# Patient Record
Sex: FEMALE | Race: WHITE | Employment: OTHER | URBAN - METROPOLITAN AREA
[De-identification: names, ages, dates, MRNs, and addresses within clinical notes are randomized per-mention and may not be internally consistent; named-entity substitution may affect disease eponyms.]

---

## 2017-01-04 ENCOUNTER — HOSPITAL ENCOUNTER (OUTPATIENT)
Dept: ULTRASOUND IMAGING | Age: 67
Discharge: HOME | End: 2017-01-04
Attending: FAMILY MEDICINE | Admitting: FAMILY MEDICINE

## 2017-01-04 DIAGNOSIS — K21.9 GASTRO-ESOPHAGEAL REFLUX DISEASE WITHOUT ESOPHAGITIS: ICD-10-CM

## 2017-01-04 DIAGNOSIS — E04.1 THYROID NODULE, COLD: ICD-10-CM

## 2017-01-12 DIAGNOSIS — F32.A ANXIETY AND DEPRESSION: ICD-10-CM

## 2017-01-12 DIAGNOSIS — F41.9 ANXIETY AND DEPRESSION: ICD-10-CM

## 2017-01-12 RX ORDER — LORAZEPAM 1 MG/1
1 TABLET ORAL 2 TIMES DAILY
Qty: 60 TABLET | Refills: 0 | Status: SHIPPED | OUTPATIENT
Start: 2017-01-12 | End: 2017-03-09 | Stop reason: SDUPTHER

## 2017-02-02 DIAGNOSIS — F32.A ANXIETY AND DEPRESSION: ICD-10-CM

## 2017-02-02 DIAGNOSIS — F41.9 ANXIETY AND DEPRESSION: ICD-10-CM

## 2017-02-02 RX ORDER — VENLAFAXINE HYDROCHLORIDE 150 MG/1
CAPSULE, EXTENDED RELEASE ORAL
Qty: 90 CAPSULE | Refills: 0 | Status: SHIPPED | OUTPATIENT
Start: 2017-02-02

## 2017-02-02 RX ORDER — VENLAFAXINE HYDROCHLORIDE 75 MG/1
CAPSULE, EXTENDED RELEASE ORAL
Qty: 90 CAPSULE | Refills: 0 | Status: ON HOLD | OUTPATIENT
Start: 2017-02-02 | End: 2019-06-25 | Stop reason: HOSPADM

## 2017-03-09 DIAGNOSIS — F32.A ANXIETY AND DEPRESSION: ICD-10-CM

## 2017-03-09 DIAGNOSIS — F41.9 ANXIETY AND DEPRESSION: ICD-10-CM

## 2017-03-10 RX ORDER — LORAZEPAM 1 MG/1
1 TABLET ORAL 2 TIMES DAILY
Qty: 60 TABLET | Refills: 0 | Status: ON HOLD | OUTPATIENT
Start: 2017-03-10 | End: 2018-11-13

## 2017-06-07 ENCOUNTER — TELEPHONE (OUTPATIENT)
Dept: FAMILY MEDICINE CLINIC | Age: 67
End: 2017-06-07

## 2017-09-12 ENCOUNTER — TELEPHONE (OUTPATIENT)
Dept: CARDIOLOGY CLINIC | Age: 67
End: 2017-09-12

## 2017-11-06 ENCOUNTER — TELEPHONE (OUTPATIENT)
Dept: PHARMACY | Facility: CLINIC | Age: 67
End: 2017-11-06

## 2017-11-07 RX ORDER — DICYCLOMINE HYDROCHLORIDE 10 MG/1
10 CAPSULE ORAL
COMMUNITY
End: 2018-11-07

## 2017-11-07 RX ORDER — NYSTATIN 100000 U/G
CREAM TOPICAL 2 TIMES DAILY
Status: ON HOLD | COMMUNITY
End: 2019-06-26 | Stop reason: HOSPADM

## 2017-11-07 NOTE — TELEPHONE ENCOUNTER
Attempted to reach patient for transitions of care follow-up after discharge from [facility] on 11/5/17. Tried to leave voicemail for patient to return phone call, but home number has a voicemail box that has not been set up yet and the work number is no longer in service. Will attempt again tomorrow.      Crystal Lambert, Rj   [address] [place] [address]  [phone] or [phone] (Option 7)    For Pharmacy Admin Tracking Only    TCM Call Made?: Yes
Made second attempt to reach patient for transitions of care follow-up after discharge from [facility] on 11/5/17. The patient picked up initially and told me she had only picked up some of her medications from the pharmacy. She said she would  the rest after she saw her PCP tomorrow. When I tried to clarify which ones she had picked up, she told me she would need me to call back in 10 minutes. Called back and was sent to voicemail box that has not been set up. Will send letter to patient today and sign off for now.       Fannie Dawson, PharmD  [address] [facility] Pharmacist  [phone] or [phone] (Option 7)
Interaction Analysis as category D or higher.    - Renal Dosing: No renal adjustments necessary.    - Follow up appointment date (7 days for more severe illness, 14 days for others):    · Patient was reminded of upcoming appointment on 11/8/17 with PCP    I was unable to route note to PCP regarding prescriptions that have yet to be picked up due to cost.  Patient states she will bring up during appointment    Thank you,    Princess Deshpande, PharmD  [address]  Phone: [phone]    CLINICAL PHARMACY NOTE   POST-DISCHARGE [address] Only    TCM Call Made?: Yes  [facility] ([facility] Select Patient?: Yes  Total # of Interventions Recommended: 1 -   - New Order #: 2  Total # Interventions Accepted: 1  Intervention Severity:   - Level 1 Intervention Present?: No   - Level 2 #: 0   - Level 3 #: 6  Outreach Status: Review Complete  Care Coordinator Outreach to Patient?: No  Provider Contacted?: No  Time Spent (min): 45    Additional Documentation:

## 2017-11-17 ENCOUNTER — TELEPHONE (OUTPATIENT)
Dept: CARDIOLOGY CLINIC | Age: 67
End: 2017-11-17

## 2018-09-10 ENCOUNTER — HOSPITAL ENCOUNTER (OUTPATIENT)
Dept: ULTRASOUND IMAGING | Age: 68
Discharge: HOME | End: 2018-09-10
Attending: INTERNAL MEDICINE | Admitting: INTERNAL MEDICINE

## 2018-09-10 DIAGNOSIS — I15.1 HYPERTENSION SECONDARY TO RENAL DISEASE, WITH DELIVERY: ICD-10-CM

## 2018-09-10 DIAGNOSIS — R60.0 LOCALIZED EDEMA: ICD-10-CM

## 2018-09-10 DIAGNOSIS — N18.2 CHRONIC KIDNEY DISEASE, STAGE II (MILD): ICD-10-CM

## 2018-09-10 DIAGNOSIS — I15.1 HYPERTENSION SECONDARY TO OTHER RENAL DISORDERS (CODE): ICD-10-CM

## 2018-09-12 ENCOUNTER — HOSPITAL ENCOUNTER (OUTPATIENT)
Dept: OTHER | Age: 68
Discharge: HOME | End: 2018-09-12
Attending: INTERNAL MEDICINE | Admitting: INTERNAL MEDICINE

## 2018-09-12 LAB
ALBUMIN SERPL-MCNC: 3.8 GM/DL (ref 3.4–5)
ALP BLD-CCNC: 139 IU/L (ref 40–129)
ALT SERPL-CCNC: 27 U/L (ref 10–40)
ANION GAP SERPL CALCULATED.3IONS-SCNC: 9 MMOL/L (ref 4–16)
AST SERPL-CCNC: 24 IU/L (ref 15–37)
BACTERIA: ABNORMAL /HPF
BACTERIA: NORMAL /HPF
BILIRUB SERPL-MCNC: 0.4 MG/DL (ref 0–1)
BILIRUBIN URINE: NEGATIVE MG/DL
BLOOD, URINE: ABNORMAL
BUN BLDV-MCNC: 24 MG/DL (ref 6–23)
CALCIUM SERPL-MCNC: 9.7 MG/DL (ref 8.3–10.6)
CAST TYPE: ABNORMAL /HPF
CAST TYPE: NORMAL /HPF
CHLORIDE BLD-SCNC: 94 MMOL/L (ref 99–110)
CLARITY: CLEAR
CO2: 33 MMOL/L (ref 21–32)
COLOR: YELLOW
COMMENT UA: NORMAL
CREAT SERPL-MCNC: 0.8 MG/DL (ref 0.6–1.1)
CRYSTAL TYPE: ABNORMAL /HPF
CRYSTAL TYPE: NORMAL /HPF
EPITHELIAL CELLS, UA: ABNORMAL /HPF
EPITHELIAL CELLS, UA: NORMAL /HPF
GFR AFRICAN AMERICAN: >60 ML/MIN/1.73M2
GFR NON-AFRICAN AMERICAN: >60 ML/MIN/1.73M2
GLUCOSE FASTING: 491 MG/DL (ref 70–99)
GLUCOSE, URINE: >1000 MG/DL
KETONES, URINE: NEGATIVE MG/DL
LEUKOCYTE ESTERASE, URINE: NEGATIVE
Lab: 24 HRS
MUCUS: NEGATIVE HPF
NITRITE URINE, QUANTITATIVE: NEGATIVE
PH, URINE: 6 (ref 5–8)
POTASSIUM SERPL-SCNC: 5.5 MMOL/L (ref 3.5–5.1)
PROTEIN 24 HOUR URINE: 1060 MG/24 HR
PROTEIN UA: 100 MG/DL
RBC URINE: ABNORMAL /HPF (ref 0–6)
RBC URINE: NORMAL /HPF (ref 0–6)
SODIUM BLD-SCNC: 136 MMOL/L (ref 135–145)
SPECIFIC GRAVITY UA: 1.02 (ref 1–1.03)
TOTAL PROTEIN: 7.3 GM/DL (ref 6.4–8.2)
UROBILINOGEN, URINE: 1 MG/DL (ref 0.2–1)
VOLUME, (UVOL): 12 ML (ref 10–12)
VOLUME, (UVOL): 1800 MLS
WBC UA: ABNORMAL /HPF (ref 0–5)
WBC UA: NORMAL /HPF (ref 0–5)

## 2018-09-13 LAB
ALBUMIN, U: 74 %
ALPHA-1-GLOBULIN, U: 6 %
ALPHA-2-GLOBULIN, U: 3 %
BETA GLOBULIN, U: 11 %
GAMMA GLOBULIN, U: 5 %
Lab: 24 HRS
PROTEIN 24 HOUR URINE: 1060 MG/24 HR
PROTEIN ELP 24 HOUR URINE: 1060 MG/24 HR
VOLUME, (UVOL): 1800 MLS

## 2018-11-07 ENCOUNTER — HOSPITAL ENCOUNTER (INPATIENT)
Age: 68
LOS: 1 days | Discharge: ANOTHER HOSPITAL | DRG: 292 | End: 2018-11-08
Attending: EMERGENCY MEDICINE | Admitting: FAMILY MEDICINE
Payer: MEDICARE

## 2018-11-07 ENCOUNTER — APPOINTMENT (OUTPATIENT)
Dept: GENERAL RADIOLOGY | Age: 68
DRG: 292 | End: 2018-11-07
Payer: MEDICARE

## 2018-11-07 DIAGNOSIS — R73.9 HYPERGLYCEMIA: ICD-10-CM

## 2018-11-07 DIAGNOSIS — I50.9 CONGESTIVE HEART FAILURE, UNSPECIFIED HF CHRONICITY, UNSPECIFIED HEART FAILURE TYPE (HCC): Primary | ICD-10-CM

## 2018-11-07 DIAGNOSIS — R09.02 HYPOXIA: ICD-10-CM

## 2018-11-07 DIAGNOSIS — R06.03 ACUTE RESPIRATORY DISTRESS: ICD-10-CM

## 2018-11-07 PROBLEM — E11.9 TYPE 2 DIABETES MELLITUS (HCC): Status: ACTIVE | Noted: 2018-11-07

## 2018-11-07 PROBLEM — I50.33 CHF (CONGESTIVE HEART FAILURE), NYHA CLASS I, ACUTE ON CHRONIC, DIASTOLIC (HCC): Status: ACTIVE | Noted: 2018-11-07

## 2018-11-07 PROBLEM — I10 HYPERTENSION: Status: ACTIVE | Noted: 2018-11-07

## 2018-11-07 PROBLEM — F32.A DEPRESSION: Status: ACTIVE | Noted: 2018-11-07

## 2018-11-07 PROBLEM — E03.9 HYPOTHYROIDISM: Status: ACTIVE | Noted: 2018-11-07

## 2018-11-07 LAB
ANION GAP SERPL CALCULATED.3IONS-SCNC: 17 MMOL/L (ref 4–16)
BASOPHILS ABSOLUTE: 0 K/CU MM
BASOPHILS RELATIVE PERCENT: 0.4 % (ref 0–1)
BUN BLDV-MCNC: 22 MG/DL (ref 6–23)
C-REACTIVE PROTEIN, HIGH SENSITIVITY: 40.4 MG/L
CALCIUM SERPL-MCNC: 9.8 MG/DL (ref 8.3–10.6)
CHLORIDE BLD-SCNC: 97 MMOL/L (ref 99–110)
CO2: 21 MMOL/L (ref 21–32)
CREAT SERPL-MCNC: 0.9 MG/DL (ref 0.6–1.1)
DIFFERENTIAL TYPE: ABNORMAL
EOSINOPHILS ABSOLUTE: 0.1 K/CU MM
EOSINOPHILS RELATIVE PERCENT: 0.9 % (ref 0–3)
GFR AFRICAN AMERICAN: >60 ML/MIN/1.73M2
GFR NON-AFRICAN AMERICAN: >60 ML/MIN/1.73M2
GLUCOSE BLD-MCNC: 575 MG/DL (ref 70–99)
HCT VFR BLD CALC: 32 % (ref 37–47)
HEMOGLOBIN: 9.8 GM/DL (ref 12.5–16)
IMMATURE NEUTROPHIL %: 0.5 % (ref 0–0.43)
LYMPHOCYTES ABSOLUTE: 1.3 K/CU MM
LYMPHOCYTES RELATIVE PERCENT: 16.3 % (ref 24–44)
MCH RBC QN AUTO: 27.8 PG (ref 27–31)
MCHC RBC AUTO-ENTMCNC: 30.6 % (ref 32–36)
MCV RBC AUTO: 90.7 FL (ref 78–100)
MONOCYTES ABSOLUTE: 0.6 K/CU MM
MONOCYTES RELATIVE PERCENT: 8 % (ref 0–4)
PDW BLD-RTO: 15.1 % (ref 11.7–14.9)
PLATELET # BLD: 154 K/CU MM (ref 140–440)
PMV BLD AUTO: 10.4 FL (ref 7.5–11.1)
POTASSIUM SERPL-SCNC: 5 MMOL/L (ref 3.5–5.1)
PRO-BNP: 1035 PG/ML
RBC # BLD: 3.53 M/CU MM (ref 4.2–5.4)
SEGMENTED NEUTROPHILS ABSOLUTE COUNT: 5.8 K/CU MM
SEGMENTED NEUTROPHILS RELATIVE PERCENT: 73.9 % (ref 36–66)
SODIUM BLD-SCNC: 135 MMOL/L (ref 135–145)
TOTAL IMMATURE NEUTOROPHIL: 0.04 K/CU MM
TROPONIN T: 0.01 NG/ML
WBC # BLD: 7.8 K/CU MM (ref 4–10.5)

## 2018-11-07 PROCEDURE — 86140 C-REACTIVE PROTEIN: CPT

## 2018-11-07 PROCEDURE — 71045 X-RAY EXAM CHEST 1 VIEW: CPT

## 2018-11-07 PROCEDURE — 80048 BASIC METABOLIC PNL TOTAL CA: CPT

## 2018-11-07 PROCEDURE — 93005 ELECTROCARDIOGRAM TRACING: CPT | Performed by: FAMILY MEDICINE

## 2018-11-07 PROCEDURE — 94640 AIRWAY INHALATION TREATMENT: CPT

## 2018-11-07 PROCEDURE — 6370000000 HC RX 637 (ALT 250 FOR IP): Performed by: EMERGENCY MEDICINE

## 2018-11-07 PROCEDURE — 96372 THER/PROPH/DIAG INJ SC/IM: CPT

## 2018-11-07 PROCEDURE — 6360000002 HC RX W HCPCS: Performed by: EMERGENCY MEDICINE

## 2018-11-07 PROCEDURE — 84484 ASSAY OF TROPONIN QUANT: CPT

## 2018-11-07 PROCEDURE — 85025 COMPLETE CBC W/AUTO DIFF WBC: CPT

## 2018-11-07 PROCEDURE — 81001 URINALYSIS AUTO W/SCOPE: CPT

## 2018-11-07 PROCEDURE — 96374 THER/PROPH/DIAG INJ IV PUSH: CPT

## 2018-11-07 PROCEDURE — 6370000000 HC RX 637 (ALT 250 FOR IP): Performed by: NURSE PRACTITIONER

## 2018-11-07 PROCEDURE — 2140000000 HC CCU INTERMEDIATE R&B

## 2018-11-07 PROCEDURE — 83036 HEMOGLOBIN GLYCOSYLATED A1C: CPT

## 2018-11-07 PROCEDURE — 83880 ASSAY OF NATRIURETIC PEPTIDE: CPT

## 2018-11-07 PROCEDURE — 99291 CRITICAL CARE FIRST HOUR: CPT

## 2018-11-07 RX ORDER — NYSTATIN 100000 U/G
CREAM TOPICAL 2 TIMES DAILY
Status: DISCONTINUED | OUTPATIENT
Start: 2018-11-07 | End: 2018-11-08 | Stop reason: HOSPADM

## 2018-11-07 RX ORDER — FUROSEMIDE 10 MG/ML
40 INJECTION INTRAMUSCULAR; INTRAVENOUS ONCE
Status: COMPLETED | OUTPATIENT
Start: 2018-11-07 | End: 2018-11-07

## 2018-11-07 RX ORDER — PANTOPRAZOLE SODIUM 40 MG/1
40 TABLET, DELAYED RELEASE ORAL DAILY
Status: DISCONTINUED | OUTPATIENT
Start: 2018-11-08 | End: 2018-11-08 | Stop reason: HOSPADM

## 2018-11-07 RX ORDER — POTASSIUM CHLORIDE 750 MG/1
10 TABLET, EXTENDED RELEASE ORAL EVERY OTHER DAY
Status: DISCONTINUED | OUTPATIENT
Start: 2018-11-08 | End: 2018-11-08 | Stop reason: HOSPADM

## 2018-11-07 RX ORDER — BUSPIRONE HYDROCHLORIDE 15 MG/1
15 TABLET ORAL 3 TIMES DAILY
Status: DISCONTINUED | OUTPATIENT
Start: 2018-11-07 | End: 2018-11-08 | Stop reason: HOSPADM

## 2018-11-07 RX ORDER — DEXTROSE MONOHYDRATE 25 G/50ML
12.5 INJECTION, SOLUTION INTRAVENOUS PRN
Status: DISCONTINUED | OUTPATIENT
Start: 2018-11-07 | End: 2018-11-08 | Stop reason: SDUPTHER

## 2018-11-07 RX ORDER — LEVOTHYROXINE SODIUM 0.03 MG/1
25 TABLET ORAL DAILY
Status: DISCONTINUED | OUTPATIENT
Start: 2018-11-08 | End: 2018-11-08 | Stop reason: HOSPADM

## 2018-11-07 RX ORDER — LOSARTAN POTASSIUM 25 MG/1
12.5 TABLET ORAL DAILY
Status: DISCONTINUED | OUTPATIENT
Start: 2018-11-08 | End: 2018-11-08 | Stop reason: HOSPADM

## 2018-11-07 RX ORDER — IPRATROPIUM BROMIDE AND ALBUTEROL SULFATE 2.5; .5 MG/3ML; MG/3ML
SOLUTION RESPIRATORY (INHALATION)
Status: DISPENSED
Start: 2018-11-07 | End: 2018-11-08

## 2018-11-07 RX ORDER — TOLTERODINE TARTRATE 2 MG/1
2 TABLET, EXTENDED RELEASE ORAL DAILY
COMMUNITY

## 2018-11-07 RX ORDER — ASPIRIN 81 MG/1
81 TABLET, CHEWABLE ORAL DAILY
Status: DISCONTINUED | OUTPATIENT
Start: 2018-11-08 | End: 2018-11-08 | Stop reason: HOSPADM

## 2018-11-07 RX ORDER — SODIUM CHLORIDE 0.9 % (FLUSH) 0.9 %
10 SYRINGE (ML) INJECTION PRN
Status: DISCONTINUED | OUTPATIENT
Start: 2018-11-07 | End: 2018-11-08 | Stop reason: HOSPADM

## 2018-11-07 RX ORDER — SODIUM CHLORIDE 0.9 % (FLUSH) 0.9 %
10 SYRINGE (ML) INJECTION EVERY 12 HOURS SCHEDULED
Status: DISCONTINUED | OUTPATIENT
Start: 2018-11-07 | End: 2018-11-08 | Stop reason: HOSPADM

## 2018-11-07 RX ORDER — NICOTINE POLACRILEX 4 MG
15 LOZENGE BUCCAL PRN
Status: DISCONTINUED | OUTPATIENT
Start: 2018-11-07 | End: 2018-11-08 | Stop reason: SDUPTHER

## 2018-11-07 RX ORDER — FERROUS SULFATE 325(65) MG
325 TABLET ORAL 2 TIMES DAILY WITH MEALS
Status: DISCONTINUED | OUTPATIENT
Start: 2018-11-08 | End: 2018-11-08 | Stop reason: HOSPADM

## 2018-11-07 RX ORDER — LORAZEPAM 1 MG/1
1 TABLET ORAL 2 TIMES DAILY
Status: DISCONTINUED | OUTPATIENT
Start: 2018-11-07 | End: 2018-11-08 | Stop reason: HOSPADM

## 2018-11-07 RX ORDER — FUROSEMIDE 10 MG/ML
60 INJECTION INTRAMUSCULAR; INTRAVENOUS DAILY
Status: DISCONTINUED | OUTPATIENT
Start: 2018-11-08 | End: 2018-11-08

## 2018-11-07 RX ORDER — CYCLOBENZAPRINE HCL 10 MG
10 TABLET ORAL 2 TIMES DAILY PRN
Status: DISCONTINUED | OUTPATIENT
Start: 2018-11-07 | End: 2018-11-08 | Stop reason: HOSPADM

## 2018-11-07 RX ORDER — LISINOPRIL 5 MG/1
5 TABLET ORAL DAILY
Status: DISCONTINUED | OUTPATIENT
Start: 2018-11-08 | End: 2018-11-08 | Stop reason: HOSPADM

## 2018-11-07 RX ORDER — DICYCLOMINE HYDROCHLORIDE 10 MG/1
10 CAPSULE ORAL
Status: DISCONTINUED | OUTPATIENT
Start: 2018-11-08 | End: 2018-11-08 | Stop reason: HOSPADM

## 2018-11-07 RX ORDER — SIMVASTATIN 20 MG
20 TABLET ORAL NIGHTLY
Status: DISCONTINUED | OUTPATIENT
Start: 2018-11-07 | End: 2018-11-08 | Stop reason: HOSPADM

## 2018-11-07 RX ORDER — VENLAFAXINE HYDROCHLORIDE 37.5 MG/1
75 CAPSULE, EXTENDED RELEASE ORAL
Status: DISCONTINUED | OUTPATIENT
Start: 2018-11-08 | End: 2018-11-08 | Stop reason: HOSPADM

## 2018-11-07 RX ORDER — TROSPIUM CHLORIDE 20 MG/1
20 TABLET, FILM COATED ORAL
Status: DISCONTINUED | OUTPATIENT
Start: 2018-11-08 | End: 2018-11-08 | Stop reason: HOSPADM

## 2018-11-07 RX ORDER — NITROGLYCERIN 0.4 MG/1
0.4 TABLET SUBLINGUAL EVERY 5 MIN PRN
Status: DISCONTINUED | OUTPATIENT
Start: 2018-11-07 | End: 2018-11-08 | Stop reason: HOSPADM

## 2018-11-07 RX ORDER — IBUPROFEN 600 MG/1
600 TABLET ORAL
Status: DISCONTINUED | OUTPATIENT
Start: 2018-11-08 | End: 2018-11-08 | Stop reason: HOSPADM

## 2018-11-07 RX ORDER — SULINDAC 150 MG/1
150 TABLET ORAL 2 TIMES DAILY
COMMUNITY
End: 2019-01-01

## 2018-11-07 RX ORDER — GABAPENTIN 400 MG/1
800 CAPSULE ORAL 4 TIMES DAILY
Status: DISCONTINUED | OUTPATIENT
Start: 2018-11-07 | End: 2018-11-08 | Stop reason: HOSPADM

## 2018-11-07 RX ORDER — GLIPIZIDE 10 MG/1
10 TABLET ORAL DAILY
Status: DISCONTINUED | OUTPATIENT
Start: 2018-11-08 | End: 2018-11-08 | Stop reason: HOSPADM

## 2018-11-07 RX ORDER — OLMESARTAN MEDOXOMIL 5 MG/1
5 TABLET ORAL DAILY
Status: ON HOLD | COMMUNITY
End: 2018-11-13 | Stop reason: HOSPADM

## 2018-11-07 RX ORDER — DEXTROSE MONOHYDRATE 50 MG/ML
100 INJECTION, SOLUTION INTRAVENOUS PRN
Status: DISCONTINUED | OUTPATIENT
Start: 2018-11-07 | End: 2018-11-08 | Stop reason: SDUPTHER

## 2018-11-07 RX ORDER — ONDANSETRON 2 MG/ML
4 INJECTION INTRAMUSCULAR; INTRAVENOUS EVERY 6 HOURS PRN
Status: DISCONTINUED | OUTPATIENT
Start: 2018-11-07 | End: 2018-11-08 | Stop reason: HOSPADM

## 2018-11-07 RX ORDER — IPRATROPIUM BROMIDE AND ALBUTEROL SULFATE 2.5; .5 MG/3ML; MG/3ML
1 SOLUTION RESPIRATORY (INHALATION)
Status: DISCONTINUED | OUTPATIENT
Start: 2018-11-08 | End: 2018-11-08 | Stop reason: HOSPADM

## 2018-11-07 RX ADMIN — LORAZEPAM 1 MG: 1 TABLET ORAL at 23:59

## 2018-11-07 RX ADMIN — FUROSEMIDE 40 MG: 10 INJECTION, SOLUTION INTRAMUSCULAR; INTRAVENOUS at 20:54

## 2018-11-07 RX ADMIN — INSULIN HUMAN 10 UNITS: 100 INJECTION, SOLUTION PARENTERAL at 22:15

## 2018-11-07 RX ADMIN — SIMVASTATIN 20 MG: 20 TABLET, FILM COATED ORAL at 23:59

## 2018-11-07 RX ADMIN — BUSPIRONE HYDROCHLORIDE 15 MG: 15 TABLET ORAL at 23:59

## 2018-11-07 RX ADMIN — CYCLOBENZAPRINE HYDROCHLORIDE 10 MG: 10 TABLET, FILM COATED ORAL at 23:59

## 2018-11-07 RX ADMIN — GABAPENTIN 800 MG: 400 CAPSULE ORAL at 23:59

## 2018-11-07 ASSESSMENT — ENCOUNTER SYMPTOMS
SORE THROAT: 0
GASTROINTESTINAL NEGATIVE: 1
COUGH: 1
SPUTUM PRODUCTION: 0
WHEEZING: 0
SHORTNESS OF BREATH: 1
VOMITING: 0
HEMOPTYSIS: 0
EYES NEGATIVE: 1

## 2018-11-07 ASSESSMENT — PAIN DESCRIPTION - PAIN TYPE: TYPE: CHRONIC PAIN

## 2018-11-07 ASSESSMENT — PAIN DESCRIPTION - ORIENTATION: ORIENTATION: LOWER

## 2018-11-07 ASSESSMENT — PAIN DESCRIPTION - DESCRIPTORS: DESCRIPTORS: ACHING

## 2018-11-07 ASSESSMENT — PAIN DESCRIPTION - FREQUENCY: FREQUENCY: CONTINUOUS

## 2018-11-07 ASSESSMENT — PAIN DESCRIPTION - PROGRESSION: CLINICAL_PROGRESSION: GRADUALLY WORSENING

## 2018-11-07 ASSESSMENT — PAIN DESCRIPTION - LOCATION: LOCATION: BACK

## 2018-11-07 ASSESSMENT — PAIN SCALES - GENERAL: PAINLEVEL_OUTOF10: 8

## 2018-11-07 ASSESSMENT — PAIN DESCRIPTION - ONSET: ONSET: ON-GOING

## 2018-11-08 ENCOUNTER — HOSPITAL ENCOUNTER (INPATIENT)
Age: 68
LOS: 5 days | Discharge: HOME HEALTH | DRG: 280 | End: 2018-11-13
Attending: INTERNAL MEDICINE | Admitting: INTERNAL MEDICINE
Payer: MEDICARE

## 2018-11-08 VITALS
SYSTOLIC BLOOD PRESSURE: 115 MMHG | DIASTOLIC BLOOD PRESSURE: 54 MMHG | HEIGHT: 60 IN | BODY MASS INDEX: 48.02 KG/M2 | OXYGEN SATURATION: 100 % | RESPIRATION RATE: 18 BRPM | HEART RATE: 84 BPM | WEIGHT: 244.6 LBS | TEMPERATURE: 97.8 F

## 2018-11-08 DIAGNOSIS — F41.9 ANXIETY AND DEPRESSION: ICD-10-CM

## 2018-11-08 DIAGNOSIS — G89.29 CHRONIC LOW BACK PAIN, UNSPECIFIED BACK PAIN LATERALITY, WITH SCIATICA PRESENCE UNSPECIFIED: Primary | ICD-10-CM

## 2018-11-08 DIAGNOSIS — F32.A ANXIETY AND DEPRESSION: ICD-10-CM

## 2018-11-08 DIAGNOSIS — M54.5 CHRONIC LOW BACK PAIN, UNSPECIFIED BACK PAIN LATERALITY, WITH SCIATICA PRESENCE UNSPECIFIED: Primary | ICD-10-CM

## 2018-11-08 DIAGNOSIS — F41.8 OTHER SPECIFIED ANXIETY DISORDERS: ICD-10-CM

## 2018-11-08 PROBLEM — I24.9 ACUTE CORONARY SYNDROME (HCC): Status: ACTIVE | Noted: 2018-11-08

## 2018-11-08 PROBLEM — I50.33 DIASTOLIC DYSFUNCTION WITH ACUTE ON CHRONIC HEART FAILURE (HCC): Status: ACTIVE | Noted: 2018-11-08

## 2018-11-08 PROBLEM — I24.9 ACS (ACUTE CORONARY SYNDROME) (HCC): Status: ACTIVE | Noted: 2018-11-08

## 2018-11-08 LAB
ANION GAP SERPL CALCULATED.3IONS-SCNC: 11 MMOL/L (ref 4–16)
BACTERIA: ABNORMAL /HPF
BASOPHILS ABSOLUTE: 0 K/CU MM
BASOPHILS RELATIVE PERCENT: 0.4 % (ref 0–1)
BETA-HYDROXYBUTYRATE: 1.6 MG/DL (ref 0–3)
BILIRUBIN URINE: NEGATIVE MG/DL
BLOOD, URINE: ABNORMAL
BUN BLDV-MCNC: 23 MG/DL (ref 6–23)
CALCIUM SERPL-MCNC: 9.6 MG/DL (ref 8.3–10.6)
CAST TYPE: NEGATIVE /HPF
CHLORIDE BLD-SCNC: 97 MMOL/L (ref 99–110)
CHOLESTEROL: 120 MG/DL
CLARITY: CLEAR
CO2: 29 MMOL/L (ref 21–32)
COLOR: YELLOW
CREAT SERPL-MCNC: 1 MG/DL (ref 0.6–1.1)
CRYSTAL TYPE: NEGATIVE /HPF
DIFFERENTIAL TYPE: ABNORMAL
EOSINOPHILS ABSOLUTE: 0.1 K/CU MM
EOSINOPHILS RELATIVE PERCENT: 1.2 % (ref 0–3)
EPITHELIAL CELLS, UA: ABNORMAL /HPF
ESTIMATED AVERAGE GLUCOSE: 192 MG/DL
GFR AFRICAN AMERICAN: >60 ML/MIN/1.73M2
GFR NON-AFRICAN AMERICAN: 55 ML/MIN/1.73M2
GLUCOSE BLD-MCNC: 173 MG/DL (ref 70–99)
GLUCOSE BLD-MCNC: 237 MG/DL (ref 70–99)
GLUCOSE BLD-MCNC: 337 MG/DL (ref 70–99)
GLUCOSE BLD-MCNC: 338 MG/DL (ref 70–99)
GLUCOSE BLD-MCNC: 342 MG/DL (ref 70–99)
GLUCOSE BLD-MCNC: 493 MG/DL (ref 70–99)
GLUCOSE, URINE: 500 MG/DL
HBA1C MFR BLD: 8.3 % (ref 4.2–6.3)
HCT VFR BLD CALC: 32.5 % (ref 37–47)
HDLC SERPL-MCNC: 53 MG/DL
HEMOGLOBIN: 10 GM/DL (ref 12.5–16)
IMMATURE NEUTROPHIL %: 0.4 % (ref 0–0.43)
KETONES, URINE: NEGATIVE MG/DL
LDL CHOLESTEROL DIRECT: 52 MG/DL
LEUKOCYTE ESTERASE, URINE: NEGATIVE
LV EF: 53 %
LVEF MODALITY: NORMAL
LYMPHOCYTES ABSOLUTE: 2.6 K/CU MM
LYMPHOCYTES RELATIVE PERCENT: 25.2 % (ref 24–44)
MAGNESIUM: 1.7 MG/DL (ref 1.8–2.4)
MCH RBC QN AUTO: 27.7 PG (ref 27–31)
MCHC RBC AUTO-ENTMCNC: 30.8 % (ref 32–36)
MCV RBC AUTO: 90 FL (ref 78–100)
MONOCYTES ABSOLUTE: 1.1 K/CU MM
MONOCYTES RELATIVE PERCENT: 11.1 % (ref 0–4)
NITRITE URINE, QUANTITATIVE: NEGATIVE
PDW BLD-RTO: 15.2 % (ref 11.7–14.9)
PH, URINE: 5 (ref 5–8)
PLATELET # BLD: 104 K/CU MM (ref 140–440)
PMV BLD AUTO: 11.3 FL (ref 7.5–11.1)
POTASSIUM SERPL-SCNC: 3.7 MMOL/L (ref 3.5–5.1)
PROTEIN UA: 30 MG/DL
RBC # BLD: 3.61 M/CU MM (ref 4.2–5.4)
RBC URINE: ABNORMAL /HPF (ref 0–6)
SEGMENTED NEUTROPHILS ABSOLUTE COUNT: 6.4 K/CU MM
SEGMENTED NEUTROPHILS RELATIVE PERCENT: 61.7 % (ref 36–66)
SODIUM BLD-SCNC: 137 MMOL/L (ref 135–145)
SPECIFIC GRAVITY UA: 1.01 (ref 1–1.03)
T4 FREE: 0.89 NG/DL (ref 0.9–1.8)
T4 FREE: 0.96 NG/DL (ref 0.9–1.8)
TOTAL IMMATURE NEUTOROPHIL: 0.04 K/CU MM
TRIGL SERPL-MCNC: 115 MG/DL
TROPONIN T: 0.17 NG/ML
TROPONIN T: 0.17 NG/ML
TROPONIN T: 0.18 NG/ML
TSH HIGH SENSITIVITY: 3.02 UIU/ML (ref 0.27–4.2)
TSH HIGH SENSITIVITY: 6.73 UIU/ML (ref 0.27–4.2)
UROBILINOGEN, URINE: 0.2 MG/DL (ref 0.2–1)
WBC # BLD: 10.3 K/CU MM (ref 4–10.5)
WBC UA: ABNORMAL /HPF (ref 0–5)

## 2018-11-08 PROCEDURE — 6360000004 HC RX CONTRAST MEDICATION

## 2018-11-08 PROCEDURE — 93458 L HRT ARTERY/VENTRICLE ANGIO: CPT | Performed by: INTERNAL MEDICINE

## 2018-11-08 PROCEDURE — 6370000000 HC RX 637 (ALT 250 FOR IP): Performed by: FAMILY MEDICINE

## 2018-11-08 PROCEDURE — 82010 KETONE BODYS QUAN: CPT

## 2018-11-08 PROCEDURE — 2709999900 HC NON-CHARGEABLE SUPPLY

## 2018-11-08 PROCEDURE — 6360000002 HC RX W HCPCS: Performed by: NURSE PRACTITIONER

## 2018-11-08 PROCEDURE — C1894 INTRO/SHEATH, NON-LASER: HCPCS

## 2018-11-08 PROCEDURE — 85025 COMPLETE CBC W/AUTO DIFF WBC: CPT

## 2018-11-08 PROCEDURE — 6370000000 HC RX 637 (ALT 250 FOR IP): Performed by: INTERNAL MEDICINE

## 2018-11-08 PROCEDURE — 94680 O2 UPTK RST&XERS DIR SIMPLE: CPT

## 2018-11-08 PROCEDURE — 36415 COLL VENOUS BLD VENIPUNCTURE: CPT

## 2018-11-08 PROCEDURE — 80061 LIPID PANEL: CPT

## 2018-11-08 PROCEDURE — 93306 TTE W/DOPPLER COMPLETE: CPT

## 2018-11-08 PROCEDURE — 83721 ASSAY OF BLOOD LIPOPROTEIN: CPT

## 2018-11-08 PROCEDURE — 6370000000 HC RX 637 (ALT 250 FOR IP): Performed by: NURSE PRACTITIONER

## 2018-11-08 PROCEDURE — 84484 ASSAY OF TROPONIN QUANT: CPT

## 2018-11-08 PROCEDURE — 2140000000 HC CCU INTERMEDIATE R&B

## 2018-11-08 PROCEDURE — 6360000002 HC RX W HCPCS: Performed by: FAMILY MEDICINE

## 2018-11-08 PROCEDURE — 83735 ASSAY OF MAGNESIUM: CPT

## 2018-11-08 PROCEDURE — C1887 CATHETER, GUIDING: HCPCS

## 2018-11-08 PROCEDURE — 4A023N7 MEASUREMENT OF CARDIAC SAMPLING AND PRESSURE, LEFT HEART, PERCUTANEOUS APPROACH: ICD-10-PCS | Performed by: INTERNAL MEDICINE

## 2018-11-08 PROCEDURE — 6370000000 HC RX 637 (ALT 250 FOR IP): Performed by: PHYSICIAN ASSISTANT

## 2018-11-08 PROCEDURE — 84443 ASSAY THYROID STIM HORMONE: CPT

## 2018-11-08 PROCEDURE — 80048 BASIC METABOLIC PNL TOTAL CA: CPT

## 2018-11-08 PROCEDURE — 99222 1ST HOSP IP/OBS MODERATE 55: CPT | Performed by: INTERNAL MEDICINE

## 2018-11-08 PROCEDURE — 82962 GLUCOSE BLOOD TEST: CPT

## 2018-11-08 PROCEDURE — C1769 GUIDE WIRE: HCPCS

## 2018-11-08 PROCEDURE — 2580000003 HC RX 258: Performed by: PHYSICIAN ASSISTANT

## 2018-11-08 PROCEDURE — 84439 ASSAY OF FREE THYROXINE: CPT

## 2018-11-08 PROCEDURE — B2111ZZ FLUOROSCOPY OF MULTIPLE CORONARY ARTERIES USING LOW OSMOLAR CONTRAST: ICD-10-PCS | Performed by: INTERNAL MEDICINE

## 2018-11-08 PROCEDURE — 94640 AIRWAY INHALATION TREATMENT: CPT

## 2018-11-08 PROCEDURE — 2580000003 HC RX 258

## 2018-11-08 PROCEDURE — 2580000003 HC RX 258: Performed by: NURSE PRACTITIONER

## 2018-11-08 PROCEDURE — 6360000002 HC RX W HCPCS

## 2018-11-08 RX ORDER — SODIUM CHLORIDE 0.9 % (FLUSH) 0.9 %
10 SYRINGE (ML) INJECTION PRN
Status: DISCONTINUED | OUTPATIENT
Start: 2018-11-08 | End: 2018-11-13 | Stop reason: HOSPADM

## 2018-11-08 RX ORDER — IPRATROPIUM BROMIDE AND ALBUTEROL SULFATE 2.5; .5 MG/3ML; MG/3ML
1 SOLUTION RESPIRATORY (INHALATION)
Status: CANCELLED | OUTPATIENT
Start: 2018-11-08

## 2018-11-08 RX ORDER — DEXTROSE MONOHYDRATE 50 MG/ML
100 INJECTION, SOLUTION INTRAVENOUS PRN
Status: DISCONTINUED | OUTPATIENT
Start: 2018-11-08 | End: 2018-11-13 | Stop reason: HOSPADM

## 2018-11-08 RX ORDER — LISINOPRIL 5 MG/1
5 TABLET ORAL DAILY
Status: CANCELLED | OUTPATIENT
Start: 2018-11-09

## 2018-11-08 RX ORDER — BUSPIRONE HYDROCHLORIDE 15 MG/1
15 TABLET ORAL 3 TIMES DAILY
Status: CANCELLED | OUTPATIENT
Start: 2018-11-08

## 2018-11-08 RX ORDER — NITROGLYCERIN 0.4 MG/1
0.4 TABLET SUBLINGUAL EVERY 5 MIN PRN
Status: DISCONTINUED | OUTPATIENT
Start: 2018-11-08 | End: 2018-11-08 | Stop reason: SDUPTHER

## 2018-11-08 RX ORDER — DEXTROSE MONOHYDRATE 25 G/50ML
12.5 INJECTION, SOLUTION INTRAVENOUS PRN
Status: DISCONTINUED | OUTPATIENT
Start: 2018-11-08 | End: 2018-11-13 | Stop reason: HOSPADM

## 2018-11-08 RX ORDER — ATORVASTATIN CALCIUM 40 MG/1
40 TABLET, FILM COATED ORAL NIGHTLY
Status: DISCONTINUED | OUTPATIENT
Start: 2018-11-08 | End: 2018-11-08

## 2018-11-08 RX ORDER — SIMVASTATIN 20 MG
20 TABLET ORAL NIGHTLY
Status: CANCELLED | OUTPATIENT
Start: 2018-11-08

## 2018-11-08 RX ORDER — TROSPIUM CHLORIDE 20 MG/1
20 TABLET, FILM COATED ORAL
Status: DISCONTINUED | OUTPATIENT
Start: 2018-11-08 | End: 2018-11-13 | Stop reason: HOSPADM

## 2018-11-08 RX ORDER — FUROSEMIDE 10 MG/ML
40 INJECTION INTRAMUSCULAR; INTRAVENOUS 2 TIMES DAILY
Status: DISCONTINUED | OUTPATIENT
Start: 2018-11-08 | End: 2018-11-08 | Stop reason: HOSPADM

## 2018-11-08 RX ORDER — INSULIN GLARGINE 100 [IU]/ML
30 INJECTION, SOLUTION SUBCUTANEOUS NIGHTLY
Status: DISCONTINUED | OUTPATIENT
Start: 2018-11-08 | End: 2018-11-09

## 2018-11-08 RX ORDER — SIMVASTATIN 40 MG
40 TABLET ORAL NIGHTLY
Status: DISCONTINUED | OUTPATIENT
Start: 2018-11-08 | End: 2018-11-13 | Stop reason: HOSPADM

## 2018-11-08 RX ORDER — VENLAFAXINE HYDROCHLORIDE 37.5 MG/1
75 CAPSULE, EXTENDED RELEASE ORAL
Status: CANCELLED | OUTPATIENT
Start: 2018-11-09

## 2018-11-08 RX ORDER — IBUPROFEN 600 MG/1
600 TABLET ORAL
Status: CANCELLED | OUTPATIENT
Start: 2018-11-08

## 2018-11-08 RX ORDER — GABAPENTIN 400 MG/1
800 CAPSULE ORAL 4 TIMES DAILY
Status: DISCONTINUED | OUTPATIENT
Start: 2018-11-08 | End: 2018-11-13 | Stop reason: HOSPADM

## 2018-11-08 RX ORDER — GLIPIZIDE 10 MG/1
10 TABLET ORAL DAILY
Status: CANCELLED | OUTPATIENT
Start: 2018-11-09

## 2018-11-08 RX ORDER — CYCLOBENZAPRINE HCL 10 MG
10 TABLET ORAL 2 TIMES DAILY PRN
Status: CANCELLED | OUTPATIENT
Start: 2018-11-08

## 2018-11-08 RX ORDER — GABAPENTIN 400 MG/1
800 CAPSULE ORAL 4 TIMES DAILY
Status: CANCELLED | OUTPATIENT
Start: 2018-11-08

## 2018-11-08 RX ORDER — VENLAFAXINE HYDROCHLORIDE 37.5 MG/1
75 CAPSULE, EXTENDED RELEASE ORAL
Status: DISCONTINUED | OUTPATIENT
Start: 2018-11-09 | End: 2018-11-13 | Stop reason: HOSPADM

## 2018-11-08 RX ORDER — NICOTINE POLACRILEX 4 MG
15 LOZENGE BUCCAL PRN
Status: DISCONTINUED | OUTPATIENT
Start: 2018-11-08 | End: 2018-11-13 | Stop reason: HOSPADM

## 2018-11-08 RX ORDER — NITROGLYCERIN 0.4 MG/1
0.4 TABLET SUBLINGUAL EVERY 5 MIN PRN
Status: CANCELLED | OUTPATIENT
Start: 2018-11-08

## 2018-11-08 RX ORDER — FUROSEMIDE 10 MG/ML
40 INJECTION INTRAMUSCULAR; INTRAVENOUS DAILY
Status: DISCONTINUED | OUTPATIENT
Start: 2018-11-08 | End: 2018-11-09

## 2018-11-08 RX ORDER — ONDANSETRON 2 MG/ML
4 INJECTION INTRAMUSCULAR; INTRAVENOUS EVERY 6 HOURS PRN
Status: CANCELLED | OUTPATIENT
Start: 2018-11-08

## 2018-11-08 RX ORDER — NYSTATIN 100000 U/G
CREAM TOPICAL 2 TIMES DAILY
Status: CANCELLED | OUTPATIENT
Start: 2018-11-08

## 2018-11-08 RX ORDER — TROSPIUM CHLORIDE 20 MG/1
20 TABLET, FILM COATED ORAL
Status: CANCELLED | OUTPATIENT
Start: 2018-11-08

## 2018-11-08 RX ORDER — ASPIRIN 81 MG/1
81 TABLET, CHEWABLE ORAL DAILY
Status: DISCONTINUED | OUTPATIENT
Start: 2018-11-08 | End: 2018-11-13 | Stop reason: HOSPADM

## 2018-11-08 RX ORDER — FUROSEMIDE 10 MG/ML
40 INJECTION INTRAMUSCULAR; INTRAVENOUS 2 TIMES DAILY
Status: CANCELLED | OUTPATIENT
Start: 2018-11-08

## 2018-11-08 RX ORDER — DEXTROSE MONOHYDRATE 50 MG/ML
100 INJECTION, SOLUTION INTRAVENOUS PRN
Status: DISCONTINUED | OUTPATIENT
Start: 2018-11-08 | End: 2018-11-08 | Stop reason: HOSPADM

## 2018-11-08 RX ORDER — LORAZEPAM 1 MG/1
1 TABLET ORAL 2 TIMES DAILY
Status: DISCONTINUED | OUTPATIENT
Start: 2018-11-08 | End: 2018-11-13 | Stop reason: HOSPADM

## 2018-11-08 RX ORDER — LORAZEPAM 1 MG/1
1 TABLET ORAL 2 TIMES DAILY
Status: CANCELLED | OUTPATIENT
Start: 2018-11-08

## 2018-11-08 RX ORDER — FERROUS SULFATE 325(65) MG
325 TABLET ORAL 2 TIMES DAILY WITH MEALS
Status: CANCELLED | OUTPATIENT
Start: 2018-11-08

## 2018-11-08 RX ORDER — FUROSEMIDE 40 MG/1
40 TABLET ORAL EVERY OTHER DAY
Status: DISCONTINUED | OUTPATIENT
Start: 2018-11-08 | End: 2018-11-08

## 2018-11-08 RX ORDER — NYSTATIN 100000 U/G
CREAM TOPICAL 2 TIMES DAILY
Status: DISCONTINUED | OUTPATIENT
Start: 2018-11-08 | End: 2018-11-13 | Stop reason: HOSPADM

## 2018-11-08 RX ORDER — DEXTROSE MONOHYDRATE 25 G/50ML
12.5 INJECTION, SOLUTION INTRAVENOUS PRN
Status: CANCELLED | OUTPATIENT
Start: 2018-11-08

## 2018-11-08 RX ORDER — LEVOTHYROXINE SODIUM 0.03 MG/1
25 TABLET ORAL DAILY
Status: DISCONTINUED | OUTPATIENT
Start: 2018-11-08 | End: 2018-11-09

## 2018-11-08 RX ORDER — VENLAFAXINE HYDROCHLORIDE 150 MG/1
150 CAPSULE, EXTENDED RELEASE ORAL
Status: DISCONTINUED | OUTPATIENT
Start: 2018-11-09 | End: 2018-11-13 | Stop reason: HOSPADM

## 2018-11-08 RX ORDER — NICOTINE POLACRILEX 4 MG
15 LOZENGE BUCCAL PRN
Status: DISCONTINUED | OUTPATIENT
Start: 2018-11-08 | End: 2018-11-08 | Stop reason: HOSPADM

## 2018-11-08 RX ORDER — LOSARTAN POTASSIUM 25 MG/1
12.5 TABLET ORAL DAILY
Status: DISCONTINUED | OUTPATIENT
Start: 2018-11-08 | End: 2018-11-08

## 2018-11-08 RX ORDER — DEXTROSE MONOHYDRATE 25 G/50ML
12.5 INJECTION, SOLUTION INTRAVENOUS PRN
Status: DISCONTINUED | OUTPATIENT
Start: 2018-11-08 | End: 2018-11-08 | Stop reason: HOSPADM

## 2018-11-08 RX ORDER — PANTOPRAZOLE SODIUM 40 MG/1
40 TABLET, DELAYED RELEASE ORAL DAILY
Status: CANCELLED | OUTPATIENT
Start: 2018-11-09

## 2018-11-08 RX ORDER — NICOTINE POLACRILEX 4 MG
15 LOZENGE BUCCAL PRN
Status: CANCELLED | OUTPATIENT
Start: 2018-11-08

## 2018-11-08 RX ORDER — SODIUM CHLORIDE 0.9 % (FLUSH) 0.9 %
10 SYRINGE (ML) INJECTION EVERY 12 HOURS SCHEDULED
Status: DISCONTINUED | OUTPATIENT
Start: 2018-11-08 | End: 2018-11-13 | Stop reason: HOSPADM

## 2018-11-08 RX ORDER — DICYCLOMINE HYDROCHLORIDE 10 MG/1
10 CAPSULE ORAL
Status: CANCELLED | OUTPATIENT
Start: 2018-11-08

## 2018-11-08 RX ORDER — SODIUM CHLORIDE 0.9 % (FLUSH) 0.9 %
10 SYRINGE (ML) INJECTION PRN
Status: CANCELLED | OUTPATIENT
Start: 2018-11-08

## 2018-11-08 RX ORDER — SODIUM CHLORIDE 0.9 % (FLUSH) 0.9 %
10 SYRINGE (ML) INJECTION EVERY 12 HOURS SCHEDULED
Status: CANCELLED | OUTPATIENT
Start: 2018-11-08

## 2018-11-08 RX ORDER — BUSPIRONE HYDROCHLORIDE 15 MG/1
15 TABLET ORAL 3 TIMES DAILY
Status: DISCONTINUED | OUTPATIENT
Start: 2018-11-08 | End: 2018-11-13 | Stop reason: HOSPADM

## 2018-11-08 RX ORDER — ONDANSETRON 2 MG/ML
4 INJECTION INTRAMUSCULAR; INTRAVENOUS EVERY 6 HOURS PRN
Status: DISCONTINUED | OUTPATIENT
Start: 2018-11-08 | End: 2018-11-13 | Stop reason: HOSPADM

## 2018-11-08 RX ORDER — NITROGLYCERIN 0.4 MG/1
0.4 TABLET SUBLINGUAL EVERY 5 MIN PRN
Status: DISCONTINUED | OUTPATIENT
Start: 2018-11-08 | End: 2018-11-13 | Stop reason: HOSPADM

## 2018-11-08 RX ORDER — LEVOTHYROXINE SODIUM 0.03 MG/1
25 TABLET ORAL DAILY
Status: CANCELLED | OUTPATIENT
Start: 2018-11-09

## 2018-11-08 RX ORDER — LOSARTAN POTASSIUM 25 MG/1
12.5 TABLET ORAL DAILY
Status: CANCELLED | OUTPATIENT
Start: 2018-11-09

## 2018-11-08 RX ORDER — ASPIRIN 81 MG/1
81 TABLET, CHEWABLE ORAL DAILY
Status: CANCELLED | OUTPATIENT
Start: 2018-11-09

## 2018-11-08 RX ORDER — DEXTROSE MONOHYDRATE 50 MG/ML
100 INJECTION, SOLUTION INTRAVENOUS PRN
Status: CANCELLED | OUTPATIENT
Start: 2018-11-08

## 2018-11-08 RX ORDER — POTASSIUM CHLORIDE 750 MG/1
10 TABLET, EXTENDED RELEASE ORAL EVERY OTHER DAY
Status: CANCELLED | OUTPATIENT
Start: 2018-11-10

## 2018-11-08 RX ADMIN — INSULIN LISPRO 8 UNITS: 100 INJECTION, SOLUTION INTRAVENOUS; SUBCUTANEOUS at 13:20

## 2018-11-08 RX ADMIN — LOSARTAN POTASSIUM 12.5 MG: 25 TABLET ORAL at 08:12

## 2018-11-08 RX ADMIN — INSULIN LISPRO 4 UNITS: 100 INJECTION, SOLUTION INTRAVENOUS; SUBCUTANEOUS at 21:27

## 2018-11-08 RX ADMIN — GABAPENTIN 800 MG: 400 CAPSULE ORAL at 20:15

## 2018-11-08 RX ADMIN — BUSPIRONE HYDROCHLORIDE 15 MG: 15 TABLET ORAL at 20:15

## 2018-11-08 RX ADMIN — POTASSIUM CHLORIDE 10 MEQ: 10 TABLET, EXTENDED RELEASE ORAL at 08:12

## 2018-11-08 RX ADMIN — LEVOTHYROXINE SODIUM 25 MCG: 25 TABLET ORAL at 06:37

## 2018-11-08 RX ADMIN — NYSTATIN: 100000 CREAM TOPICAL at 08:10

## 2018-11-08 RX ADMIN — PANTOPRAZOLE SODIUM 40 MG: 40 TABLET, DELAYED RELEASE ORAL at 08:11

## 2018-11-08 RX ADMIN — SODIUM CHLORIDE, PRESERVATIVE FREE 10 ML: 5 INJECTION INTRAVENOUS at 00:11

## 2018-11-08 RX ADMIN — INSULIN LISPRO 8 UNITS: 100 INJECTION, SOLUTION INTRAVENOUS; SUBCUTANEOUS at 08:55

## 2018-11-08 RX ADMIN — SODIUM CHLORIDE, PRESERVATIVE FREE 10 ML: 5 INJECTION INTRAVENOUS at 08:14

## 2018-11-08 RX ADMIN — INSULIN LISPRO 2 UNITS: 100 INJECTION, SOLUTION INTRAVENOUS; SUBCUTANEOUS at 17:23

## 2018-11-08 RX ADMIN — BUSPIRONE HYDROCHLORIDE 15 MG: 15 TABLET ORAL at 08:13

## 2018-11-08 RX ADMIN — GABAPENTIN 800 MG: 400 CAPSULE ORAL at 08:11

## 2018-11-08 RX ADMIN — IPRATROPIUM BROMIDE AND ALBUTEROL SULFATE 1 AMPULE: .5; 3 SOLUTION RESPIRATORY (INHALATION) at 07:35

## 2018-11-08 RX ADMIN — INSULIN LISPRO 10 UNITS: 100 INJECTION, SOLUTION INTRAVENOUS; SUBCUTANEOUS at 03:41

## 2018-11-08 RX ADMIN — LORAZEPAM 1 MG: 1 TABLET ORAL at 08:11

## 2018-11-08 RX ADMIN — LORAZEPAM 1 MG: 1 TABLET ORAL at 20:15

## 2018-11-08 RX ADMIN — TROSPIUM CHLORIDE 20 MG: 20 TABLET ORAL at 17:23

## 2018-11-08 RX ADMIN — GLIPIZIDE 10 MG: 10 TABLET ORAL at 08:11

## 2018-11-08 RX ADMIN — TROSPIUM CHLORIDE 20 MG: 20 TABLET ORAL at 06:37

## 2018-11-08 RX ADMIN — SODIUM CHLORIDE, PRESERVATIVE FREE 10 ML: 5 INJECTION INTRAVENOUS at 20:20

## 2018-11-08 RX ADMIN — NYSTATIN: 100000 CREAM TOPICAL at 20:15

## 2018-11-08 RX ADMIN — SIMVASTATIN 40 MG: 40 TABLET, FILM COATED ORAL at 20:20

## 2018-11-08 RX ADMIN — FERROUS SULFATE TAB 325 MG (65 MG ELEMENTAL FE) 325 MG: 325 (65 FE) TAB at 08:12

## 2018-11-08 RX ADMIN — ENOXAPARIN SODIUM 40 MG: 40 INJECTION SUBCUTANEOUS at 08:10

## 2018-11-08 RX ADMIN — ASPIRIN 81 MG 81 MG: 81 TABLET ORAL at 08:55

## 2018-11-08 RX ADMIN — DICYCLOMINE HYDROCHLORIDE 10 MG: 10 CAPSULE ORAL at 06:37

## 2018-11-08 RX ADMIN — INSULIN LISPRO 5 UNITS: 100 INJECTION, SOLUTION INTRAVENOUS; SUBCUTANEOUS at 08:56

## 2018-11-08 RX ADMIN — VENLAFAXINE HYDROCHLORIDE 75 MG: 37.5 CAPSULE, EXTENDED RELEASE ORAL at 08:11

## 2018-11-08 RX ADMIN — INSULIN GLARGINE 30 UNITS: 100 INJECTION, SOLUTION SUBCUTANEOUS at 21:27

## 2018-11-08 RX ADMIN — GABAPENTIN 800 MG: 400 CAPSULE ORAL at 17:23

## 2018-11-08 RX ADMIN — LISINOPRIL 5 MG: 5 TABLET ORAL at 08:11

## 2018-11-08 RX ADMIN — FUROSEMIDE 40 MG: 10 INJECTION, SOLUTION INTRAVENOUS at 08:55

## 2018-11-08 RX ADMIN — VITAMIN D, TAB 1000IU (100/BT) 1000 UNITS: 25 TAB at 08:12

## 2018-11-08 RX ADMIN — IBUPROFEN 600 MG: 600 TABLET ORAL at 08:12

## 2018-11-08 ASSESSMENT — PAIN DESCRIPTION - PAIN TYPE
TYPE: CHRONIC PAIN

## 2018-11-08 ASSESSMENT — PAIN SCALES - GENERAL
PAINLEVEL_OUTOF10: 0
PAINLEVEL_OUTOF10: 5
PAINLEVEL_OUTOF10: 9
PAINLEVEL_OUTOF10: 4
PAINLEVEL_OUTOF10: 9

## 2018-11-08 ASSESSMENT — PAIN DESCRIPTION - LOCATION
LOCATION: BACK

## 2018-11-08 ASSESSMENT — PAIN DESCRIPTION - FREQUENCY
FREQUENCY: CONTINUOUS
FREQUENCY: CONTINUOUS

## 2018-11-08 ASSESSMENT — PAIN DESCRIPTION - DESCRIPTORS
DESCRIPTORS: DULL
DESCRIPTORS: DULL

## 2018-11-08 ASSESSMENT — PAIN DESCRIPTION - ORIENTATION: ORIENTATION: LOWER

## 2018-11-08 NOTE — H&P
History and Physical      Name:  Will Murphy /Age/Sex: 1950  (76 y.o. female)   MRN & CSN:  [id number] & [id number] Admission Date/Time: 2018  8:16 PM   Location:   PCP: Jv Ceron PA-C       Will Murphy is a 76 y.o.  female  who presents with Shortness of Breath (Arrives via [facility] due to having SOB since last night. Upon EMS arrival, initial O2 saturation was 66% on RA. Given duoneb and placed on O2 per NC in route. )      Day 1,    Assessment and Plan:     1. Congestive heart failure    Shortness of breath, weight gain, sleeps on recliner   Non productive cough, low Extremity dependent edema    Pro-BNP 1,035 (H) PG/ML  Troponin T 0.012 (H) NG/ML  Diuresis with lasix, oxygen supplement   Echo, GI and DVT prophylaxis. 2. Diabetes Mellitus     Hyperglycemia ( glucose 575 )      POCT glucose     Hypoglycemic protocol      Patient Stopped home insulin pump   Sliding scale insulin with meal coverage      3. Depression    Continue home  buspar and Effexor    4.  Hypothyroidism     Continue synthroid        Medications:   Medications:    ipratropium-albuterol        [START ON 2018] insulin lispro  0-6 Units Subcutaneous TID WC    insulin lispro  0-3 Units Subcutaneous Nightly      Infusions:    dextrose       PRN Meds:   glucose 15 g PRN   dextrose 12.5 g PRN   glucagon (rDNA) 1 mg PRN   dextrose 100 mL/hr PRN       Current Facility-Administered Medications:     ipratropium-albuterol (DUONEB) 0.5-2.5 (3) MG/3ML nebulizer solution, , , ,     glucose (GLUTOSE) 40 % oral gel 15 g, 15 g, Oral, PRN, Jason Akaeze, APRN - CNP    dextrose 50 % solution 12.5 g, 12.5 g, Intravenous, PRN, Jason Akaeze, APRN - CNP    glucagon (rDNA) injection 1 mg, 1 mg, Intramuscular, PRN, Jason Akaeze, APRN - CNP    dextrose 5 % solution, 100 mL/hr, Intravenous, PRN, Jason Akaeze, APRN - CNP    [START ON 2018] insulin lispro (HUMALOG) injection vial 0-6 Units,

## 2018-11-08 NOTE — CONSULTS
Reason for Consult? education on DM and management, was just taken off insulin pump?, appreciate care  Patient seen for diabetic educational needs. Tommy Murphy is a 76y.o. year old female admitted with ACS. Patient Active Problem List   Diagnosis    HTN (hypertension)    Hyperlipemia    COPD [facility]chronic obstructive pulmonary disease) ([facility]    Anxiety and depression    DM type 2, uncontrolled, with retinopathy ([facility]    Hypokalemia    Hyperlipidemia    Chest pain    Hyperglycemia    Axillary abscess    Light headed    Orthostatic hypotension    Hypertriglyceridemia    CCC (chronic calculous cholecystitis)    Cirrhosis of liver without ascites (HCC)    Mild obstructive sleep apnea    Idiopathic progressive neuropathy    Congestive heart failure (CHF) (HCC)    Type 2 diabetes mellitus ([facility]    Hypothyroidism    Depression    Hypertension    CHF (congestive heart failure), NYHA class I, acute on chronic, diastolic (HCC)    ACS (acute coronary syndrome) (HCC)    Acute coronary syndrome (HCC)    Diastolic dysfunction with acute on chronic heart failure (HCC)      HgBA1c:    Lab Results   Component Value Date    LABA1C 8.3 11/07/2018     Patient very drowsy Sister at bedside for education with patient's permission. Patient states that she was on [employer] pump at one time. Asked what settings were but patient too drowsy to answer. Sister states that patient has been noncompliant with insulin and meal plan. Nurse caring for patient notified that patient too drowsy to provide education. Endo has been consulted    Attempt to provide Diabetes Education. Patient very drowsy, Unable to provide education. Printed information Diabetes ,  Contact number for Diabetes Educator and resources for ongoing education provided. Tiana Sanford RN, BSN, CDE

## 2018-11-08 NOTE — ED NOTES
Updated on plan of care. Pt acknowledges understanding and agreement.       Santos White RN  11/07/18 1119

## 2018-11-08 NOTE — PROGRESS NOTES
Pt had her medications here in the ED. Stated she went over each bottle with nurse in the ED and then sent the bottles home with her niece. Pt stated she didn't take any medication today (11-7-18) as she wasn't feeling good. When asked she stated that she took all of her medication the day before but didn't know the times. ED report states she is non compliant with her medications and hasn't taken her lasix in a year. Pt does not report this though. Meds reviewed from the ED with the pt.

## 2018-11-08 NOTE — ED PROVIDER NOTES
Basophils # 0.0 K/CU MM    Immature Neutrophil % 0.5 (H) 0 - 0.43 %    Total Immature Neutrophil 0.04 K/CU MM   BMP   Result Value Ref Range    Sodium 135 135 - 145 MMOL/L    Potassium 5.0 3.5 - 5.1 MMOL/L    Chloride 97 (L) 99 - 110 mMol/L    CO2 21 21 - 32 MMOL/L    Anion Gap 17 (H) 4 - 16    BUN 22 6 - 23 MG/DL    CREATININE 0.9 0.6 - 1.1 MG/DL    Glucose 575 (HH) 70 - 99 MG/DL    Calcium 9.8 8.3 - 10.6 MG/DL    GFR Non-African American >60 >60 mL/min/1.73m2    GFR African American >60 >60 mL/min/1.73m2   Brain Natriuretic Peptide   Result Value Ref Range    Pro-BNP 1,035 (H) <300 PG/ML   Troponin   Result Value Ref Range    Troponin T 0.012 (H) <0.01 NG/ML     XR CHEST PORTABLE   Final Result   Bilateral parahilar infiltrates with consolidative changes in the upper lobes. This could reflect CHF and/or pneumonia. EKG shows sinus tachycardia without any acute ST segment elevations. LAD and hypertrophy are noted. * read in absence of cardiology    The patient was given duoneb in EMS and in ED but her presentaion is more consistent with CHF. She was prescribed rx for lasix but she stopped taking it months ago because she had to pee all the time. Patient given 10 units SQ. The patient doesn't have her insulin pump attached. Patient will be admitted to hospital and I have contacted admission  My typical dicussion, presentation,and considerations for this patients' chief complaint, diagnosis, differential diagnosis, medications, medication use,  medication safety and medication interactions have been explained and outlined to this patient for thispatient encounter. Critical care time of 60 minutes was given to this patient which included time at the bedside, discussions with consultants, review of the chart, and lab studies. This critical care time does not include any billable procedures. Final Impression    1.  Congestive heart failure, unspecified HF chronicity, unspecified heart failure

## 2018-11-08 NOTE — PROGRESS NOTES
Doctor Please copy and paste below in your progress note per DME requirment. Patient was seen in hospital for COPD, CHF.  Pt resting sat. Is 88%. I am prescribing oxygen because the diagnosis and testing requires the patient to have oxygen in the home. Conditions will improve or be benefited by oxygen use. The patient is able to perform good mobility and therefore requires the use of a portable oxygen system for ambulation.

## 2018-11-08 NOTE — CONSULTS
CARDIOLOGY CONSULT NOTE   Reason for consultation:  NSTEMI    Referring physician:  Addie Jacob MD     Primary care physician: David Zhang PA-C      Dear Dr. Lennon Primrose  Thanks for the consult. History of present Kenny Peacock is a 76 y. o.year old who is transferred from [facility] for possible ACS, SHE HAS mildly elevated trop, her main compaints were shortness of breath which was moderate to severe, for few months and getting worse, intermittent,  not associated with  fever, gets worse with activity and better with rest,  Blood pressure, cholesterol, blood glucose and weight are well controlled. Past medical history:    has a past medical history of Anxiety; Arthritis; Atrial fibrillation ([address] Atrial fibrillation with RVR ([address] Atrial fibrillation with RVR ([address] Atrial fibrillation with RVR ([address] Cancer ([address] Cervicalgia; Constipation; COPD (chronic obstructive pulmonary disease) ([address] Depression; Diabetes mellitus ([address] Diabetic neuropathy ([address] Disc herniation; Fatigue; Fibromyalgia; H/O cardiovascular stress test; H/O complete electrocardiogram; H/O echocardiogram; Headache(784.0); Hepatitis B; History of cardiac cath; History of chest x-ray; Holter monitor, abnormal; Hx of cardiovascular stress test; Hx of echocardiogram; Hyperlipidemia; Hypertension; Insulin pump in place; Liver cirrhosis ([address] SADI on CPAP; Peripheral vascular disease ([address] PONV (postoperative nausea and vomiting); Renal disease; Tachycardia; and Wears dentures. Past surgical history:   has a past surgical history that includes Hysterectomy (2000); back surgery (1998); Tonsillectomy (1966); shoulder surgery (Right, 1960); Cataract removal with implant (Bilateral, 2000s); Diagnostic Cardiac Cath Lab Procedure (2000s); Tonsillectomy and adenoidectomy (1966); Cholecystectomy (44202061); and liver biopsy (9/14/2015). Social History:   reports that she has quit smoking. Her smoking use included Cigarettes.  She has a 21.00 magnesium hydroxide (MILK OF MAGNESIA) 400 MG/5ML suspension 30 mL  30 mL Oral Daily PRN Jovita L Pacer, PA-C        ondansetron (ZOFRAN) injection 4 mg  4 mg Intravenous Q6H PRN Jovita L Pacer, PA-C        aspirin chewable tablet 81 mg  81 mg Oral Daily Jovita L Pacer, PA-C        furosemide (LASIX) injection 40 mg  40 mg Intravenous Daily Jovita L Pacer, PA-C        glucose (GLUTOSE) 40 % oral gel 15 g  15 g Oral PRN Jovita L Pacer, PA-C        dextrose 50 % solution 12.5 g  12.5 g Intravenous PRN Jovita L Pacer, PA-C        glucagon (rDNA) injection 1 mg  1 mg Intramuscular PRN Jovita L Pacer, PA-C        dextrose 5 % solution  100 mL/hr Intravenous PRN Jovita L Pacer, PA-C        insulin lispro (HUMALOG) injection vial 0-12 Units  0-12 Units Subcutaneous TID WC Jovita L Pacer, PA-C   8 Units at 11/08/18 1320    insulin lispro (HUMALOG) injection vial 0-6 Units  0-6 Units Subcutaneous Nightly Jovita L Pacer, PA-C        busPIRone (BUSPAR) tablet 15 mg  15 mg Oral TID Jovita L Yamileth, PA-C        gabapentin (NEURONTIN) capsule 800 mg  800 mg Oral 4x Daily Jovita L Yamileth, PA-C        levothyroxine (SYNTHROID) tablet 25 mcg  25 mcg Oral Daily Jovita L Jonathanr, PA-C        LORazepam (ATIVAN) tablet 1 mg  1 mg Oral BID Jovita L Jonathanr, PA-C        magnesium hydroxide (MILK OF MAGNESIA) 400 MG/5ML suspension 30 mL  30 mL Oral Daily PRN Jovita L Pacer, PA-C        nitroGLYCERIN (NITROSTAT) SL tablet 0.4 mg  0.4 mg Sublingual Q5 Min PRN Jovita L Jonathanr, PA-C        nystatin (MYCOSTATIN) cream   Topical BID Jovita L Pacer, PA-C        losartan (COZAAR) tablet 12.5 mg  12.5 mg Oral Daily Jovita L Pacer, PA-C        simvastatin (ZOCOR) tablet 40 mg  40 mg Oral Nightly Jovita L Pacer, PA-C        trospium (SANCTURA) tablet 20 mg  20 mg Oral BID AC Jovita Carson PA-C        [START ON 11/9/2018] venlafaxine (EFFEXOR XR) extended release capsule 75 mg  75 mg Oral Daily with breakfast Jovita Carson PA-C

## 2018-11-08 NOTE — H&P
Hospitalist History and Physical        Jovita Crason PA-C    Name:  Tien Edward Standard /Age/Sex: 1950  (76 y.o. female)   MRN & CSN:  [id number] & [id number] Admission Date/Time: 2018 12:21 PM   Location:  [id number]-Y PCP: Bhavana Aj, [facility] Day: 1    Supervising Physician: Dr. Michelle Roy MD  Chief Complaint: SOB     Assessment and Plan:   Marylin Lentz is a 76 y.o.  female who presents with Diastolic dysfunction with acute on chronic heart failure ([facility]      Acute with chronic HFpEF exacerbation - Admits to [facility], orthopnea. ProBNP:1,035.              -telemetry monitoring              -NPO for cath then cardiac/low sodium thereafter              -3L O2 via NC now, goal sat>92%   -TTE              -SADI Screening              -Lasix now to diurese as BP tolerates   -Nitroglycerin PRN, ARB   -HF educator    Elevated troponin, rule out ACS- No acute STEMI or NSTEMI on EKG 1 day ago. Brandon Quijano Suspecting type 2 demand more than NSTEMI/STEMI  Initial troponin 0.012, this AM 0.167.   -Telemetry monitoring    -Trending troponin levels   -Pending labs for further risk stratification    -PO or IV pain medication PRN    -Antiplatelet-ASA, Plavix /BB-coreg, Lopresso, Toprol/Statin/sl Nitro (as BP permits) on medication regimen.    -Cardiology consult for cath today     Uncontrolled DM type II with chronic complications and with long term use of insulin- Recently taken off insulin pump for unknown reasons, Last A1C, ( 8.3 ). Glucose:575 yesterday, 338 today.   -Hold PO medications while inpatient   -beta-hydroxy              -Monitor FSBS, and cover with medium dose correction insulin    Thrombocytopenia- drop from 154 to 104 today.   -hold Lovenox now, cardiology to consider anti-coagulation, heparin    -place SCDs in the interim      Plan of care discussed with patient and family who agree(s) to the above plan and disposition of admission for further care.  All two days. She admits to not taking her Lasix for 1 month now due to the fact that it makes her urinate too oten. She was recently disconnected from her insulin pump for unknown reasons that she states was because \"they want to keep better control with manual administration. \" She states she has not followed up with a cardiologist for two years and has an appointment with Dr. Amna King in a few weeks. She states she regularly misses doctor's appointments however she wants to take better care of herself. She denies oxygen use at home. Summary: The patient was transferred to [facility] from [facility] after admission yesterday for increased troponin levels. Also noted to have sig hyperglycemia in the 500s yesterday. No ketonuria however no beta-hydroxy ordered for DKA evaluation. She appears to be generally weak and drowsy today. She endorses some mild SOB while at rest on 3.5 L with 95% oxygen sat. Suspect that she is in [facility] with type 2 demand ischemia with elevated troponin's in the setting of exacerbation. Will provide further care and evaluation along with cardiology consult. Her BP is slowly dropping and will be cautious with diuresis and anti-hypertensives today. Details per patient history and current CC include: ED/EMS summary, triage data and review of patient medical records and previous admission/ED visits. 10-14 point ROS reviewed negative, unless as noted above. Objective:   No intake or output data in the 24 hours ending 11/08/18 1354     Vitals: There were no vitals filed for this visit. Physical Exam:  11/08/18     GEN: Awake obese and elderly female, who appears chronically ill and disheveled, cooperative, and is sitting upright in bed in no apparent distress. Appears given age. EYES: PERRLA. EOMI. Sclera and conjunctiva are anicteric with no discharge, erythema or conjunctivitis. HENT: Normocephalic, Atraumatic. Mucous membranes are moist, symmetrical rise of palate.  No oropharyngeal exudate BIOPSY  9/14/2015    SHOULDER SURGERY Right 1960    fracture surgery-screw at humeral head    TONSILLECTOMY  1966    TONSILLECTOMY AND ADENOIDECTOMY  1966       Family History   Problem Relation Age of Onset    Arthritis Mother     Depression Mother     Emphysema Mother     Dementia Mother     Arthritis Father     Cancer Father         prostate    Vision Loss Father     Other Father         brain aneurysm    Arthritis Sister     Depression Sister     Anxiety Disorder Sister     Arthritis Brother     Cancer Brother         eye    Hearing Loss Brother     High Blood Pressure Brother      Family history reviewed as above. Social History     Social History    Marital status:       Spouse name: N/A    Number of children: N/A    Years of education: N/A     Occupational History          Social History Main Topics    Smoking status: Former Smoker     Packs/day: 1.00     Years: 21.00     Types: Cigarettes    Smokeless tobacco: Never Used      Comment: lives with smokers    Alcohol use No      Comment:           CAFFEINE: 4-6 diet sodas daily    Drug use: No    Sexual activity: Not on file     Other Topics Concern    Not on file     Social History Narrative    No narrative on file       Current Facility-Administered Medications   Medication Dose Route Frequency Provider Last Rate Last Dose    sodium chloride flush 0.9 % injection 10 mL  10 mL Intravenous 2 times per day Jovita Carson PA-C        sodium chloride flush 0.9 % injection 10 mL  10 mL Intravenous PRN Jovita Carson PA-C        magnesium hydroxide (MILK OF MAGNESIA) 400 MG/5ML suspension 30 mL  30 mL Oral Daily PRN Jovita Carson PA-C        ondansetron (ZOFRAN) injection 4 mg  4 mg Intravenous Q6H PRN Jovita Carson PA-C        aspirin chewable tablet 81 mg  81 mg Oral Daily Jovita Carson PA-C        nitroGLYCERIN (NITROSTAT) SL tablet 0.4 mg  0.4 mg Sublingual Q5 Min PRN Jovita Carson PA-C        furosemide (LASIX) injection 40 mg  40 mg Intravenous Daily Jovita Carson, PA-C        glucose (GLUTOSE) 40 % oral gel 15 g  15 g Oral PRN Jovita Carson, PA-C        dextrose 50 % solution 12.5 g  12.5 g Intravenous PRN Jovita Carson, PA-C        glucagon (rDNA) injection 1 mg  1 mg Intramuscular PRN Jovita Carson, PA-C        dextrose 5 % solution  100 mL/hr Intravenous PRN Jovita Carson, PA-C        insulin lispro (HUMALOG) injection vial 0-12 Units  0-12 Units Subcutaneous TID WC Jovita Carson, PA-C        insulin lispro (HUMALOG) injection vial 0-6 Units  0-6 Units Subcutaneous Nightly TARUN Vera-C        busPIRone (BUSPAR) tablet 15 mg  15 mg Oral TID Jovita Carson, PA-C        furosemide (LASIX) tablet 40 mg  40 mg Oral Every Other Day TARUN Vera-C        gabapentin (NEURONTIN) tablet 800 mg  800 mg Oral 4x Daily TARUN Vera-C        levothyroxine (SYNTHROID) tablet 25 mcg  25 mcg Oral Daily Jovita Carson, PA-C        LORazepam (ATIVAN) tablet 1 mg  1 mg Oral BID TARUN Vera-C        magnesium hydroxide (MILK OF MAGNESIA) 400 MG/5ML suspension 30 mL  30 mL Oral Daily PRN Jovita Carson, PA-C        nitroGLYCERIN (NITROSTAT) SL tablet 0.4 mg  0.4 mg Sublingual Q5 Min PRN TARUN Vera-C        nystatin (MYCOSTATIN) cream   Topical BID TARUN Vera-C        losartan (COZAAR) tablet 12.5 mg  12.5 mg Oral Daily Jvoita Carson, PA-C        simvastatin (ZOCOR) tablet 40 mg  40 mg Oral Nightly TARUN Vera-C        trospium (SANCTURA) tablet 20 mg  20 mg Oral BID AC JOSE LIUS VeraC        [START ON 11/9/2018] venlafaxine (EFFEXOR XR) extended release capsule 75 mg  75 mg Oral Daily with breakfast JOSE LUIS VeraC        [START ON 11/9/2018] venlafaxine (EFFEXOR XR) extended release capsule 150 mg  150 mg Oral Daily with breakfast Jovita Carson PA-C        Vitamin D (CHOLECALCIFEROL) capsule 1,000 Units  1,000 Units Oral Daily Jovita

## 2018-11-09 ENCOUNTER — APPOINTMENT (OUTPATIENT)
Dept: ULTRASOUND IMAGING | Age: 68
DRG: 280 | End: 2018-11-09
Attending: INTERNAL MEDICINE
Payer: MEDICARE

## 2018-11-09 LAB
EKG ATRIAL RATE: 106 BPM
EKG DIAGNOSIS: NORMAL
EKG P AXIS: 42 DEGREES
EKG P-R INTERVAL: 154 MS
EKG Q-T INTERVAL: 366 MS
EKG QRS DURATION: 82 MS
EKG QTC CALCULATION (BAZETT): 486 MS
EKG R AXIS: -43 DEGREES
EKG T AXIS: 140 DEGREES
EKG VENTRICULAR RATE: 106 BPM
GLUCOSE BLD-MCNC: 113 MG/DL (ref 70–99)
GLUCOSE BLD-MCNC: 132 MG/DL (ref 70–99)
GLUCOSE BLD-MCNC: 172 MG/DL (ref 70–99)
GLUCOSE BLD-MCNC: 175 MG/DL (ref 70–99)
GLUCOSE BLD-MCNC: 254 MG/DL (ref 70–99)
HCT VFR BLD CALC: 32 % (ref 37–47)
HEMOGLOBIN: 9.4 GM/DL (ref 12.5–16)
MCH RBC QN AUTO: 27.8 PG (ref 27–31)
MCHC RBC AUTO-ENTMCNC: 29.4 % (ref 32–36)
MCV RBC AUTO: 94.7 FL (ref 78–100)
PDW BLD-RTO: 15.3 % (ref 11.7–14.9)
PLATELET # BLD: 156 K/CU MM (ref 140–440)
PMV BLD AUTO: 10.4 FL (ref 7.5–11.1)
RBC # BLD: 3.38 M/CU MM (ref 4.2–5.4)
WBC # BLD: 8.5 K/CU MM (ref 4–10.5)

## 2018-11-09 PROCEDURE — 2140000000 HC CCU INTERMEDIATE R&B

## 2018-11-09 PROCEDURE — 6370000000 HC RX 637 (ALT 250 FOR IP): Performed by: HOSPITALIST

## 2018-11-09 PROCEDURE — 2580000003 HC RX 258: Performed by: PHYSICIAN ASSISTANT

## 2018-11-09 PROCEDURE — 94761 N-INVAS EAR/PLS OXIMETRY MLT: CPT

## 2018-11-09 PROCEDURE — 6370000000 HC RX 637 (ALT 250 FOR IP): Performed by: PHYSICIAN ASSISTANT

## 2018-11-09 PROCEDURE — 93010 ELECTROCARDIOGRAM REPORT: CPT | Performed by: INTERNAL MEDICINE

## 2018-11-09 PROCEDURE — 99232 SBSQ HOSP IP/OBS MODERATE 35: CPT | Performed by: INTERNAL MEDICINE

## 2018-11-09 PROCEDURE — 2700000000 HC OXYGEN THERAPY PER DAY

## 2018-11-09 PROCEDURE — 82962 GLUCOSE BLOOD TEST: CPT

## 2018-11-09 PROCEDURE — 36415 COLL VENOUS BLD VENIPUNCTURE: CPT

## 2018-11-09 PROCEDURE — 85027 COMPLETE CBC AUTOMATED: CPT

## 2018-11-09 PROCEDURE — 6370000000 HC RX 637 (ALT 250 FOR IP): Performed by: INTERNAL MEDICINE

## 2018-11-09 PROCEDURE — 6360000002 HC RX W HCPCS: Performed by: HOSPITALIST

## 2018-11-09 PROCEDURE — 86376 MICROSOMAL ANTIBODY EACH: CPT

## 2018-11-09 PROCEDURE — 76536 US EXAM OF HEAD AND NECK: CPT

## 2018-11-09 PROCEDURE — 6360000002 HC RX W HCPCS: Performed by: PHYSICIAN ASSISTANT

## 2018-11-09 PROCEDURE — 86800 THYROGLOBULIN ANTIBODY: CPT

## 2018-11-09 PROCEDURE — 87086 URINE CULTURE/COLONY COUNT: CPT

## 2018-11-09 RX ORDER — LEVOTHYROXINE SODIUM 0.07 MG/1
75 TABLET ORAL DAILY
Status: DISCONTINUED | OUTPATIENT
Start: 2018-11-10 | End: 2018-11-13 | Stop reason: HOSPADM

## 2018-11-09 RX ORDER — OXYCODONE HYDROCHLORIDE 5 MG/1
5 TABLET ORAL EVERY 12 HOURS PRN
Status: COMPLETED | OUTPATIENT
Start: 2018-11-09 | End: 2018-11-10

## 2018-11-09 RX ORDER — INSULIN GLARGINE 100 [IU]/ML
40 INJECTION, SOLUTION SUBCUTANEOUS NIGHTLY
Status: DISCONTINUED | OUTPATIENT
Start: 2018-11-09 | End: 2018-11-11

## 2018-11-09 RX ORDER — TRAMADOL HYDROCHLORIDE 50 MG/1
50 TABLET ORAL EVERY 6 HOURS PRN
Status: DISCONTINUED | OUTPATIENT
Start: 2018-11-09 | End: 2018-11-13 | Stop reason: HOSPADM

## 2018-11-09 RX ORDER — FUROSEMIDE 10 MG/ML
40 INJECTION INTRAMUSCULAR; INTRAVENOUS 2 TIMES DAILY
Status: DISCONTINUED | OUTPATIENT
Start: 2018-11-10 | End: 2018-11-11

## 2018-11-09 RX ADMIN — NYSTATIN: 100000 CREAM TOPICAL at 11:09

## 2018-11-09 RX ADMIN — BUSPIRONE HYDROCHLORIDE 15 MG: 15 TABLET ORAL at 10:12

## 2018-11-09 RX ADMIN — SODIUM CHLORIDE, PRESERVATIVE FREE 10 ML: 5 INJECTION INTRAVENOUS at 10:06

## 2018-11-09 RX ADMIN — ASPIRIN 81 MG 81 MG: 81 TABLET ORAL at 10:13

## 2018-11-09 RX ADMIN — VENLAFAXINE HYDROCHLORIDE 75 MG: 37.5 CAPSULE, EXTENDED RELEASE ORAL at 11:10

## 2018-11-09 RX ADMIN — NYSTATIN: 100000 CREAM TOPICAL at 21:58

## 2018-11-09 RX ADMIN — TRAMADOL HYDROCHLORIDE 50 MG: 50 TABLET, FILM COATED ORAL at 19:10

## 2018-11-09 RX ADMIN — INSULIN LISPRO 6 UNITS: 100 INJECTION, SOLUTION INTRAVENOUS; SUBCUTANEOUS at 02:30

## 2018-11-09 RX ADMIN — Medication 1000 UNITS: at 10:12

## 2018-11-09 RX ADMIN — SODIUM CHLORIDE, PRESERVATIVE FREE 10 ML: 5 INJECTION INTRAVENOUS at 21:58

## 2018-11-09 RX ADMIN — ENOXAPARIN SODIUM 40 MG: 40 INJECTION SUBCUTANEOUS at 18:55

## 2018-11-09 RX ADMIN — VENLAFAXINE HYDROCHLORIDE 150 MG: 150 CAPSULE, EXTENDED RELEASE ORAL at 11:10

## 2018-11-09 RX ADMIN — FUROSEMIDE 40 MG: 10 INJECTION, SOLUTION INTRAMUSCULAR; INTRAVENOUS at 10:07

## 2018-11-09 RX ADMIN — GABAPENTIN 800 MG: 400 CAPSULE ORAL at 17:40

## 2018-11-09 RX ADMIN — OXYCODONE HYDROCHLORIDE 5 MG: 5 TABLET ORAL at 23:36

## 2018-11-09 RX ADMIN — INSULIN LISPRO 2 UNITS: 100 INJECTION, SOLUTION INTRAVENOUS; SUBCUTANEOUS at 12:39

## 2018-11-09 RX ADMIN — TROSPIUM CHLORIDE 20 MG: 20 TABLET ORAL at 05:19

## 2018-11-09 RX ADMIN — TROSPIUM CHLORIDE 20 MG: 20 TABLET ORAL at 16:42

## 2018-11-09 RX ADMIN — GABAPENTIN 800 MG: 400 CAPSULE ORAL at 21:57

## 2018-11-09 RX ADMIN — BUSPIRONE HYDROCHLORIDE 15 MG: 15 TABLET ORAL at 15:15

## 2018-11-09 RX ADMIN — SIMVASTATIN 40 MG: 40 TABLET, FILM COATED ORAL at 21:56

## 2018-11-09 RX ADMIN — LORAZEPAM 1 MG: 1 TABLET ORAL at 21:56

## 2018-11-09 RX ADMIN — INSULIN LISPRO 20 UNITS: 100 INJECTION, SOLUTION INTRAVENOUS; SUBCUTANEOUS at 17:03

## 2018-11-09 RX ADMIN — BUSPIRONE HYDROCHLORIDE 15 MG: 15 TABLET ORAL at 21:56

## 2018-11-09 RX ADMIN — INSULIN LISPRO 2 UNITS: 100 INJECTION, SOLUTION INTRAVENOUS; SUBCUTANEOUS at 08:53

## 2018-11-09 RX ADMIN — LEVOTHYROXINE SODIUM 25 MCG: 25 TABLET ORAL at 05:19

## 2018-11-09 RX ADMIN — INSULIN LISPRO 20 UNITS: 100 INJECTION, SOLUTION INTRAVENOUS; SUBCUTANEOUS at 08:54

## 2018-11-09 RX ADMIN — GABAPENTIN 800 MG: 400 CAPSULE ORAL at 10:12

## 2018-11-09 RX ADMIN — GABAPENTIN 800 MG: 400 CAPSULE ORAL at 15:15

## 2018-11-09 RX ADMIN — INSULIN LISPRO 20 UNITS: 100 INJECTION, SOLUTION INTRAVENOUS; SUBCUTANEOUS at 12:40

## 2018-11-09 RX ADMIN — LORAZEPAM 1 MG: 1 TABLET ORAL at 11:11

## 2018-11-09 ASSESSMENT — PAIN DESCRIPTION - PROGRESSION: CLINICAL_PROGRESSION: GRADUALLY WORSENING

## 2018-11-09 ASSESSMENT — PAIN DESCRIPTION - PAIN TYPE
TYPE: CHRONIC PAIN
TYPE: CHRONIC PAIN

## 2018-11-09 ASSESSMENT — PAIN SCALES - GENERAL
PAINLEVEL_OUTOF10: 10
PAINLEVEL_OUTOF10: 0
PAINLEVEL_OUTOF10: 10
PAINLEVEL_OUTOF10: 0
PAINLEVEL_OUTOF10: 8

## 2018-11-09 ASSESSMENT — PAIN DESCRIPTION - ORIENTATION
ORIENTATION: LOWER
ORIENTATION: LOWER

## 2018-11-09 ASSESSMENT — PAIN DESCRIPTION - FREQUENCY: FREQUENCY: CONTINUOUS

## 2018-11-09 ASSESSMENT — PAIN DESCRIPTION - LOCATION
LOCATION: BACK
LOCATION: BACK

## 2018-11-09 ASSESSMENT — PAIN DESCRIPTION - DESCRIPTORS: DESCRIPTORS: CONSTANT

## 2018-11-09 ASSESSMENT — PAIN DESCRIPTION - ONSET: ONSET: ON-GOING

## 2018-11-09 NOTE — PROGRESS NOTES
Progress Note( Dr. Deacon Benz)  11/9/2018  Subjective:   Admit Date: 11/8/2018  PCP: Bhavana Aj PA-C    Admitted For : Chest pain & SOB NSTMI     Consulted For: Better control of DM     Interval History:  Still has  SOB  Had LHC . Cardiology mote : LHC showed mild to moderate non obstructive CAD of the LAD and RCA, both of which are small in caliber. The main artery appears to be LCX which is dominant. Therefore Medical management only reccommended continue with asa   C/O  chest pains,   Still  SOB . Denies nausea or vomiting. No new bowel or bladder symptoms.      No intake or output data in the 24 hours ending 11/09/18 0711    DATA    CBC:   Recent Labs      11/07/18 2030 11/08/18   0600  11/09/18   0448   WBC  7.8  10.3  8.5   HGB  9.8*  10.0*  9.4*   PLT  154  104*  156    CMP:  Recent Labs      11/07/18 2030 11/08/18   0600   NA  135  137   K  5.0  3.7   CL  97*  97*   CO2  21  29   BUN  22  23   CREATININE  0.9  1.0   CALCIUM  9.8  9.6     Lipids:   Lab Results   Component Value Date    CHOL 120 11/08/2018    CHOL 205 03/17/2016    HDL 53 11/08/2018    TRIG 115 11/08/2018     Glucose:  Recent Labs      11/08/18   1240  11/08/18   2007  11/09/18   0228   POCGLU  342*  237*  254*     [id number]:  Lab Results   Component Value Date    LABA1C 8.3 11/07/2018     High Sensitivity TSH:   Lab Results   Component Value Date    TSHHS 3.020 11/08/2018     Free T3:   Lab Results   Component Value Date    FT3 2.9 03/17/2016     Free T4:  Lab Results   Component Value Date    T4FREE 0.89 11/08/2018       Xr Chest Portable    Result Date: 11/7/2018  EXAMINATION: SINGLE XRAY VIEW OF THE CHEST 11/7/2018 8:55 pm COMPARISON: November 2, 2017 HISTORY: ORDERING SYSTEM PROVIDED HISTORY: chest pain TECHNOLOGIST PROVIDED HISTORY: Reason for exam:->chest pain Ordering Physician Provided Reason for Exam: sob Acuity: Acute Type of Exam: Initial Additional signs and symptoms: sob cough, congestion started last night Problem List:     HTN (hypertension)     Hyperlipemia     COPD (chronic obstructive pulmonary disease) (HCC)     Anxiety and depression     DM type 2, uncontrolled, with retinopathy ([facility]     Chest pain     Orthostatic hypotension     CCC (chronic calculous cholecystitis)     Cirrhosis of liver without ascites (HCC)     Mild obstructive sleep apnea     Idiopathic progressive neuropathy     Type 2 diabetes mellitus ([facility]     Hypothyroidism     Depression     Hypertension     CHF (congestive heart failure), NYHA class I, acute on chronic, diastolic (HCC)     ACS (acute coronary syndrome) ([facility])     Diastolic dysfunction with acute on chronic heart failure ([facility]      Plan:     1. Reviewed POC blood glucose . Labs and X ray results   2. Reviewed Current Medicines  3. Off Insulin Pump    4. On meal/ Correction bolus Humalog/ Basal Lantus Insulin regime  5. Monitor Blood glucose frequently   6. Modified  the dose of Insulin/ other medicines as needed   7. Will follow     .      Jer Mayo MD

## 2018-11-09 NOTE — CARE COORDINATION
.CM met with pt for d/c planning. Introduced self and updated white board. Pt's niece lives with her and helps her as needed. Pt and niece both drive. She has a PCP, has insurance, and is able to afford her medication. She states that she would like help with the co-pays. Med-Assist info provided. Pt reports to have a walker, cane, shower seat, and a c-pap machine that she does not use. [facility] offered and pt refused. Pt denies any needs at this time. D/c plan is home with niece, no needs. CM will continue to follow.   TE

## 2018-11-09 NOTE — CONSULTS
Endocrinology   Consult Note  Dear Doctor Galdino Becker [facility]    Thank You for the Consult     Pt. Was Admitted for : Chest pain or shortness of breath    Reason for Consult: There's a control of blood glucose    History Obtained From:  Patient/ EMR       HISTORY OF PRESENT ILLNESS:                The patient is a 76 y.o. female with significant past medical history of COPD, insulin-dependent diabetes mellitus on  insulin pump, diabetic neuropathy , disc disease, low back pain was admitted to the hospital for shortness of breath and chest pain. She underwent cardiac cath and was found to have non-significant coronary artery disease but has cardio myopathy and congestive heart failure. Medical management was suggested. I was  consulted for better control of blood glucose. ROS:   Pt's ROS done in detail. Abnormal ROS are noted in Medical and Surgical History Section below: Other Medical History:        Diagnosis Date    Anxiety     Arthritis     bilats hands, right shoulder    Atrial fibrillation [facility])     Cardioversion @ [facility] - no trouble with it ever since. Sees Dr. Jesús Thomas Atrial fibrillation with RVR [facility]) 2013    Atrial fibrillation with RVR ([facility] 11/26/2012    Atrial fibrillation with RVR ([facility] 3/1/2013    Cancer (HCC)     skin (right upper chest & face)    Cervicalgia     Constipation     COPD (chronic obstructive pulmonary disease) (HCC)     Depression     Diabetes mellitus (HCC)     IDDM    Diabetic neuropathy (HCC)     Disc herniation     lower back    Fatigue     Fibromyalgia     H/O cardiovascular stress test 05/08 EF 70%,07/09 EF 66%,02/10, 03/10 EF 63% 04/10    04/26/10 if LAD and circ are compared, the LAD in its mid segment has about 60% stenosis around a small diag. circumflex vessel is a dominant vessel and is without any significant disease,  rca is nondominant with about 50% stenosis , will continue medical management for now.     H/O complete electrocardiogram 02/10 02/03/10 on chart    H/O echocardiogram 05/08,EF 55%, 02/10    02/25/10 EF>55% left ventricular systolic function is normal,mild mitral regurg. there is no PE    Headache(784.0)     Not migraines, usually from a high blood sugar.  Hepatitis B     History of cardiac cath 06/08    06/17/10 heart cath, patient has single vessel cad with RCA which is nondominant, being 50% stenosed, rest of vessels are without any significant disease, medical therapy and risk factor modification will be continued    History of chest x-ray 06/08    06/15/08 chest xray, nonacute chest with borderline cardiomegaly    Holter monitor, abnormal 10/28/13    48hr-Predominantly SR w/only SVT ectopy in single beat form noted.  Hx of cardiovascular stress test 4/1/2013    EF70%, normal    Hx of echocardiogram 4/1/2013    EF55%,mild tricuspid regurg    Hyperlipidemia     Hypertension     Insulin pump in place     Liver cirrhosis (HCC)     Dr Ayala Slice SADI on CPAP     In the process of getting a c-pap. Does not currently have one.  Peripheral vascular disease (HCC)     PONV (postoperative nausea and vomiting) 1960    with shoulder surgery    Renal disease     Tachycardia     Wears dentures     upper plate     Surgical History:        Procedure Laterality Date    BACK SURGERY  1998    herniated disc - no hardware    CATARACT REMOVAL WITH IMPLANT Bilateral 2000s    CHOLECYSTECTOMY  [id number]    laproscopic with liver biopsy    DIAGNOSTIC CARDIAC CATH LAB PROCEDURE  2000s    no stents (mimimal blockage)    HYSTERECTOMY  2000    total    LIVER BIOPSY  9/14/2015    SHOULDER SURGERY Right 1960    fracture surgery-screw at humeral head    TONSILLECTOMY  1966    TONSILLECTOMY AND ADENOIDECTOMY  1966       Allergies:  Latex;  Adhesive tape; and Codeine    Family History:   Family History   Problem Relation Age of Onset    Arthritis Mother     Depression Mother     Emphysema Mother     Dementia Mother     Arthritis

## 2018-11-09 NOTE — PROGRESS NOTES
Cardiology Progress Note     Today's Plan:    Admit Date:  11/8/2018    Consult reason/ Seen today for:  NSTEMI     Subjective and  Overnight Events:  Up sitting in the chair. She denies any chest pain or unusual SOB. Right rist cath site is free of hematoma or ecchymosis. There is a good radial pulse noted. Telemetry:  SR     Assessment / Plan / Recommendation:     ASCVD- she did rule in for NSTEMI- LHC done demonstrating mild to moderate non obstructive CAD of the LAD and RCA, both of which are small in caliber. The main artery appears to be LCX which is dominant. Therefore Medical management only reccommended continue with asa - Can resume BB once BP improved. Can consider adding low dose ACEI/ARB if BP can tolerate it. Cath site is with out any complications. HFpEF- Pro- BNP slightly is elevated:  echo in 2017 with EF of >50% with impaired relaxation compatible with diastolic dysfunction-  Continue with lasix  Stress compliance with medications   DM  On insulin  Hyperlipidemia- continue with statin    Will sign off for now and be available if needed. History of Presenting Illness:    Chief complain on admission : 76 y. o.year old who is admitted forNo chief complaint on file. Past medical history:    has a past medical history of Anxiety; Arthritis; Atrial fibrillation ([address] Atrial fibrillation with RVR ([address] Atrial fibrillation with RVR ([address] Atrial fibrillation with RVR ([address] Cancer ([address] Cervicalgia; Constipation; COPD (chronic obstructive pulmonary disease) ([address] Depression; Diabetes mellitus ([address] Diabetic neuropathy ([address] Disc herniation; Fatigue; Fibromyalgia; H/O cardiovascular stress test; H/O complete electrocardiogram; H/O echocardiogram; Headache(784.0); Hepatitis B; History of cardiac cath; History of chest x-ray;  Holter monitor, abnormal; Hx of cardiovascular stress test; Hx of echocardiogram; plan with changes made by me as follows     CARDIOLOGY ATTENDING ADDENDUM    HPI:  I have reviewed the above HPI  And agree with above   Chuck Estrada is a 76 y. o.year old who and presents with had no chief complaint listed for this encounter. No chief complaint on file. Interval history:  Insignificant CAD for medical management. Physical Exam:  General:  Awake, alert, NAD  Head:normal  Eye:normal  Neck:  No JVD   Chest:  Clear to auscultation, respiration easy  Cardiovascular:  RRR S1S2  Abdomen:   nontender  Extremities:  no edema  Pulses; palpable  Neuro: grossly normal      MEDICAL DECISION MAKING;    I agree with the above plan, which was planned by myself and discussed with NP.     Liz Coffman MD [facility]   on 11/9/2018 at 11:07 AM

## 2018-11-10 ENCOUNTER — APPOINTMENT (OUTPATIENT)
Dept: GENERAL RADIOLOGY | Age: 68
DRG: 280 | End: 2018-11-10
Attending: INTERNAL MEDICINE
Payer: MEDICARE

## 2018-11-10 LAB
ANION GAP SERPL CALCULATED.3IONS-SCNC: 16 MMOL/L (ref 4–16)
ANTITHYROID MICORSOMAL: 0.4
BACTERIA: ABNORMAL /HPF
BASE EXCESS MIXED: 3.6 (ref 0–2.3)
BASOPHILS ABSOLUTE: 0.1 K/CU MM
BASOPHILS RELATIVE PERCENT: 0.6 % (ref 0–1)
BILIRUBIN URINE: NEGATIVE MG/DL
BLOOD, URINE: NEGATIVE
BUN BLDV-MCNC: 47 MG/DL (ref 6–23)
CALCIUM SERPL-MCNC: 8.3 MG/DL (ref 8.3–10.6)
CARBON MONOXIDE, BLOOD: 2.3 % (ref 0–5)
CHLORIDE BLD-SCNC: 96 MMOL/L (ref 99–110)
CHLORIDE URINE RANDOM: 45 MMOL/L (ref 43–210)
CLARITY: CLEAR
CO2 CONTENT: 32.9 MMOL/L (ref 19–24)
CO2: 22 MMOL/L (ref 21–32)
COLOR: YELLOW
CREAT SERPL-MCNC: 2.1 MG/DL (ref 0.6–1.1)
CULTURE: NORMAL
DIFFERENTIAL TYPE: ABNORMAL
EOSINOPHILS ABSOLUTE: 0.3 K/CU MM
EOSINOPHILS RELATIVE PERCENT: 3.8 % (ref 0–3)
FERRITIN: 104 NG/ML (ref 15–150)
FOLATE: 19.6 NG/ML (ref 3.1–17.5)
GFR AFRICAN AMERICAN: 28 ML/MIN/1.73M2
GFR NON-AFRICAN AMERICAN: 23 ML/MIN/1.73M2
GLUCOSE BLD-MCNC: 123 MG/DL (ref 70–99)
GLUCOSE BLD-MCNC: 173 MG/DL (ref 70–99)
GLUCOSE BLD-MCNC: 176 MG/DL (ref 70–99)
GLUCOSE BLD-MCNC: 184 MG/DL (ref 70–99)
GLUCOSE BLD-MCNC: 264 MG/DL (ref 70–99)
GLUCOSE BLD-MCNC: 277 MG/DL (ref 70–99)
GLUCOSE, URINE: NEGATIVE MG/DL
HCO3 ARTERIAL: 31.1 MMOL/L (ref 18–23)
HCT VFR BLD CALC: 36.5 % (ref 37–47)
HEMOGLOBIN: 11.5 GM/DL (ref 12.5–16)
HYALINE CASTS: 0 /LPF
IMMATURE NEUTROPHIL %: 0.4 % (ref 0–0.43)
IRON: 60 UG/DL (ref 37–145)
KETONES, URINE: NEGATIVE MG/DL
LEUKOCYTE ESTERASE, URINE: NEGATIVE
LYMPHOCYTES ABSOLUTE: 2.6 K/CU MM
LYMPHOCYTES RELATIVE PERCENT: 34 % (ref 24–44)
Lab: NORMAL
MCH RBC QN AUTO: 28.1 PG (ref 27–31)
MCHC RBC AUTO-ENTMCNC: 31.5 % (ref 32–36)
MCV RBC AUTO: 89.2 FL (ref 78–100)
METHEMOGLOBIN ARTERIAL: 1.3 %
MONOCYTES ABSOLUTE: 0.8 K/CU MM
MONOCYTES RELATIVE PERCENT: 10.3 % (ref 0–4)
MUCUS: ABNORMAL HPF
NITRITE URINE, QUANTITATIVE: NEGATIVE
NUCLEATED RBC %: 0 %
O2 SATURATION: 94.5 % (ref 96–97)
PCO2 ARTERIAL: 59 MMHG (ref 32–45)
PCT TRANSFERRIN: 19 % (ref 10–44)
PDW BLD-RTO: 15.5 % (ref 11.7–14.9)
PH BLOOD: 7.33 (ref 7.34–7.45)
PH, URINE: 5 (ref 5–8)
PLATELET # BLD: 173 K/CU MM (ref 140–440)
PMV BLD AUTO: 9.8 FL (ref 7.5–11.1)
PO2 ARTERIAL: 84 MMHG (ref 75–100)
POTASSIUM SERPL-SCNC: 4.9 MMOL/L (ref 3.5–5.1)
POTASSIUM, UR: 22.9 MMOL/L (ref 22–119)
PREALBUMIN: 14 MG/DL (ref 20–40)
PROTEIN UA: NEGATIVE MG/DL
RBC # BLD: 4.09 M/CU MM (ref 4.2–5.4)
RBC URINE: 1 /HPF (ref 0–6)
REPORT STATUS: NORMAL
RETICULOCYTE COUNT PCT: 3 % (ref 0.2–2.2)
SEGMENTED NEUTROPHILS ABSOLUTE COUNT: 3.9 K/CU MM
SEGMENTED NEUTROPHILS RELATIVE PERCENT: 50.9 % (ref 36–66)
SODIUM BLD-SCNC: 134 MMOL/L (ref 135–145)
SODIUM URINE: 54 MMOL/L (ref 35–167)
SPECIFIC GRAVITY UA: 1.01 (ref 1–1.03)
SPECIMEN: NORMAL
SQUAMOUS EPITHELIAL: 2 /HPF
TOTAL IMMATURE NEUTOROPHIL: 0.03 K/CU MM
TOTAL IRON BINDING CAPACITY: 320 UG/DL (ref 250–450)
TOTAL NUCLEATED RBC: 0 K/CU MM
TOTAL RETICULOCYTE COUNT: 0.12 K/CU MM
TRANSITIONAL EPITHELIAL: <1 /HPF
TRICHOMONAS: ABNORMAL /HPF
UNSATURATED IRON BINDING CAPACITY: 260 UG/DL (ref 110–370)
UROBILINOGEN, URINE: 1 MG/DL (ref 0.2–1)
VITAMIN B-12: 243.7 PG/ML (ref 211–911)
WBC # BLD: 7.7 K/CU MM (ref 4–10.5)
WBC UA: 1 /HPF (ref 0–5)

## 2018-11-10 PROCEDURE — 82746 ASSAY OF FOLIC ACID SERUM: CPT

## 2018-11-10 PROCEDURE — 83540 ASSAY OF IRON: CPT

## 2018-11-10 PROCEDURE — 6370000000 HC RX 637 (ALT 250 FOR IP): Performed by: INTERNAL MEDICINE

## 2018-11-10 PROCEDURE — 82803 BLOOD GASES ANY COMBINATION: CPT

## 2018-11-10 PROCEDURE — 6360000002 HC RX W HCPCS: Performed by: HOSPITALIST

## 2018-11-10 PROCEDURE — 6370000000 HC RX 637 (ALT 250 FOR IP): Performed by: PHYSICIAN ASSISTANT

## 2018-11-10 PROCEDURE — 85025 COMPLETE CBC W/AUTO DIFF WBC: CPT

## 2018-11-10 PROCEDURE — 6360000002 HC RX W HCPCS: Performed by: INTERNAL MEDICINE

## 2018-11-10 PROCEDURE — 71046 X-RAY EXAM CHEST 2 VIEWS: CPT

## 2018-11-10 PROCEDURE — 82436 ASSAY OF URINE CHLORIDE: CPT

## 2018-11-10 PROCEDURE — 85045 AUTOMATED RETICULOCYTE COUNT: CPT

## 2018-11-10 PROCEDURE — 84134 ASSAY OF PREALBUMIN: CPT

## 2018-11-10 PROCEDURE — 84300 ASSAY OF URINE SODIUM: CPT

## 2018-11-10 PROCEDURE — 82728 ASSAY OF FERRITIN: CPT

## 2018-11-10 PROCEDURE — 81001 URINALYSIS AUTO W/SCOPE: CPT

## 2018-11-10 PROCEDURE — 80048 BASIC METABOLIC PNL TOTAL CA: CPT

## 2018-11-10 PROCEDURE — 83550 IRON BINDING TEST: CPT

## 2018-11-10 PROCEDURE — 2140000000 HC CCU INTERMEDIATE R&B

## 2018-11-10 PROCEDURE — 2580000003 HC RX 258: Performed by: PHYSICIAN ASSISTANT

## 2018-11-10 PROCEDURE — 36415 COLL VENOUS BLD VENIPUNCTURE: CPT

## 2018-11-10 PROCEDURE — 82607 VITAMIN B-12: CPT

## 2018-11-10 PROCEDURE — 82962 GLUCOSE BLOOD TEST: CPT

## 2018-11-10 PROCEDURE — 99221 1ST HOSP IP/OBS SF/LOW 40: CPT | Performed by: INTERNAL MEDICINE

## 2018-11-10 PROCEDURE — 36600 WITHDRAWAL OF ARTERIAL BLOOD: CPT

## 2018-11-10 PROCEDURE — 84133 ASSAY OF URINE POTASSIUM: CPT

## 2018-11-10 PROCEDURE — 6370000000 HC RX 637 (ALT 250 FOR IP): Performed by: HOSPITALIST

## 2018-11-10 RX ORDER — METHYLPREDNISOLONE SODIUM SUCCINATE 40 MG/ML
40 INJECTION, POWDER, LYOPHILIZED, FOR SOLUTION INTRAMUSCULAR; INTRAVENOUS EVERY 12 HOURS
Status: DISCONTINUED | OUTPATIENT
Start: 2018-11-10 | End: 2018-11-12

## 2018-11-10 RX ADMIN — GABAPENTIN 800 MG: 400 CAPSULE ORAL at 10:06

## 2018-11-10 RX ADMIN — BUSPIRONE HYDROCHLORIDE 15 MG: 15 TABLET ORAL at 16:48

## 2018-11-10 RX ADMIN — ASPIRIN 81 MG 81 MG: 81 TABLET ORAL at 10:05

## 2018-11-10 RX ADMIN — OXYCODONE HYDROCHLORIDE 5 MG: 5 TABLET ORAL at 21:09

## 2018-11-10 RX ADMIN — INSULIN LISPRO 2 UNITS: 100 INJECTION, SOLUTION INTRAVENOUS; SUBCUTANEOUS at 10:14

## 2018-11-10 RX ADMIN — FUROSEMIDE 40 MG: 10 INJECTION, SOLUTION INTRAMUSCULAR; INTRAVENOUS at 11:10

## 2018-11-10 RX ADMIN — SIMVASTATIN 40 MG: 40 TABLET, FILM COATED ORAL at 20:51

## 2018-11-10 RX ADMIN — TROSPIUM CHLORIDE 20 MG: 20 TABLET ORAL at 16:48

## 2018-11-10 RX ADMIN — ENOXAPARIN SODIUM 40 MG: 40 INJECTION SUBCUTANEOUS at 10:09

## 2018-11-10 RX ADMIN — LORAZEPAM 1 MG: 1 TABLET ORAL at 10:34

## 2018-11-10 RX ADMIN — INSULIN LISPRO 6 UNITS: 100 INJECTION, SOLUTION INTRAVENOUS; SUBCUTANEOUS at 13:47

## 2018-11-10 RX ADMIN — TRAMADOL HYDROCHLORIDE 50 MG: 50 TABLET, FILM COATED ORAL at 10:34

## 2018-11-10 RX ADMIN — INSULIN GLARGINE 40 UNITS: 100 INJECTION, SOLUTION SUBCUTANEOUS at 20:51

## 2018-11-10 RX ADMIN — GABAPENTIN 800 MG: 400 CAPSULE ORAL at 16:48

## 2018-11-10 RX ADMIN — SODIUM CHLORIDE, PRESERVATIVE FREE 10 ML: 5 INJECTION INTRAVENOUS at 10:33

## 2018-11-10 RX ADMIN — INSULIN LISPRO 4 UNITS: 100 INJECTION, SOLUTION INTRAVENOUS; SUBCUTANEOUS at 02:55

## 2018-11-10 RX ADMIN — BUSPIRONE HYDROCHLORIDE 15 MG: 15 TABLET ORAL at 10:09

## 2018-11-10 RX ADMIN — VENLAFAXINE HYDROCHLORIDE 150 MG: 150 CAPSULE, EXTENDED RELEASE ORAL at 10:20

## 2018-11-10 RX ADMIN — LORAZEPAM 1 MG: 1 TABLET ORAL at 20:50

## 2018-11-10 RX ADMIN — GABAPENTIN 800 MG: 400 CAPSULE ORAL at 20:51

## 2018-11-10 RX ADMIN — LEVOTHYROXINE SODIUM 75 MCG: 75 TABLET ORAL at 06:31

## 2018-11-10 RX ADMIN — METHYLPREDNISOLONE SODIUM SUCCINATE 40 MG: 40 INJECTION, POWDER, LYOPHILIZED, FOR SOLUTION INTRAMUSCULAR; INTRAVENOUS at 20:50

## 2018-11-10 RX ADMIN — INSULIN LISPRO 20 UNITS: 100 INJECTION, SOLUTION INTRAVENOUS; SUBCUTANEOUS at 13:48

## 2018-11-10 RX ADMIN — SODIUM CHLORIDE, PRESERVATIVE FREE 10 ML: 5 INJECTION INTRAVENOUS at 20:52

## 2018-11-10 RX ADMIN — INSULIN LISPRO 6 UNITS: 100 INJECTION, SOLUTION INTRAVENOUS; SUBCUTANEOUS at 20:52

## 2018-11-10 RX ADMIN — Medication 1000 UNITS: at 10:10

## 2018-11-10 RX ADMIN — NYSTATIN: 100000 CREAM TOPICAL at 10:34

## 2018-11-10 RX ADMIN — FUROSEMIDE 40 MG: 10 INJECTION, SOLUTION INTRAMUSCULAR; INTRAVENOUS at 17:16

## 2018-11-10 RX ADMIN — GABAPENTIN 800 MG: 400 CAPSULE ORAL at 13:47

## 2018-11-10 RX ADMIN — TROSPIUM CHLORIDE 20 MG: 20 TABLET ORAL at 06:32

## 2018-11-10 RX ADMIN — VENLAFAXINE HYDROCHLORIDE 75 MG: 37.5 CAPSULE, EXTENDED RELEASE ORAL at 10:08

## 2018-11-10 RX ADMIN — BUSPIRONE HYDROCHLORIDE 15 MG: 15 TABLET ORAL at 20:51

## 2018-11-10 RX ADMIN — INSULIN LISPRO 20 UNITS: 100 INJECTION, SOLUTION INTRAVENOUS; SUBCUTANEOUS at 10:15

## 2018-11-10 ASSESSMENT — PAIN DESCRIPTION - LOCATION
LOCATION: BACK;OTHER (COMMENT)
LOCATION: BACK

## 2018-11-10 ASSESSMENT — PAIN DESCRIPTION - PAIN TYPE
TYPE: CHRONIC PAIN

## 2018-11-10 ASSESSMENT — PAIN DESCRIPTION - FREQUENCY
FREQUENCY: CONTINUOUS

## 2018-11-10 ASSESSMENT — PAIN SCALES - GENERAL
PAINLEVEL_OUTOF10: 0
PAINLEVEL_OUTOF10: 9
PAINLEVEL_OUTOF10: 7
PAINLEVEL_OUTOF10: 0
PAINLEVEL_OUTOF10: 9
PAINLEVEL_OUTOF10: 0
PAINLEVEL_OUTOF10: 0

## 2018-11-10 ASSESSMENT — PAIN DESCRIPTION - ORIENTATION
ORIENTATION: LOWER

## 2018-11-10 ASSESSMENT — PAIN DESCRIPTION - PROGRESSION: CLINICAL_PROGRESSION: GRADUALLY WORSENING

## 2018-11-10 ASSESSMENT — PAIN DESCRIPTION - DESCRIPTORS
DESCRIPTORS: ACHING;DISCOMFORT
DESCRIPTORS: ACHING;DISCOMFORT
DESCRIPTORS: CONSTANT

## 2018-11-10 ASSESSMENT — PAIN DESCRIPTION - ONSET
ONSET: ON-GOING
ONSET: ON-GOING

## 2018-11-10 NOTE — CONSULTS
Nephrology Service Consultation        [address], [address] [address]  Phone: [phone]  Office Hours: 8:30AM - 4:30PM  Monday - Friday           Patient:  Phillip Murphy  MRN: [id number]  Consulting physician:  No att. providers found  Reason for Consult: elevated cr,fluid overload, pt of Dr Faith Solitario      PCP: Cortney Wolf PA-C    HISTORY OF PRESENT ILLNESS:   The patient is a 76 y.o. female with afib, dm2, copd presented with soa. on 11/8/18  Was taken to cath lab on 11/8/18, found to have non obstructive CAD, no RADHAMES placed  Renal consult for cr 2.1 today and soa with cxr suggestive of volume overload  Baseline cr 0.9 on admission but up to 2.1 today  On NC, +soa  Reports making urine  Only potential nephrotoxin per med review was the IV contrast      REVIEW OF SYSTEMS:  14 point ROS is Negative. See positive ROS per HPI    Past Medical History:        Diagnosis Date    Anxiety     Arthritis     bilats hands, right shoulder    Atrial fibrillation [facility])     Cardioversion @ [facility] - no trouble with it ever since. Sees Dr. Precious Rojo Atrial fibrillation with RVR [facility]) 2013    Atrial fibrillation with RVR ([facility] 11/26/2012    Atrial fibrillation with RVR ([facility] 3/1/2013    Cancer (HCC)     skin (right upper chest & face)    Cervicalgia     Constipation     COPD (chronic obstructive pulmonary disease) (HCC)     Depression     Diabetes mellitus (HCC)     IDDM    Diabetic neuropathy (HCC)     Disc herniation     lower back    Fatigue     Fibromyalgia     H/O cardiovascular stress test 05/08 EF 70%,07/09 EF 66%,02/10, 03/10 EF 63% 04/10    04/26/10 if LAD and circ are compared, the LAD in its mid segment has about 60% stenosis around a small diag. circumflex vessel is a dominant vessel and is without any significant disease,  rca is nondominant with about 50% stenosis , will continue medical management for now.     H/O complete electrocardiogram 02/10    02/03/10 on

## 2018-11-10 NOTE — CONSULTS
Pulmonary Consult Note      Reason for Consult:Acute hypoxic resp failure   Requesting Physician: Dr. Yung Cárdenas:   CHIEF COMPLAINT :SOB    Patient Active Problem List    Diagnosis Date Noted    NSTEMI (non-ST elevated myocardial infarction) ([facility]     ACS (acute coronary syndrome) ([facility] 11/08/2018    Acute [facility] syndrome ([facility] 69/14/9661    Diastolic dysfunction with acute on chronic heart failure ([facility] 11/08/2018    Congestive heart failure (CHF) ([facility] 11/07/2018    Type 2 diabetes mellitus ([facility] 11/07/2018    Hypothyroidism 11/07/2018    Depression 11/07/2018    Hypertension 11/07/2018    CHF (congestive heart failure), NYHA class I, acute on chronic, diastolic ([facility] 71/50/1375    Idiopathic progressive neuropathy 04/18/2016     Overview Note:     Patient has chronic peripheral neuropathy to bilateral feet. She is currently on gabapentin 800 mg tid with some benefit. She reports decreased sensation to her feet with intermittent pain.  Mild obstructive sleep apnea 10/14/2015    CCC (chronic calculous cholecystitis) 09/14/2015    Cirrhosis of liver without ascites ([facility] 09/14/2015    Light headed 04/23/2014     Overview Note:     From dehydration from hyperglycemia - patient dry on presentation. replace inactive diagnosis      Orthostatic hypotension 04/23/2014    Hypertriglyceridemia 04/23/2014    Chest pain 04/22/2014    Hyperglycemia 04/22/2014     Overview Note:     Admission blood glucose of 120; was >500 prior to presentation to ER, first accucheck in ER was 341.  Axillary abscess 04/22/2014     Overview Note:     Left      Hyperlipidemia     DM type 2, uncontrolled, with retinopathy ([facility] 03/01/2013     Overview Note:     Patient's last A1c was 12.1, and she has been referred in the last month to endocrinology.  She has an appointment this coming month with a specialist.      Hypokalemia 03/01/2013    HTN (hypertension) 11/26/2012    Hyperlipemia 11/26/2012    Subcutaneous Daily    furosemide  40 mg Intravenous BID    sodium chloride flush  10 mL Intravenous 2 times per day    aspirin  81 mg Oral Daily    insulin lispro  0-12 Units Subcutaneous TID WC    busPIRone  15 mg Oral TID    gabapentin  800 mg Oral 4x Daily    LORazepam  1 mg Oral BID    nystatin   Topical BID    simvastatin  40 mg Oral Nightly    trospium  20 mg Oral BID AC    venlafaxine  75 mg Oral Daily with breakfast    venlafaxine  150 mg Oral Daily with breakfast    vitamin D  1,000 Units Oral Daily    influenza virus vaccine  0.5 mL Intramuscular Once    insulin lispro  0-12 Units Subcutaneous 2 times per day     Allergies:    Social History:    TOBACCO:   reports that she has quit smoking. Her smoking use included Cigarettes. She has a 21.00 pack-year smoking history. She has never used smokeless tobacco.  ETOH:   reports that she does not drink alcohol. Patient currently lives independently    Family History:   Family History   Problem Relation Age of Onset    Arthritis Mother     Depression Mother     Emphysema Mother     Dementia Mother     Arthritis Father     Cancer Father         prostate    Vision Loss Father     Other Father         brain aneurysm    Arthritis Sister     Depression Sister     Anxiety Disorder Sister     Arthritis Brother     Cancer Brother         eye    Hearing Loss Brother     High Blood Pressure Brother        REVIEW OF SYSTEMS:    CONSTITUTIONAL:  negative for fevers, chills, diaphoresis, activity change, appetite change, fatigue, night sweats and unexpected weight change.    EYES:  negative for blurred vision, eye discharge, visual disturbance and icterus  HEENT:  negative for hearing loss, tinnitus, ear drainage, sinus pressure, nasal congestion, epistaxis and snoring  RESPIRATORY:  See HPI  CARDIOVASCULAR:  negative for chest pain, palpitations, exertional chest pressure/discomfort, edema, syncope  GASTROINTESTINAL:  negative for nausea,

## 2018-11-11 ENCOUNTER — APPOINTMENT (OUTPATIENT)
Dept: GENERAL RADIOLOGY | Age: 68
DRG: 280 | End: 2018-11-11
Attending: INTERNAL MEDICINE
Payer: MEDICARE

## 2018-11-11 LAB
ANION GAP SERPL CALCULATED.3IONS-SCNC: 18 MMOL/L (ref 4–16)
BUN BLDV-MCNC: 59 MG/DL (ref 6–23)
CALCIUM SERPL-MCNC: 8.6 MG/DL (ref 8.3–10.6)
CHLORIDE BLD-SCNC: 92 MMOL/L (ref 99–110)
CO2: 20 MMOL/L (ref 21–32)
CREAT SERPL-MCNC: 1.9 MG/DL (ref 0.6–1.1)
GFR AFRICAN AMERICAN: 32 ML/MIN/1.73M2
GFR NON-AFRICAN AMERICAN: 26 ML/MIN/1.73M2
GLUCOSE BLD-MCNC: 234 MG/DL (ref 70–99)
GLUCOSE BLD-MCNC: 295 MG/DL (ref 70–99)
GLUCOSE BLD-MCNC: 314 MG/DL (ref 70–99)
GLUCOSE BLD-MCNC: 318 MG/DL (ref 70–99)
GLUCOSE BLD-MCNC: 343 MG/DL (ref 70–99)
GLUCOSE BLD-MCNC: 355 MG/DL (ref 70–99)
HCT VFR BLD CALC: 33 % (ref 37–47)
HEMOGLOBIN: 9.6 GM/DL (ref 12.5–16)
MCH RBC QN AUTO: 29 PG (ref 27–31)
MCHC RBC AUTO-ENTMCNC: 29.1 % (ref 32–36)
MCV RBC AUTO: 99.7 FL (ref 78–100)
PDW BLD-RTO: 15 % (ref 11.7–14.9)
PLATELET # BLD: 141 K/CU MM (ref 140–440)
PMV BLD AUTO: 10.1 FL (ref 7.5–11.1)
POTASSIUM SERPL-SCNC: 5.4 MMOL/L (ref 3.5–5.1)
RBC # BLD: 3.31 M/CU MM (ref 4.2–5.4)
SODIUM BLD-SCNC: 130 MMOL/L (ref 135–145)
WBC # BLD: 5.4 K/CU MM (ref 4–10.5)

## 2018-11-11 PROCEDURE — 6370000000 HC RX 637 (ALT 250 FOR IP): Performed by: HOSPITALIST

## 2018-11-11 PROCEDURE — 94660 CPAP INITIATION&MGMT: CPT

## 2018-11-11 PROCEDURE — 2580000003 HC RX 258: Performed by: PHYSICIAN ASSISTANT

## 2018-11-11 PROCEDURE — 36415 COLL VENOUS BLD VENIPUNCTURE: CPT

## 2018-11-11 PROCEDURE — 6360000002 HC RX W HCPCS: Performed by: HOSPITALIST

## 2018-11-11 PROCEDURE — 2140000000 HC CCU INTERMEDIATE R&B

## 2018-11-11 PROCEDURE — 97162 PT EVAL MOD COMPLEX 30 MIN: CPT

## 2018-11-11 PROCEDURE — 6370000000 HC RX 637 (ALT 250 FOR IP): Performed by: PHYSICIAN ASSISTANT

## 2018-11-11 PROCEDURE — 6370000000 HC RX 637 (ALT 250 FOR IP): Performed by: INTERNAL MEDICINE

## 2018-11-11 PROCEDURE — 6360000002 HC RX W HCPCS: Performed by: INTERNAL MEDICINE

## 2018-11-11 PROCEDURE — 80048 BASIC METABOLIC PNL TOTAL CA: CPT

## 2018-11-11 PROCEDURE — G8978 MOBILITY CURRENT STATUS: HCPCS

## 2018-11-11 PROCEDURE — G8979 MOBILITY GOAL STATUS: HCPCS

## 2018-11-11 PROCEDURE — 99232 SBSQ HOSP IP/OBS MODERATE 35: CPT | Performed by: INTERNAL MEDICINE

## 2018-11-11 PROCEDURE — 82962 GLUCOSE BLOOD TEST: CPT

## 2018-11-11 PROCEDURE — 97116 GAIT TRAINING THERAPY: CPT

## 2018-11-11 PROCEDURE — 71045 X-RAY EXAM CHEST 1 VIEW: CPT

## 2018-11-11 PROCEDURE — 99233 SBSQ HOSP IP/OBS HIGH 50: CPT | Performed by: INTERNAL MEDICINE

## 2018-11-11 PROCEDURE — 85027 COMPLETE CBC AUTOMATED: CPT

## 2018-11-11 RX ORDER — ACETAMINOPHEN 325 MG/1
650 TABLET ORAL ONCE
Status: DISCONTINUED | OUTPATIENT
Start: 2018-11-11 | End: 2018-11-12

## 2018-11-11 RX ORDER — FUROSEMIDE 40 MG/1
40 TABLET ORAL 2 TIMES DAILY
Status: DISCONTINUED | OUTPATIENT
Start: 2018-11-11 | End: 2018-11-12

## 2018-11-11 RX ORDER — HYDROCODONE BITARTRATE AND ACETAMINOPHEN 5; 325 MG/1; MG/1
1 TABLET ORAL EVERY 6 HOURS PRN
Status: DISCONTINUED | OUTPATIENT
Start: 2018-11-11 | End: 2018-11-13 | Stop reason: HOSPADM

## 2018-11-11 RX ORDER — INSULIN GLARGINE 100 [IU]/ML
50 INJECTION, SOLUTION SUBCUTANEOUS NIGHTLY
Status: DISCONTINUED | OUTPATIENT
Start: 2018-11-11 | End: 2018-11-13

## 2018-11-11 RX ORDER — POLYETHYLENE GLYCOL 3350 17 G/17G
17 POWDER, FOR SOLUTION ORAL ONCE
Status: COMPLETED | OUTPATIENT
Start: 2018-11-11 | End: 2018-11-11

## 2018-11-11 RX ADMIN — INSULIN LISPRO 30 UNITS: 100 INJECTION, SOLUTION INTRAVENOUS; SUBCUTANEOUS at 07:55

## 2018-11-11 RX ADMIN — VENLAFAXINE HYDROCHLORIDE 150 MG: 150 CAPSULE, EXTENDED RELEASE ORAL at 07:51

## 2018-11-11 RX ADMIN — BUSPIRONE HYDROCHLORIDE 15 MG: 15 TABLET ORAL at 13:08

## 2018-11-11 RX ADMIN — LORAZEPAM 1 MG: 1 TABLET ORAL at 21:34

## 2018-11-11 RX ADMIN — GABAPENTIN 800 MG: 400 CAPSULE ORAL at 17:02

## 2018-11-11 RX ADMIN — POLYETHYLENE GLYCOL (3350) 17 G: 17 POWDER, FOR SOLUTION ORAL at 16:14

## 2018-11-11 RX ADMIN — SODIUM CHLORIDE, PRESERVATIVE FREE 10 ML: 5 INJECTION INTRAVENOUS at 21:36

## 2018-11-11 RX ADMIN — BUSPIRONE HYDROCHLORIDE 15 MG: 15 TABLET ORAL at 07:52

## 2018-11-11 RX ADMIN — BUSPIRONE HYDROCHLORIDE 15 MG: 15 TABLET ORAL at 21:34

## 2018-11-11 RX ADMIN — HYDROCODONE BITARTRATE AND ACETAMINOPHEN 1 TABLET: 5; 325 TABLET ORAL at 11:30

## 2018-11-11 RX ADMIN — NYSTATIN: 100000 CREAM TOPICAL at 21:35

## 2018-11-11 RX ADMIN — INSULIN LISPRO 30 UNITS: 100 INJECTION, SOLUTION INTRAVENOUS; SUBCUTANEOUS at 16:58

## 2018-11-11 RX ADMIN — INSULIN LISPRO 8 UNITS: 100 INJECTION, SOLUTION INTRAVENOUS; SUBCUTANEOUS at 16:58

## 2018-11-11 RX ADMIN — TROSPIUM CHLORIDE 20 MG: 20 TABLET ORAL at 06:19

## 2018-11-11 RX ADMIN — SODIUM CHLORIDE, PRESERVATIVE FREE 10 ML: 5 INJECTION INTRAVENOUS at 07:54

## 2018-11-11 RX ADMIN — GABAPENTIN 800 MG: 400 CAPSULE ORAL at 07:51

## 2018-11-11 RX ADMIN — LEVOTHYROXINE SODIUM 75 MCG: 75 TABLET ORAL at 06:19

## 2018-11-11 RX ADMIN — GABAPENTIN 800 MG: 400 CAPSULE ORAL at 13:08

## 2018-11-11 RX ADMIN — LORAZEPAM 1 MG: 1 TABLET ORAL at 07:51

## 2018-11-11 RX ADMIN — FUROSEMIDE 40 MG: 10 INJECTION, SOLUTION INTRAMUSCULAR; INTRAVENOUS at 07:51

## 2018-11-11 RX ADMIN — INSULIN LISPRO 8 UNITS: 100 INJECTION, SOLUTION INTRAVENOUS; SUBCUTANEOUS at 02:18

## 2018-11-11 RX ADMIN — SIMVASTATIN 40 MG: 40 TABLET, FILM COATED ORAL at 21:36

## 2018-11-11 RX ADMIN — ENOXAPARIN SODIUM 40 MG: 40 INJECTION SUBCUTANEOUS at 07:52

## 2018-11-11 RX ADMIN — FUROSEMIDE 40 MG: 40 TABLET ORAL at 17:03

## 2018-11-11 RX ADMIN — TRAMADOL HYDROCHLORIDE 50 MG: 50 TABLET, FILM COATED ORAL at 07:52

## 2018-11-11 RX ADMIN — ASPIRIN 81 MG 81 MG: 81 TABLET ORAL at 07:52

## 2018-11-11 RX ADMIN — TROSPIUM CHLORIDE 20 MG: 20 TABLET ORAL at 16:14

## 2018-11-11 RX ADMIN — INSULIN GLARGINE 50 UNITS: 100 INJECTION, SOLUTION SUBCUTANEOUS at 21:33

## 2018-11-11 RX ADMIN — METHYLPREDNISOLONE SODIUM SUCCINATE 40 MG: 40 INJECTION, POWDER, LYOPHILIZED, FOR SOLUTION INTRAMUSCULAR; INTRAVENOUS at 07:50

## 2018-11-11 RX ADMIN — VENLAFAXINE HYDROCHLORIDE 75 MG: 37.5 CAPSULE, EXTENDED RELEASE ORAL at 09:47

## 2018-11-11 RX ADMIN — INSULIN LISPRO 4 UNITS: 100 INJECTION, SOLUTION INTRAVENOUS; SUBCUTANEOUS at 21:34

## 2018-11-11 RX ADMIN — NYSTATIN: 100000 CREAM TOPICAL at 09:47

## 2018-11-11 RX ADMIN — INSULIN LISPRO 8 UNITS: 100 INJECTION, SOLUTION INTRAVENOUS; SUBCUTANEOUS at 11:59

## 2018-11-11 RX ADMIN — INSULIN LISPRO 10 UNITS: 100 INJECTION, SOLUTION INTRAVENOUS; SUBCUTANEOUS at 07:54

## 2018-11-11 RX ADMIN — INSULIN LISPRO 30 UNITS: 100 INJECTION, SOLUTION INTRAVENOUS; SUBCUTANEOUS at 12:00

## 2018-11-11 RX ADMIN — METHYLPREDNISOLONE SODIUM SUCCINATE 40 MG: 40 INJECTION, POWDER, LYOPHILIZED, FOR SOLUTION INTRAMUSCULAR; INTRAVENOUS at 21:34

## 2018-11-11 RX ADMIN — GABAPENTIN 800 MG: 400 CAPSULE ORAL at 21:34

## 2018-11-11 RX ADMIN — Medication 1000 UNITS: at 07:51

## 2018-11-11 RX ADMIN — INSULIN HUMAN 15 UNITS: 100 INJECTION, SUSPENSION SUBCUTANEOUS at 09:47

## 2018-11-11 ASSESSMENT — PAIN SCALES - GENERAL
PAINLEVEL_OUTOF10: 9
PAINLEVEL_OUTOF10: 0
PAINLEVEL_OUTOF10: 8
PAINLEVEL_OUTOF10: 0

## 2018-11-11 NOTE — PROGRESS NOTES
Type 2 diabetes mellitus ([facility] 11/07/2018    Hypothyroidism 11/07/2018    Depression 11/07/2018    Hypertension 11/07/2018    CHF (congestive heart failure), NYHA class I, acute on chronic, diastolic ([facility] 10/48/1701    Idiopathic progressive neuropathy 04/18/2016     Overview Note:     Patient has chronic peripheral neuropathy to bilateral feet. She is currently on gabapentin 800 mg tid with some benefit. She reports decreased sensation to her feet with intermittent pain.  Mild obstructive sleep apnea 10/14/2015    CCC (chronic calculous cholecystitis) 09/14/2015    Cirrhosis of liver without ascites ([facility] 09/14/2015    Light headed 04/23/2014     Overview Note:     From dehydration from hyperglycemia - patient dry on presentation. replace inactive diagnosis      Orthostatic hypotension 04/23/2014    Hypertriglyceridemia 04/23/2014    Chest pain 04/22/2014    Hyperglycemia 04/22/2014     Overview Note:     Admission blood glucose of 120; was >500 prior to presentation to ER, first accucheck in ER was 341.  Axillary abscess 04/22/2014     Overview Note:     Left      Hyperlipidemia     DM type 2, uncontrolled, with retinopathy ([facility] 03/01/2013     Overview Note:     Patient's last A1c was 12.1, and she has been referred in the last month to endocrinology. She has an appointment this coming month with a specialist.      Hypokalemia 03/01/2013    HTN (hypertension) 11/26/2012    Hyperlipemia 11/26/2012    COPD (chronic obstructive pulmonary disease) ([facility] 11/26/2012    Anxiety and depression 11/26/2012     Overview Note:     Patient is currently on venlafaxine 150 mg daily for depression and anxiety. She feels like it is not as affective as she would like currently. She does not feel suicidal and is sleeping ok but has decreased motivation and less happy days. 1. C/w BIPAP  2. Keep sats >92%  3. ICS  4. OOB to chair  5. C/w present management  6. OP Split night study  7.  OP

## 2018-11-11 NOTE — PROGRESS NOTES
exam:->chest pain Ordering Physician Provided Reason for Exam: sob Acuity: Acute Type of Exam: Initial Additional signs and symptoms: sob cough, congestion started last night worse tonight, hx of chf FINDINGS: There are bilateral parahilar infiltrates with consolidative changes in the upper lobes. The heart size is magnified by portable technique. There is deformity of the right shoulder as on prior. Bilateral parahilar infiltrates with consolidative changes in the upper lobes. This could reflect CHF and/or pneumonia.        Scheduled Medicines   Medications:    insulin lispro  30 Units Subcutaneous TID WC    insulin glargine  50 Units Subcutaneous Nightly    insulin lispro  0-12 Units Subcutaneous TID WC    insulin lispro  0-12 Units Subcutaneous 2 times per day    insulin NPH  15 Units Subcutaneous Once    methylPREDNISolone  40 mg Intravenous Q12H    levothyroxine  75 mcg Oral Daily    enoxaparin  40 mg Subcutaneous Daily    furosemide  40 mg Intravenous BID    sodium chloride flush  10 mL Intravenous 2 times per day    aspirin  81 mg Oral Daily    busPIRone  15 mg Oral TID    gabapentin  800 mg Oral 4x Daily    LORazepam  1 mg Oral BID    nystatin   Topical BID    simvastatin  40 mg Oral Nightly    trospium  20 mg Oral BID AC    venlafaxine  75 mg Oral Daily with breakfast    venlafaxine  150 mg Oral Daily with breakfast    vitamin D  1,000 Units Oral Daily    influenza virus vaccine  0.5 mL Intramuscular Once      Infusions:    dextrose           Objective:   Vitals: BP (!) 133/59   Pulse 79   Temp 97.1 °F (36.2 °C) (Axillary)   Resp 12   Ht 4' 11.84\" (1.52 m)   Wt 244 lb 7.8 oz (110.9 kg)   SpO2 99%   BMI 48.00 kg/m²   General appearance: alert and cooperative with exam  Neck: no JVD or bruit  Thyroid : Normal lobes   Lungs: Has Vesicular Breath sounds has some wheezing and rales   Heart:  regular rate and rhythm  Abdomen: soft, non-tender; bowel sounds normal; no masses,  no

## 2018-11-11 NOTE — PROGRESS NOTES
Topher Gomez MD  Referral Date : 11/10/18  Diagnosis: acute on chronic heart failure  Follows Commands: Within Functional Limits  General Comment  Comments: pt in chair upon arrival  Subjective  Subjective: agreeable to PT evaluation. agreeable to therapy placement if needed. denies SOB at rest   Pain Screening  Patient Currently in Pain: Denies  Pain Assessment  Pain Assessment: 0-10  Vital Signs  Patient Currently in Pain: Denies       Orientation  Orientation  Overall Orientation Status: Within Normal Limits  Social/Functional History  Social/Functional History  Lives With: Family (niece)  Type of Home: House  Home Layout: One level  Home Access: Stairs to enter with rails  Entrance Stairs - Number of Steps: 3  Bathroom Shower/Tub: Tub/Shower unit  Bathroom Toilet: Standard  Bathroom Equipment: Shower chair  Home Equipment: Rolling walker, Cane, Oxygen (uses cane ; 2 L NC at baseline)  Receives Help From: Family (niece)  ADL Assistance: Needs assistance (assistance for transfers into tub shower)  Homemaking Assistance: Needs assistance  Homemaking Responsibilities: No  Ambulation Assistance: Independent  Transfer Assistance: Independent  Active : Yes  Additional Comments: pt describes decline in function with increased need for assistance in the aforementioned areas. frequent falls at home. sleeps in recliner    Objective     Observation/Palpation  Posture: Fair  Observation: obese, poor body habitus ; vitals stable on 3 L NC     PROM RLE (degrees)  RLE PROM: WNL  AROM RLE (degrees)  RLE AROM: WNL  PROM LLE (degrees)  LLE PROM: WNL  AROM LLE (degrees)  LLE AROM : WNL  Strength RLE  Strength RLE: Exception  Comment: gross LE weakness observed with mobility. knee instability/shaking increases with fatigue  Strength LLE  Strength LLE: Exception  Comment: gross LE weakness observed with mobility.  knee instability/shaking increases with fatigue  Motor Control  Gross Motor?: WNL  Sensation  Overall Sensation will sit<-> stand at LRAD at [facility] with good efficiency from all surfaces  Short term goal 2: pt will ambulate 80 ft with LRAD at [facility] without near LOB or knee unsteadiness/buckling   Short term goal 3: pt will tolerate 5 consecutive min of standing ther ex, ther act, no greater than SBA, VSS        Therapy Time      Time In: 1300  Time Out: 1331  Total Treatment Time: 31  Timed Code Treatment Minutes: Adriana Cody, PT

## 2018-11-11 NOTE — PROGRESS NOTES
Fibromyalgia; H/O cardiovascular stress test; H/O complete electrocardiogram; H/O echocardiogram; Headache(784.0); Hepatitis B; History of cardiac cath; History of chest x-ray; Holter monitor, abnormal; Hx of cardiovascular stress test; Hx of echocardiogram; Hyperlipidemia; Hypertension; Insulin pump in place; Liver cirrhosis ([facility] [address] SADI on CPAP; Peripheral vascular disease ([facility] [address] PONV (postoperative nausea and vomiting); Renal disease; Tachycardia; and Wears dentures. has a past surgical history that includes Hysterectomy (2000); back surgery (1998); Tonsillectomy (1966); shoulder surgery (Right, 1960); Cataract removal with implant (Bilateral, 2000s); Diagnostic Cardiac Cath Lab Procedure (2000s); Tonsillectomy and adenoidectomy (1966); Cholecystectomy ([id number]); and liver biopsy (9/14/2015).   Physical Exam:  General:  Awake, alert, NAD  Head:normal  Eye:normal  Neck:  No JVD   Chest:   Mild wheeze  Cardiovascular:  RRR S1S2  Abdomen:   nontender  Extremities:  tr edema  Pulses; palpable  Neuro: grossly normal    Medications:    insulin lispro  30 Units Subcutaneous TID WC    insulin glargine  50 Units Subcutaneous Nightly    insulin lispro  0-12 Units Subcutaneous TID WC    insulin lispro  0-12 Units Subcutaneous 2 times per day    insulin NPH  15 Units Subcutaneous Once    polyethylene glycol  17 g Oral Once    methylPREDNISolone  40 mg Intravenous Q12H    levothyroxine  75 mcg Oral Daily    enoxaparin  40 mg Subcutaneous Daily    furosemide  40 mg Intravenous BID    sodium chloride flush  10 mL Intravenous 2 times per day    aspirin  81 mg Oral Daily    busPIRone  15 mg Oral TID    gabapentin  800 mg Oral 4x Daily    LORazepam  1 mg Oral BID    nystatin   Topical BID    simvastatin  40 mg Oral Nightly    trospium  20 mg Oral BID AC    venlafaxine  75 mg Oral Daily with breakfast    venlafaxine  150 mg Oral Daily with breakfast    vitamin D  1,000 Units Oral Daily    influenza virus

## 2018-11-11 NOTE — PROGRESS NOTES
Progress Note( Dr. Nain Beaulieu)  11/10/2018  Subjective:   Admit Date: 11/8/2018  PCP: Karmen Vega PA-C    Admitted For : Chest pain & SOB NSTMI     Consulted For: Better control of DM     Interval History:  Still has  SOB  Had LHC . Cardiology mote : LHC showed mild to moderate non obstructive CAD of the LAD and RCA, both of which are small in caliber. The main artery appears to be LCX which is dominant. Therefore Medical management only reccommended continue with asa   C/O  chest pains,   More SOB . Denies nausea or vomiting. No new bowel or bladder symptoms.        Intake/Output Summary (Last 24 hours) at 11/10/18 2149  Last data filed at 11/10/18 2050   Gross per 24 hour   Intake              910 ml   Output             1400 ml   Net             -490 ml       DATA    CBC:   Recent Labs      11/08/18   0600  11/09/18   0448  11/10/18   0347   WBC  10.3  8.5  7.7   HGB  10.0*  9.4*  11.5*   PLT  104*  156  173    CMP:  Recent Labs      11/08/18   0600  11/10/18   0347   NA  137  134*   K  3.7  4.9   CL  97*  96*   CO2  29  22   BUN  23  47*   CREATININE  1.0  2.1*   CALCIUM  9.6  8.3     Lipids:   Lab Results   Component Value Date    CHOL 120 11/08/2018    CHOL 205 03/17/2016    HDL 53 11/08/2018    TRIG 115 11/08/2018     Glucose:  Recent Labs      11/10/18   1138  11/10/18   1654  11/10/18   2050   POCGLU  277*  123*  264*     KqhycwkddnK1O:  Lab Results   Component Value Date    LABA1C 8.3 11/07/2018     High Sensitivity TSH:   Lab Results   Component Value Date    TSHHS 3.020 11/08/2018     Free T3:   Lab Results   Component Value Date    FT3 2.9 03/17/2016     Free T4:  Lab Results   Component Value Date    T4FREE 0.89 11/08/2018       Xr Chest Portable    Result Date: 11/7/2018  EXAMINATION: SINGLE XRAY VIEW OF THE CHEST 11/7/2018 8:55 pm COMPARISON: November 2, 2017 HISTORY: ORDERING SYSTEM PROVIDED HISTORY: chest pain TECHNOLOGIST PROVIDED HISTORY: Reason for exam:->chest pain Ordering Physician

## 2018-11-11 NOTE — PROGRESS NOTES
Nephrology Progress Note        [address], [address] [facility], [facility] [address]  Phone: [phone]  Office Hours: 8:30AM - 4:30PM  Monday - Friday       [facility]  MD Natan Layton, DO [address],  [address] Aguilar, [facility] [address]  PHONE: [phone]  FAX: [phone]    11/11/2018 11:16 AM  Subjective:   Admit Date: 11/8/2018  PCP: Sherry Wolf PA-C  Interval History: on nc  No worsening  soa  Good uop  Complains of right shoulder pain and leg pain, would like a heat pack    Diet: DIET CARDIAC; Carb Control: 4 carb choices (60 gms)/meal; No Caffeine      Data:   Scheduled Meds:   insulin lispro  30 Units Subcutaneous TID WC    insulin glargine  50 Units Subcutaneous Nightly    insulin lispro  0-12 Units Subcutaneous TID WC    insulin lispro  0-12 Units Subcutaneous 2 times per day    polyethylene glycol  17 g Oral Once    methylPREDNISolone  40 mg Intravenous Q12H    levothyroxine  75 mcg Oral Daily    enoxaparin  40 mg Subcutaneous Daily    furosemide  40 mg Intravenous BID    sodium chloride flush  10 mL Intravenous 2 times per day    aspirin  81 mg Oral Daily    busPIRone  15 mg Oral TID    gabapentin  800 mg Oral 4x Daily    LORazepam  1 mg Oral BID    nystatin   Topical BID    simvastatin  40 mg Oral Nightly    trospium  20 mg Oral BID AC    venlafaxine  75 mg Oral Daily with breakfast    venlafaxine  150 mg Oral Daily with breakfast    vitamin D  1,000 Units Oral Daily    influenza virus vaccine  0.5 mL Intramuscular Once     Continuous Infusions:   dextrose       PRN Meds:HYDROcodone 5 mg - acetaminophen, traMADol, sodium chloride flush, magnesium hydroxide, ondansetron, glucose, dextrose, glucagon (rDNA), dextrose, magnesium hydroxide, nitroGLYCERIN  I/O last 3 completed shifts: In: 1789 [P.O.:1380; I.V.:10]  Out: 2900 [Urine:2900]  No intake/output data recorded.     Intake/Output Summary (Last 24 hours) at

## 2018-11-11 NOTE — CARE COORDINATION
LSW received a consult to talk with pt. LSW informed that pt has had falls at home and is very unsteady on her feet. LSW spoke with pt about possible SNF placement. Pt agreeable. CM will need PT/OT evals. LSW placed a not on the computer white board asking for therapy to eval pt for placement. Pt has [facility] and will need a precert or may qualify for the 6 click. Either way PT/OT evals are needed. SNF list given to pt.

## 2018-11-12 LAB
ANION GAP SERPL CALCULATED.3IONS-SCNC: 12 MMOL/L (ref 4–16)
ANION GAP SERPL CALCULATED.3IONS-SCNC: 13 MMOL/L (ref 4–16)
ANTITHYROGLOBULIN AB: <0.9
BUN BLDV-MCNC: 55 MG/DL (ref 6–23)
BUN BLDV-MCNC: 61 MG/DL (ref 6–23)
CALCIUM SERPL-MCNC: 8.4 MG/DL (ref 8.3–10.6)
CALCIUM SERPL-MCNC: 8.6 MG/DL (ref 8.3–10.6)
CHLORIDE BLD-SCNC: 90 MMOL/L (ref 99–110)
CHLORIDE BLD-SCNC: 93 MMOL/L (ref 99–110)
CO2: 28 MMOL/L (ref 21–32)
CO2: 28 MMOL/L (ref 21–32)
CREAT SERPL-MCNC: 1.4 MG/DL (ref 0.6–1.1)
CREAT SERPL-MCNC: 1.5 MG/DL (ref 0.6–1.1)
GFR AFRICAN AMERICAN: 42 ML/MIN/1.73M2
GFR AFRICAN AMERICAN: 45 ML/MIN/1.73M2
GFR NON-AFRICAN AMERICAN: 35 ML/MIN/1.73M2
GFR NON-AFRICAN AMERICAN: 37 ML/MIN/1.73M2
GLUCOSE BLD-MCNC: 266 MG/DL (ref 70–99)
GLUCOSE BLD-MCNC: 274 MG/DL (ref 70–99)
GLUCOSE BLD-MCNC: 280 MG/DL (ref 70–99)
GLUCOSE BLD-MCNC: 366 MG/DL (ref 70–99)
GLUCOSE BLD-MCNC: 390 MG/DL (ref 70–99)
GLUCOSE BLD-MCNC: 397 MG/DL (ref 70–99)
GLUCOSE BLD-MCNC: 426 MG/DL (ref 70–99)
HCT VFR BLD CALC: 30.8 % (ref 37–47)
HEMOGLOBIN: 9.9 GM/DL (ref 12.5–16)
MCH RBC QN AUTO: 29.6 PG (ref 27–31)
MCHC RBC AUTO-ENTMCNC: 32.1 % (ref 32–36)
MCV RBC AUTO: 91.9 FL (ref 78–100)
PDW BLD-RTO: 14.8 % (ref 11.7–14.9)
PLATELET # BLD: 158 K/CU MM (ref 140–440)
PMV BLD AUTO: 10.4 FL (ref 7.5–11.1)
POTASSIUM SERPL-SCNC: 5.3 MMOL/L (ref 3.5–5.1)
POTASSIUM SERPL-SCNC: 5.8 MMOL/L (ref 3.5–5.1)
RBC # BLD: 3.35 M/CU MM (ref 4.2–5.4)
REASON FOR REJECTION: NORMAL
REJECTED TEST: NORMAL
SODIUM BLD-SCNC: 131 MMOL/L (ref 135–145)
SODIUM BLD-SCNC: 133 MMOL/L (ref 135–145)
SOURCE: NORMAL
WBC # BLD: 10.1 K/CU MM (ref 4–10.5)

## 2018-11-12 PROCEDURE — 6370000000 HC RX 637 (ALT 250 FOR IP): Performed by: INTERNAL MEDICINE

## 2018-11-12 PROCEDURE — 6360000002 HC RX W HCPCS: Performed by: INTERNAL MEDICINE

## 2018-11-12 PROCEDURE — 80048 BASIC METABOLIC PNL TOTAL CA: CPT

## 2018-11-12 PROCEDURE — G8987 SELF CARE CURRENT STATUS: HCPCS

## 2018-11-12 PROCEDURE — 97530 THERAPEUTIC ACTIVITIES: CPT

## 2018-11-12 PROCEDURE — G8988 SELF CARE GOAL STATUS: HCPCS

## 2018-11-12 PROCEDURE — 2580000003 HC RX 258: Performed by: INTERNAL MEDICINE

## 2018-11-12 PROCEDURE — 97166 OT EVAL MOD COMPLEX 45 MIN: CPT

## 2018-11-12 PROCEDURE — 99231 SBSQ HOSP IP/OBS SF/LOW 25: CPT | Performed by: INTERNAL MEDICINE

## 2018-11-12 PROCEDURE — 6360000002 HC RX W HCPCS: Performed by: HOSPITALIST

## 2018-11-12 PROCEDURE — 99232 SBSQ HOSP IP/OBS MODERATE 35: CPT | Performed by: INTERNAL MEDICINE

## 2018-11-12 PROCEDURE — 97116 GAIT TRAINING THERAPY: CPT

## 2018-11-12 PROCEDURE — 97535 SELF CARE MNGMENT TRAINING: CPT

## 2018-11-12 PROCEDURE — 36415 COLL VENOUS BLD VENIPUNCTURE: CPT

## 2018-11-12 PROCEDURE — 94660 CPAP INITIATION&MGMT: CPT

## 2018-11-12 PROCEDURE — 2580000003 HC RX 258: Performed by: PHYSICIAN ASSISTANT

## 2018-11-12 PROCEDURE — 2140000000 HC CCU INTERMEDIATE R&B

## 2018-11-12 PROCEDURE — 6370000000 HC RX 637 (ALT 250 FOR IP): Performed by: PHYSICIAN ASSISTANT

## 2018-11-12 PROCEDURE — 85027 COMPLETE CBC AUTOMATED: CPT

## 2018-11-12 RX ORDER — SODIUM POLYSTYRENE SULFONATE 15 G/60ML
30 SUSPENSION ORAL; RECTAL ONCE
Status: COMPLETED | OUTPATIENT
Start: 2018-11-12 | End: 2018-11-12

## 2018-11-12 RX ORDER — 0.9 % SODIUM CHLORIDE 0.9 %
250 INTRAVENOUS SOLUTION INTRAVENOUS ONCE
Status: COMPLETED | OUTPATIENT
Start: 2018-11-12 | End: 2018-11-12

## 2018-11-12 RX ORDER — POLYETHYLENE GLYCOL 3350 17 G/17G
17 POWDER, FOR SOLUTION ORAL ONCE
Status: COMPLETED | OUTPATIENT
Start: 2018-11-12 | End: 2018-11-12

## 2018-11-12 RX ORDER — FUROSEMIDE 40 MG/1
40 TABLET ORAL 2 TIMES DAILY
Status: DISCONTINUED | OUTPATIENT
Start: 2018-11-12 | End: 2018-11-13

## 2018-11-12 RX ORDER — METHYLPREDNISOLONE SODIUM SUCCINATE 40 MG/ML
40 INJECTION, POWDER, LYOPHILIZED, FOR SOLUTION INTRAMUSCULAR; INTRAVENOUS DAILY
Status: DISCONTINUED | OUTPATIENT
Start: 2018-11-13 | End: 2018-11-13

## 2018-11-12 RX ORDER — FUROSEMIDE 10 MG/ML
20 INJECTION INTRAMUSCULAR; INTRAVENOUS ONCE
Status: COMPLETED | OUTPATIENT
Start: 2018-11-12 | End: 2018-11-12

## 2018-11-12 RX ORDER — INSULIN GLARGINE 100 [IU]/ML
20 INJECTION, SOLUTION SUBCUTANEOUS ONCE
Status: COMPLETED | OUTPATIENT
Start: 2018-11-12 | End: 2018-11-13

## 2018-11-12 RX ORDER — DOCUSATE SODIUM 100 MG/1
100 CAPSULE, LIQUID FILLED ORAL DAILY
Status: DISCONTINUED | OUTPATIENT
Start: 2018-11-12 | End: 2018-11-13 | Stop reason: HOSPADM

## 2018-11-12 RX ADMIN — BUSPIRONE HYDROCHLORIDE 15 MG: 15 TABLET ORAL at 08:35

## 2018-11-12 RX ADMIN — LORAZEPAM 1 MG: 1 TABLET ORAL at 08:46

## 2018-11-12 RX ADMIN — ENOXAPARIN SODIUM 40 MG: 40 INJECTION SUBCUTANEOUS at 08:32

## 2018-11-12 RX ADMIN — FUROSEMIDE 20 MG: 10 INJECTION, SOLUTION INTRAVENOUS at 08:35

## 2018-11-12 RX ADMIN — GABAPENTIN 800 MG: 400 CAPSULE ORAL at 08:33

## 2018-11-12 RX ADMIN — INSULIN HUMAN 20 UNITS: 100 INJECTION, SUSPENSION SUBCUTANEOUS at 11:35

## 2018-11-12 RX ADMIN — SODIUM POLYSTYRENE SULFONATE 30 G: 15 SUSPENSION ORAL; RECTAL at 08:46

## 2018-11-12 RX ADMIN — INSULIN LISPRO 9 UNITS: 100 INJECTION, SOLUTION INTRAVENOUS; SUBCUTANEOUS at 08:36

## 2018-11-12 RX ADMIN — INSULIN GLARGINE 50 UNITS: 100 INJECTION, SOLUTION SUBCUTANEOUS at 21:36

## 2018-11-12 RX ADMIN — SODIUM CHLORIDE 250 ML: 9 INJECTION, SOLUTION INTRAVENOUS at 08:38

## 2018-11-12 RX ADMIN — TROSPIUM CHLORIDE 20 MG: 20 TABLET ORAL at 16:32

## 2018-11-12 RX ADMIN — FUROSEMIDE 40 MG: 40 TABLET ORAL at 16:33

## 2018-11-12 RX ADMIN — INSULIN LISPRO 15 UNITS: 100 INJECTION, SOLUTION INTRAVENOUS; SUBCUTANEOUS at 21:37

## 2018-11-12 RX ADMIN — POLYETHYLENE GLYCOL (3350) 17 G: 17 POWDER, FOR SOLUTION ORAL at 08:39

## 2018-11-12 RX ADMIN — ASPIRIN 81 MG 81 MG: 81 TABLET ORAL at 08:33

## 2018-11-12 RX ADMIN — LEVOTHYROXINE SODIUM 75 MCG: 75 TABLET ORAL at 08:33

## 2018-11-12 RX ADMIN — GABAPENTIN 800 MG: 400 CAPSULE ORAL at 16:32

## 2018-11-12 RX ADMIN — TROSPIUM CHLORIDE 20 MG: 20 TABLET ORAL at 08:33

## 2018-11-12 RX ADMIN — Medication 1000 UNITS: at 08:33

## 2018-11-12 RX ADMIN — SIMVASTATIN 40 MG: 40 TABLET, FILM COATED ORAL at 21:33

## 2018-11-12 RX ADMIN — INSULIN LISPRO 30 UNITS: 100 INJECTION, SOLUTION INTRAVENOUS; SUBCUTANEOUS at 16:33

## 2018-11-12 RX ADMIN — INSULIN LISPRO 15 UNITS: 100 INJECTION, SOLUTION INTRAVENOUS; SUBCUTANEOUS at 11:36

## 2018-11-12 RX ADMIN — GABAPENTIN 800 MG: 400 CAPSULE ORAL at 13:39

## 2018-11-12 RX ADMIN — HYDROCODONE BITARTRATE AND ACETAMINOPHEN 1 TABLET: 5; 325 TABLET ORAL at 02:39

## 2018-11-12 RX ADMIN — INSULIN LISPRO 15 UNITS: 100 INJECTION, SOLUTION INTRAVENOUS; SUBCUTANEOUS at 16:34

## 2018-11-12 RX ADMIN — VENLAFAXINE HYDROCHLORIDE 75 MG: 37.5 CAPSULE, EXTENDED RELEASE ORAL at 08:46

## 2018-11-12 RX ADMIN — LORAZEPAM 1 MG: 1 TABLET ORAL at 21:33

## 2018-11-12 RX ADMIN — SODIUM CHLORIDE, PRESERVATIVE FREE 10 ML: 5 INJECTION INTRAVENOUS at 08:40

## 2018-11-12 RX ADMIN — METHYLPREDNISOLONE SODIUM SUCCINATE 40 MG: 40 INJECTION, POWDER, LYOPHILIZED, FOR SOLUTION INTRAMUSCULAR; INTRAVENOUS at 08:35

## 2018-11-12 RX ADMIN — GABAPENTIN 800 MG: 400 CAPSULE ORAL at 21:33

## 2018-11-12 RX ADMIN — BUSPIRONE HYDROCHLORIDE 15 MG: 15 TABLET ORAL at 21:33

## 2018-11-12 RX ADMIN — NYSTATIN: 100000 CREAM TOPICAL at 08:41

## 2018-11-12 RX ADMIN — SODIUM CHLORIDE, PRESERVATIVE FREE 10 ML: 5 INJECTION INTRAVENOUS at 21:34

## 2018-11-12 RX ADMIN — VENLAFAXINE HYDROCHLORIDE 150 MG: 150 CAPSULE, EXTENDED RELEASE ORAL at 08:34

## 2018-11-12 RX ADMIN — BUSPIRONE HYDROCHLORIDE 15 MG: 15 TABLET ORAL at 13:38

## 2018-11-12 RX ADMIN — NYSTATIN: 100000 CREAM TOPICAL at 21:33

## 2018-11-12 RX ADMIN — INSULIN LISPRO 6 UNITS: 100 INJECTION, SOLUTION INTRAVENOUS; SUBCUTANEOUS at 04:22

## 2018-11-12 RX ADMIN — INSULIN LISPRO 30 UNITS: 100 INJECTION, SOLUTION INTRAVENOUS; SUBCUTANEOUS at 08:39

## 2018-11-12 RX ADMIN — INSULIN LISPRO 30 UNITS: 100 INJECTION, SOLUTION INTRAVENOUS; SUBCUTANEOUS at 11:36

## 2018-11-12 ASSESSMENT — PAIN SCALES - GENERAL
PAINLEVEL_OUTOF10: 9
PAINLEVEL_OUTOF10: 0

## 2018-11-12 ASSESSMENT — PAIN DESCRIPTION - ORIENTATION: ORIENTATION: LOWER

## 2018-11-12 ASSESSMENT — PAIN DESCRIPTION - LOCATION: LOCATION: BACK

## 2018-11-12 ASSESSMENT — PAIN DESCRIPTION - PROGRESSION: CLINICAL_PROGRESSION: GRADUALLY WORSENING

## 2018-11-12 ASSESSMENT — PAIN DESCRIPTION - PAIN TYPE: TYPE: CHRONIC PAIN

## 2018-11-12 NOTE — PROGRESS NOTES
Cardiology Progress Note     Today's Plan:    Admit Date:  11/8/2018    Consult reason/ Seen today for: CHF    Subjective and  Overnight Events:  Up sitting in the chair. Denies any chest pain. Notes the SOB has improved. Telemetry : SR     Assessment / Plan / Recommendation:     ASCVD: + NSTEMI  [facility] with mild to moderate non obstructive CAD. Medical management - ASA and statin  HFpEF EF >50% - SOB has improved. She is in a negative fluid balance. Continue with diuresis:  She is not a candidate for ACE at this time   DM- per primary   SHER Renal following       History of Presenting Illness:    Chief complain on admission : 76 y. o.year old who is admitted for sob     Past medical history:    has a past medical history of Anxiety; Arthritis; Atrial fibrillation ([address] Atrial fibrillation with RVR ([address] Atrial fibrillation with RVR ([address] Atrial fibrillation with RVR ([address] Cancer ([address] Cervicalgia; Constipation; COPD (chronic obstructive pulmonary disease) ([address] Depression; Diabetes mellitus ([address] Diabetic neuropathy ([address] Disc herniation; Fatigue; Fibromyalgia; H/O cardiovascular stress test; H/O complete electrocardiogram; H/O echocardiogram; Headache(784.0); Hepatitis B; History of cardiac cath; History of chest x-ray; Holter monitor, abnormal; Hx of cardiovascular stress test; Hx of echocardiogram; Hyperlipidemia; Hypertension; Insulin pump in place; Liver cirrhosis ([address] SADI on CPAP; Peripheral vascular disease ([address] PONV (postoperative nausea and vomiting); Renal disease; Tachycardia; and Wears dentures. Past surgical history:   has a past surgical history that includes Hysterectomy (2000); back surgery (1998); Tonsillectomy (1966); shoulder surgery (Right, 1960); Cataract removal with implant (Bilateral, 2000s); Diagnostic Cardiac Cath Lab Procedure (2000s); Tonsillectomy and adenoidectomy (1966);  Cholecystectomy

## 2018-11-12 NOTE — PLAN OF CARE
Reviewed WB note from RN Kathe YOUNG. Called her to inform that [facility] was appropriately used and documented with PT moe from 11/11. Left message. Please refer to eval note. Ronna Tubbs [id number]  4:04 PM 11/12/2018     Intend to f/u with staff today as well.   Ronna Tubbs [id number]  9:33 AM 11/13/2018

## 2018-11-12 NOTE — PROGRESS NOTES
Physical Therapy  Physical Therapy Treatment Note  Name: Phillip Murphy MRN: [id number] :   1950   Date:  2018   Admission Date: 2018 Room:  [address] [place] [address]   Restrictions/Precautions:  Restrictions/Precautions  Restrictions/Precautions: General Precautions, Fall Risk, Up as Tolerated         Communication with other providers:  Handoff to RN    Subjective:  Patient states:  Patient reports she's been up in the chair since 3am.  Pain:   Location, Type, Intensity (0/10 to 10/10):  Chronic pain in back and shoulder, didn't rate    Objective:    Bed mobility: Min assist, STS: min assist, Stand pivot transfer: CGA assist, Gait: CGA/min A assist.  Performed with AD. Treatment, including education/measures:    Therapeutic Activity Training:   Therapeutic activity training was instructed today. Cues were given for safety, sequence, UE/LE placement, awareness, and balance. Activities performed today included bed mobility training, sup-sit, sit-stand, SPT. Sitting balance:  Patient able to sit EOB with SBA      Standing balance:   Patient performed standing balance with AD with CGA assist.    Gait Training:  Patient ambulated 50ft, x25ft with AD, cues were given for safety, sequencing, device negotiation, balance, posture. Patient with x1 LOB, reported d/t chronic vertigo and min instability. Noted decreased stride and river, Provided verbal cues for pursed lip breathing to increase O2 saturation throughout mobility. Patient desated to mid [de-identified], able to return to low 90's. Assessment / Impression:       Patient's tolerance of treatment:  Tolerated well   Adverse Reaction: SOB  Significant change in status and impact:  Improved mobility and out of bed tolerance. Barriers to improvement:  Strength, balance, endurance    Plan for Next Session:    Progress independence with bed mobility, transfers, and gait per POC.   Time in:  09  Time out:  09  Timed treatment minutes: 45  Total treatment time:  45    Previously filed items:  Social/Functional History  Lives With: Family (niece)  Type of Home: House  Home Layout: One level  Home Access: Stairs to enter with rails  Entrance Stairs - Number of Steps: 3  Bathroom Shower/Tub: Tub/Shower unit  Bathroom Toilet: Standard  Bathroom Equipment: Shower chair  Home Equipment: Rolling walker, Cane, Oxygen (uses cane ; 2 L NC at baseline)  Receives Help From: Family (niece)  ADL Assistance: Needs assistance (assistance for transfers into tub shower)  Homemaking Assistance: Needs assistance  Homemaking Responsibilities: No  Ambulation Assistance: Independent  Transfer Assistance: Independent  Active : Yes  Additional Comments: pt describes decline in function with increased need for assistance in the aforementioned areas. frequent falls at home.  sleeps in recliner  Short term goals  Time Frame for Short term goals: one week  Short term goal 1: pt will sit<-> stand at LRAD at [facility] with good efficiency from all surfaces  Short term goal 2: pt will ambulate 80 ft with LRAD at [facility] without near LOB or knee unsteadiness/buckling   Short term goal 3: pt will tolerate 5 consecutive min of standing ther ex, ther act, no greater than SBA, VSS        Electronically signed by:    Anil Bennett PT  11/12/2018, 9:06 AM

## 2018-11-12 NOTE — PROGRESS NOTES
per 24 hour   Intake                0 ml   Output             1500 ml   Net            -1500 ml       CBC:   Recent Labs      11/10/18   0347  11/11/18   0341  11/12/18   0616   WBC  7.7  5.4  10.1   HGB  11.5*  9.6*  9.9*   PLT  173  141  158       BMP:  Recent Labs      11/10/18   0347  11/11/18   0341  11/12/18   0616   NA  134*  130*  133*   K  4.9  5.4*  5.8*   CL  96*  92*  93*   CO2  22  20*  28   BUN  47*  59*  61*   CREATININE  2.1*  1.9*  1.5*   GLUCOSE  173*  295*  266*     Hepatic: No results for input(s): AST, ALT, ALB, BILITOT, ALKPHOS in the last 72 hours. Troponin: No results for input(s): TROPONINI in the last 72 hours. BNP: No results for input(s): BNP in the last 72 hours. Lipids: No results for input(s): CHOL, HDL in the last 72 hours. Invalid input(s): LDLCALCU  ABGs: Lab Results   Component Value Date    PO2ART 84 11/10/2018    YWK6STV 59.0 11/10/2018     INR: No results for input(s): INR in the last 72 hours. Objective:   Vitals: BP (!) 117/49   Pulse 87   Temp 96.4 °F (35.8 °C) (Oral)   Resp 14   Ht 4' 11.84\" (1.52 m)   Wt 245 lb (111.1 kg)   SpO2 99%   BMI 48.10 kg/m²   General appearance: alert and cooperative with exam, in no acute distress  HEENT: normocephalic, atraumatic,   Neck: supple, trachea midline  Lungs: , breathing comfortably on NC  Heart[de-identified] regular rate and rhythm, S1, S2 normal,  Abdomen: soft, non-tender; non distended,   Extremities: extremities atraumatic, no cyanosis or edema  Neurologic: alert, oriented, follows commands, interactive    Assessment and Plan:   - SHER; cr 2.1 , baseline 0.9; Based on timeline , SHER likely from contrast-induced nephropathy, bland UA and urine lytes suggest ATN  - Acute hypoxic resp failure: heart failure, copd?   - Proteinuria: non nephrotic  - Hyponatremia  - hyperkalemia  - Acute on chronic diastolic CM  - DM2  - Hypothyroidism   - Non obstructive CAD per [facility] from 11/8/18  - COPD     Plan  - Cr better but K is wrose  - Give

## 2018-11-12 NOTE — CARE COORDINATION
CM in to discuss the PT recommendation for pt to go to a SNF. Pt is agreeable. PT/OT is in room with pt. SNF list provided. CM will f/u with pt in a little while for SNF choice.   TE

## 2018-11-12 NOTE — CARE COORDINATION
[facility] has informed this CM that they are not in network with her insurance. Called pt's second choice/ [facility] and she states that they are not in network with [facility]. Pt updated. Her next SNF choice is [facility]. Referral made to [facility]. She states that pt is eligible for the 6 click program.  Will need the 6 click score from PT. Notified PT via WB that the 6 click score needs to be put in the PT note. Kate Cervantes will review info and notify CM if they can take pt.   TE

## 2018-11-12 NOTE — PROGRESS NOTES
[facility]PITAL[facility] PROGRESS NOTE  Date: 11/12/2018   Name: Will Murphy   MRN: [id number]   YOB: 1950  No chief complaint on file. Subjective/Interval Hx:      Patient says she is feeling slightly better today. No acute  overnight events    Objective:   Physical Exam:   BP (!) 139/42   Pulse 80   Temp 98.2 °F (36.8 °C) (Oral)   Resp 16   Ht 4' 11.84\" (1.52 m)   Wt 245 lb (111.1 kg)   SpO2 99%   BMI 48.10 kg/m²   CONSTITUTIONAL:  No acute distress  HENT:  NCAT  LUNGS:   Slightly diminished breath sounds. No wheezing/ rhonchi  CARDIOVASCULAR:  s1s2 rrr  ABDOMEN:  Soft nt nd  MUSCULOSKELETAL/Extremities:  No edema clubbing or cyanosis  NEUROLOGIC:  No gross deficits, aao ×3  SKIN:  No gross lesions    Assessment/Plan:     1. Acute on Chronic CHF exacerbation, diastolic  - Echo: SY92-09%, G3DD, severe LVH. Continue Lasix  2. NSTEMI, likely type 2  -had LHC mold to moderate non obstructive CAD of LAD and RCA. On ASA, plavix,low dose BB once BP is stable, on statin. 3.  Acute COPD exacerbation: Continue on Solu-Medrol, duonebs  And O2 support               Continue on BiPAP. Pulmonology on board for additional recommendations  4. DM2  -SSI, Lantus, last hgba1c 8.3. Monitor Accu-Cheks and adjust insulin dose  5. Thrombocytopenia  -PLT improved. Restart lovenox. 6. Anemia  -check anemia panel  7. SADI  Not using home CPAP. Educated  For compliance  8. HTN/HLP  9. Hypothyroidism  10.  Obesity    DVT Prophylaxis: Restart Lovenox  Diet: DIET CARDIAC; No Caffeine, Low Potassium  Code Status: Full Code      Dispo:  -needs breathing to improve    Whitley Patterson MD  11/12/2018    Meds:   Meds:    insulin lispro  0-18 Units Subcutaneous TID WC    insulin lispro  0-18 Units Subcutaneous 2 times per day    furosemide  40 mg Oral BID    docusate sodium  100 mg Oral Daily    [START ON 11/13/2018] methylPREDNISolone  40 mg Intravenous Daily    insulin lispro  30 Units Subcutaneous TID ml   Net            -3900 ml

## 2018-11-13 VITALS
OXYGEN SATURATION: 100 % | BODY MASS INDEX: 48.1 KG/M2 | TEMPERATURE: 97.6 F | SYSTOLIC BLOOD PRESSURE: 97 MMHG | DIASTOLIC BLOOD PRESSURE: 40 MMHG | HEIGHT: 60 IN | WEIGHT: 245 LBS | RESPIRATION RATE: 118 BRPM | HEART RATE: 77 BPM

## 2018-11-13 LAB
ANION GAP SERPL CALCULATED.3IONS-SCNC: 11 MMOL/L (ref 4–16)
BUN BLDV-MCNC: 49 MG/DL (ref 6–23)
CALCIUM SERPL-MCNC: 8.6 MG/DL (ref 8.3–10.6)
CHLORIDE BLD-SCNC: 97 MMOL/L (ref 99–110)
CO2: 30 MMOL/L (ref 21–32)
CREAT SERPL-MCNC: 1.2 MG/DL (ref 0.6–1.1)
GFR AFRICAN AMERICAN: 54 ML/MIN/1.73M2
GFR NON-AFRICAN AMERICAN: 45 ML/MIN/1.73M2
GLUCOSE BLD-MCNC: 145 MG/DL (ref 70–99)
GLUCOSE BLD-MCNC: 158 MG/DL (ref 70–99)
GLUCOSE BLD-MCNC: 205 MG/DL (ref 70–99)
GLUCOSE BLD-MCNC: 274 MG/DL (ref 70–99)
HCT VFR BLD CALC: 31.5 % (ref 37–47)
HEMOGLOBIN: 9.7 GM/DL (ref 12.5–16)
MCH RBC QN AUTO: 29 PG (ref 27–31)
MCHC RBC AUTO-ENTMCNC: 30.8 % (ref 32–36)
MCV RBC AUTO: 94 FL (ref 78–100)
PDW BLD-RTO: 14.9 % (ref 11.7–14.9)
PLATELET # BLD: 149 K/CU MM (ref 140–440)
PMV BLD AUTO: 10.1 FL (ref 7.5–11.1)
POTASSIUM SERPL-SCNC: 4.2 MMOL/L (ref 3.5–5.1)
RBC # BLD: 3.35 M/CU MM (ref 4.2–5.4)
SODIUM BLD-SCNC: 138 MMOL/L (ref 135–145)
WBC # BLD: 8.2 K/CU MM (ref 4–10.5)

## 2018-11-13 PROCEDURE — 80048 BASIC METABOLIC PNL TOTAL CA: CPT

## 2018-11-13 PROCEDURE — 6360000002 HC RX W HCPCS: Performed by: HOSPITALIST

## 2018-11-13 PROCEDURE — 85027 COMPLETE CBC AUTOMATED: CPT

## 2018-11-13 PROCEDURE — 82962 GLUCOSE BLOOD TEST: CPT

## 2018-11-13 PROCEDURE — 99231 SBSQ HOSP IP/OBS SF/LOW 25: CPT | Performed by: INTERNAL MEDICINE

## 2018-11-13 PROCEDURE — 36415 COLL VENOUS BLD VENIPUNCTURE: CPT

## 2018-11-13 PROCEDURE — 99232 SBSQ HOSP IP/OBS MODERATE 35: CPT | Performed by: INTERNAL MEDICINE

## 2018-11-13 PROCEDURE — 94660 CPAP INITIATION&MGMT: CPT

## 2018-11-13 PROCEDURE — 6360000002 HC RX W HCPCS: Performed by: INTERNAL MEDICINE

## 2018-11-13 PROCEDURE — 2580000003 HC RX 258: Performed by: PHYSICIAN ASSISTANT

## 2018-11-13 PROCEDURE — 6370000000 HC RX 637 (ALT 250 FOR IP): Performed by: INTERNAL MEDICINE

## 2018-11-13 PROCEDURE — 6370000000 HC RX 637 (ALT 250 FOR IP): Performed by: PHYSICIAN ASSISTANT

## 2018-11-13 RX ORDER — FUROSEMIDE 40 MG/1
40 TABLET ORAL DAILY
Status: DISCONTINUED | OUTPATIENT
Start: 2018-11-13 | End: 2018-11-13

## 2018-11-13 RX ORDER — METHYLPREDNISOLONE 4 MG/1
TABLET ORAL
Qty: 1 KIT | Refills: 0 | Status: SHIPPED | OUTPATIENT
Start: 2018-11-14 | End: 2018-11-19

## 2018-11-13 RX ORDER — BUSPIRONE HYDROCHLORIDE 10 MG/1
15 TABLET ORAL 3 TIMES DAILY
Qty: 160 TABLET | Refills: 0 | Status: SHIPPED | OUTPATIENT
Start: 2018-11-13

## 2018-11-13 RX ORDER — POLYETHYLENE GLYCOL 3350 17 G/17G
17 POWDER, FOR SOLUTION ORAL DAILY
Status: DISCONTINUED | OUTPATIENT
Start: 2018-11-13 | End: 2018-11-13 | Stop reason: HOSPADM

## 2018-11-13 RX ORDER — PREDNISONE 20 MG/1
20 TABLET ORAL DAILY
Status: DISCONTINUED | OUTPATIENT
Start: 2018-11-17 | End: 2018-11-13 | Stop reason: HOSPADM

## 2018-11-13 RX ORDER — HYDROCODONE BITARTRATE AND ACETAMINOPHEN 5; 325 MG/1; MG/1
1 TABLET ORAL EVERY 6 HOURS PRN
Qty: 28 TABLET | Refills: 0 | Status: SHIPPED | OUTPATIENT
Start: 2018-11-13 | End: 2018-11-20

## 2018-11-13 RX ORDER — PREDNISONE 10 MG/1
10 TABLET ORAL DAILY
Status: DISCONTINUED | OUTPATIENT
Start: 2018-11-19 | End: 2018-11-13 | Stop reason: HOSPADM

## 2018-11-13 RX ORDER — PSEUDOEPHEDRINE HCL 30 MG
100 TABLET ORAL DAILY
Qty: 60 CAPSULE | Refills: 0 | Status: SHIPPED | OUTPATIENT
Start: 2018-11-14

## 2018-11-13 RX ORDER — PREDNISONE 20 MG/1
40 TABLET ORAL DAILY
Status: DISCONTINUED | OUTPATIENT
Start: 2018-11-13 | End: 2018-11-13 | Stop reason: HOSPADM

## 2018-11-13 RX ORDER — INSULIN GLARGINE 100 [IU]/ML
60 INJECTION, SOLUTION SUBCUTANEOUS NIGHTLY
Status: DISCONTINUED | OUTPATIENT
Start: 2018-11-13 | End: 2018-11-13 | Stop reason: HOSPADM

## 2018-11-13 RX ORDER — FUROSEMIDE 40 MG/1
40 TABLET ORAL DAILY
Qty: 60 TABLET | Refills: 1 | Status: ON HOLD | OUTPATIENT
Start: 2018-11-14 | End: 2019-06-19 | Stop reason: SDUPTHER

## 2018-11-13 RX ORDER — ASPIRIN 81 MG/1
81 TABLET, CHEWABLE ORAL DAILY
Qty: 30 TABLET | Refills: 3 | Status: SHIPPED | OUTPATIENT
Start: 2018-11-14 | End: 2019-07-23

## 2018-11-13 RX ORDER — INSULIN GLARGINE 100 [IU]/ML
60 INJECTION, SOLUTION SUBCUTANEOUS NIGHTLY
Qty: 1 VIAL | Refills: 3 | Status: SHIPPED | OUTPATIENT
Start: 2018-11-13

## 2018-11-13 RX ORDER — PREDNISONE 10 MG/1
10 TABLET ORAL DAILY
Status: DISCONTINUED | OUTPATIENT
Start: 2018-11-13 | End: 2018-11-13 | Stop reason: DRUGHIGH

## 2018-11-13 RX ORDER — LORAZEPAM 1 MG/1
1 TABLET ORAL 2 TIMES DAILY
Qty: 60 TABLET | Refills: 0 | Status: SHIPPED | OUTPATIENT
Start: 2018-11-13 | End: 2018-12-13

## 2018-11-13 RX ADMIN — DOCUSATE SODIUM 100 MG: 100 CAPSULE, LIQUID FILLED ORAL at 08:20

## 2018-11-13 RX ADMIN — NYSTATIN: 100000 CREAM TOPICAL at 08:20

## 2018-11-13 RX ADMIN — INSULIN LISPRO 12 UNITS: 100 INJECTION, SOLUTION INTRAVENOUS; SUBCUTANEOUS at 13:17

## 2018-11-13 RX ADMIN — TROSPIUM CHLORIDE 20 MG: 20 TABLET ORAL at 08:20

## 2018-11-13 RX ADMIN — METHYLPREDNISOLONE SODIUM SUCCINATE 40 MG: 40 INJECTION, POWDER, FOR SOLUTION INTRAMUSCULAR; INTRAVENOUS at 08:19

## 2018-11-13 RX ADMIN — INSULIN GLARGINE 20 UNITS: 100 INJECTION, SOLUTION SUBCUTANEOUS at 00:16

## 2018-11-13 RX ADMIN — BUSPIRONE HYDROCHLORIDE 15 MG: 15 TABLET ORAL at 13:15

## 2018-11-13 RX ADMIN — SODIUM CHLORIDE, PRESERVATIVE FREE 10 ML: 5 INJECTION INTRAVENOUS at 08:22

## 2018-11-13 RX ADMIN — ENOXAPARIN SODIUM 40 MG: 40 INJECTION SUBCUTANEOUS at 08:19

## 2018-11-13 RX ADMIN — POLYETHYLENE GLYCOL 3350 17 G: 17 POWDER, FOR SOLUTION ORAL at 08:19

## 2018-11-13 RX ADMIN — Medication 1000 UNITS: at 08:20

## 2018-11-13 RX ADMIN — VENLAFAXINE HYDROCHLORIDE 150 MG: 150 CAPSULE, EXTENDED RELEASE ORAL at 08:20

## 2018-11-13 RX ADMIN — INSULIN LISPRO 15 UNITS: 100 INJECTION, SOLUTION INTRAVENOUS; SUBCUTANEOUS at 13:20

## 2018-11-13 RX ADMIN — INSULIN LISPRO 8 UNITS: 100 INJECTION, SOLUTION INTRAVENOUS; SUBCUTANEOUS at 02:09

## 2018-11-13 RX ADMIN — INSULIN LISPRO 4 UNITS: 100 INJECTION, SOLUTION INTRAVENOUS; SUBCUTANEOUS at 09:08

## 2018-11-13 RX ADMIN — GABAPENTIN 800 MG: 400 CAPSULE ORAL at 13:15

## 2018-11-13 RX ADMIN — ASPIRIN 81 MG 81 MG: 81 TABLET ORAL at 08:20

## 2018-11-13 RX ADMIN — INSULIN LISPRO 15 UNITS: 100 INJECTION, SOLUTION INTRAVENOUS; SUBCUTANEOUS at 09:06

## 2018-11-13 RX ADMIN — LEVOTHYROXINE SODIUM 75 MCG: 75 TABLET ORAL at 08:20

## 2018-11-13 RX ADMIN — VENLAFAXINE HYDROCHLORIDE 75 MG: 37.5 CAPSULE, EXTENDED RELEASE ORAL at 08:21

## 2018-11-13 RX ADMIN — BUSPIRONE HYDROCHLORIDE 15 MG: 15 TABLET ORAL at 08:20

## 2018-11-13 RX ADMIN — GABAPENTIN 800 MG: 400 CAPSULE ORAL at 08:20

## 2018-11-13 RX ADMIN — LORAZEPAM 1 MG: 1 TABLET ORAL at 08:20

## 2018-11-13 RX ADMIN — PREDNISONE 40 MG: 20 TABLET ORAL at 13:15

## 2018-11-13 NOTE — PROGRESS NOTES
Nephrology Progress Note        [address], [address] [facility] [address]  Phone: [phone]  Office Hours: 8:30AM - 4:30PM  Monday - Friday       [facility]  MD Magdy Johnson, DO [address],  [facility]  [place] [facility]  PHONE: [phone]  FAX: [phone]    11/13/2018 8:31 AM  Subjective:   Admit Date: 11/8/2018  PCP: Jeramy Wolf PA-C  Interval History: doing well  On NC, 3L    Diet: DIET CARDIAC; No Caffeine, Low Potassium      Data:   Scheduled Meds:   polyethylene glycol  17 g Oral Daily    insulin NPH  10 Units Subcutaneous Once    insulin lispro  15 Units Subcutaneous TID WC    insulin glargine  60 Units Subcutaneous Nightly    docusate sodium  100 mg Oral Daily    methylPREDNISolone  40 mg Intravenous Daily    insulin lispro  0-25 Units Subcutaneous TID WC    insulin lispro  0-25 Units Subcutaneous 2 times per day    levothyroxine  75 mcg Oral Daily    enoxaparin  40 mg Subcutaneous Daily    sodium chloride flush  10 mL Intravenous 2 times per day    aspirin  81 mg Oral Daily    busPIRone  15 mg Oral TID    gabapentin  800 mg Oral 4x Daily    LORazepam  1 mg Oral BID    nystatin   Topical BID    simvastatin  40 mg Oral Nightly    trospium  20 mg Oral BID AC    venlafaxine  75 mg Oral Daily with breakfast    venlafaxine  150 mg Oral Daily with breakfast    vitamin D  1,000 Units Oral Daily    influenza virus vaccine  0.5 mL Intramuscular Once     Continuous Infusions:   dextrose       PRN Meds:HYDROcodone 5 mg - acetaminophen, traMADol, sodium chloride flush, magnesium hydroxide, ondansetron, glucose, dextrose, glucagon (rDNA), dextrose, magnesium hydroxide, nitroGLYCERIN  I/O last 3 completed shifts:  In: -   Out: 2400 [Urine:2400]  No intake/output data recorded.     Intake/Output Summary (Last 24 hours) at 11/13/18 0831  Last data filed at 11/12/18 1528   Gross per 24 hour   Intake 0 ml   Output             2400 ml   Net            -2400 ml       CBC:   Recent Labs      11/11/18   0341  11/12/18   0616  11/13/18   0612   WBC  5.4  10.1  8.2   HGB  9.6*  9.9*  9.7*   PLT  141  158  149       BMP:  Recent Labs      11/12/18   0616  11/12/18   1718  11/13/18   0612   NA  133*  131*  138   K  5.8*  5.3*  4.2   CL  93*  90*  97*   CO2  28  28  30   BUN  61*  55*  49*   CREATININE  1.5*  1.4*  1.2*   GLUCOSE  266*  366*  158*     Hepatic: No results for input(s): AST, ALT, ALB, BILITOT, ALKPHOS in the last 72 hours. Troponin: No results for input(s): TROPONINI in the last 72 hours. BNP: No results for input(s): BNP in the last 72 hours. Lipids: No results for input(s): CHOL, HDL in the last 72 hours. Invalid input(s): LDLCALCU  ABGs: Lab Results   Component Value Date    PO2ART 84 11/10/2018    [id number] 59.0 11/10/2018     INR: No results for input(s): INR in the last 72 hours. Objective:   Vitals: BP (!) 97/40   Pulse 77   Temp 97.6 °F (36.4 °C) (Oral)   Resp (!) 118   Ht 4' 11.84\" (1.52 m)   Wt 245 lb (111.1 kg)   SpO2 100%   BMI 48.10 kg/m²   General appearance: alert and cooperative with exam, in no acute distress  HEENT: normocephalic, atraumatic,   Neck: supple, trachea midline  Lungs:  breathing comfortably on nc  Heart[de-identified] regular rate and rhythm, S1, S2 normal,  Abdomen: soft, non-tender; non distended,   Extremities: extremities atraumatic, no cyanosis or edema  Neurologic: alert, oriented, follows commands, interactive    Assessment and Plan:     - SHER; cr 2.1 , baseline 0.9; Based on timeline , SHER likely from contrast-induced nephropathy, bland UA and urine lytes suggest ATN  - Acute hypoxic resp failure: heart failure, copd?   - Proteinuria: non nephrotic  - Hyponatremia: better  - hyperkalemia: better  - Acute on chronic diastolic CM: better  - DM2  - Hypothyroidism   - Non obstructive CAD per [facility] from 11/8/18  - COPD     Plan  - Cr better   - K wnl, continue low K diet

## 2018-11-13 NOTE — PROGRESS NOTES
Cardiology Progress Note     Today's Plan:    Admit Date:  11/8/2018    Consult reason/ Seen today for: CHF    Subjective and  Overnight Events:  Sitting up in the chair. She notes she is feeling better. The SOB has improved. There are no reported palpitations. Telemetry : SR     Assessment / Plan / Recommendation:     ASCVD: + NSTEMI: non obstructive CAD. Medical management: continue with ASA and statin  HFpEF EF >50% - SOB has improved. She is in a negative fluid balance of 5 liters. Continue with daily lasix    DM- on meds-per primary   SHER Renal following       History of Presenting Illness:    Chief complain on admission : 76 y. o.year old who is admitted for sob     Past medical history:    has a past medical history of Anxiety; Arthritis; Atrial fibrillation ([address] Atrial fibrillation with RVR ([address] Atrial fibrillation with RVR ([address] Atrial fibrillation with RVR ([address] Cancer ([address] Cervicalgia; Constipation; COPD (chronic obstructive pulmonary disease) ([address] Depression; Diabetes mellitus ([address] Diabetic neuropathy ([address] Disc herniation; Fatigue; Fibromyalgia; H/O cardiovascular stress test; H/O complete electrocardiogram; H/O echocardiogram; Headache(784.0); Hepatitis B; History of cardiac cath; History of chest x-ray; Holter monitor, abnormal; Hx of cardiovascular stress test; Hx of echocardiogram; Hyperlipidemia; Hypertension; Insulin pump in place; Liver cirrhosis ([address] SADI on CPAP; Peripheral vascular disease ([address] PONV (postoperative nausea and vomiting); Renal disease; Tachycardia; and Wears dentures. Past surgical history:   has a past surgical history that includes Hysterectomy (2000); back surgery (1998); Tonsillectomy (1966); shoulder surgery (Right, 1960); Cataract removal with implant (Bilateral, 2000s); Diagnostic Cardiac Cath Lab Procedure (2000s); Tonsillectomy and adenoidectomy (1966);  Cholecystectomy

## 2018-11-13 NOTE — DISCHARGE SUMMARY
Linda Paci Standard 1950 [id number]  PCP:  Whitney Esteban PA-C    Admit date: 11/8/2018  Admitting Physician: Zachary Mcguire MD    Discharge date: 11/13/2018 Discharge Physician: Kendal Torre MD         Discharge Diagnoses. As per below    Hospital Course:  History of present illness at admission: As per H&P  Subsequent Hospital Course:        1. Acute on Chronic CHF exacerbation, diastolic  - Echo: MD43-50%, G3DD, severe LVH. Continue Lasix  2. NSTEMI, likely type 2  -had LHC mold to moderate non obstructive CAD of LAD and RCA. On ASA, plavix,low dose BB once BP is stable, on statin. 3.  Acute COPD exacerbation: Continue on Solu-Medrol, duonebs  And O2 support               Continue on BiPAP. Pulmonology evaluated for additional recommendations  4. DM2  -SSI, Lantus, last hgba1c 8.3. Adjusted insulin dose as per Accu-Cheks  5. Thrombocytopenia  -PLT improved. 6. Anemia  -check anemia panel  7. SADI  Not using home CPAP. Educated  For compliance  8. HTN/HLP  9. Hypothyroidism  10. Obesity     On the day of discharge, pt felt better. No new complaints. Pertinent Exam Findings on Day of Discharge:  General Appearance:    Alert, cooperative, no distress, appears stated age  Head:    Normocephalic, without obvious abnormality, atraumatic  Eyes:    PERRL, conjunctiva/corneas clear, EOM's intact  Lungs:    Clear to auscultation bilaterally, respirations unlabored   Heart:    Regular rate and rhythm, S1 and S2 normal, no murmur,   rub or gallop  Abdomen:     Soft, non-tender, bowel sounds active, no masses, no organomegaly  Extremities:   Extremities normal, atraumatic, no cyanosis or edema    Consults:  IP CONSULT TO HEART FAILURE NURSE/COORDINATOR  IP CONSULT TO ENDOCRINOLOGY  IP CONSULT TO DIABETES EDUCATOR  IP CONSULT TO CASE MANAGEMENT  IP CONSULT TO NEPHROLOGY  IP CONSULT TO PULMONOLOGY    Patient Instructions:    [place], [id number] Medication Instructions [id number]

## 2018-11-13 NOTE — PROGRESS NOTES
Per Dr. Christa Vargas, pt should be fine with her home CPAP tonight until [facility] facility can get BIPAP tomorrow.

## 2018-11-16 ENCOUNTER — HOSPITAL ENCOUNTER (OUTPATIENT)
Age: 68
Discharge: HOME | End: 2018-11-16

## 2018-11-16 LAB
ANION GAP SERPL CALCULATED.3IONS-SCNC: 15 MMOL/L (ref 4–16)
BUN BLDV-MCNC: 34 MG/DL (ref 6–23)
CALCIUM SERPL-MCNC: 9.8 MG/DL (ref 8.3–10.6)
CHLORIDE BLD-SCNC: 87 MMOL/L (ref 99–110)
CO2: 33 MMOL/L (ref 21–32)
CREAT SERPL-MCNC: 1.1 MG/DL (ref 0.6–1.1)
GFR AFRICAN AMERICAN: 60 ML/MIN/1.73M2
GFR NON-AFRICAN AMERICAN: 49 ML/MIN/1.73M2
GLUCOSE BLD-MCNC: 335 MG/DL (ref 70–99)
HCT VFR BLD CALC: 35.6 % (ref 37–47)
HEMOGLOBIN: 11 GM/DL (ref 12.5–16)
MCH RBC QN AUTO: 28.4 PG (ref 27–31)
MCHC RBC AUTO-ENTMCNC: 30.9 % (ref 32–36)
MCV RBC AUTO: 92 FL (ref 78–100)
PDW BLD-RTO: 14.6 % (ref 11.7–14.9)
PLATELET # BLD: 272 K/CU MM (ref 140–440)
PMV BLD AUTO: 10.4 FL (ref 7.5–11.1)
POTASSIUM SERPL-SCNC: 5.3 MMOL/L (ref 3.5–5.1)
RBC # BLD: 3.87 M/CU MM (ref 4.2–5.4)
SODIUM BLD-SCNC: 135 MMOL/L (ref 135–145)
WBC # BLD: 13.9 K/CU MM (ref 4–10.5)

## 2018-11-16 PROCEDURE — 36415 COLL VENOUS BLD VENIPUNCTURE: CPT

## 2018-11-16 PROCEDURE — 80048 BASIC METABOLIC PNL TOTAL CA: CPT

## 2018-11-16 PROCEDURE — 85027 COMPLETE CBC AUTOMATED: CPT

## 2018-11-18 ENCOUNTER — HOSPITAL ENCOUNTER (OUTPATIENT)
Age: 68
Setting detail: SPECIMEN
Discharge: HOME | End: 2018-11-18

## 2018-11-18 LAB
ANION GAP SERPL CALCULATED.3IONS-SCNC: 14 MMOL/L (ref 4–16)
BUN BLDV-MCNC: 46 MG/DL (ref 6–23)
CALCIUM SERPL-MCNC: 9.5 MG/DL (ref 8.3–10.6)
CHLORIDE BLD-SCNC: 87 MMOL/L (ref 99–110)
CO2: 32 MMOL/L (ref 21–32)
CREAT SERPL-MCNC: 1.3 MG/DL (ref 0.6–1.1)
GFR AFRICAN AMERICAN: 49 ML/MIN/1.73M2
GFR NON-AFRICAN AMERICAN: 41 ML/MIN/1.73M2
GLUCOSE BLD-MCNC: 329 MG/DL (ref 70–99)
POTASSIUM SERPL-SCNC: 5.6 MMOL/L (ref 3.5–5.1)
SODIUM BLD-SCNC: 133 MMOL/L (ref 135–145)

## 2018-11-18 PROCEDURE — 36415 COLL VENOUS BLD VENIPUNCTURE: CPT

## 2018-11-18 PROCEDURE — 80048 BASIC METABOLIC PNL TOTAL CA: CPT

## 2018-11-20 ENCOUNTER — HOSPITAL ENCOUNTER (OUTPATIENT)
Age: 68
Discharge: HOME | End: 2018-11-20

## 2018-11-20 LAB
ANION GAP SERPL CALCULATED.3IONS-SCNC: 15 MMOL/L (ref 4–16)
BUN BLDV-MCNC: 39 MG/DL (ref 6–23)
CALCIUM SERPL-MCNC: 9.1 MG/DL (ref 8.3–10.6)
CHLORIDE BLD-SCNC: 96 MMOL/L (ref 99–110)
CO2: 28 MMOL/L (ref 21–32)
CREAT SERPL-MCNC: 1.1 MG/DL (ref 0.6–1.1)
GFR AFRICAN AMERICAN: 60 ML/MIN/1.73M2
GFR NON-AFRICAN AMERICAN: 49 ML/MIN/1.73M2
GLUCOSE BLD-MCNC: 96 MG/DL (ref 70–99)
HCT VFR BLD CALC: 33.6 % (ref 37–47)
HEMOGLOBIN: 9.6 GM/DL (ref 12.5–16)
MCH RBC QN AUTO: 27.8 PG (ref 27–31)
MCHC RBC AUTO-ENTMCNC: 28.6 % (ref 32–36)
MCV RBC AUTO: 97.4 FL (ref 78–100)
PDW BLD-RTO: 14.5 % (ref 11.7–14.9)
PLATELET # BLD: 173 K/CU MM (ref 140–440)
PMV BLD AUTO: 10.4 FL (ref 7.5–11.1)
POTASSIUM SERPL-SCNC: 4.9 MMOL/L (ref 3.5–5.1)
RBC # BLD: 3.45 M/CU MM (ref 4.2–5.4)
SODIUM BLD-SCNC: 139 MMOL/L (ref 135–145)
WBC # BLD: 7.9 K/CU MM (ref 4–10.5)

## 2018-11-20 PROCEDURE — 36415 COLL VENOUS BLD VENIPUNCTURE: CPT

## 2018-11-20 PROCEDURE — 80048 BASIC METABOLIC PNL TOTAL CA: CPT

## 2018-11-20 PROCEDURE — 85027 COMPLETE CBC AUTOMATED: CPT

## 2018-11-23 ENCOUNTER — HOSPITAL ENCOUNTER (OUTPATIENT)
Age: 68
Setting detail: SPECIMEN
Discharge: HOME | End: 2018-11-23
Payer: MEDICARE

## 2018-11-26 LAB
REASON FOR REJECTION: NORMAL
REJECTED TEST: NORMAL
SOURCE: NORMAL

## 2018-11-27 ENCOUNTER — HOSPITAL ENCOUNTER (OUTPATIENT)
Age: 68
Discharge: HOME | End: 2018-11-27

## 2018-11-27 LAB
ANION GAP SERPL CALCULATED.3IONS-SCNC: 11 MMOL/L (ref 4–16)
BUN BLDV-MCNC: 24 MG/DL (ref 6–23)
CALCIUM SERPL-MCNC: 8.9 MG/DL (ref 8.3–10.6)
CHLORIDE BLD-SCNC: 99 MMOL/L (ref 99–110)
CO2: 29 MMOL/L (ref 21–32)
CREAT SERPL-MCNC: 1 MG/DL (ref 0.6–1.1)
GFR AFRICAN AMERICAN: >60 ML/MIN/1.73M2
GFR NON-AFRICAN AMERICAN: 55 ML/MIN/1.73M2
GLUCOSE BLD-MCNC: 192 MG/DL (ref 70–99)
HCT VFR BLD CALC: 26 % (ref 37–47)
HEMOGLOBIN: 8 GM/DL (ref 12.5–16)
MCH RBC QN AUTO: 28.6 PG (ref 27–31)
MCHC RBC AUTO-ENTMCNC: 30.8 % (ref 32–36)
MCV RBC AUTO: 92.9 FL (ref 78–100)
PDW BLD-RTO: 14.6 % (ref 11.7–14.9)
PLATELET # BLD: 150 K/CU MM (ref 140–440)
PMV BLD AUTO: 10.9 FL (ref 7.5–11.1)
POTASSIUM SERPL-SCNC: 4.6 MMOL/L (ref 3.5–5.1)
RBC # BLD: 2.8 M/CU MM (ref 4.2–5.4)
SODIUM BLD-SCNC: 139 MMOL/L (ref 135–145)
WBC # BLD: 6.5 K/CU MM (ref 4–10.5)

## 2018-11-27 PROCEDURE — G0328 FECAL BLOOD SCRN IMMUNOASSAY: HCPCS

## 2018-11-27 PROCEDURE — 85027 COMPLETE CBC AUTOMATED: CPT

## 2018-11-27 PROCEDURE — 80048 BASIC METABOLIC PNL TOTAL CA: CPT

## 2018-11-27 PROCEDURE — 36415 COLL VENOUS BLD VENIPUNCTURE: CPT

## 2018-11-28 ENCOUNTER — HOSPITAL ENCOUNTER (OUTPATIENT)
Age: 68
Discharge: HOME | End: 2018-11-28
Payer: MEDICARE

## 2018-11-28 ENCOUNTER — HOSPITAL ENCOUNTER (OUTPATIENT)
Dept: GENERAL RADIOLOGY | Age: 68
Discharge: HOME | End: 2018-11-28
Payer: MEDICARE

## 2018-11-28 DIAGNOSIS — S20.229A CONTUSION OF BACK, UNSPECIFIED LATERALITY, INITIAL ENCOUNTER: ICD-10-CM

## 2018-11-28 PROCEDURE — 72100 X-RAY EXAM L-S SPINE 2/3 VWS: CPT

## 2018-11-28 PROCEDURE — 72050 X-RAY EXAM NECK SPINE 4/5VWS: CPT

## 2018-11-28 PROCEDURE — 72072 X-RAY EXAM THORAC SPINE 3VWS: CPT

## 2018-11-29 ENCOUNTER — HOSPITAL ENCOUNTER (OUTPATIENT)
Dept: CT IMAGING | Age: 68
Discharge: HOME | End: 2018-11-29
Payer: COMMERCIAL

## 2018-11-29 ENCOUNTER — HOSPITAL ENCOUNTER (OUTPATIENT)
Age: 68
Setting detail: SPECIMEN
Discharge: HOME | End: 2018-11-29
Payer: COMMERCIAL

## 2018-11-29 DIAGNOSIS — T14.90XA INJURY: ICD-10-CM

## 2018-11-29 LAB
HCT VFR BLD CALC: 28.1 % (ref 37–47)
HEMOCCULT SP1 STL QL: POSITIVE
HEMOGLOBIN: 8.8 GM/DL (ref 12.5–16)
OCCULT BLOOD 2: POSITIVE

## 2018-11-29 PROCEDURE — 85014 HEMATOCRIT: CPT

## 2018-11-29 PROCEDURE — 85018 HEMOGLOBIN: CPT

## 2018-11-29 PROCEDURE — 70450 CT HEAD/BRAIN W/O DYE: CPT

## 2018-11-29 PROCEDURE — 36415 COLL VENOUS BLD VENIPUNCTURE: CPT

## 2018-11-30 ENCOUNTER — HOSPITAL ENCOUNTER (OUTPATIENT)
Age: 68
Discharge: HOME | End: 2018-11-30

## 2018-11-30 LAB
ANION GAP SERPL CALCULATED.3IONS-SCNC: 12 MMOL/L (ref 4–16)
BUN BLDV-MCNC: 20 MG/DL (ref 6–23)
CALCIUM SERPL-MCNC: 9.1 MG/DL (ref 8.3–10.6)
CHLORIDE BLD-SCNC: 102 MMOL/L (ref 99–110)
CO2: 27 MMOL/L (ref 21–32)
CREAT SERPL-MCNC: 0.9 MG/DL (ref 0.6–1.1)
GFR AFRICAN AMERICAN: >60 ML/MIN/1.73M2
GFR NON-AFRICAN AMERICAN: >60 ML/MIN/1.73M2
GLUCOSE BLD-MCNC: 167 MG/DL (ref 70–99)
HCT VFR BLD CALC: 30.7 % (ref 37–47)
HEMOGLOBIN: 8.9 GM/DL (ref 12.5–16)
MCH RBC QN AUTO: 28.4 PG (ref 27–31)
MCHC RBC AUTO-ENTMCNC: 29 % (ref 32–36)
MCV RBC AUTO: 98.1 FL (ref 78–100)
PDW BLD-RTO: 16.7 % (ref 11.7–14.9)
PLATELET # BLD: 160 K/CU MM (ref 140–440)
PMV BLD AUTO: 10.7 FL (ref 7.5–11.1)
POTASSIUM SERPL-SCNC: 4.4 MMOL/L (ref 3.5–5.1)
RBC # BLD: 3.13 M/CU MM (ref 4.2–5.4)
SODIUM BLD-SCNC: 141 MMOL/L (ref 135–145)
WBC # BLD: 6.3 K/CU MM (ref 4–10.5)

## 2018-11-30 PROCEDURE — 80048 BASIC METABOLIC PNL TOTAL CA: CPT

## 2018-11-30 PROCEDURE — 85027 COMPLETE CBC AUTOMATED: CPT

## 2018-11-30 PROCEDURE — 36415 COLL VENOUS BLD VENIPUNCTURE: CPT

## 2019-01-01 ENCOUNTER — INITIAL CONSULT (OUTPATIENT)
Dept: PULMONOLOGY | Age: 69
End: 2019-01-01
Payer: MEDICARE

## 2019-01-01 ENCOUNTER — HOSPITAL ENCOUNTER (OUTPATIENT)
Age: 69
Setting detail: SPECIMEN
Discharge: HOME | End: 2019-08-20
Payer: MEDICARE

## 2019-01-01 VITALS
RESPIRATION RATE: 16 BRPM | OXYGEN SATURATION: 98 % | BODY MASS INDEX: 48.51 KG/M2 | DIASTOLIC BLOOD PRESSURE: 72 MMHG | HEART RATE: 94 BPM | SYSTOLIC BLOOD PRESSURE: 130 MMHG | WEIGHT: 248.4 LBS

## 2019-01-01 DIAGNOSIS — E66.01 MORBID OBESITY (HCC): ICD-10-CM

## 2019-01-01 DIAGNOSIS — Z87.891 EX-SMOKER: ICD-10-CM

## 2019-01-01 DIAGNOSIS — G47.19 EXCESSIVE DAYTIME SLEEPINESS: ICD-10-CM

## 2019-01-01 DIAGNOSIS — G47.33 OSA (OBSTRUCTIVE SLEEP APNEA): ICD-10-CM

## 2019-01-01 DIAGNOSIS — J44.9 CHRONIC OBSTRUCTIVE PULMONARY DISEASE, UNSPECIFIED COPD TYPE (HCC): ICD-10-CM

## 2019-01-01 LAB
ALBUMIN SERPL-MCNC: 3.7 GM/DL (ref 3.4–5)
ALP BLD-CCNC: 109 IU/L (ref 40–129)
ALT SERPL-CCNC: 17 U/L (ref 10–40)
ANION GAP SERPL CALCULATED.3IONS-SCNC: 9 MMOL/L (ref 4–16)
AST SERPL-CCNC: 20 IU/L (ref 15–37)
BILIRUB SERPL-MCNC: 0.2 MG/DL (ref 0–1)
BUN BLDV-MCNC: 33 MG/DL (ref 6–23)
CALCIUM SERPL-MCNC: 9.4 MG/DL (ref 8.3–10.6)
CHLORIDE BLD-SCNC: 101 MMOL/L (ref 99–110)
CO2: 34 MMOL/L (ref 21–32)
CREAT SERPL-MCNC: 1.4 MG/DL (ref 0.6–1.1)
GFR AFRICAN AMERICAN: 45 ML/MIN/1.73M2
GFR NON-AFRICAN AMERICAN: 37 ML/MIN/1.73M2
GLUCOSE BLD-MCNC: 144 MG/DL (ref 70–99)
POTASSIUM SERPL-SCNC: 4.6 MMOL/L (ref 3.5–5.1)
SODIUM BLD-SCNC: 144 MMOL/L (ref 135–145)
TOTAL PROTEIN: 6.5 GM/DL (ref 6.4–8.2)

## 2019-01-01 PROCEDURE — 80053 COMPREHEN METABOLIC PANEL: CPT

## 2019-01-01 PROCEDURE — 99213 OFFICE O/P EST LOW 20 MIN: CPT | Performed by: INTERNAL MEDICINE

## 2019-01-01 RX ORDER — LORAZEPAM 1 MG/1
1 TABLET ORAL EVERY 6 HOURS PRN
COMMUNITY

## 2019-01-01 RX ORDER — SUCRALFATE 1 G/1
1 TABLET ORAL
COMMUNITY
End: 2019-01-01

## 2019-01-01 RX ORDER — PANTOPRAZOLE SODIUM 40 MG/1
40 FOR SUSPENSION ORAL
COMMUNITY

## 2019-01-01 RX ORDER — ACETAMINOPHEN 500 MG
500 TABLET ORAL EVERY 6 HOURS PRN
COMMUNITY
End: 2019-01-01

## 2019-01-01 RX ORDER — PREDNISONE 20 MG/1
20 TABLET ORAL DAILY
COMMUNITY
End: 2019-01-01

## 2019-01-01 ASSESSMENT — SLEEP AND FATIGUE QUESTIONNAIRES
HOW LIKELY ARE YOU TO NOD OFF OR FALL ASLEEP WHILE WATCHING TV: 1
HOW LIKELY ARE YOU TO NOD OFF OR FALL ASLEEP WHEN YOU ARE A PASSENGER IN A CAR FOR AN HOUR WITHOUT A BREAK: 3
NECK CIRCUMFERENCE (INCHES): 18
HOW LIKELY ARE YOU TO NOD OFF OR FALL ASLEEP IN A CAR, WHILE STOPPED FOR A FEW MINUTES IN TRAFFIC: 0
HOW LIKELY ARE YOU TO NOD OFF OR FALL ASLEEP WHILE SITTING QUIETLY AFTER LUNCH WITHOUT ALCOHOL: 0
HOW LIKELY ARE YOU TO NOD OFF OR FALL ASLEEP WHILE SITTING AND TALKING TO SOMEONE: 0
HOW LIKELY ARE YOU TO NOD OFF OR FALL ASLEEP WHILE SITTING INACTIVE IN A PUBLIC PLACE: 0
ESS TOTAL SCORE: 5
HOW LIKELY ARE YOU TO NOD OFF OR FALL ASLEEP WHILE LYING DOWN TO REST IN THE AFTERNOON WHEN CIRCUMSTANCES PERMIT: 1
HOW LIKELY ARE YOU TO NOD OFF OR FALL ASLEEP WHILE SITTING AND READING: 0

## 2019-01-01 ASSESSMENT — ENCOUNTER SYMPTOMS
ABDOMINAL PAIN: 0
SHORTNESS OF BREATH: 1
BACK PAIN: 0
ABDOMINAL DISTENTION: 0
COUGH: 1
EYE ITCHING: 0
EYE DISCHARGE: 0

## 2019-02-15 ENCOUNTER — TELEPHONE (OUTPATIENT)
Dept: CARDIOLOGY CLINIC | Age: 69
End: 2019-02-15

## 2019-06-17 ENCOUNTER — APPOINTMENT (OUTPATIENT)
Dept: GENERAL RADIOLOGY | Age: 69
DRG: 291 | End: 2019-06-17
Payer: MEDICARE

## 2019-06-17 ENCOUNTER — HOSPITAL ENCOUNTER (INPATIENT)
Age: 69
LOS: 2 days | Discharge: HOME HEALTH | DRG: 291 | End: 2019-06-19
Attending: EMERGENCY MEDICINE | Admitting: INTERNAL MEDICINE
Payer: MEDICARE

## 2019-06-17 DIAGNOSIS — J81.0 ACUTE PULMONARY EDEMA (HCC): ICD-10-CM

## 2019-06-17 DIAGNOSIS — R06.00 DYSPNEA, UNSPECIFIED TYPE: ICD-10-CM

## 2019-06-17 DIAGNOSIS — R09.02 HYPOXIA: Primary | ICD-10-CM

## 2019-06-17 PROBLEM — I50.31 ACUTE CONGESTIVE HEART FAILURE WITH LEFT VENTRICULAR DIASTOLIC DYSFUNCTION (HCC): Status: ACTIVE | Noted: 2019-06-17

## 2019-06-17 LAB
ALBUMIN SERPL-MCNC: 3.2 GM/DL (ref 3.4–5)
ALP BLD-CCNC: 143 IU/L (ref 40–129)
ALT SERPL-CCNC: 13 U/L (ref 10–40)
ANION GAP SERPL CALCULATED.3IONS-SCNC: 17 MMOL/L (ref 4–16)
AST SERPL-CCNC: 17 IU/L (ref 15–37)
BASOPHILS ABSOLUTE: 0 K/CU MM
BASOPHILS RELATIVE PERCENT: 0.3 % (ref 0–1)
BILIRUB SERPL-MCNC: 0.3 MG/DL (ref 0–1)
BUN BLDV-MCNC: 23 MG/DL (ref 6–23)
CALCIUM SERPL-MCNC: 8.8 MG/DL (ref 8.3–10.6)
CHLORIDE BLD-SCNC: 105 MMOL/L (ref 99–110)
CO2: 20 MMOL/L (ref 21–32)
CREAT SERPL-MCNC: 0.9 MG/DL (ref 0.6–1.1)
DIFFERENTIAL TYPE: ABNORMAL
EKG ATRIAL RATE: 86 BPM
EKG DIAGNOSIS: NORMAL
EKG P AXIS: 45 DEGREES
EKG P-R INTERVAL: 192 MS
EKG Q-T INTERVAL: 408 MS
EKG QRS DURATION: 94 MS
EKG QTC CALCULATION (BAZETT): 488 MS
EKG R AXIS: -40 DEGREES
EKG T AXIS: 127 DEGREES
EKG VENTRICULAR RATE: 86 BPM
EOSINOPHILS ABSOLUTE: 0.3 K/CU MM
EOSINOPHILS RELATIVE PERCENT: 3.1 % (ref 0–3)
GFR AFRICAN AMERICAN: >60 ML/MIN/1.73M2
GFR NON-AFRICAN AMERICAN: >60 ML/MIN/1.73M2
GLUCOSE BLD-MCNC: 334 MG/DL (ref 70–99)
HCT VFR BLD CALC: 31.1 % (ref 37–47)
HEMOGLOBIN: 9.8 GM/DL (ref 12.5–16)
IMMATURE NEUTROPHIL %: 0.6 % (ref 0–0.43)
LYMPHOCYTES ABSOLUTE: 1.5 K/CU MM
LYMPHOCYTES RELATIVE PERCENT: 16.9 % (ref 24–44)
MCH RBC QN AUTO: 27.8 PG (ref 27–31)
MCHC RBC AUTO-ENTMCNC: 31.5 % (ref 32–36)
MCV RBC AUTO: 88.4 FL (ref 78–100)
MONOCYTES ABSOLUTE: 0.6 K/CU MM
MONOCYTES RELATIVE PERCENT: 6.5 % (ref 0–4)
PDW BLD-RTO: 15.4 % (ref 11.7–14.9)
PLATELET # BLD: 128 K/CU MM (ref 140–440)
PMV BLD AUTO: 10.5 FL (ref 7.5–11.1)
POTASSIUM SERPL-SCNC: 4.1 MMOL/L (ref 3.5–5.1)
PRO-BNP: 554.1 PG/ML
RBC # BLD: 3.52 M/CU MM (ref 4.2–5.4)
SEGMENTED NEUTROPHILS ABSOLUTE COUNT: 6.2 K/CU MM
SEGMENTED NEUTROPHILS RELATIVE PERCENT: 72.6 % (ref 36–66)
SODIUM BLD-SCNC: 142 MMOL/L (ref 135–145)
TOTAL IMMATURE NEUTOROPHIL: 0.05 K/CU MM
TOTAL PROTEIN: 6.9 GM/DL (ref 6.4–8.2)
TROPONIN T: <0.01 NG/ML
WBC # BLD: 8.6 K/CU MM (ref 4–10.5)

## 2019-06-17 PROCEDURE — 84484 ASSAY OF TROPONIN QUANT: CPT

## 2019-06-17 PROCEDURE — 2140000000 HC CCU INTERMEDIATE R&B

## 2019-06-17 PROCEDURE — 85025 COMPLETE CBC W/AUTO DIFF WBC: CPT

## 2019-06-17 PROCEDURE — 94640 AIRWAY INHALATION TREATMENT: CPT

## 2019-06-17 PROCEDURE — 96374 THER/PROPH/DIAG INJ IV PUSH: CPT

## 2019-06-17 PROCEDURE — 93010 ELECTROCARDIOGRAM REPORT: CPT | Performed by: INTERNAL MEDICINE

## 2019-06-17 PROCEDURE — 96375 TX/PRO/DX INJ NEW DRUG ADDON: CPT

## 2019-06-17 PROCEDURE — 99285 EMERGENCY DEPT VISIT HI MDM: CPT

## 2019-06-17 PROCEDURE — 93005 ELECTROCARDIOGRAM TRACING: CPT | Performed by: EMERGENCY MEDICINE

## 2019-06-17 PROCEDURE — 6370000000 HC RX 637 (ALT 250 FOR IP): Performed by: EMERGENCY MEDICINE

## 2019-06-17 PROCEDURE — 6370000000 HC RX 637 (ALT 250 FOR IP)

## 2019-06-17 PROCEDURE — 71045 X-RAY EXAM CHEST 1 VIEW: CPT

## 2019-06-17 PROCEDURE — 80053 COMPREHEN METABOLIC PANEL: CPT

## 2019-06-17 PROCEDURE — 83880 ASSAY OF NATRIURETIC PEPTIDE: CPT

## 2019-06-17 PROCEDURE — 6360000002 HC RX W HCPCS: Performed by: EMERGENCY MEDICINE

## 2019-06-17 RX ORDER — METHYLPREDNISOLONE SODIUM SUCCINATE 125 MG/2ML
125 INJECTION, POWDER, LYOPHILIZED, FOR SOLUTION INTRAMUSCULAR; INTRAVENOUS ONCE
Status: COMPLETED | OUTPATIENT
Start: 2019-06-17 | End: 2019-06-17

## 2019-06-17 RX ORDER — IPRATROPIUM BROMIDE AND ALBUTEROL SULFATE 2.5; .5 MG/3ML; MG/3ML
SOLUTION RESPIRATORY (INHALATION)
Status: COMPLETED
Start: 2019-06-17 | End: 2019-06-17

## 2019-06-17 RX ORDER — FUROSEMIDE 10 MG/ML
40 INJECTION INTRAMUSCULAR; INTRAVENOUS ONCE
Status: COMPLETED | OUTPATIENT
Start: 2019-06-17 | End: 2019-06-17

## 2019-06-17 RX ORDER — IPRATROPIUM BROMIDE AND ALBUTEROL SULFATE 2.5; .5 MG/3ML; MG/3ML
2 SOLUTION RESPIRATORY (INHALATION) ONCE
Status: COMPLETED | OUTPATIENT
Start: 2019-06-17 | End: 2019-06-17

## 2019-06-17 RX ADMIN — METHYLPREDNISOLONE SODIUM SUCCINATE 125 MG: 125 INJECTION, POWDER, FOR SOLUTION INTRAMUSCULAR; INTRAVENOUS at 21:21

## 2019-06-17 RX ADMIN — IPRATROPIUM BROMIDE AND ALBUTEROL SULFATE 1 AMPULE: .5; 3 SOLUTION RESPIRATORY (INHALATION) at 20:38

## 2019-06-17 RX ADMIN — IPRATROPIUM BROMIDE AND ALBUTEROL SULFATE 3 ML: .5; 3 SOLUTION RESPIRATORY (INHALATION) at 20:38

## 2019-06-17 RX ADMIN — FUROSEMIDE 40 MG: 10 INJECTION, SOLUTION INTRAVENOUS at 23:15

## 2019-06-18 LAB
ANION GAP SERPL CALCULATED.3IONS-SCNC: 11 MMOL/L (ref 4–16)
BACTERIA: ABNORMAL /HPF
BILIRUBIN URINE: NEGATIVE MG/DL
BLOOD, URINE: NEGATIVE
BUN BLDV-MCNC: 21 MG/DL (ref 6–23)
CALCIUM SERPL-MCNC: 8.9 MG/DL (ref 8.3–10.6)
CAST TYPE: ABNORMAL /HPF
CHLORIDE BLD-SCNC: 102 MMOL/L (ref 99–110)
CHOLESTEROL: 106 MG/DL
CLARITY: CLEAR
CO2: 26 MMOL/L (ref 21–32)
COLOR: YELLOW
CREAT SERPL-MCNC: 0.9 MG/DL (ref 0.6–1.1)
CRYSTAL TYPE: ABNORMAL /HPF
EPITHELIAL CELLS, UA: ABNORMAL /HPF
ESTIMATED AVERAGE GLUCOSE: 217 MG/DL
GFR AFRICAN AMERICAN: >60 ML/MIN/1.73M2
GFR NON-AFRICAN AMERICAN: >60 ML/MIN/1.73M2
GLUCOSE BLD-MCNC: 229 MG/DL (ref 70–99)
GLUCOSE BLD-MCNC: 291 MG/DL (ref 70–99)
GLUCOSE BLD-MCNC: 293 MG/DL (ref 70–99)
GLUCOSE BLD-MCNC: 392 MG/DL (ref 70–99)
GLUCOSE BLD-MCNC: 446 MG/DL (ref 70–99)
GLUCOSE BLD-MCNC: 452 MG/DL (ref 70–99)
GLUCOSE, URINE: >1000 MG/DL
HBA1C MFR BLD: 9.2 % (ref 4.2–6.3)
HDLC SERPL-MCNC: 46 MG/DL
KETONES, URINE: NEGATIVE MG/DL
LDL CHOLESTEROL DIRECT: 50 MG/DL
LEUKOCYTE ESTERASE, URINE: NEGATIVE
MAGNESIUM: 1.4 MG/DL (ref 1.8–2.4)
MUCUS: NEGATIVE HPF
NITRITE URINE, QUANTITATIVE: NEGATIVE
PH, URINE: 5.5 (ref 5–8)
POTASSIUM SERPL-SCNC: 3.8 MMOL/L (ref 3.5–5.1)
PROTEIN UA: 30 MG/DL
RBC URINE: ABNORMAL /HPF (ref 0–6)
SODIUM BLD-SCNC: 139 MMOL/L (ref 135–145)
SPECIFIC GRAVITY UA: 1.02 (ref 1–1.03)
T4 FREE: 1.04 NG/DL (ref 0.9–1.8)
TRIGL SERPL-MCNC: 81 MG/DL
TROPONIN T: <0.01 NG/ML
TROPONIN T: <0.01 NG/ML
TSH HIGH SENSITIVITY: 5.81 UIU/ML (ref 0.27–4.2)
UROBILINOGEN, URINE: 0.2 MG/DL (ref 0.2–1)
VOLUME, (UVOL): 12 ML (ref 10–12)
WBC UA: ABNORMAL /HPF (ref 0–5)

## 2019-06-18 PROCEDURE — G0378 HOSPITAL OBSERVATION PER HR: HCPCS

## 2019-06-18 PROCEDURE — 80061 LIPID PANEL: CPT

## 2019-06-18 PROCEDURE — 96372 THER/PROPH/DIAG INJ SC/IM: CPT

## 2019-06-18 PROCEDURE — 84443 ASSAY THYROID STIM HORMONE: CPT

## 2019-06-18 PROCEDURE — 81001 URINALYSIS AUTO W/SCOPE: CPT

## 2019-06-18 PROCEDURE — 2700000000 HC OXYGEN THERAPY PER DAY

## 2019-06-18 PROCEDURE — 96365 THER/PROPH/DIAG IV INF INIT: CPT

## 2019-06-18 PROCEDURE — 87633 RESP VIRUS 12-25 TARGETS: CPT

## 2019-06-18 PROCEDURE — 87486 CHLMYD PNEUM DNA AMP PROBE: CPT

## 2019-06-18 PROCEDURE — 6360000002 HC RX W HCPCS: Performed by: INTERNAL MEDICINE

## 2019-06-18 PROCEDURE — 83735 ASSAY OF MAGNESIUM: CPT

## 2019-06-18 PROCEDURE — 87581 M.PNEUMON DNA AMP PROBE: CPT

## 2019-06-18 PROCEDURE — 2140000000 HC CCU INTERMEDIATE R&B

## 2019-06-18 PROCEDURE — 6360000002 HC RX W HCPCS: Performed by: NURSE PRACTITIONER

## 2019-06-18 PROCEDURE — 6370000000 HC RX 637 (ALT 250 FOR IP): Performed by: INTERNAL MEDICINE

## 2019-06-18 PROCEDURE — 87798 DETECT AGENT NOS DNA AMP: CPT

## 2019-06-18 PROCEDURE — 87899 AGENT NOS ASSAY W/OPTIC: CPT

## 2019-06-18 PROCEDURE — 84484 ASSAY OF TROPONIN QUANT: CPT

## 2019-06-18 PROCEDURE — 83721 ASSAY OF BLOOD LIPOPROTEIN: CPT

## 2019-06-18 PROCEDURE — 96376 TX/PRO/DX INJ SAME DRUG ADON: CPT

## 2019-06-18 PROCEDURE — 80048 BASIC METABOLIC PNL TOTAL CA: CPT

## 2019-06-18 PROCEDURE — 94640 AIRWAY INHALATION TREATMENT: CPT

## 2019-06-18 PROCEDURE — 83036 HEMOGLOBIN GLYCOSYLATED A1C: CPT

## 2019-06-18 PROCEDURE — 87449 NOS EACH ORGANISM AG IA: CPT

## 2019-06-18 PROCEDURE — 96366 THER/PROPH/DIAG IV INF ADDON: CPT

## 2019-06-18 PROCEDURE — 2580000003 HC RX 258: Performed by: NURSE PRACTITIONER

## 2019-06-18 PROCEDURE — 6370000000 HC RX 637 (ALT 250 FOR IP): Performed by: NURSE PRACTITIONER

## 2019-06-18 PROCEDURE — 36415 COLL VENOUS BLD VENIPUNCTURE: CPT

## 2019-06-18 PROCEDURE — 84439 ASSAY OF FREE THYROXINE: CPT

## 2019-06-18 PROCEDURE — 82962 GLUCOSE BLOOD TEST: CPT

## 2019-06-18 RX ORDER — FUROSEMIDE 10 MG/ML
40 INJECTION INTRAMUSCULAR; INTRAVENOUS 2 TIMES DAILY
Status: DISCONTINUED | OUTPATIENT
Start: 2019-06-18 | End: 2019-06-18

## 2019-06-18 RX ORDER — GABAPENTIN 400 MG/1
800 CAPSULE ORAL 4 TIMES DAILY
Status: DISCONTINUED | OUTPATIENT
Start: 2019-06-18 | End: 2019-06-19 | Stop reason: HOSPADM

## 2019-06-18 RX ORDER — ALBUTEROL SULFATE 90 UG/1
1 AEROSOL, METERED RESPIRATORY (INHALATION) EVERY 4 HOURS PRN
Status: DISCONTINUED | OUTPATIENT
Start: 2019-06-18 | End: 2019-06-19 | Stop reason: HOSPADM

## 2019-06-18 RX ORDER — FUROSEMIDE 40 MG/1
40 TABLET ORAL DAILY
Status: DISCONTINUED | OUTPATIENT
Start: 2019-06-18 | End: 2019-06-18

## 2019-06-18 RX ORDER — IPRATROPIUM BROMIDE AND ALBUTEROL SULFATE 2.5; .5 MG/3ML; MG/3ML
1 SOLUTION RESPIRATORY (INHALATION)
Status: DISCONTINUED | OUTPATIENT
Start: 2019-06-18 | End: 2019-06-19 | Stop reason: HOSPADM

## 2019-06-18 RX ORDER — SIMVASTATIN 40 MG
40 TABLET ORAL NIGHTLY
Status: DISCONTINUED | OUTPATIENT
Start: 2019-06-18 | End: 2019-06-19 | Stop reason: HOSPADM

## 2019-06-18 RX ORDER — GUAIFENESIN 600 MG/1
600 TABLET, EXTENDED RELEASE ORAL 2 TIMES DAILY
Status: DISCONTINUED | OUTPATIENT
Start: 2019-06-18 | End: 2019-06-19 | Stop reason: HOSPADM

## 2019-06-18 RX ORDER — SODIUM CHLORIDE 0.9 % (FLUSH) 0.9 %
10 SYRINGE (ML) INJECTION EVERY 12 HOURS SCHEDULED
Status: DISCONTINUED | OUTPATIENT
Start: 2019-06-18 | End: 2019-06-19 | Stop reason: HOSPADM

## 2019-06-18 RX ORDER — TROSPIUM CHLORIDE 20 MG/1
20 TABLET, FILM COATED ORAL NIGHTLY
Status: DISCONTINUED | OUTPATIENT
Start: 2019-06-18 | End: 2019-06-19 | Stop reason: HOSPADM

## 2019-06-18 RX ORDER — AZITHROMYCIN 250 MG/1
500 TABLET, FILM COATED ORAL DAILY
Status: DISCONTINUED | OUTPATIENT
Start: 2019-06-18 | End: 2019-06-19 | Stop reason: HOSPADM

## 2019-06-18 RX ORDER — VENLAFAXINE HYDROCHLORIDE 37.5 MG/1
75 CAPSULE, EXTENDED RELEASE ORAL
Status: DISCONTINUED | OUTPATIENT
Start: 2019-06-18 | End: 2019-06-19 | Stop reason: HOSPADM

## 2019-06-18 RX ORDER — POTASSIUM CHLORIDE 20 MEQ/1
20 TABLET, EXTENDED RELEASE ORAL 2 TIMES DAILY WITH MEALS
Status: DISCONTINUED | OUTPATIENT
Start: 2019-06-18 | End: 2019-06-19 | Stop reason: HOSPADM

## 2019-06-18 RX ORDER — MAGNESIUM SULFATE IN WATER 40 MG/ML
4 INJECTION, SOLUTION INTRAVENOUS
Status: DISCONTINUED | OUTPATIENT
Start: 2019-06-18 | End: 2019-06-18 | Stop reason: SDUPTHER

## 2019-06-18 RX ORDER — NITROGLYCERIN 0.4 MG/1
0.4 TABLET SUBLINGUAL EVERY 5 MIN PRN
Status: DISCONTINUED | OUTPATIENT
Start: 2019-06-18 | End: 2019-06-19 | Stop reason: HOSPADM

## 2019-06-18 RX ORDER — DOCUSATE SODIUM 100 MG/1
100 CAPSULE, LIQUID FILLED ORAL DAILY
Status: DISCONTINUED | OUTPATIENT
Start: 2019-06-18 | End: 2019-06-19 | Stop reason: HOSPADM

## 2019-06-18 RX ORDER — DEXTROSE MONOHYDRATE 50 MG/ML
100 INJECTION, SOLUTION INTRAVENOUS PRN
Status: DISCONTINUED | OUTPATIENT
Start: 2019-06-18 | End: 2019-06-19 | Stop reason: HOSPADM

## 2019-06-18 RX ORDER — FUROSEMIDE 10 MG/ML
20 INJECTION INTRAMUSCULAR; INTRAVENOUS 2 TIMES DAILY
Status: DISCONTINUED | OUTPATIENT
Start: 2019-06-18 | End: 2019-06-18

## 2019-06-18 RX ORDER — LEVOTHYROXINE SODIUM 0.03 MG/1
25 TABLET ORAL DAILY
Status: DISCONTINUED | OUTPATIENT
Start: 2019-06-18 | End: 2019-06-19 | Stop reason: HOSPADM

## 2019-06-18 RX ORDER — ASPIRIN 81 MG/1
81 TABLET, CHEWABLE ORAL DAILY
Status: DISCONTINUED | OUTPATIENT
Start: 2019-06-18 | End: 2019-06-19 | Stop reason: HOSPADM

## 2019-06-18 RX ORDER — BUSPIRONE HYDROCHLORIDE 15 MG/1
15 TABLET ORAL 3 TIMES DAILY
Status: DISCONTINUED | OUTPATIENT
Start: 2019-06-18 | End: 2019-06-19 | Stop reason: HOSPADM

## 2019-06-18 RX ORDER — NICOTINE POLACRILEX 4 MG
15 LOZENGE BUCCAL PRN
Status: DISCONTINUED | OUTPATIENT
Start: 2019-06-18 | End: 2019-06-19 | Stop reason: HOSPADM

## 2019-06-18 RX ORDER — IBUPROFEN 600 MG/1
600 TABLET ORAL
Status: DISCONTINUED | OUTPATIENT
Start: 2019-06-18 | End: 2019-06-19 | Stop reason: HOSPADM

## 2019-06-18 RX ORDER — SODIUM CHLORIDE 0.9 % (FLUSH) 0.9 %
10 SYRINGE (ML) INJECTION PRN
Status: DISCONTINUED | OUTPATIENT
Start: 2019-06-18 | End: 2019-06-19 | Stop reason: HOSPADM

## 2019-06-18 RX ORDER — DEXTROSE MONOHYDRATE 25 G/50ML
12.5 INJECTION, SOLUTION INTRAVENOUS PRN
Status: DISCONTINUED | OUTPATIENT
Start: 2019-06-18 | End: 2019-06-19 | Stop reason: HOSPADM

## 2019-06-18 RX ORDER — FUROSEMIDE 10 MG/ML
40 INJECTION INTRAMUSCULAR; INTRAVENOUS 2 TIMES DAILY
Status: DISCONTINUED | OUTPATIENT
Start: 2019-06-18 | End: 2019-06-19 | Stop reason: HOSPADM

## 2019-06-18 RX ORDER — ONDANSETRON 2 MG/ML
4 INJECTION INTRAMUSCULAR; INTRAVENOUS EVERY 6 HOURS PRN
Status: DISCONTINUED | OUTPATIENT
Start: 2019-06-18 | End: 2019-06-19 | Stop reason: HOSPADM

## 2019-06-18 RX ORDER — PREDNISONE 20 MG/1
40 TABLET ORAL DAILY
Status: DISCONTINUED | OUTPATIENT
Start: 2019-06-18 | End: 2019-06-19 | Stop reason: HOSPADM

## 2019-06-18 RX ORDER — INSULIN GLARGINE 100 [IU]/ML
60 INJECTION, SOLUTION SUBCUTANEOUS NIGHTLY
Status: DISCONTINUED | OUTPATIENT
Start: 2019-06-18 | End: 2019-06-18

## 2019-06-18 RX ORDER — MAGNESIUM SULFATE IN WATER 40 MG/ML
2 INJECTION, SOLUTION INTRAVENOUS
Status: COMPLETED | OUTPATIENT
Start: 2019-06-18 | End: 2019-06-18

## 2019-06-18 RX ORDER — NYSTATIN 100000 U/G
CREAM TOPICAL 2 TIMES DAILY
Status: DISCONTINUED | OUTPATIENT
Start: 2019-06-18 | End: 2019-06-19 | Stop reason: HOSPADM

## 2019-06-18 RX ADMIN — SIMVASTATIN 40 MG: 40 TABLET, FILM COATED ORAL at 21:16

## 2019-06-18 RX ADMIN — GABAPENTIN 800 MG: 400 CAPSULE ORAL at 01:11

## 2019-06-18 RX ADMIN — INSULIN LISPRO 9 UNITS: 100 INJECTION, SOLUTION INTRAVENOUS; SUBCUTANEOUS at 13:13

## 2019-06-18 RX ADMIN — VITAMIN D, TAB 1000IU (100/BT) 1000 UNITS: 25 TAB at 08:59

## 2019-06-18 RX ADMIN — MAGNESIUM SULFATE HEPTAHYDRATE 2 G: 40 INJECTION, SOLUTION INTRAVENOUS at 16:06

## 2019-06-18 RX ADMIN — BUSPIRONE HYDROCHLORIDE 15 MG: 15 TABLET ORAL at 14:21

## 2019-06-18 RX ADMIN — GABAPENTIN 800 MG: 400 CAPSULE ORAL at 14:20

## 2019-06-18 RX ADMIN — LEVOTHYROXINE SODIUM 25 MCG: 25 TABLET ORAL at 05:32

## 2019-06-18 RX ADMIN — IPRATROPIUM BROMIDE AND ALBUTEROL SULFATE 1 AMPULE: .5; 3 SOLUTION RESPIRATORY (INHALATION) at 21:13

## 2019-06-18 RX ADMIN — MAGNESIUM SULFATE HEPTAHYDRATE 2 G: 40 INJECTION, SOLUTION INTRAVENOUS at 14:23

## 2019-06-18 RX ADMIN — BUSPIRONE HYDROCHLORIDE 15 MG: 15 TABLET ORAL at 08:57

## 2019-06-18 RX ADMIN — ENOXAPARIN SODIUM 40 MG: 40 INJECTION SUBCUTANEOUS at 09:00

## 2019-06-18 RX ADMIN — TROSPIUM CHLORIDE 20 MG: 20 TABLET, FILM COATED ORAL at 21:16

## 2019-06-18 RX ADMIN — GABAPENTIN 800 MG: 400 CAPSULE ORAL at 18:06

## 2019-06-18 RX ADMIN — INSULIN LISPRO 9 UNITS: 100 INJECTION, SOLUTION INTRAVENOUS; SUBCUTANEOUS at 18:01

## 2019-06-18 RX ADMIN — INSULIN LISPRO 18 UNITS: 100 INJECTION, SOLUTION INTRAVENOUS; SUBCUTANEOUS at 13:12

## 2019-06-18 RX ADMIN — BUSPIRONE HYDROCHLORIDE 15 MG: 15 TABLET ORAL at 21:16

## 2019-06-18 RX ADMIN — INSULIN LISPRO 6 UNITS: 100 INJECTION, SOLUTION INTRAVENOUS; SUBCUTANEOUS at 09:02

## 2019-06-18 RX ADMIN — GUAIFENESIN 600 MG: 600 TABLET, EXTENDED RELEASE ORAL at 21:16

## 2019-06-18 RX ADMIN — FUROSEMIDE 20 MG: 10 INJECTION, SOLUTION INTRAMUSCULAR; INTRAVENOUS at 09:01

## 2019-06-18 RX ADMIN — VENLAFAXINE HYDROCHLORIDE 75 MG: 37.5 CAPSULE, EXTENDED RELEASE ORAL at 08:58

## 2019-06-18 RX ADMIN — IBUPROFEN 600 MG: 600 TABLET, FILM COATED ORAL at 08:58

## 2019-06-18 RX ADMIN — TROSPIUM CHLORIDE 20 MG: 20 TABLET, FILM COATED ORAL at 01:11

## 2019-06-18 RX ADMIN — INSULIN GLARGINE 80 UNITS: 100 INJECTION, SOLUTION SUBCUTANEOUS at 01:13

## 2019-06-18 RX ADMIN — INSULIN LISPRO 2 UNITS: 100 INJECTION, SOLUTION INTRAVENOUS; SUBCUTANEOUS at 01:13

## 2019-06-18 RX ADMIN — FUROSEMIDE 40 MG: 10 INJECTION, SOLUTION INTRAMUSCULAR; INTRAVENOUS at 21:17

## 2019-06-18 RX ADMIN — INSULIN LISPRO 18 UNITS: 100 INJECTION, SOLUTION INTRAVENOUS; SUBCUTANEOUS at 17:59

## 2019-06-18 RX ADMIN — IPRATROPIUM BROMIDE AND ALBUTEROL SULFATE 1 AMPULE: .5; 3 SOLUTION RESPIRATORY (INHALATION) at 14:17

## 2019-06-18 RX ADMIN — PREDNISONE 40 MG: 20 TABLET ORAL at 14:20

## 2019-06-18 RX ADMIN — GABAPENTIN 800 MG: 400 CAPSULE ORAL at 21:16

## 2019-06-18 RX ADMIN — IBUPROFEN 600 MG: 600 TABLET, FILM COATED ORAL at 14:20

## 2019-06-18 RX ADMIN — POTASSIUM CHLORIDE 20 MEQ: 20 TABLET, EXTENDED RELEASE ORAL at 18:06

## 2019-06-18 RX ADMIN — ASPIRIN 81 MG 81 MG: 81 TABLET ORAL at 08:58

## 2019-06-18 RX ADMIN — SODIUM CHLORIDE, PRESERVATIVE FREE 10 ML: 5 INJECTION INTRAVENOUS at 21:17

## 2019-06-18 RX ADMIN — SIMVASTATIN 40 MG: 40 TABLET, FILM COATED ORAL at 01:11

## 2019-06-18 RX ADMIN — GUAIFENESIN 600 MG: 600 TABLET, EXTENDED RELEASE ORAL at 14:21

## 2019-06-18 RX ADMIN — NYSTATIN: 100000 CREAM TOPICAL at 09:16

## 2019-06-18 RX ADMIN — SODIUM CHLORIDE, PRESERVATIVE FREE 10 ML: 5 INJECTION INTRAVENOUS at 09:00

## 2019-06-18 RX ADMIN — AZITHROMYCIN 500 MG: 250 TABLET, FILM COATED ORAL at 14:21

## 2019-06-18 RX ADMIN — INSULIN LISPRO 7 UNITS: 100 INJECTION, SOLUTION INTRAVENOUS; SUBCUTANEOUS at 21:17

## 2019-06-18 RX ADMIN — GABAPENTIN 800 MG: 400 CAPSULE ORAL at 08:58

## 2019-06-18 ASSESSMENT — PAIN SCALES - GENERAL
PAINLEVEL_OUTOF10: 4
PAINLEVEL_OUTOF10: 0
PAINLEVEL_OUTOF10: 0
PAINLEVEL_OUTOF10: 3
PAINLEVEL_OUTOF10: 0
PAINLEVEL_OUTOF10: 0

## 2019-06-18 NOTE — PROGRESS NOTES
Skin assessment completed with this RN and Cindy Denney. Excoriation noted under each breast and one small scab noted on the left lower leg.

## 2019-06-18 NOTE — ED PROVIDER NOTES
EMERGENCY DEPARTMENT H&P    Patient Name:  Allan Murphy  :  1950  MRN:  [id number]  Date of Visit:  2019    Triage Chief Complaint:   Shortness of Breath (Difficulty breathing x 48 hours, bilateral upper lobe wheezes. Recent dx of CHF)    HPI:  Felecia Springer is a 71 y.o. female who presents by EMS from home for complaint of shortness of breath over the last 2 days. Symptoms have been ongoing since onset. She notes that she has a chronic cough due to COPD which has not increased recently. She notes that she was just recently diagnosed with CHF   And is on oral Lasix. Tonight her difficulty breathing became worse so she called 911 and EMS providers brought her to the ED. They started 1 DuoNeb treatment during transport and patient notes improvement of her symptoms after receiving this treatment. She denies any areas of pain at this time such as neck pain, chest pain, back pain, abdominal pain or other areas of pain. She denies any recent fevers, chills, diaphoresis, hemoptysis, sputum production. She has noticed mildly increased leg edema. Denies other associated symptoms such as loss of consciousness, abdominal pain, nausea, vomiting. ROS:  Review of Systems  Ten point ROS was performed and is negative except as stated in HPI above. Review of systems obtained from the patient. Past Medical History:   Diagnosis Date    Anxiety     Arthritis     bilats hands, right shoulder    Atrial fibrillation [facility])     Cardioversion @ [facility] - no trouble with it ever since.  Sees Dr. Ry Dinero Atrial fibrillation with RVR [facility])     Atrial fibrillation with RVR ([facility] [address] 2012    Atrial fibrillation with RVR ([facility] [address] 3/1/2013    Cancer (HCC)     skin (right upper chest & face)    Cervicalgia     Constipation     COPD (chronic obstructive pulmonary disease) ([facility] [address]     Depression     Diabetes mellitus (HCC)     IDDM    Diabetic neuropathy (HCC)     Disc herniation     lower file    Intimate partner violence:     Fear of current or ex partner: Not on file     Emotionally abused: Not on file     Physically abused: Not on file     Forced sexual activity: Not on file   Other Topics Concern    Not on file   Social History Narrative    Not on file     No current facility-administered medications for this encounter. Current Outpatient Medications   Medication Sig Dispense Refill    aspirin 81 MG chewable tablet Take 1 tablet by mouth daily 30 tablet 3    busPIRone (BUSPAR) 10 MG tablet Take 1.5 tablets by mouth 3 times daily 160 tablet 0    insulin glargine (LANTUS) 100 UNIT/ML injection vial Inject 60 Units into the skin nightly 1 vial 3    docusate sodium (COLACE, DULCOLAX) 100 MG CAPS Take 100 mg by mouth daily 60 capsule 0    furosemide (LASIX) 40 MG tablet Take 1 tablet by mouth daily 60 tablet 1    sulindac (CLINORIL) 150 MG tablet Take 150 mg by mouth 2 times daily      tolterodine (DETROL) 2 MG tablet Take 2 mg by mouth daily      nystatin (MYCOSTATIN) 388969 UNIT/GM cream Apply topically 2 times daily Apply topically 2 times daily.  nitroGLYCERIN (NITROSTAT) 0.4 MG SL tablet up to max of 3 total doses.  If no relief after 1 dose, call 911. 25 tablet 1    simvastatin (ZOCOR) 20 MG tablet Take 1 tablet by mouth nightly (Patient taking differently: Take 40 mg by mouth nightly ) 30 tablet 3    magnesium hydroxide (MILK OF MAGNESIA) 400 MG/5ML suspension Take 30 mLs by mouth daily as needed for Constipation      albuterol-ipratropium (COMBIVENT)  MCG/ACT inhaler Inhale 1 puff into the lungs every 4 hours as needed for Wheezing 1 Inhaler 1    levothyroxine (SYNTHROID) 25 MCG tablet Take 25 mcg by mouth Daily      venlafaxine (EFFEXOR XR) 150 MG extended release capsule Take one capsule daily at the same time as the 75 mg capsule 90 capsule 0    venlafaxine (EFFEXOR XR) 75 MG extended release capsule Take one capsule daily at the same time as the 150 mg capsule 90 capsule 0    ONE TOUCH LANCETS MISC 1 each by Does not apply route daily 100 each 11    Insulin Lispro, Human, (HUMALOG SC) Inject into the skin Insulin pump      gabapentin (NEURONTIN) 800 MG tablet Take 1 tablet by mouth 3 times daily (Patient taking differently: Take 800 mg by mouth 4 times daily. Lu Miguel ) 270 tablet 1    glucose blood VI test strips (ASCENSIA AUTODISC VI;ONE TOUCH ULTRA TEST VI) strip 1 each by In Vitro route 4 times daily as needed As needed.  Alcohol Swabs (SURE-PREP ALCOHOL PREP) 70 % PADS by Does not apply route. 100 each 3    Vitamin D (CHOLECALCIFEROL) 1000 UNITS CAPS capsule Take 1,000 Units by mouth daily During the winter months. Allergies   Allergen Reactions    Latex Other (See Comments)     Blisters    Adhesive Tape      Paper tape ok to use    Codeine Nausea And Vomiting       Physical Exam:  ED TRIAGE VITALS /69, heart rate 91, respirations 24, temperature 97.9 °F, pulse ox 92%  GENERAL: Appears stated age, awake and alert, no acute distress, non-toxic appearing  HEENT: NC / AT. Oropharynx unremarkable. MMM. NECK: Trachea midline. No overt adenopathy or masses. No JVD. CARDIOVASCULAR: RRR. Normal s1/s2. No murmur. Peripheral pulses and perfusion intact. Mild bilateral nonpitting LE edema noted. RESPIRATORY: Mild tachypnea noted. Mild bilateral expiratory wheezing appreciated as well as bibasilar crackles. No respiratory distress or accessory muscle usage. ABDOMEN: Soft, no tenderness to palpation noted. Non-distended  SKIN: Warm. Dry. Intact. No obvious skin abnormalities noted. MUSCULOSKELETAL: No swelling or deformities noted. No visible overt ROM restriction noted. NEURO: The patient is awake, alert, interactive. Follows commands and answers questions appropriately. Speech is fluent with intact cognition.     I have reviewed and interpreted all of the currently available lab results from this visit:  Results for orders placed or performed during the hospital encounter of 06/17/19   CBC Auto Differential   Result Value Ref Range    WBC 8.6 4.0 - 10.5 K/CU MM    RBC 3.52 (L) 4.2 - 5.4 M/CU MM    Hemoglobin 9.8 (L) 12.5 - 16.0 GM/DL    Hematocrit 31.1 (L) 37 - 47 %    MCV 88.4 78 - 100 FL    MCH 27.8 27 - 31 PG    MCHC 31.5 (L) 32.0 - 36.0 %    RDW 15.4 (H) 11.7 - 14.9 %    Platelets 205 (L) 568 - 440 K/CU MM    MPV 10.5 7.5 - 11.1 FL    Differential Type AUTOMATED DIFFERENTIAL     Segs Relative 72.6 (H) 36 - 66 %    Lymphocytes % 16.9 (L) 24 - 44 %    Monocytes % 6.5 (H) 0 - 4 %    Eosinophils % 3.1 (H) 0 - 3 %    Basophils % 0.3 0 - 1 %    Segs Absolute 6.2 K/CU MM    Lymphocytes # 1.5 K/CU MM    Monocytes # 0.6 K/CU MM    Eosinophils # 0.3 K/CU MM    Basophils # 0.0 K/CU MM    Immature Neutrophil % 0.6 (H) 0 - 0.43 %    Total Immature Neutrophil 0.05 K/CU MM   Brain Natriuretic Peptide   Result Value Ref Range    Pro-.1 (H) <300 PG/ML   Comprehensive Metabolic Panel w/ Reflex to MG   Result Value Ref Range    Sodium 142 135 - 145 MMOL/L    Potassium 4.1 3.5 - 5.1 MMOL/L    Chloride 105 99 - 110 mMol/L    CO2 20 (L) 21 - 32 MMOL/L    BUN 23 6 - 23 MG/DL    CREATININE 0.9 0.6 - 1.1 MG/DL    Glucose 334 (H) 70 - 99 MG/DL    Calcium 8.8 8.3 - 10.6 MG/DL    Alb 3.2 (L) 3.4 - 5.0 GM/DL    Total Protein 6.9 6.4 - 8.2 GM/DL    Total Bilirubin 0.3 0.0 - 1.0 MG/DL    ALT 13 10 - 40 U/L    AST 17 15 - 37 IU/L    Alkaline Phosphatase 143 (H) 40 - 129 IU/L    GFR Non-African American >60 >60 mL/min/1.73m2    GFR African American >60 >60 mL/min/1.73m2    Anion Gap 17 (H) 4 - 16   Troponin   Result Value Ref Range    Troponin T <0.010 <0.01 NG/ML   POCT Glucose   Result Value Ref Range    POC Glucose 229 (H) 70 - 99 MG/DL       Radiographs:  Radiologist's Report Reviewed:  XR CHEST PORTABLE   Final Result   Cardiomegaly with findings of acute congestive heart failure.            EKG: (All EKG's are interpreted by myself in the absence of a cardiologist)    Rhythm: Normal sinus rhythm  Rate: 86  Axis: Normal  Ectopy: None  Conduction: Normal  ST Segments: No acute elevations or depressions noted. T Waves: Old T wave inversions noted in 1 and aVL  No acute findings noted compared to previous EKG on file.     Medications administered:  Medications   aspirin chewable tablet 81 mg (has no administration in time range)   busPIRone (BUSPAR) tablet 15 mg (has no administration in time range)   docusate sodium (COLACE) capsule 100 mg (has no administration in time range)   furosemide (LASIX) tablet 40 mg (has no administration in time range)   gabapentin (NEURONTIN) capsule 800 mg (800 mg Oral Given 6/18/19 0111)   levothyroxine (SYNTHROID) tablet 25 mcg (has no administration in time range)   magnesium hydroxide (MILK OF MAGNESIA) 400 MG/5ML suspension 30 mL (has no administration in time range)   nitroGLYCERIN (NITROSTAT) SL tablet 0.4 mg (has no administration in time range)   nystatin (MYCOSTATIN) cream ( Topical Not Given 6/18/19 0047)   simvastatin (ZOCOR) tablet 40 mg (40 mg Oral Given 6/18/19 0111)   ibuprofen (ADVIL;MOTRIN) tablet 600 mg (has no administration in time range)   trospium (SANCTURA) tablet 20 mg (20 mg Oral Given 6/18/19 0111)   venlafaxine (EFFEXOR XR) extended release capsule 75 mg (has no administration in time range)   vitamin D (CHOLECALCIFEROL) tablet 1,000 Units (has no administration in time range)   sodium chloride flush 0.9 % injection 10 mL (has no administration in time range)   sodium chloride flush 0.9 % injection 10 mL (has no administration in time range)   ondansetron (ZOFRAN) injection 4 mg (has no administration in time range)   enoxaparin (LOVENOX) injection 40 mg (has no administration in time range)   glucose (GLUTOSE) 40 % oral gel 15 g (has no administration in time range)   dextrose 50 % IV solution (has no administration in time range)   glucagon (rDNA) injection 1 mg (has no administration in time range) dextrose 5 % solution (has no administration in time range)   insulin lispro (HUMALOG) injection vial 0-12 Units (has no administration in time range)   insulin lispro (HUMALOG) injection vial 0-6 Units (2 Units Subcutaneous Given 6/18/19 0113)   furosemide (LASIX) injection 40 mg (0 mg Intravenous Held 6/18/19 0120)   nitroglycerin (NITRO-BID) 2 % ointment 1 inch (1 inch Topical Not Given 6/18/19 0047)   insulin glargine (LANTUS) injection pen 80 Units (80 Units Subcutaneous Given 6/18/19 0113)   albuterol sulfate  (90 Base) MCG/ACT inhaler 1 puff (has no administration in time range)     And   ipratropium (ATROVENT HFA) 17 MCG/ACT inhaler 1 puff (has no administration in time range)   ipratropium-albuterol (DUONEB) nebulizer solution 2 ampule (1 ampule Inhalation Given 6/17/19 2038)   methylPREDNISolone sodium (SOLU-MEDROL) injection 125 mg (125 mg Intravenous Given 6/17/19 2121)   ipratropium-albuterol (DUONEB) 0.5-2.5 (3) MG/3ML nebulizer solution (3 mLs  Given 6/17/19 2038)   furosemide (LASIX) injection 40 mg (40 mg Intravenous Given 6/17/19 2315)       ED COURSE & MDM:  Nursing notes and vital signs were reviewed. The patient's medical record and available pertinent information was also reviewed if available. Pt presents with hypoxia on room air as well as tachypnea. Other vitals are unremarkable. Please see history and exam above. Workup initiated as above. Treatment initiated with DuoNeb treatments and IV Solu-Medrol. Do not feel the patient requires BiPAP or other airway management at this time. EKG shows no acute findings at this time. Lab work reviewed, stable. Negative troponin. CMP shows hyperglycemia at 334. No signs of DKA at this time. Remainder of CMP unremarkable. CBC is stable. Chest x-ray shows pulmonary edema likely due to CHF. 40 mg Lasix IV ordered for diuresis. Patient was reassessed after DuoNeb treatments. She noted feeling significantly better.   She continues to require oxygen to maintain normal O2 saturation. I believe she will need admission to the hospital for further management. Patient was agreeable to this. Her case was discussed with hospitalist nurse practitioner Елена Thakur who accepted the patient for admission and will continue care. Clinical Impression:  1. Hypoxia    2. Acute pulmonary edema (HCC)    3. Dyspnea, unspecified type      Comment: Please note this report has been produced using speech recognition software and may contain errors related to that system including errors in grammar, punctuation, and spelling, as well as words and phrases that may be inappropriate. If there are any questions or concerns please feel free to contact the dictating provider for clarification.     Electronically Signed:        Raquel Peraza DO  06/18/19 4768

## 2019-06-18 NOTE — CARE COORDINATION
CM met with the patient for discharge planning. Patient lives at home with her nieces, has insurance with Rx coverage & PCP, states she is independent with ADL's, drives only occasionally (family provides transportation as needed), and was not receiving [address] prior to admission (has received [address] through [facility] in the past). Patient states that she uses a walker at home and does not require oxygen at home. Patient plans to return home upon discharge and states she may benefit from [address] again upon discharge but will think about it. CM will follow.

## 2019-06-18 NOTE — PROGRESS NOTES
X 3,  neurovascularly intact with sensory/motor intact upper extremities/lower extremities, bilaterally. Cranial nerves:II-XII intact, grossly non-focal.  Mental status: Alert, oriented, thought content appropriate. Data    Recent Labs     06/17/19 2045   WBC 8.6   HGB 9.8*   HCT 31.1*   *      Recent Labs     06/17/19 2045 06/18/19  0415    139   K 4.1 3.8    102   CO2 20* 26   BUN 23 21   CREATININE 0.9 0.9     Recent Labs     06/17/19 2045   AST 17   ALT 13   BILITOT 0.3   ALKPHOS 143*     No results for input(s): INR in the last 72 hours. No results for input(s): CKTOTAL, CKMB, CKMBINDEX, TROPONINI in the last 72 hours. Imaging/Test Results    bs 229-446  Mg 1.4  K 3.8  a1c 9.2  Hb 9.8  T4 nml        Consults:     IP CONSULT TO DIETITIAN  IP CONSULT TO DIETITIAN    Allergies  Latex;  Adhesive tape; and Codeine    Medications      Scheduled Meds:   aspirin  81 mg Oral Daily    busPIRone  15 mg Oral TID    docusate sodium  100 mg Oral Daily    gabapentin  800 mg Oral 4x Daily    levothyroxine  25 mcg Oral Daily    nystatin   Topical BID    simvastatin  40 mg Oral Nightly    ibuprofen  600 mg Oral TID WC    trospium  20 mg Oral Nightly    venlafaxine  75 mg Oral Daily with breakfast    vitamin D  1,000 Units Oral Daily    sodium chloride flush  10 mL Intravenous 2 times per day    enoxaparin  40 mg Subcutaneous Daily    nitroglycerin  1 inch Topical 4 times per day    furosemide  20 mg Intravenous BID    insulin lispro  0.08 Units/kg Subcutaneous TID     insulin lispro  0-18 Units Subcutaneous TID     insulin lispro  0-9 Units Subcutaneous Nightly    potassium chloride  20 mEq Oral BID     insulin glargine  80 Units Subcutaneous Q12H    predniSONE  40 mg Oral Daily    azithromycin  500 mg Oral Daily    ipratropium-albuterol  1 ampule Inhalation Q4H WA    guaiFENesin  600 mg Oral BID       Infusions:   dextrose         PRN Meds:  magnesium hydroxide, nitroGLYCERIN, sodium chloride flush, ondansetron, glucose, dextrose, glucagon (rDNA), dextrose, albuterol sulfate HFA **AND** ipratropium **AND** MDI Treatment    ASSESSMENT AND PLAN      Acute on chronic LV diastolic HF--TTE 05/90 reviewed, EF 50%, severe LVH, grade 3 diastolic dysfunction, pt with pulm edema on CXR, bilateral LE edema, was on lasix 40 mg daily, diuresing with IV lasix, monitor I/o, chem daily, low salt diet, fluid restriction, ASA, statin    Acute COPD exacerbation--check viral resp pcr panel, urine strep/legionella, no infiltrate on cXR, PO azithro, new productive cough, mucinex, nebs, PO prednisone    Acute resp failure--pt does not wear home oxygen, on 3L NC, d/t fluid overload, pulm edema on CXR and acute COPD exacerbation, wean as tolerated    DM type 2 uncontrolled--a1c 9.8, on steroids for COPD, continue home lantus bid, lispro with meals and SS, ADA diet    Morbid obesity--bmi 48, nutrition consulted    hypomag--replace, repeat tomorrow    Anemia--no bleeding clinically, check iron studies    Foul smelling urine--check UA, no fevers    Hypothyroidism--T4 nml, stable    HTN--stable    Anxiety--stable    HLD--stable        PT/OT Eval Status:deferred    DVT Prophylaxis: SQ Lovenox  Diet: DIET CARDIAC;  Code Status: Full Code      Dispo - IV lasix, wean oxygen,  d/c 2-3 days    Pretty Trevino MD

## 2019-06-18 NOTE — H&P
History and Physical      Name:  Dameon Kendrick Standard /Age/Sex: 1950  (71 y.o. female)   MRN & CSN:  [id number] & [id number] Admission Date/Time: 2019  8:32 PM   Location:   PCP: Alaina Mar, [facility] Day: 1    Assessment and Plan:   Perri Schneider is a 71 y.o.  female  who presents with     1. Acute congestive heart failure- CXR reveals cardiomegaly consistent with acute congestive heart failure. . 1. EKG reveals NSR. ECHO 2018 EF 50-55%               Serial troponin                ECHO                TSH, FT4, FLP                Diuresis with IV furosemide BID                Med nebs q 4 PRN               IV solumedrol                Daily labs CBC, BMP              BNP every other day               Daily weights   2. Diabetes Mellitus Type 2- POCT blood glucose four times daily. HgbA1C. Medium SS insulin coverage algorithm. 3. Hypertension- BP: (129-168)/(54-76)  stable, continue home furosemide     Diet No diet orders on file   DVT Prophylaxis [] Lovenox, []  Heparin, [] SCDs, [] Ambulation   GI Prophylaxis [] PPI,  [] H2 Blocker,  [] Carafate,  [] Diet/Tube Feeds   Code Status Prior   Disposition Patient requires continued admission due to need for further evaluation and management of acute congestive heart failure    MDM [] Low, [] Moderate,[x]  High  Patient's risk as above due to  complexity of medical data reviewed and treatment options available        History of Present Illness:     Chief Complaint:   Chief Complaint   Patient presents with    Shortness of Breath     Difficulty breathing x 48 hours, bilateral upper lobe wheezes. Recent dx of CHF       Perri Schneider is a 71 y.o.  female  who presents with shortness of breath for last two days. Patient has history of COPD, CHF and HTN. Patient also has associated BLE edema and BLUL wheezes.     Patient states that symptoms improved with duo nebs given enroute to hospital. Denies pain or any other symtptoms. Ten point ROS reviewed negative, unless as noted above    Objective:   No intake or output data in the 24 hours ending 06/17/19 2258   Vitals:   Vitals:    06/17/19 2158   BP:    Pulse: 88   Resp: 23   Temp:    SpO2: 100%     Physical Exam:   GEN Awake female, sitting upright in bed in no apparent distress. Appears given age. EYES Pupils are equally round. No scleral erythema, discharge, or conjunctivitis. HENT Mucous membranes are moist. Oral pharynx without exudates, no evidence of thrush. NECK Supple, no apparent thyromegaly or masses. RESP Diminished breath sounds bilaterally , mild expiratory wheezes, bibasilar rales, no rhonchi. Symmetric chest movement  CARDIO/VASC S1/S2 auscultated. Regular rate without appreciable murmurs, rubs, or gallops. No JVD or carotid bruits. Peripheral pulses equal bilaterally and palpable. 2 PE peripheral edema to above knee  GI Abdomen is soft without significant tenderness, masses, or guarding. Bowel sounds are normoactive. Rectal exam deferred.  No costovertebral angle tenderness. Normal appearing external genitalia. Bullard catheter is not present. HEME/LYMPH No palpable cervical lymphadenopathy and no hepatosplenomegaly. No petechiae or ecchymoses. MSK No gross joint deformities. SKIN Normal coloration, warm, dry. NEURO Cranial nerves appear grossly intact, normal speech, no lateralizing weakness. PSYCH Awake, alert, oriented x 4. Affect appropriate. Past Medical History:      Past Medical History:   Diagnosis Date    Anxiety     Arthritis     bilats hands, right shoulder    Atrial fibrillation [facility])     Cardioversion @ [facility] - no trouble with it ever since.  Sees Dr. Merida Lowers fibrillation with RVR [facility]) 2013    Atrial fibrillation with RVR ([facility] [address] 11/26/2012    Atrial fibrillation with RVR ([facility] [address] 3/1/2013    Cancer (HCC)     skin (right upper chest & face)    Cervicalgia     Constipation     COPD (chronic obstructive 1960); Cataract removal with implant (Bilateral, 2000s); Diagnostic Cardiac Cath Lab Procedure (2000s); Tonsillectomy and adenoidectomy (1966); Cholecystectomy ([id number]); and liver biopsy (9/14/2015). Allergies: Allergies   Allergen Reactions    Latex Other (See Comments)     Blisters    Adhesive Tape      Paper tape ok to use    Codeine Nausea And Vomiting       FAM HX: family history includes Anxiety Disorder in her sister; Arthritis in her brother, father, mother, and sister; Cancer in her brother and father; Dementia in her mother; Depression in her mother and sister; Emphysema in her mother; Hearing Loss in her brother; High Blood Pressure in her brother; Other in her father; Vision Loss in her father. Soc HX:   Social History     Socioeconomic History    Marital status:       Spouse name: None    Number of children: None    Years of education: None    Highest education level: None   Occupational History    Occupation:    Social Needs    Financial resource strain: None    Food insecurity:     Worry: None     Inability: None    Transportation needs:     Medical: None     Non-medical: None   Tobacco Use    Smoking status: Former Smoker     Packs/day: 1.00     Years: 21.00     Pack years: 21.00     Types: Cigarettes    Smokeless tobacco: Never Used    Tobacco comment: lives with smokers   Substance and Sexual Activity    Alcohol use: No     Alcohol/week: 0.0 oz     Comment:           CAFFEINE: 4-6 diet sodas daily    Drug use: No    Sexual activity: None   Lifestyle    Physical activity:     Days per week: None     Minutes per session: None    Stress: None   Relationships    Social connections:     Talks on phone: None     Gets together: None     Attends Holiness service: None     Active member of club or organization: None     Attends meetings of clubs or organizations: None     Relationship status: None    Intimate partner violence:     Fear of current or ex partner: None     Emotionally abused: None     Physically abused: None     Forced sexual activity: None   Other Topics Concern    None   Social History Narrative    None       Medications:     Prior to Admission medications    Medication Sig Start Date End Date Taking? Authorizing Provider   aspirin 81 MG chewable tablet Take 1 tablet by mouth daily 11/14/18  Yes Rona Gracia MD   busPIRone (BUSPAR) 10 MG tablet Take 1.5 tablets by mouth 3 times daily 11/13/18  Yes Rona Gracia MD   insulin glargine (LANTUS) 100 UNIT/ML injection vial Inject 60 Units into the skin nightly 11/13/18  Yes Rona Gracia MD   docusate sodium (COLACE, DULCOLAX) 100 MG CAPS Take 100 mg by mouth daily 11/14/18  Yes Rona Gracia MD   furosemide (LASIX) 40 MG tablet Take 1 tablet by mouth daily 11/14/18  Yes Rona Gracia MD   sulindac (CLINORIL) 150 MG tablet Take 150 mg by mouth 2 times daily   Yes Historical Provider, MD   tolterodine (DETROL) 2 MG tablet Take 2 mg by mouth daily   Yes Historical Provider, MD   nystatin (MYCOSTATIN) 483224 UNIT/GM cream Apply topically 2 times daily Apply topically 2 times daily. Yes Historical Provider, MD   nitroGLYCERIN (NITROSTAT) 0.4 MG SL tablet up to max of 3 total doses.  If no relief after 1 dose, call 911. 11/5/17  Yes Shannan Butterfield MD   simvastatin (ZOCOR) 20 MG tablet Take 1 tablet by mouth nightly  Patient taking differently: Take 40 mg by mouth nightly  11/5/17  Yes Shannan Butterfield MD   magnesium hydroxide (MILK OF MAGNESIA) 400 MG/5ML suspension Take 30 mLs by mouth daily as needed for Constipation 11/5/17  Yes Shannan Butterfield MD   albuterol-ipratropium [facility])  MCG/ACT inhaler Inhale 1 puff into the lungs every 4 hours as needed for Wheezing 11/5/17 6/17/19 Yes Shannan Butterfield MD   levothyroxine (SYNTHROID) 25 MCG tablet Take 25 mcg by mouth Daily   Yes Historical Provider, MD   venlafaxine (EFFEXOR XR) 150 MG extended release capsule Take one capsule daily at the same time as the 75 mg capsule 2/2/17  Yes Renaldo Wolf PA-C   venlafaxine (EFFEXOR XR) 75 MG extended release capsule Take one capsule daily at the same time as the 150 mg capsule 2/2/17  Yes Renaldo Wolf PA-C   ONE TOUCH LANCETS MISC 1 each by Does not apply route daily 10/11/16  Yes Renaldo Wolf PA-C   Insulin Lispro, Human, (HUMALOG SC) Inject into the skin Insulin pump   Yes Historical Provider, MD   gabapentin (NEURONTIN) 800 MG tablet Take 1 tablet by mouth 3 times daily  Patient taking differently: Take 800 mg by mouth 4 times daily. . 10/4/16  Yes Renaldo Wolf PA-C   glucose blood VI test strips (ASCENSIA AUTODISC VI;ONE TOUCH ULTRA TEST VI) strip 1 each by In Vitro route 4 times daily as needed As needed. Yes Historical Provider, MD   Alcohol Swabs (SURE-PREP ALCOHOL PREP) 70 % PADS by Does not apply route. 3/2/13  Yes Linden Carter MD   Vitamin D (CHOLECALCIFEROL) 1000 UNITS CAPS capsule Take 1,000 Units by mouth daily During the winter months.    Yes Historical Provider, MD       Electronically signed by Arturo Schwab, APRN - CNP on 6/17/2019 at 10:58 PM

## 2019-06-19 ENCOUNTER — HOSPITAL ENCOUNTER (INPATIENT)
Age: 69
LOS: 7 days | Discharge: NURSING, REHAB OR LONG-TERM CARE | DRG: 286 | End: 2019-06-26
Attending: HOSPITALIST | Admitting: INTERNAL MEDICINE
Payer: MEDICARE

## 2019-06-19 VITALS
HEART RATE: 99 BPM | HEIGHT: 60 IN | OXYGEN SATURATION: 96 % | RESPIRATION RATE: 16 BRPM | TEMPERATURE: 97.2 F | SYSTOLIC BLOOD PRESSURE: 127 MMHG | BODY MASS INDEX: 47.76 KG/M2 | DIASTOLIC BLOOD PRESSURE: 64 MMHG | WEIGHT: 243.25 LBS

## 2019-06-19 PROBLEM — I50.31 ACUTE CONGESTIVE HEART FAILURE WITH LEFT VENTRICULAR DIASTOLIC DYSFUNCTION (HCC): Status: RESOLVED | Noted: 2019-06-17 | Resolved: 2019-06-19

## 2019-06-19 PROBLEM — J81.0 ACUTE PULMONARY EDEMA (HCC): Status: ACTIVE | Noted: 2019-06-19

## 2019-06-19 PROBLEM — J81.0 ACUTE PULMONARY EDEMA (HCC): Status: RESOLVED | Noted: 2019-06-19 | Resolved: 2019-06-19

## 2019-06-19 PROBLEM — R07.9 CHEST PAIN: Status: ACTIVE | Noted: 2019-06-19

## 2019-06-19 LAB
ADENOVIRUS DETECTION BY PCR: NOT DETECTED
ANION GAP SERPL CALCULATED.3IONS-SCNC: 8 MMOL/L (ref 4–16)
BORDETELLA PERTUSSIS PCR: NOT DETECTED
BUN BLDV-MCNC: 31 MG/DL (ref 6–23)
CALCIUM SERPL-MCNC: 9.2 MG/DL (ref 8.3–10.6)
CHLAMYDOPHILA PNEUMONIA PCR: NOT DETECTED
CHLORIDE BLD-SCNC: 99 MMOL/L (ref 99–110)
CO2: 31 MMOL/L (ref 21–32)
CORONAVIRUS 229E PCR: NOT DETECTED
CORONAVIRUS HKU1 PCR: NOT DETECTED
CORONAVIRUS NL63 PCR: NOT DETECTED
CORONAVIRUS OC43 PCR: NOT DETECTED
CREAT SERPL-MCNC: 1.1 MG/DL (ref 0.6–1.1)
FERRITIN: 72 NG/ML (ref 15–150)
FOLATE: 15.8 NG/ML (ref 3.1–17.5)
GFR AFRICAN AMERICAN: 60 ML/MIN/1.73M2
GFR NON-AFRICAN AMERICAN: 49 ML/MIN/1.73M2
GLUCOSE BLD-MCNC: 324 MG/DL (ref 70–99)
GLUCOSE BLD-MCNC: 377 MG/DL (ref 70–99)
GLUCOSE BLD-MCNC: 381 MG/DL (ref 70–99)
GLUCOSE BLD-MCNC: 420 MG/DL (ref 70–99)
GLUCOSE BLD-MCNC: 461 MG/DL (ref 70–99)
GLUCOSE BLD-MCNC: 468 MG/DL (ref 70–99)
HUMAN METAPNEUMOVIRUS PCR: NOT DETECTED
INFLUENZA A BY PCR: NOT DETECTED
INFLUENZA A H1 (2009) PCR: NOT DETECTED
INFLUENZA A H1 PANDEMIC PCR: NOT DETECTED
INFLUENZA A H3 PCR: NOT DETECTED
INFLUENZA B BY PCR: NOT DETECTED
IRON: 57 UG/DL (ref 37–145)
LEGIONELLA URINARY AG: NEGATIVE
MAGNESIUM: 2.3 MG/DL (ref 1.8–2.4)
MAGNESIUM: 2.5 MG/DL (ref 1.8–2.4)
MYCOPLASMA PNEUMONIAE PCR: NOT DETECTED
PARAINFLUENZA 1 PCR: NOT DETECTED
PARAINFLUENZA 2 PCR: NOT DETECTED
PARAINFLUENZA 3 PCR: NOT DETECTED
PARAINFLUENZA 4 PCR: NOT DETECTED
PCT TRANSFERRIN: 17 % (ref 10–44)
POTASSIUM SERPL-SCNC: 4.8 MMOL/L (ref 3.5–5.1)
PRO-BNP: 844.8 PG/ML
RHINOVIRUS ENTEROVIRUS PCR: NOT DETECTED
RSV PCR: NOT DETECTED
SODIUM BLD-SCNC: 138 MMOL/L (ref 135–145)
STREP PNEUMONIAE ANTIGEN: NORMAL
TOTAL IRON BINDING CAPACITY: 345 UG/DL (ref 250–450)
TROPONIN T: <0.01 NG/ML
TROPONIN T: <0.01 NG/ML
UNSATURATED IRON BINDING CAPACITY: 288 UG/DL (ref 110–370)
VITAMIN B-12: 276.3 PG/ML (ref 211–911)

## 2019-06-19 PROCEDURE — 6360000002 HC RX W HCPCS: Performed by: NURSE PRACTITIONER

## 2019-06-19 PROCEDURE — 2700000000 HC OXYGEN THERAPY PER DAY

## 2019-06-19 PROCEDURE — 6370000000 HC RX 637 (ALT 250 FOR IP): Performed by: INTERNAL MEDICINE

## 2019-06-19 PROCEDURE — G0378 HOSPITAL OBSERVATION PER HR: HCPCS

## 2019-06-19 PROCEDURE — 1200000000 HC SEMI PRIVATE

## 2019-06-19 PROCEDURE — 2580000003 HC RX 258: Performed by: INTERNAL MEDICINE

## 2019-06-19 PROCEDURE — 83735 ASSAY OF MAGNESIUM: CPT

## 2019-06-19 PROCEDURE — 6360000002 HC RX W HCPCS: Performed by: INTERNAL MEDICINE

## 2019-06-19 PROCEDURE — 36415 COLL VENOUS BLD VENIPUNCTURE: CPT

## 2019-06-19 PROCEDURE — 83550 IRON BINDING TEST: CPT

## 2019-06-19 PROCEDURE — 96372 THER/PROPH/DIAG INJ SC/IM: CPT

## 2019-06-19 PROCEDURE — 96376 TX/PRO/DX INJ SAME DRUG ADON: CPT

## 2019-06-19 PROCEDURE — 80048 BASIC METABOLIC PNL TOTAL CA: CPT

## 2019-06-19 PROCEDURE — 82746 ASSAY OF FOLIC ACID SERUM: CPT

## 2019-06-19 PROCEDURE — 84484 ASSAY OF TROPONIN QUANT: CPT

## 2019-06-19 PROCEDURE — 82728 ASSAY OF FERRITIN: CPT

## 2019-06-19 PROCEDURE — 2580000003 HC RX 258: Performed by: NURSE PRACTITIONER

## 2019-06-19 PROCEDURE — 93005 ELECTROCARDIOGRAM TRACING: CPT | Performed by: FAMILY MEDICINE

## 2019-06-19 PROCEDURE — 83880 ASSAY OF NATRIURETIC PEPTIDE: CPT

## 2019-06-19 PROCEDURE — 82962 GLUCOSE BLOOD TEST: CPT

## 2019-06-19 PROCEDURE — 94761 N-INVAS EAR/PLS OXIMETRY MLT: CPT

## 2019-06-19 PROCEDURE — 83540 ASSAY OF IRON: CPT

## 2019-06-19 PROCEDURE — 94640 AIRWAY INHALATION TREATMENT: CPT

## 2019-06-19 PROCEDURE — 6370000000 HC RX 637 (ALT 250 FOR IP): Performed by: NURSE PRACTITIONER

## 2019-06-19 PROCEDURE — 82607 VITAMIN B-12: CPT

## 2019-06-19 RX ORDER — SODIUM CHLORIDE 0.9 % (FLUSH) 0.9 %
10 SYRINGE (ML) INJECTION PRN
Status: DISCONTINUED | OUTPATIENT
Start: 2019-06-19 | End: 2019-06-22 | Stop reason: SDUPTHER

## 2019-06-19 RX ORDER — POTASSIUM CHLORIDE 7.45 MG/ML
10 INJECTION INTRAVENOUS PRN
Status: DISCONTINUED | OUTPATIENT
Start: 2019-06-19 | End: 2019-06-26 | Stop reason: HOSPADM

## 2019-06-19 RX ORDER — SODIUM CHLORIDE 0.9 % (FLUSH) 0.9 %
10 SYRINGE (ML) INJECTION EVERY 12 HOURS SCHEDULED
Status: DISCONTINUED | OUTPATIENT
Start: 2019-06-19 | End: 2019-06-22 | Stop reason: SDUPTHER

## 2019-06-19 RX ORDER — NITROGLYCERIN 0.4 MG/1
0.4 TABLET SUBLINGUAL EVERY 5 MIN PRN
Status: CANCELLED | OUTPATIENT
Start: 2019-06-19

## 2019-06-19 RX ORDER — SIMVASTATIN 40 MG
40 TABLET ORAL NIGHTLY
Status: CANCELLED | OUTPATIENT
Start: 2019-06-19

## 2019-06-19 RX ORDER — VENLAFAXINE HYDROCHLORIDE 37.5 MG/1
75 CAPSULE, EXTENDED RELEASE ORAL
Status: CANCELLED | OUTPATIENT
Start: 2019-06-20

## 2019-06-19 RX ORDER — ASPIRIN 81 MG/1
81 TABLET, CHEWABLE ORAL DAILY
Status: CANCELLED | OUTPATIENT
Start: 2019-06-20

## 2019-06-19 RX ORDER — IPRATROPIUM BROMIDE AND ALBUTEROL SULFATE 2.5; .5 MG/3ML; MG/3ML
1 SOLUTION RESPIRATORY (INHALATION)
Status: CANCELLED | OUTPATIENT
Start: 2019-06-19

## 2019-06-19 RX ORDER — IBUPROFEN 600 MG/1
600 TABLET ORAL
Status: CANCELLED | OUTPATIENT
Start: 2019-06-20

## 2019-06-19 RX ORDER — POTASSIUM CHLORIDE 20 MEQ/1
20 TABLET, EXTENDED RELEASE ORAL DAILY
Qty: 5 TABLET | Refills: 0 | Status: SHIPPED | OUTPATIENT
Start: 2019-06-19 | End: 2019-01-01

## 2019-06-19 RX ORDER — GUAIFENESIN 600 MG/1
600 TABLET, EXTENDED RELEASE ORAL 2 TIMES DAILY
Status: CANCELLED | OUTPATIENT
Start: 2019-06-19

## 2019-06-19 RX ORDER — AZITHROMYCIN 500 MG/1
500 TABLET, FILM COATED ORAL DAILY
Qty: 3 TABLET | Refills: 0 | Status: ON HOLD | OUTPATIENT
Start: 2019-06-20 | End: 2019-06-26 | Stop reason: HOSPADM

## 2019-06-19 RX ORDER — ONDANSETRON 2 MG/ML
4 INJECTION INTRAMUSCULAR; INTRAVENOUS EVERY 6 HOURS PRN
Status: CANCELLED | OUTPATIENT
Start: 2019-06-19

## 2019-06-19 RX ORDER — BUSPIRONE HYDROCHLORIDE 15 MG/1
15 TABLET ORAL 3 TIMES DAILY
Status: CANCELLED | OUTPATIENT
Start: 2019-06-19

## 2019-06-19 RX ORDER — NITROGLYCERIN 0.4 MG/1
0.4 TABLET SUBLINGUAL EVERY 5 MIN PRN
Status: DISCONTINUED | OUTPATIENT
Start: 2019-06-19 | End: 2019-06-26 | Stop reason: HOSPADM

## 2019-06-19 RX ORDER — FUROSEMIDE 10 MG/ML
40 INJECTION INTRAMUSCULAR; INTRAVENOUS 2 TIMES DAILY
Status: DISCONTINUED | OUTPATIENT
Start: 2019-06-19 | End: 2019-06-22

## 2019-06-19 RX ORDER — POTASSIUM CHLORIDE 20 MEQ/1
20 TABLET, EXTENDED RELEASE ORAL 2 TIMES DAILY WITH MEALS
Status: CANCELLED | OUTPATIENT
Start: 2019-06-20

## 2019-06-19 RX ORDER — DOCUSATE SODIUM 100 MG/1
100 CAPSULE, LIQUID FILLED ORAL DAILY
Status: CANCELLED | OUTPATIENT
Start: 2019-06-20

## 2019-06-19 RX ORDER — FUROSEMIDE 40 MG/1
40 TABLET ORAL 2 TIMES DAILY
Qty: 60 TABLET | Refills: 0 | Status: SHIPPED
Start: 2019-06-19

## 2019-06-19 RX ORDER — NYSTATIN 100000 U/G
CREAM TOPICAL 2 TIMES DAILY
Status: CANCELLED | OUTPATIENT
Start: 2019-06-19

## 2019-06-19 RX ORDER — ONDANSETRON 2 MG/ML
4 INJECTION INTRAMUSCULAR; INTRAVENOUS EVERY 6 HOURS PRN
Status: DISCONTINUED | OUTPATIENT
Start: 2019-06-19 | End: 2019-06-22 | Stop reason: SDUPTHER

## 2019-06-19 RX ORDER — TROSPIUM CHLORIDE 20 MG/1
20 TABLET, FILM COATED ORAL
Status: CANCELLED | OUTPATIENT
Start: 2019-06-19

## 2019-06-19 RX ORDER — AZITHROMYCIN 250 MG/1
500 TABLET, FILM COATED ORAL DAILY
Status: CANCELLED | OUTPATIENT
Start: 2019-06-20

## 2019-06-19 RX ORDER — GABAPENTIN 400 MG/1
800 CAPSULE ORAL 4 TIMES DAILY
Status: CANCELLED | OUTPATIENT
Start: 2019-06-19

## 2019-06-19 RX ORDER — DEXTROSE MONOHYDRATE 50 MG/ML
100 INJECTION, SOLUTION INTRAVENOUS PRN
Status: CANCELLED | OUTPATIENT
Start: 2019-06-19

## 2019-06-19 RX ORDER — INSULIN GLARGINE 100 [IU]/ML
60 INJECTION, SOLUTION SUBCUTANEOUS NIGHTLY
Status: DISCONTINUED | OUTPATIENT
Start: 2019-06-19 | End: 2019-06-26 | Stop reason: HOSPADM

## 2019-06-19 RX ORDER — POTASSIUM CHLORIDE 20 MEQ/1
40 TABLET, EXTENDED RELEASE ORAL PRN
Status: DISCONTINUED | OUTPATIENT
Start: 2019-06-19 | End: 2019-06-26 | Stop reason: HOSPADM

## 2019-06-19 RX ORDER — ALBUTEROL SULFATE 90 UG/1
2 AEROSOL, METERED RESPIRATORY (INHALATION) EVERY 4 HOURS PRN
Qty: 1 INHALER | Refills: 0 | Status: SHIPPED | OUTPATIENT
Start: 2019-06-19

## 2019-06-19 RX ORDER — SODIUM CHLORIDE 0.9 % (FLUSH) 0.9 %
10 SYRINGE (ML) INJECTION PRN
Status: CANCELLED | OUTPATIENT
Start: 2019-06-19

## 2019-06-19 RX ORDER — DEXTROSE MONOHYDRATE 25 G/50ML
12.5 INJECTION, SOLUTION INTRAVENOUS PRN
Status: CANCELLED | OUTPATIENT
Start: 2019-06-19

## 2019-06-19 RX ORDER — NICOTINE POLACRILEX 4 MG
15 LOZENGE BUCCAL PRN
Status: CANCELLED | OUTPATIENT
Start: 2019-06-19

## 2019-06-19 RX ORDER — LEVOTHYROXINE SODIUM 0.03 MG/1
25 TABLET ORAL DAILY
Status: CANCELLED | OUTPATIENT
Start: 2019-06-20

## 2019-06-19 RX ORDER — SODIUM CHLORIDE 0.9 % (FLUSH) 0.9 %
10 SYRINGE (ML) INJECTION EVERY 12 HOURS SCHEDULED
Status: CANCELLED | OUTPATIENT
Start: 2019-06-19

## 2019-06-19 RX ORDER — PREDNISONE 20 MG/1
40 TABLET ORAL DAILY
Qty: 6 TABLET | Refills: 0 | Status: ON HOLD | OUTPATIENT
Start: 2019-06-20 | End: 2019-06-26 | Stop reason: HOSPADM

## 2019-06-19 RX ORDER — ALBUTEROL SULFATE 90 UG/1
1 AEROSOL, METERED RESPIRATORY (INHALATION) EVERY 4 HOURS PRN
Status: CANCELLED | OUTPATIENT
Start: 2019-06-19

## 2019-06-19 RX ORDER — ASPIRIN 81 MG/1
81 TABLET, CHEWABLE ORAL DAILY
Status: DISCONTINUED | OUTPATIENT
Start: 2019-06-19 | End: 2019-06-21 | Stop reason: SDUPTHER

## 2019-06-19 RX ORDER — POTASSIUM CHLORIDE 1.5 G/1.77G
40 POWDER, FOR SOLUTION ORAL PRN
Status: DISCONTINUED | OUTPATIENT
Start: 2019-06-19 | End: 2019-06-26 | Stop reason: HOSPADM

## 2019-06-19 RX ORDER — MAGNESIUM SULFATE 1 G/100ML
1 INJECTION INTRAVENOUS PRN
Status: DISCONTINUED | OUTPATIENT
Start: 2019-06-19 | End: 2019-06-26 | Stop reason: HOSPADM

## 2019-06-19 RX ORDER — FUROSEMIDE 10 MG/ML
40 INJECTION INTRAMUSCULAR; INTRAVENOUS 2 TIMES DAILY
Status: CANCELLED | OUTPATIENT
Start: 2019-06-19

## 2019-06-19 RX ORDER — PREDNISONE 20 MG/1
40 TABLET ORAL DAILY
Status: CANCELLED | OUTPATIENT
Start: 2019-06-20

## 2019-06-19 RX ADMIN — GUAIFENESIN 600 MG: 600 TABLET, EXTENDED RELEASE ORAL at 10:22

## 2019-06-19 RX ADMIN — AZITHROMYCIN 500 MG: 250 TABLET, FILM COATED ORAL at 10:21

## 2019-06-19 RX ADMIN — SODIUM CHLORIDE, PRESERVATIVE FREE 10 ML: 5 INJECTION INTRAVENOUS at 21:39

## 2019-06-19 RX ADMIN — ENOXAPARIN SODIUM 40 MG: 40 INJECTION SUBCUTANEOUS at 10:24

## 2019-06-19 RX ADMIN — INSULIN LISPRO 9 UNITS: 100 INJECTION, SOLUTION INTRAVENOUS; SUBCUTANEOUS at 08:33

## 2019-06-19 RX ADMIN — ASPIRIN 81 MG 81 MG: 81 TABLET ORAL at 10:21

## 2019-06-19 RX ADMIN — VENLAFAXINE HYDROCHLORIDE 75 MG: 37.5 CAPSULE, EXTENDED RELEASE ORAL at 10:21

## 2019-06-19 RX ADMIN — INSULIN LISPRO 18 UNITS: 100 INJECTION, SOLUTION INTRAVENOUS; SUBCUTANEOUS at 21:39

## 2019-06-19 RX ADMIN — BUSPIRONE HYDROCHLORIDE 15 MG: 15 TABLET ORAL at 14:19

## 2019-06-19 RX ADMIN — NITROGLYCERIN 1 INCH: 20 OINTMENT TOPICAL at 18:09

## 2019-06-19 RX ADMIN — POTASSIUM CHLORIDE 20 MEQ: 20 TABLET, EXTENDED RELEASE ORAL at 18:10

## 2019-06-19 RX ADMIN — ENOXAPARIN SODIUM 40 MG: 40 INJECTION SUBCUTANEOUS at 21:38

## 2019-06-19 RX ADMIN — INSULIN GLARGINE 60 UNITS: 100 INJECTION, SOLUTION SUBCUTANEOUS at 21:39

## 2019-06-19 RX ADMIN — INSULIN LISPRO 18 UNITS: 100 INJECTION, SOLUTION INTRAVENOUS; SUBCUTANEOUS at 18:10

## 2019-06-19 RX ADMIN — GABAPENTIN 800 MG: 400 CAPSULE ORAL at 14:19

## 2019-06-19 RX ADMIN — INSULIN LISPRO 10 UNITS: 100 INJECTION, SOLUTION INTRAVENOUS; SUBCUTANEOUS at 21:41

## 2019-06-19 RX ADMIN — INSULIN LISPRO 9 UNITS: 100 INJECTION, SOLUTION INTRAVENOUS; SUBCUTANEOUS at 12:52

## 2019-06-19 RX ADMIN — DOCUSATE SODIUM 100 MG: 100 CAPSULE, LIQUID FILLED ORAL at 10:21

## 2019-06-19 RX ADMIN — VITAMIN D, TAB 1000IU (100/BT) 1000 UNITS: 25 TAB at 10:21

## 2019-06-19 RX ADMIN — INSULIN LISPRO 18 UNITS: 100 INJECTION, SOLUTION INTRAVENOUS; SUBCUTANEOUS at 12:50

## 2019-06-19 RX ADMIN — GABAPENTIN 800 MG: 400 CAPSULE ORAL at 10:22

## 2019-06-19 RX ADMIN — LEVOTHYROXINE SODIUM 25 MCG: 25 TABLET ORAL at 05:24

## 2019-06-19 RX ADMIN — PREDNISONE 40 MG: 20 TABLET ORAL at 10:21

## 2019-06-19 RX ADMIN — FUROSEMIDE 40 MG: 10 INJECTION, SOLUTION INTRAMUSCULAR; INTRAVENOUS at 10:23

## 2019-06-19 RX ADMIN — SODIUM CHLORIDE, PRESERVATIVE FREE 10 ML: 5 INJECTION INTRAVENOUS at 10:23

## 2019-06-19 RX ADMIN — ASPIRIN 81 MG 81 MG: 81 TABLET ORAL at 21:39

## 2019-06-19 RX ADMIN — BUSPIRONE HYDROCHLORIDE 15 MG: 15 TABLET ORAL at 10:22

## 2019-06-19 RX ADMIN — INSULIN LISPRO 12 UNITS: 100 INJECTION, SOLUTION INTRAVENOUS; SUBCUTANEOUS at 08:29

## 2019-06-19 RX ADMIN — GABAPENTIN 800 MG: 400 CAPSULE ORAL at 18:09

## 2019-06-19 RX ADMIN — POTASSIUM CHLORIDE 20 MEQ: 20 TABLET, EXTENDED RELEASE ORAL at 10:22

## 2019-06-19 RX ADMIN — NYSTATIN: 100000 CREAM TOPICAL at 10:22

## 2019-06-19 RX ADMIN — IPRATROPIUM BROMIDE AND ALBUTEROL SULFATE 1 AMPULE: .5; 3 SOLUTION RESPIRATORY (INHALATION) at 07:16

## 2019-06-19 RX ADMIN — FUROSEMIDE 40 MG: 10 INJECTION, SOLUTION INTRAMUSCULAR; INTRAVENOUS at 21:38

## 2019-06-19 RX ADMIN — NITROGLYCERIN 0.4 MG: 0.4 TABLET SUBLINGUAL at 17:34

## 2019-06-19 RX ADMIN — INSULIN LISPRO 9 UNITS: 100 INJECTION, SOLUTION INTRAVENOUS; SUBCUTANEOUS at 18:11

## 2019-06-19 RX ADMIN — NITROGLYCERIN 0.4 MG: 0.4 TABLET SUBLINGUAL at 16:53

## 2019-06-19 RX ADMIN — IPRATROPIUM BROMIDE AND ALBUTEROL SULFATE 1 AMPULE: .5; 3 SOLUTION RESPIRATORY (INHALATION) at 11:00

## 2019-06-19 RX ADMIN — IPRATROPIUM BROMIDE AND ALBUTEROL SULFATE 1 AMPULE: .5; 3 SOLUTION RESPIRATORY (INHALATION) at 15:52

## 2019-06-19 ASSESSMENT — PAIN DESCRIPTION - PAIN TYPE: TYPE: ACUTE PAIN

## 2019-06-19 ASSESSMENT — PAIN SCALES - GENERAL
PAINLEVEL_OUTOF10: 0
PAINLEVEL_OUTOF10: 0
PAINLEVEL_OUTOF10: 2

## 2019-06-19 ASSESSMENT — PAIN DESCRIPTION - DESCRIPTORS: DESCRIPTORS: TIGHTNESS

## 2019-06-19 ASSESSMENT — PAIN DESCRIPTION - LOCATION: LOCATION: NECK

## 2019-06-19 NOTE — DISCHARGE SUMMARY
normal dentition  Neck: supple with no lymphadenopathy, JVD down, trachea central  CVS: normal S1S2 with no additional heart sounds, no lower extremity edema  Respi: Good air entry in both lung fields with occasional crepitus heard in the bases   None significant GI: soft & non tender, with +ve bowel sounds, no guarding ,rigidity or rebound tenderness  : no inguinal lymphadenopathy, no CVA tenderness  CNS: no focal weakness or sensory deficits peripherally, DTR's equal bilaterally, CN2-12 normal  Skin: no rash, purpurea or eruptions  MS: normal muscle strength, normal ROM in all major joints      Procedures: None significant    Significant Diagnostic Studies at discharge:   As above    Patient Instructions: Rex Standard, [address] [id number] Medication Instructions [facility]    Printed on:19 2282   Medication Information                      albuterol sulfate HFA (PROAIR HFA) 108 (90 Base) MCG/ACT inhaler  Inhale 2 puffs into the lungs every 4 hours as needed for Wheezing             albuterol-ipratropium (COMBIVENT)  MCG/ACT inhaler  Inhale 2 puffs into the lungs 4 times daily             Alcohol Swabs (SURE-PREP ALCOHOL PREP) 70 % PADS  by Does not apply route. aspirin 81 MG chewable tablet  Take 1 tablet by mouth daily             azithromycin (ZITHROMAX) 500 MG tablet  Take 1 tablet by mouth daily for 3 days             busPIRone (BUSPAR) 10 MG tablet  Take 1.5 tablets by mouth 3 times daily             docusate sodium (COLACE, DULCOLAX) 100 MG CAPS  Take 100 mg by mouth daily             furosemide (LASIX) 40 MG tablet  Take 1 tablet by mouth 2 times daily             gabapentin (NEURONTIN) 800 MG tablet  Take 1 tablet by mouth 3 times daily             glucose blood VI test strips (ASCENSIA AUTODISC VI;ONE TOUCH ULTRA TEST VI) strip  1 each by In Vitro route 4 times daily as needed As needed.              insulin glargine (LANTUS) 100 UNIT/ML injection vial  Inject 60 Units into the skin nightly             Insulin Lispro, Human, (HUMALOG SC)  Inject into the skin Insulin pump             levothyroxine (SYNTHROID) 25 MCG tablet  Take 25 mcg by mouth Daily             magnesium hydroxide (MILK OF MAGNESIA) 400 MG/5ML suspension  Take 30 mLs by mouth daily as needed for Constipation             nitroGLYCERIN (NITROSTAT) 0.4 MG SL tablet  up to max of 3 total doses. If no relief after 1 dose, call 911.             nystatin (MYCOSTATIN) 675604 UNIT/GM cream  Apply topically 2 times daily Apply topically 2 times daily. ONE TOUCH LANCETS MISC  1 each by Does not apply route daily             potassium chloride (KLOR-CON M) 20 MEQ extended release tablet  Take 1 tablet by mouth daily for 5 days             predniSONE (DELTASONE) 20 MG tablet  Take 2 tablets by mouth daily for 3 days             simvastatin (ZOCOR) 20 MG tablet  Take 1 tablet by mouth nightly             sulindac (CLINORIL) 150 MG tablet  Take 150 mg by mouth 2 times daily             tolterodine (DETROL) 2 MG tablet  Take 2 mg by mouth daily             venlafaxine (EFFEXOR XR) 150 MG extended release capsule  Take one capsule daily at the same time as the 75 mg capsule             venlafaxine (EFFEXOR XR) 75 MG extended release capsule  Take one capsule daily at the same time as the 150 mg capsule             Vitamin D (CHOLECALCIFEROL) 1000 UNITS CAPS capsule  Take 1,000 Units by mouth daily During the winter months. Code Status: Full Code     Consults: Cardiology    Diet: Diabetic    Activity: As tolerated   work:    Discharged Condition: Stable    Prognosis: Fair    Disposition: [facility]      Follow-up with   1. PCP within   2-3    Days    Follow up labs: Not applicable       Discharge Physician Signed: Maryam Espinoza M.D.       Time spent on discharge in the examination, evaluation, counseling and review of medications and discharge plan: 40 minutes    Electronically signed by Lisa Maldonado MD on 6/19/2019 at 10:45 AM      Comment: Please note this report has been produced using speech recognition software and may contain errors related to that system including errors in grammar, punctuation, and spelling, as well as words and phrases that may be inappropriate.  If there are any questions or concerns please feel free to contact the dictating provider for clarification

## 2019-06-19 NOTE — PROGRESS NOTES
Skin reassessed prior to transfer to [facility]. Redness noted under bilat breasts  abdominal fold and fold in right upper thigh.

## 2019-06-19 NOTE — PROGRESS NOTES
Call from [address] [place] [address] with update on room assignment 3007. Nurse report [phone]. Will arrange for transport and call back with eta.  Oneyda Coronado RN

## 2019-06-19 NOTE — PROGRESS NOTES
O 2 saturation on room air at rest = 91 %    O 2 saturation at peek exertion on room air = 93 %      Je Gallegos [facility]

## 2019-06-19 NOTE — PLAN OF CARE
Problem: Falls - Risk of:  Goal: Will remain free from falls  Description  Will remain free from falls  6/18/2019 2232 by Letha Ramos RN  Outcome: Ongoing  6/18/2019 1245 by Santi Kimball RN  Outcome: Ongoing  Goal: Absence of physical injury  Description  Absence of physical injury  6/18/2019 2232 by Letha Ramos RN  Outcome: Ongoing  6/18/2019 1245 by Santi Kimball RN  Outcome: Ongoing     Problem: Gas Exchange - Impaired:  Goal: Levels of oxygenation will improve  Description  Levels of oxygenation will improve  Outcome: Ongoing

## 2019-06-19 NOTE — PROGRESS NOTES
Patient resting in chair. O2 sat is 85% on room air. States is feeling some pressure in chest.  Dr. Kim Castelan notified.

## 2019-06-19 NOTE — PROGRESS NOTES
Called in room by uday Cifuentes, pt has complaints of not feeling well. In room pt sitting upright in chair alert and oriented with 02 on at 3L/NC. States that she \"just couldn't breath right all bent over in chair and I had to get up by myself. I just don't feel right\". Pt denies any chest pain or pressure at this time. Her only complaint is \"I don't know what is wrong, I just don't feel right\". She does voice that she has not had any pain or other new sx since she had the 2nd SL Nitro. Pt feels better sitting in the chair. She is talking in full sentences and does not appear to be in any distress at this time. Vitals stable. She denies other needs at this time. Call light in reach. This RN and uday Cifuentes remain in room with patient. Nevin Palafox in shortly after and updated with the above information. Expecting transport at approx 1900 for admission to [facility].

## 2019-06-20 ENCOUNTER — APPOINTMENT (OUTPATIENT)
Dept: CT IMAGING | Age: 69
DRG: 286 | End: 2019-06-20
Attending: HOSPITALIST
Payer: MEDICARE

## 2019-06-20 ENCOUNTER — DIRECT ADMIT ORDERS (OUTPATIENT)
Dept: CARDIOLOGY | Age: 69
End: 2019-06-20

## 2019-06-20 LAB
ANION GAP SERPL CALCULATED.3IONS-SCNC: 12 MMOL/L (ref 4–16)
BASOPHILS ABSOLUTE: 0 K/CU MM
BASOPHILS RELATIVE PERCENT: 0.2 % (ref 0–1)
BUN BLDV-MCNC: 34 MG/DL (ref 6–23)
CALCIUM SERPL-MCNC: 9.1 MG/DL (ref 8.3–10.6)
CHLORIDE BLD-SCNC: 101 MMOL/L (ref 99–110)
CHOLESTEROL: 122 MG/DL
CO2: 28 MMOL/L (ref 21–32)
CREAT SERPL-MCNC: 1 MG/DL (ref 0.6–1.1)
DIFFERENTIAL TYPE: ABNORMAL
EOSINOPHILS ABSOLUTE: 0 K/CU MM
EOSINOPHILS RELATIVE PERCENT: 0.1 % (ref 0–3)
ESTIMATED AVERAGE GLUCOSE: 226 MG/DL
GFR AFRICAN AMERICAN: >60 ML/MIN/1.73M2
GFR NON-AFRICAN AMERICAN: 55 ML/MIN/1.73M2
GLUCOSE BLD-MCNC: 119 MG/DL (ref 70–99)
GLUCOSE BLD-MCNC: 150 MG/DL (ref 70–99)
GLUCOSE BLD-MCNC: 179 MG/DL (ref 70–99)
GLUCOSE BLD-MCNC: 190 MG/DL (ref 70–99)
GLUCOSE BLD-MCNC: 239 MG/DL (ref 70–99)
GLUCOSE BLD-MCNC: 272 MG/DL (ref 70–99)
GLUCOSE BLD-MCNC: 80 MG/DL (ref 70–99)
HBA1C MFR BLD: 9.5 % (ref 4.2–6.3)
HCT VFR BLD CALC: 35 % (ref 37–47)
HDLC SERPL-MCNC: 51 MG/DL
HEMOGLOBIN: 10 GM/DL (ref 12.5–16)
IMMATURE NEUTROPHIL %: 0.5 % (ref 0–0.43)
LDL CHOLESTEROL DIRECT: 57 MG/DL
LYMPHOCYTES ABSOLUTE: 2.2 K/CU MM
LYMPHOCYTES RELATIVE PERCENT: 21.4 % (ref 24–44)
MCH RBC QN AUTO: 27.7 PG (ref 27–31)
MCHC RBC AUTO-ENTMCNC: 28.6 % (ref 32–36)
MCV RBC AUTO: 97 FL (ref 78–100)
MONOCYTES ABSOLUTE: 0.9 K/CU MM
MONOCYTES RELATIVE PERCENT: 9.2 % (ref 0–4)
NUCLEATED RBC %: 0.2 %
PDW BLD-RTO: 15.5 % (ref 11.7–14.9)
PLATELET # BLD: 142 K/CU MM (ref 140–440)
PMV BLD AUTO: 10.4 FL (ref 7.5–11.1)
POTASSIUM SERPL-SCNC: 4.4 MMOL/L (ref 3.5–5.1)
RBC # BLD: 3.61 M/CU MM (ref 4.2–5.4)
SEGMENTED NEUTROPHILS ABSOLUTE COUNT: 7 K/CU MM
SEGMENTED NEUTROPHILS RELATIVE PERCENT: 68.6 % (ref 36–66)
SODIUM BLD-SCNC: 141 MMOL/L (ref 135–145)
TOTAL IMMATURE NEUTOROPHIL: 0.05 K/CU MM
TOTAL NUCLEATED RBC: 0 K/CU MM
TRIGL SERPL-MCNC: 150 MG/DL
TROPONIN T: 0.01 NG/ML
TROPONIN T: 0.02 NG/ML
TSH HIGH SENSITIVITY: 4.73 UIU/ML (ref 0.27–4.2)
WBC # BLD: 10.2 K/CU MM (ref 4–10.5)

## 2019-06-20 PROCEDURE — 6360000004 HC RX CONTRAST MEDICATION

## 2019-06-20 PROCEDURE — 94640 AIRWAY INHALATION TREATMENT: CPT

## 2019-06-20 PROCEDURE — 83036 HEMOGLOBIN GLYCOSYLATED A1C: CPT

## 2019-06-20 PROCEDURE — 6360000002 HC RX W HCPCS

## 2019-06-20 PROCEDURE — 4A023N6 MEASUREMENT OF CARDIAC SAMPLING AND PRESSURE, RIGHT HEART, PERCUTANEOUS APPROACH: ICD-10-PCS | Performed by: INTERNAL MEDICINE

## 2019-06-20 PROCEDURE — 6370000000 HC RX 637 (ALT 250 FOR IP): Performed by: INTERNAL MEDICINE

## 2019-06-20 PROCEDURE — 83721 ASSAY OF BLOOD LIPOPROTEIN: CPT

## 2019-06-20 PROCEDURE — 84443 ASSAY THYROID STIM HORMONE: CPT

## 2019-06-20 PROCEDURE — C1894 INTRO/SHEATH, NON-LASER: HCPCS

## 2019-06-20 PROCEDURE — 2709999900 HC NON-CHARGEABLE SUPPLY

## 2019-06-20 PROCEDURE — C1751 CATH, INF, PER/CENT/MIDLINE: HCPCS

## 2019-06-20 PROCEDURE — 80061 LIPID PANEL: CPT

## 2019-06-20 PROCEDURE — 6360000004 HC RX CONTRAST MEDICATION: Performed by: INTERNAL MEDICINE

## 2019-06-20 PROCEDURE — 2580000003 HC RX 258

## 2019-06-20 PROCEDURE — 4A133B3 MONITORING OF ARTERIAL PRESSURE, PULMONARY, PERCUTANEOUS APPROACH: ICD-10-PCS | Performed by: INTERNAL MEDICINE

## 2019-06-20 PROCEDURE — 84484 ASSAY OF TROPONIN QUANT: CPT

## 2019-06-20 PROCEDURE — 93460 R&L HRT ART/VENTRICLE ANGIO: CPT

## 2019-06-20 PROCEDURE — B2111ZZ FLUOROSCOPY OF MULTIPLE CORONARY ARTERIES USING LOW OSMOLAR CONTRAST: ICD-10-PCS | Performed by: INTERNAL MEDICINE

## 2019-06-20 PROCEDURE — 4A1239Z MONITORING OF CARDIAC OUTPUT, PERCUTANEOUS APPROACH: ICD-10-PCS | Performed by: INTERNAL MEDICINE

## 2019-06-20 PROCEDURE — 93005 ELECTROCARDIOGRAM TRACING: CPT | Performed by: INTERNAL MEDICINE

## 2019-06-20 PROCEDURE — 85025 COMPLETE CBC W/AUTO DIFF WBC: CPT

## 2019-06-20 PROCEDURE — 93460 R&L HRT ART/VENTRICLE ANGIO: CPT | Performed by: INTERNAL MEDICINE

## 2019-06-20 PROCEDURE — 36415 COLL VENOUS BLD VENIPUNCTURE: CPT

## 2019-06-20 PROCEDURE — C1769 GUIDE WIRE: HCPCS

## 2019-06-20 PROCEDURE — 99221 1ST HOSP IP/OBS SF/LOW 40: CPT | Performed by: INTERNAL MEDICINE

## 2019-06-20 PROCEDURE — 2580000003 HC RX 258: Performed by: INTERNAL MEDICINE

## 2019-06-20 PROCEDURE — 80048 BASIC METABOLIC PNL TOTAL CA: CPT

## 2019-06-20 PROCEDURE — B2151ZZ FLUOROSCOPY OF LEFT HEART USING LOW OSMOLAR CONTRAST: ICD-10-PCS | Performed by: INTERNAL MEDICINE

## 2019-06-20 PROCEDURE — 82962 GLUCOSE BLOOD TEST: CPT

## 2019-06-20 PROCEDURE — 93308 TTE F-UP OR LMTD: CPT

## 2019-06-20 PROCEDURE — 1200000000 HC SEMI PRIVATE

## 2019-06-20 PROCEDURE — 94761 N-INVAS EAR/PLS OXIMETRY MLT: CPT

## 2019-06-20 PROCEDURE — 2500000003 HC RX 250 WO HCPCS

## 2019-06-20 PROCEDURE — 74177 CT ABD & PELVIS W/CONTRAST: CPT

## 2019-06-20 RX ORDER — HYDRALAZINE HYDROCHLORIDE 20 MG/ML
10 INJECTION INTRAMUSCULAR; INTRAVENOUS EVERY 10 MIN PRN
Status: DISCONTINUED | OUTPATIENT
Start: 2019-06-20 | End: 2019-06-26 | Stop reason: HOSPADM

## 2019-06-20 RX ORDER — OXYCODONE HYDROCHLORIDE AND ACETAMINOPHEN 5; 325 MG/1; MG/1
1 TABLET ORAL ONCE
Status: COMPLETED | OUTPATIENT
Start: 2019-06-20 | End: 2019-06-20

## 2019-06-20 RX ORDER — 0.9 % SODIUM CHLORIDE 0.9 %
250 INTRAVENOUS SOLUTION INTRAVENOUS ONCE
Status: COMPLETED | OUTPATIENT
Start: 2019-06-20 | End: 2019-06-20

## 2019-06-20 RX ORDER — SODIUM CHLORIDE 0.9 % (FLUSH) 0.9 %
10 SYRINGE (ML) INJECTION PRN
Status: CANCELLED | OUTPATIENT
Start: 2019-06-20

## 2019-06-20 RX ORDER — GABAPENTIN 400 MG/1
800 CAPSULE ORAL 4 TIMES DAILY
Status: DISCONTINUED | OUTPATIENT
Start: 2019-06-20 | End: 2019-06-22 | Stop reason: SDUPTHER

## 2019-06-20 RX ORDER — BUSPIRONE HYDROCHLORIDE 7.5 MG/1
15 TABLET ORAL 3 TIMES DAILY
Status: DISCONTINUED | OUTPATIENT
Start: 2019-06-20 | End: 2019-06-22 | Stop reason: SDUPTHER

## 2019-06-20 RX ORDER — DOCUSATE SODIUM 100 MG/1
100 CAPSULE, LIQUID FILLED ORAL DAILY
Status: DISCONTINUED | OUTPATIENT
Start: 2019-06-20 | End: 2019-06-22 | Stop reason: SDUPTHER

## 2019-06-20 RX ORDER — ONDANSETRON 2 MG/ML
4 INJECTION INTRAMUSCULAR; INTRAVENOUS EVERY 6 HOURS PRN
Status: DISCONTINUED | OUTPATIENT
Start: 2019-06-20 | End: 2019-06-22 | Stop reason: SDUPTHER

## 2019-06-20 RX ORDER — SODIUM CHLORIDE 9 MG/ML
INJECTION, SOLUTION INTRAVENOUS CONTINUOUS
Status: CANCELLED | OUTPATIENT
Start: 2019-06-20

## 2019-06-20 RX ORDER — ISOSORBIDE MONONITRATE 30 MG/1
30 TABLET, EXTENDED RELEASE ORAL DAILY
Status: DISCONTINUED | OUTPATIENT
Start: 2019-06-20 | End: 2019-06-26 | Stop reason: HOSPADM

## 2019-06-20 RX ORDER — 0.9 % SODIUM CHLORIDE 0.9 %
500 INTRAVENOUS SOLUTION INTRAVENOUS ONCE
Status: DISCONTINUED | OUTPATIENT
Start: 2019-06-20 | End: 2019-06-25 | Stop reason: ALTCHOICE

## 2019-06-20 RX ORDER — DIPHENHYDRAMINE HCL 25 MG
25 TABLET ORAL
Status: CANCELLED | OUTPATIENT
Start: 2019-06-20 | End: 2019-06-20

## 2019-06-20 RX ORDER — SIMVASTATIN 40 MG
40 TABLET ORAL NIGHTLY
Status: DISCONTINUED | OUTPATIENT
Start: 2019-06-20 | End: 2019-06-22 | Stop reason: SDUPTHER

## 2019-06-20 RX ORDER — LEVOTHYROXINE SODIUM 0.03 MG/1
25 TABLET ORAL DAILY
Status: DISCONTINUED | OUTPATIENT
Start: 2019-06-20 | End: 2019-06-22 | Stop reason: SDUPTHER

## 2019-06-20 RX ORDER — ACETAMINOPHEN 325 MG/1
650 TABLET ORAL EVERY 4 HOURS PRN
Status: DISCONTINUED | OUTPATIENT
Start: 2019-06-20 | End: 2019-06-26 | Stop reason: HOSPADM

## 2019-06-20 RX ORDER — SODIUM CHLORIDE 0.9 % (FLUSH) 0.9 %
10 SYRINGE (ML) INJECTION EVERY 12 HOURS SCHEDULED
Status: DISCONTINUED | OUTPATIENT
Start: 2019-06-20 | End: 2019-06-26 | Stop reason: HOSPADM

## 2019-06-20 RX ORDER — ALBUTEROL SULFATE 90 UG/1
2 AEROSOL, METERED RESPIRATORY (INHALATION) 4 TIMES DAILY
Status: DISCONTINUED | OUTPATIENT
Start: 2019-06-20 | End: 2019-06-22

## 2019-06-20 RX ORDER — DIAZEPAM 5 MG/1
5 TABLET ORAL
Status: CANCELLED | OUTPATIENT
Start: 2019-06-20 | End: 2019-06-20

## 2019-06-20 RX ORDER — ASPIRIN 81 MG/1
81 TABLET, CHEWABLE ORAL DAILY
Status: DISCONTINUED | OUTPATIENT
Start: 2019-06-20 | End: 2019-06-22 | Stop reason: SDUPTHER

## 2019-06-20 RX ORDER — ALBUTEROL SULFATE 90 UG/1
2 AEROSOL, METERED RESPIRATORY (INHALATION) EVERY 4 HOURS PRN
Status: DISCONTINUED | OUTPATIENT
Start: 2019-06-20 | End: 2019-06-22 | Stop reason: SDUPTHER

## 2019-06-20 RX ORDER — SODIUM CHLORIDE 0.9 % (FLUSH) 0.9 %
10 SYRINGE (ML) INJECTION EVERY 12 HOURS SCHEDULED
Status: CANCELLED | OUTPATIENT
Start: 2019-06-20

## 2019-06-20 RX ORDER — SODIUM CHLORIDE 0.9 % (FLUSH) 0.9 %
10 SYRINGE (ML) INJECTION PRN
Status: DISCONTINUED | OUTPATIENT
Start: 2019-06-20 | End: 2019-06-26 | Stop reason: HOSPADM

## 2019-06-20 RX ADMIN — Medication 2 PUFF: at 22:05

## 2019-06-20 RX ADMIN — ALBUTEROL SULFATE 2 PUFF: 90 AEROSOL, METERED RESPIRATORY (INHALATION) at 22:05

## 2019-06-20 RX ADMIN — INSULIN LISPRO 3 UNITS: 100 INJECTION, SOLUTION INTRAVENOUS; SUBCUTANEOUS at 21:33

## 2019-06-20 RX ADMIN — SODIUM CHLORIDE, PRESERVATIVE FREE 10 ML: 5 INJECTION INTRAVENOUS at 14:55

## 2019-06-20 RX ADMIN — IOPAMIDOL 80 ML: 755 INJECTION, SOLUTION INTRAVENOUS at 19:43

## 2019-06-20 RX ADMIN — GABAPENTIN 800 MG: 400 CAPSULE ORAL at 14:51

## 2019-06-20 RX ADMIN — INSULIN LISPRO 6 UNITS: 100 INJECTION, SOLUTION INTRAVENOUS; SUBCUTANEOUS at 18:37

## 2019-06-20 RX ADMIN — LEVOTHYROXINE SODIUM 25 MCG: 25 TABLET ORAL at 14:50

## 2019-06-20 RX ADMIN — ALBUTEROL SULFATE 2 PUFF: 90 AEROSOL, METERED RESPIRATORY (INHALATION) at 15:41

## 2019-06-20 RX ADMIN — INSULIN GLARGINE 60 UNITS: 100 INJECTION, SOLUTION SUBCUTANEOUS at 21:33

## 2019-06-20 RX ADMIN — GABAPENTIN 800 MG: 400 CAPSULE ORAL at 21:33

## 2019-06-20 RX ADMIN — SIMVASTATIN 40 MG: 40 TABLET, FILM COATED ORAL at 21:33

## 2019-06-20 RX ADMIN — OXYCODONE AND ACETAMINOPHEN 1 TABLET: 5; 325 TABLET ORAL at 14:01

## 2019-06-20 RX ADMIN — DOCUSATE SODIUM 100 MG: 100 CAPSULE, LIQUID FILLED ORAL at 14:51

## 2019-06-20 RX ADMIN — Medication 1000 UNITS: at 14:50

## 2019-06-20 RX ADMIN — Medication 2 PUFF: at 15:42

## 2019-06-20 RX ADMIN — INSULIN LISPRO 9 UNITS: 100 INJECTION, SOLUTION INTRAVENOUS; SUBCUTANEOUS at 02:10

## 2019-06-20 RX ADMIN — SODIUM CHLORIDE, PRESERVATIVE FREE 10 ML: 5 INJECTION INTRAVENOUS at 21:34

## 2019-06-20 RX ADMIN — SODIUM CHLORIDE, PRESERVATIVE FREE 10 ML: 5 INJECTION INTRAVENOUS at 14:12

## 2019-06-20 RX ADMIN — BUSPIRONE HYDROCHLORIDE 15 MG: 7.5 TABLET ORAL at 21:33

## 2019-06-20 RX ADMIN — ASPIRIN 81 MG 81 MG: 81 TABLET ORAL at 14:50

## 2019-06-20 RX ADMIN — BUSPIRONE HYDROCHLORIDE 15 MG: 7.5 TABLET ORAL at 14:51

## 2019-06-20 RX ADMIN — SODIUM CHLORIDE 250 ML: 9 INJECTION, SOLUTION INTRAVENOUS at 17:48

## 2019-06-20 ASSESSMENT — PAIN DESCRIPTION - INTENSITY: RATING_2: 10

## 2019-06-20 ASSESSMENT — PAIN DESCRIPTION - ORIENTATION
ORIENTATION_2: RIGHT
ORIENTATION: LOWER

## 2019-06-20 ASSESSMENT — PAIN SCALES - GENERAL
PAINLEVEL_OUTOF10: 6
PAINLEVEL_OUTOF10: 0

## 2019-06-20 ASSESSMENT — PAIN DESCRIPTION - PAIN TYPE
TYPE: CHRONIC PAIN
TYPE_2: CHRONIC PAIN

## 2019-06-20 ASSESSMENT — PAIN DESCRIPTION - DESCRIPTORS: DESCRIPTORS_2: NAGGING

## 2019-06-20 ASSESSMENT — PAIN DESCRIPTION - LOCATION
LOCATION_2: ARM
LOCATION: BACK

## 2019-06-20 NOTE — CONSULTS
lateral leads when  she was having active symptoms. ASSESSMENT AND PLAN:  This 60-year-old lady with coronary artery disease  and valvular heart disease is admitted with congestive heart failure and  symptoms of unstable angina with acute EKG abnormalities. She has ruled  out for myocardial infarction, but probably a right and left heart  cardiac catheterization is warranted with valve study as well as  coronary angiography to further risk stratify. I have discussed these  management plans with the patient and she agrees. Further  recommendations to be based on cath findings.         Kenzie Viera MD    D: 06/20/2019 9:00:03       T: 06/20/2019 15:43:57     [email or web address]  Job#: [id number]     Doc#: [id number]    CC:

## 2019-06-20 NOTE — CONSULTS
brain aneurysm    Arthritis Sister     Depression Sister     Anxiety Disorder Sister     Arthritis Brother     Cancer Brother         eye    Hearing Loss Brother     High Blood Pressure Brother      REVIEW OF SYSTEMS:  Review of System Done as noted above     PHYSICAL EXAM:      Vitals:    /65   Pulse 101   Temp 98.3 °F (36.8 °C) (Oral)   Resp 19   Ht 5' (1.524 m)   Wt 246 lb (111.6 kg)   SpO2 98%   BMI 48.04 kg/m²     CONSTITUTIONAL:  awake, alert, cooperative, appears stated age   EYES:  vision intact Fundoscopic Exam not performed   ENT:Normal  NECK:  Supple, No JVD.    Thyroid Exam:Normal   LUNGS:  Has Vesicular Breath Sounds, rales and wheezing  CARDIOVASCULAR: Atrial fibrillation   ABDOMEN:  No scars, normal bowel sounds, soft, non-distended, non-tender, no masses palpated, no hepatolienomegaly  Musculoskeletal: Normal  Extremities: Normal, peripheral pulses normal, , has no edema   NEUROLOGIC:  Awake, kai cranial nerve, speech normal, neuropathy,  and gait is abnormal  DATA:    CBC:   Recent Labs     06/17/19 2045   WBC 8.6   HGB 9.8*   *    CMP:  Recent Labs     06/17/19  2045 06/18/19  0415 06/19/19  0525    139 138   K 4.1 3.8 4.8    102 99   CO2 20* 26 31   BUN 23 21 31*   CREATININE 0.9 0.9 1.1   CALCIUM 8.8 8.9 9.2   PROT 6.9  --   --    LABALBU 3.2*  --   --    BILITOT 0.3  --   --    ALKPHOS 143*  --   --    AST 17  --   --    ALT 13  --   --      Lipids:   Lab Results   Component Value Date    CHOL 106 06/18/2019    CHOL 205 03/17/2016    HDL 46 06/18/2019    TRIG 81 06/18/2019     Glucose:   Recent Labs     06/19/19  0800 06/19/19  1147 06/19/19  1735   POCGLU 324* 420* 468*     Hemoglobin A1C:   Lab Results   Component Value Date    LABA1C 9.2 06/18/2019     Free T4:   Lab Results   Component Value Date    T4FREE 1.04 06/18/2019     Free T3:   Lab Results   Component Value Date    FT3 2.9 03/17/2016     TSH High Sensitivity:   Lab Results   Component Value obstructive sleep apnea    Idiopathic progressive neuropathy    [facility]est[facility] failure ([facility]F) ([facility])    Type 2 diabetes mellitus ([facility] [address]    Hypothyroidism    Depression    Hypertension    CHF (congestive heart failure), NYHA class I, acute on chronic, diastolic ([facility])    ACS (acute coronary syndrome) ([facility])    Acute coronary syndrome ([facility])    Diastolic dysfunction with acute on chronic heart failure ([facility])    NSTEMI (non-ST elevated myocardial infarction) ([facility])    Acute and chr resp failure, unsp w hypoxia or hypercapnia ([facility])    SADI (obstructive sleep apnea)    Morbid obesity ([facility])    Chest pain         RECOMMENDATIONS:      1. Reviewed POC blood glucose . Labs and X ray results   2. Reviewed Home and Current Medicines   3. Will Start On meal/ Correction bolus Humalog/ Lantus Insulin regime   4. Monitor Blood glucose frequently   5. Modify  the dose of Insulin  as needed        Will follow with you  Again thank you for sharing pt's care with me.      Truly yours,       Kati Kovacs MD

## 2019-06-20 NOTE — H&P
Renaldo Wolf PA-C   Insulin Lispro, Human, (HUMALOG SC) Inject into the skin Insulin pump    Historical Provider, MD   gabapentin (NEURONTIN) 800 MG tablet Take 1 tablet by mouth 3 times daily  Patient taking differently: Take 800 mg by mouth 4 times daily. . 10/4/16   Renaldo Wolf PA-C   glucose blood VI test strips (ASCENSIA AUTODISC VI;ONE TOUCH ULTRA TEST VI) strip 1 each by In Vitro route 4 times daily as needed As needed. Historical Provider, MD   Alcohol Swabs (SURE-PREP ALCOHOL PREP) 70 % PADS by Does not apply route. 3/2/13   Consuelo Farfan MD   Vitamin D (CHOLECALCIFEROL) 1000 UNITS CAPS capsule Take 1,000 Units by mouth daily During the winter months. Historical Provider, MD     Past Medical History:  Past Medical History:   Diagnosis Date    Anxiety     Arthritis     bilats hands, right shoulder    Atrial fibrillation [facility])     Cardioversion @ [facility] - no trouble with it ever since. Sees Dr. Reyes Roblero Atrial fibrillation with RVR [facility]) 2013    Atrial fibrillation with RVR ([facility] [address] 11/26/2012    Atrial fibrillation with RVR ([facility] [address] 3/1/2013    Cancer (HCC)     skin (right upper chest & face)    Cervicalgia     Constipation     COPD (chronic obstructive pulmonary disease) (HCC)     Depression     Diabetes mellitus (HCC)     IDDM    Diabetic neuropathy (HCC)     Disc herniation     lower back    Fatigue     Fibromyalgia     H/O cardiovascular stress test 05/08 EF 70%,07/09 EF 66%,02/10, 03/10 EF 63% 04/10    04/26/10 if LAD and circ are compared, the LAD in its mid segment has about 60% stenosis around a small diag. circumflex vessel is a dominant vessel and is without any significant disease,  rca is nondominant with about 50% stenosis , will continue medical management for now.  H/O complete electrocardiogram 02/10    02/03/10 on chart    H/O echocardiogram 05/08,EF 55%, 02/10    02/25/10 EF>55% left ventricular systolic function is normal,mild mitral regurg.  there is no PE    Brother     Cancer Brother         eye    Hearing Loss Brother     High Blood Pressure Brother      History:  Social History     Socioeconomic History    Marital status:       Spouse name: Not on file    Number of children: Not on file    Years of education: Not on file    Highest education level: Not on file   Occupational History    Occupation:    Social Needs    Financial resource strain: Not on file    Food insecurity:     Worry: Not on file     Inability: Not on file    Transportation needs:     Medical: Not on file     Non-medical: Not on file   Tobacco Use    Smoking status: Former Smoker     Packs/day: 1.00     Years: 21.00     Pack years: 21.00     Types: Cigarettes    Smokeless tobacco: Never Used    Tobacco comment: lives with smokers   Substance and Sexual Activity    Alcohol use: No     Alcohol/week: 0.0 oz     Comment:           CAFFEINE: 4-6 diet sodas daily    Drug use: No    Sexual activity: Not on file   Lifestyle    Physical activity:     Days per week: Not on file     Minutes per session: Not on file    Stress: Not on file   Relationships    Social connections:     Talks on phone: Not on file     Gets together: Not on file     Attends Christian service: Not on file     Active member of club or organization: Not on file     Attends meetings of clubs or organizations: Not on file     Relationship status: Not on file    Intimate partner violence:     Fear of current or ex partner: Not on file     Emotionally abused: Not on file     Physically abused: Not on file     Forced sexual activity: Not on file   Other Topics Concern    Not on file   Social History Narrative    Not on file     Physical Exam:  /65   Pulse 101   Temp 98.3 °F (36.8 °C) (Oral)   Resp 19   Ht 5' (1.524 m)   Wt 246 lb (111.6 kg)   SpO2 98%   BMI 48.04 kg/m²   Physical Exam  GEN: NAD  HEENT: AT, NC, No cyanosis, anicteric  EYES: EOMI, Pupils equal and reactive to light  LYMPHATICS:

## 2019-06-21 ENCOUNTER — APPOINTMENT (OUTPATIENT)
Dept: ULTRASOUND IMAGING | Age: 69
DRG: 286 | End: 2019-06-21
Attending: HOSPITALIST
Payer: MEDICARE

## 2019-06-21 LAB
ALBUMIN SERPL-MCNC: 3.4 GM/DL (ref 3.4–5)
ALP BLD-CCNC: 100 IU/L (ref 40–129)
ALT SERPL-CCNC: 25 U/L (ref 10–40)
ANION GAP SERPL CALCULATED.3IONS-SCNC: 10 MMOL/L (ref 4–16)
AST SERPL-CCNC: 40 IU/L (ref 15–37)
BASOPHILS ABSOLUTE: 0 K/CU MM
BASOPHILS RELATIVE PERCENT: 0.5 % (ref 0–1)
BILIRUB SERPL-MCNC: 0.3 MG/DL (ref 0–1)
BILIRUBIN DIRECT: 0.2 MG/DL (ref 0–0.3)
BILIRUBIN, INDIRECT: 0.1 MG/DL (ref 0–0.7)
BUN BLDV-MCNC: 33 MG/DL (ref 6–23)
CALCIUM SERPL-MCNC: 8.6 MG/DL (ref 8.3–10.6)
CHLORIDE BLD-SCNC: 101 MMOL/L (ref 99–110)
CO2: 28 MMOL/L (ref 21–32)
CORTISOL, PLASMA: 11.37
CREAT SERPL-MCNC: 1.2 MG/DL (ref 0.6–1.1)
DIFFERENTIAL TYPE: ABNORMAL
EKG ATRIAL RATE: 103 BPM
EKG ATRIAL RATE: 105 BPM
EKG ATRIAL RATE: 90 BPM
EKG DIAGNOSIS: NORMAL
EKG P AXIS: 49 DEGREES
EKG P AXIS: 56 DEGREES
EKG P AXIS: 66 DEGREES
EKG P-R INTERVAL: 166 MS
EKG P-R INTERVAL: 174 MS
EKG P-R INTERVAL: 182 MS
EKG Q-T INTERVAL: 360 MS
EKG Q-T INTERVAL: 360 MS
EKG Q-T INTERVAL: 402 MS
EKG QRS DURATION: 86 MS
EKG QRS DURATION: 86 MS
EKG QRS DURATION: 92 MS
EKG QTC CALCULATION (BAZETT): 471 MS
EKG QTC CALCULATION (BAZETT): 475 MS
EKG QTC CALCULATION (BAZETT): 491 MS
EKG R AXIS: -38 DEGREES
EKG R AXIS: -40 DEGREES
EKG R AXIS: -41 DEGREES
EKG T AXIS: 123 DEGREES
EKG T AXIS: 128 DEGREES
EKG T AXIS: 145 DEGREES
EKG VENTRICULAR RATE: 103 BPM
EKG VENTRICULAR RATE: 105 BPM
EKG VENTRICULAR RATE: 90 BPM
EOSINOPHILS ABSOLUTE: 0.1 K/CU MM
EOSINOPHILS RELATIVE PERCENT: 1.7 % (ref 0–3)
FERRITIN: 106 NG/ML (ref 15–150)
FOLATE: 16.5 NG/ML (ref 3.1–17.5)
GFR AFRICAN AMERICAN: 54 ML/MIN/1.73M2
GFR NON-AFRICAN AMERICAN: 45 ML/MIN/1.73M2
GLUCOSE BLD-MCNC: 121 MG/DL (ref 70–99)
GLUCOSE BLD-MCNC: 157 MG/DL (ref 70–99)
GLUCOSE BLD-MCNC: 166 MG/DL (ref 70–99)
GLUCOSE BLD-MCNC: 170 MG/DL (ref 70–99)
GLUCOSE BLD-MCNC: 218 MG/DL (ref 70–99)
GLUCOSE BLD-MCNC: 224 MG/DL (ref 70–99)
HCT VFR BLD CALC: 31.1 % (ref 37–47)
HCT VFR BLD CALC: 33 % (ref 37–47)
HEMOGLOBIN: 9.3 GM/DL (ref 12.5–16)
HEMOGLOBIN: 9.4 GM/DL (ref 12.5–16)
IMMATURE NEUTROPHIL %: 0.3 % (ref 0–0.43)
INR BLD: 1.04 INDEX
IRON: 45 UG/DL (ref 37–145)
LACTIC ACID, SEPSIS: 1.5 MMOL/L (ref 0.5–1.9)
LYMPHOCYTES ABSOLUTE: 2.5 K/CU MM
LYMPHOCYTES RELATIVE PERCENT: 33.3 % (ref 24–44)
MCH RBC QN AUTO: 27.6 PG (ref 27–31)
MCHC RBC AUTO-ENTMCNC: 29.9 % (ref 32–36)
MCV RBC AUTO: 92.3 FL (ref 78–100)
MONOCYTES ABSOLUTE: 0.5 K/CU MM
MONOCYTES RELATIVE PERCENT: 6.5 % (ref 0–4)
NUCLEATED RBC %: 0 %
PCT TRANSFERRIN: 14 % (ref 10–44)
PDW BLD-RTO: 15.5 % (ref 11.7–14.9)
PLATELET # BLD: 150 K/CU MM (ref 140–440)
PMV BLD AUTO: 10.4 FL (ref 7.5–11.1)
POTASSIUM SERPL-SCNC: 4.2 MMOL/L (ref 3.5–5.1)
PROCALCITONIN: 0.21
PROTHROMBIN TIME: 12.1 SECONDS (ref 9.12–12.5)
RBC # BLD: 3.37 M/CU MM (ref 4.2–5.4)
RETICULOCYTE COUNT PCT: 3.8 % (ref 0.2–2.2)
SEGMENTED NEUTROPHILS ABSOLUTE COUNT: 4.3 K/CU MM
SEGMENTED NEUTROPHILS RELATIVE PERCENT: 57.7 % (ref 36–66)
SODIUM BLD-SCNC: 139 MMOL/L (ref 135–145)
TOTAL IMMATURE NEUTOROPHIL: 0.02 K/CU MM
TOTAL IRON BINDING CAPACITY: 311 UG/DL (ref 250–450)
TOTAL NUCLEATED RBC: 0 K/CU MM
TOTAL PROTEIN: 6.3 GM/DL (ref 6.4–8.2)
UNSATURATED IRON BINDING CAPACITY: 266 UG/DL (ref 110–370)
VITAMIN B-12: 258.1 PG/ML (ref 211–911)
WBC # BLD: 7.5 K/CU MM (ref 4–10.5)

## 2019-06-21 PROCEDURE — 97116 GAIT TRAINING THERAPY: CPT

## 2019-06-21 PROCEDURE — 82728 ASSAY OF FERRITIN: CPT

## 2019-06-21 PROCEDURE — 6360000002 HC RX W HCPCS: Performed by: INTERNAL MEDICINE

## 2019-06-21 PROCEDURE — 97162 PT EVAL MOD COMPLEX 30 MIN: CPT

## 2019-06-21 PROCEDURE — 82533 TOTAL CORTISOL: CPT

## 2019-06-21 PROCEDURE — 36415 COLL VENOUS BLD VENIPUNCTURE: CPT

## 2019-06-21 PROCEDURE — 80076 HEPATIC FUNCTION PANEL: CPT

## 2019-06-21 PROCEDURE — 82607 VITAMIN B-12: CPT

## 2019-06-21 PROCEDURE — 94640 AIRWAY INHALATION TREATMENT: CPT

## 2019-06-21 PROCEDURE — 80048 BASIC METABOLIC PNL TOTAL CA: CPT

## 2019-06-21 PROCEDURE — 2580000003 HC RX 258: Performed by: INTERNAL MEDICINE

## 2019-06-21 PROCEDURE — 99232 SBSQ HOSP IP/OBS MODERATE 35: CPT | Performed by: INTERNAL MEDICINE

## 2019-06-21 PROCEDURE — 85610 PROTHROMBIN TIME: CPT

## 2019-06-21 PROCEDURE — 2000000000 HC ICU R&B

## 2019-06-21 PROCEDURE — 82962 GLUCOSE BLOOD TEST: CPT

## 2019-06-21 PROCEDURE — 93010 ELECTROCARDIOGRAM REPORT: CPT | Performed by: INTERNAL MEDICINE

## 2019-06-21 PROCEDURE — 6370000000 HC RX 637 (ALT 250 FOR IP): Performed by: INTERNAL MEDICINE

## 2019-06-21 PROCEDURE — 85018 HEMOGLOBIN: CPT

## 2019-06-21 PROCEDURE — 97535 SELF CARE MNGMENT TRAINING: CPT

## 2019-06-21 PROCEDURE — 83605 ASSAY OF LACTIC ACID: CPT

## 2019-06-21 PROCEDURE — 94762 N-INVAS EAR/PLS OXIMTRY CONT: CPT

## 2019-06-21 PROCEDURE — 85025 COMPLETE CBC W/AUTO DIFF WBC: CPT

## 2019-06-21 PROCEDURE — 97530 THERAPEUTIC ACTIVITIES: CPT

## 2019-06-21 PROCEDURE — 82746 ASSAY OF FOLIC ACID SERUM: CPT

## 2019-06-21 PROCEDURE — 94761 N-INVAS EAR/PLS OXIMETRY MLT: CPT

## 2019-06-21 PROCEDURE — 93926 LOWER EXTREMITY STUDY: CPT

## 2019-06-21 PROCEDURE — 99221 1ST HOSP IP/OBS SF/LOW 40: CPT | Performed by: INTERNAL MEDICINE

## 2019-06-21 PROCEDURE — 97167 OT EVAL HIGH COMPLEX 60 MIN: CPT

## 2019-06-21 PROCEDURE — 83540 ASSAY OF IRON: CPT

## 2019-06-21 PROCEDURE — 85014 HEMATOCRIT: CPT

## 2019-06-21 PROCEDURE — 83550 IRON BINDING TEST: CPT

## 2019-06-21 PROCEDURE — 84145 PROCALCITONIN (PCT): CPT

## 2019-06-21 PROCEDURE — 2700000000 HC OXYGEN THERAPY PER DAY

## 2019-06-21 PROCEDURE — 85045 AUTOMATED RETICULOCYTE COUNT: CPT

## 2019-06-21 RX ORDER — DOPAMINE HYDROCHLORIDE 160 MG/100ML
2.5 INJECTION, SOLUTION INTRAVENOUS CONTINUOUS
Status: DISCONTINUED | OUTPATIENT
Start: 2019-06-21 | End: 2019-06-23

## 2019-06-21 RX ADMIN — GABAPENTIN 800 MG: 400 CAPSULE ORAL at 21:40

## 2019-06-21 RX ADMIN — SODIUM CHLORIDE, PRESERVATIVE FREE 10 ML: 5 INJECTION INTRAVENOUS at 11:06

## 2019-06-21 RX ADMIN — GABAPENTIN 800 MG: 400 CAPSULE ORAL at 13:21

## 2019-06-21 RX ADMIN — INSULIN LISPRO 30 UNITS: 100 INJECTION, SOLUTION INTRAVENOUS; SUBCUTANEOUS at 13:26

## 2019-06-21 RX ADMIN — Medication 2 PUFF: at 15:21

## 2019-06-21 RX ADMIN — ALBUTEROL SULFATE 2 PUFF: 90 AEROSOL, METERED RESPIRATORY (INHALATION) at 21:13

## 2019-06-21 RX ADMIN — Medication 1000 UNITS: at 10:27

## 2019-06-21 RX ADMIN — INSULIN LISPRO 6 UNITS: 100 INJECTION, SOLUTION INTRAVENOUS; SUBCUTANEOUS at 21:42

## 2019-06-21 RX ADMIN — VENLAFAXINE HYDROCHLORIDE 225 MG: 150 CAPSULE, EXTENDED RELEASE ORAL at 10:29

## 2019-06-21 RX ADMIN — SIMVASTATIN 40 MG: 40 TABLET, FILM COATED ORAL at 21:40

## 2019-06-21 RX ADMIN — INSULIN LISPRO 3 UNITS: 100 INJECTION, SOLUTION INTRAVENOUS; SUBCUTANEOUS at 13:26

## 2019-06-21 RX ADMIN — INSULIN LISPRO 30 UNITS: 100 INJECTION, SOLUTION INTRAVENOUS; SUBCUTANEOUS at 10:16

## 2019-06-21 RX ADMIN — INSULIN GLARGINE 60 UNITS: 100 INJECTION, SOLUTION SUBCUTANEOUS at 21:42

## 2019-06-21 RX ADMIN — INSULIN LISPRO 3 UNITS: 100 INJECTION, SOLUTION INTRAVENOUS; SUBCUTANEOUS at 03:43

## 2019-06-21 RX ADMIN — ALBUTEROL SULFATE 2 PUFF: 90 AEROSOL, METERED RESPIRATORY (INHALATION) at 15:21

## 2019-06-21 RX ADMIN — SODIUM CHLORIDE, PRESERVATIVE FREE 10 ML: 5 INJECTION INTRAVENOUS at 21:40

## 2019-06-21 RX ADMIN — BUSPIRONE HYDROCHLORIDE 15 MG: 7.5 TABLET ORAL at 13:21

## 2019-06-21 RX ADMIN — ISOSORBIDE MONONITRATE 30 MG: 30 TABLET, EXTENDED RELEASE ORAL at 10:29

## 2019-06-21 RX ADMIN — Medication 2 PUFF: at 12:57

## 2019-06-21 RX ADMIN — LEVOTHYROXINE SODIUM 25 MCG: 25 TABLET ORAL at 11:15

## 2019-06-21 RX ADMIN — GABAPENTIN 800 MG: 400 CAPSULE ORAL at 10:29

## 2019-06-21 RX ADMIN — ALBUTEROL SULFATE 2 PUFF: 90 AEROSOL, METERED RESPIRATORY (INHALATION) at 12:57

## 2019-06-21 RX ADMIN — BUSPIRONE HYDROCHLORIDE 15 MG: 7.5 TABLET ORAL at 21:40

## 2019-06-21 RX ADMIN — ASPIRIN 81 MG 81 MG: 81 TABLET ORAL at 11:02

## 2019-06-21 RX ADMIN — Medication 2 PUFF: at 21:13

## 2019-06-21 RX ADMIN — BUSPIRONE HYDROCHLORIDE 15 MG: 7.5 TABLET ORAL at 10:26

## 2019-06-21 ASSESSMENT — PAIN SCALES - GENERAL
PAINLEVEL_OUTOF10: 6
PAINLEVEL_OUTOF10: 4
PAINLEVEL_OUTOF10: 7
PAINLEVEL_OUTOF10: 5
PAINLEVEL_OUTOF10: 7
PAINLEVEL_OUTOF10: 0

## 2019-06-21 ASSESSMENT — PAIN - FUNCTIONAL ASSESSMENT
PAIN_FUNCTIONAL_ASSESSMENT: PREVENTS OR INTERFERES SOME ACTIVE ACTIVITIES AND ADLS

## 2019-06-21 ASSESSMENT — PAIN DESCRIPTION - DESCRIPTORS
DESCRIPTORS: THROBBING
DESCRIPTORS: SHARP;THROBBING

## 2019-06-21 ASSESSMENT — PAIN DESCRIPTION - LOCATION
LOCATION: BACK

## 2019-06-21 ASSESSMENT — PAIN DESCRIPTION - PROGRESSION
CLINICAL_PROGRESSION: NOT CHANGED

## 2019-06-21 ASSESSMENT — PAIN DESCRIPTION - PAIN TYPE
TYPE: CHRONIC PAIN

## 2019-06-21 ASSESSMENT — PAIN DESCRIPTION - ONSET
ONSET: ON-GOING

## 2019-06-21 ASSESSMENT — PAIN DESCRIPTION - ORIENTATION
ORIENTATION: LOWER
ORIENTATION: LOWER;MID
ORIENTATION: LOWER;MID
ORIENTATION: LOWER
ORIENTATION: LOWER;MID

## 2019-06-21 ASSESSMENT — PAIN DESCRIPTION - FREQUENCY
FREQUENCY: CONTINUOUS

## 2019-06-21 NOTE — PROGRESS NOTES
Message sent to dr Arvind Rodriguez:  did you see how blood pressures trended. when at sbp 81patient was lightheaded and dizzy and nauseated. do you want anything given?  ok to transfer?

## 2019-06-21 NOTE — PROGRESS NOTES
Pt transferred to ICU with all of her belongings. Family notified of transfer. Bedside report given to [facility].

## 2019-06-21 NOTE — PROGRESS NOTES
Inhalation 4x daily    And    ipratropium  2 puff Inhalation 4x daily    sodium chloride flush  10 mL Intravenous 2 times per day    enoxaparin  40 mg Subcutaneous Daily    furosemide  40 mg Intravenous BID    aspirin  81 mg Oral Daily    insulin glargine  60 Units Subcutaneous Nightly    insulin lispro  0-18 Units Subcutaneous TID WC    insulin lispro  0-18 Units Subcutaneous 2 times per day      Infusions:   PRN Meds:   sodium chloride flush 10 mL PRN   acetaminophen 650 mg Q4H PRN   magnesium hydroxide 30 mL Daily PRN   ondansetron 4 mg Q6H PRN   hydrALAZINE 10 mg Q10 Min PRN   albuterol sulfate HFA 2 puff Q4H PRN   sodium chloride flush 10 mL PRN   magnesium hydroxide 30 mL Daily PRN   ondansetron 4 mg Q6H PRN   potassium chloride 40 mEq PRN   Or     potassium alternative oral replacement 40 mEq PRN   Or     potassium chloride 10 mEq PRN   magnesium sulfate 1 g PRN   nitroGLYCERIN 0.4 mg Q5 Min PRN         Electronically signed by Jerzy Gomez MD on 6/20/2019 at 9:52 PM

## 2019-06-21 NOTE — CONSULTS
Pulmonary Consult Note      Reason for Consult: ? SADI  Requesting Physician: Dr. Noreen Mayers:   CHIEF COMPLAINT :SOB    Patient Active Problem List    Diagnosis Date Noted    Coronary artery disease involving native coronary artery of native heart with unstable angina pectoris ([facility]     VHD (valvular heart disease)     Chest pain 06/19/2019    Acute and chr resp failure, unsp w hypoxia or hypercapnia (HCC)     SADI (obstructive sleep apnea)     Morbid obesity ([address]     NSTEMI (non-ST elevated myocardial infarction) ([address]     ACS (acute coronary syndrome) ([address] 11/08/2018    Acute coronary syndrome ([address] 75/09/9456    Diastolic dysfunction with acute on chronic heart failure ([address] 11/08/2018    Congestive heart failure (CHF) ([address] 11/07/2018    Type 2 diabetes mellitus ([address] 11/07/2018    Hypothyroidism 11/07/2018    Depression 11/07/2018    Hypertension 11/07/2018    CHF (congestive heart failure), NYHA class I, acute on chronic, diastolic ([facility] 14/33/3968    Idiopathic progressive neuropathy 04/18/2016     Overview Note:     Patient has chronic peripheral neuropathy to bilateral feet. She is currently on gabapentin 800 mg tid with some benefit. She reports decreased sensation to her feet with intermittent pain.  Mild obstructive sleep apnea 10/14/2015    CCC (chronic calculous cholecystitis) 09/14/2015    Cirrhosis of liver without ascites ([facility] 09/14/2015    Light headed 04/23/2014     Overview Note:     From dehydration from hyperglycemia - patient dry on presentation. replace inactive diagnosis      Orthostatic hypotension 04/23/2014    Hypertriglyceridemia 04/23/2014    Precordial pain 04/22/2014    Hyperglycemia 04/22/2014     Overview Note:     Admission blood glucose of 120; was >500 prior to presentation to ER, first accucheck in ER was 341.       Axillary abscess 04/22/2014     Overview Note:     Left      Hyperlipidemia     DM type 2, uncontrolled, with retinopathy insulin lispro  30 Units Subcutaneous TID     sodium chloride  500 mL Intravenous Once    sodium chloride flush  10 mL Intravenous 2 times per day    metoprolol tartrate  25 mg Oral BID    isosorbide mononitrate  30 mg Oral Daily    aspirin  81 mg Oral Daily    busPIRone  15 mg Oral TID    gabapentin  800 mg Oral 4x Daily    levothyroxine  25 mcg Oral Daily    simvastatin  40 mg Oral Nightly    venlafaxine  225 mg Oral Daily with breakfast    vitamin D  1,000 Units Oral Daily    docusate sodium  100 mg Oral Daily    albuterol sulfate HFA  2 puff Inhalation 4x daily    And    ipratropium  2 puff Inhalation 4x daily    sodium chloride flush  10 mL Intravenous 2 times per day    furosemide  40 mg Intravenous BID    insulin glargine  60 Units Subcutaneous Nightly    insulin lispro  0-18 Units Subcutaneous TID WC    insulin lispro  0-18 Units Subcutaneous 2 times per day     Allergies:    Social History:    TOBACCO:   reports that she has quit smoking. Her smoking use included cigarettes. She has a 21.00 pack-year smoking history. She has never used smokeless tobacco.  ETOH:   reports that she does not drink alcohol. Patient currently lives independently    Family History:       Problem Relation Age of Onset    Arthritis Mother     Depression Mother     Emphysema Mother     Dementia Mother     Arthritis Father     Cancer Father         prostate    Vision Loss Father     Other Father         brain aneurysm    Arthritis Sister     Depression Sister     Anxiety Disorder Sister     Arthritis Brother     Cancer Brother         eye    Hearing Loss Brother     High Blood Pressure Brother        REVIEW OF SYSTEMS:    CONSTITUTIONAL:  negative for fevers, chills, diaphoresis, activity change, appetite change, fatigue, night sweats and unexpected weight change.    EYES:  negative for blurred vision, eye discharge, visual disturbance and icterus  HEENT:  negative for hearing loss, tinnitus, ear drainage, sinus pressure, nasal congestion, epistaxis and snoring  RESPIRATORY:  See HPI  CARDIOVASCULAR:  negative for chest pain, palpitations, exertional chest pressure/discomfort, edema, syncope  GASTROINTESTINAL:  negative for nausea, vomiting, diarrhea, constipation, blood in stool and abdominal pain  GENITOURINARY:  negative for frequency, dysuria, urinary incontinence, decreased urine volume, and hematuria  HEMATOLOGIC/LYMPHATIC:  negative for easy bruising, bleeding and lymphadenopathy  ALLERGIC/IMMUNOLOGIC:  negative for recurrent infections, angioedema, anaphylaxis and drug reactions  ENDOCRINE:  negative for weight changes and diabetic symptoms including polyuria, polydipsia and polyphagia  MUSCULOSKELETAL:  negative for  pain, joint swelling, decreased range of motion and muscle weakness  NEUROLOGICAL:  negative for headaches, slurred speech, unilateral weakness  PSYCHIATRIC/BEHAVIORAL: negative for hallucinations, behavioral problems, confusion and agitation. Objective:   PHYSICAL EXAM:      VITALS:  /63   Pulse 87   Temp 98.1 °F (36.7 °C) (Oral)   Resp 15   Ht 5' (1.524 m)   Wt 246 lb 6.4 oz (111.8 kg)   SpO2 97%   BMI 48.12 kg/m²   24HR INTAKE/OUTPUT:      Intake/Output Summary (Last 24 hours) at 2019 1234  Last data filed at 2019 1412  Gross per 24 hour   Intake 10 ml   Output --   Net 10 ml     CURRENT PULSE OXIMETRY:  SpO2: 97 %  24HR PULSE OXIMETRY RANGE:  SpO2  Av.9 %  Min: 89 %  Max: 100 %    CONSTITUTIONAL:  awake, alert, cooperative, no apparent distress, and appears stated age  NECK:  Supple, symmetrical, trachea midline, no adenopathy, thyroid symmetric, not enlarged and no tenderness, skin normal  LUNGS:  no increased work of breathing and clear to auscultation. No accessory muscle use  CARDIOVASCULAR:  normal S1 and S2, no edema and no JVD  ABDOMEN:  normal bowel sounds, non-distended and no masses palpated, and no tenderness to palpation.  No unsp w hypoxia or hypercapnia (HCC)     SADI (obstructive sleep apnea)     Morbid obesity (HCC)     NSTEMI (non-ST elevated myocardial infarction) ([facility]     ACS [facility] syndrome) [facility] 11/08/2018    Acute coronary syndrome (HCC) 34/54/5574    Diastolic dysfunction with acute on chronic heart failure (HCC) 11/08/2018    Congestive heart failure (CHF) ([facility] 11/07/2018    Type 2 diabetes mellitus ([facility] 11/07/2018    Hypothyroidism 11/07/2018    Depression 11/07/2018    Hypertension 11/07/2018    CHF (congestive heart failure), NYHA class I, acute on chronic, diastolic ([facility]) 77/01/8579    Idiopathic progressive neuropathy 04/18/2016     Overview Note:     Patient has chronic peripheral neuropathy to bilateral feet. She is currently on gabapentin 800 mg tid with some benefit. She reports decreased sensation to her feet with intermittent pain.  Mild obstructive sleep apnea 10/14/2015    CCC (chronic calculous cholecystitis) 09/14/2015    Cirrhosis of liver without ascites ([facility] 09/14/2015    Light headed 04/23/2014     Overview Note:     From dehydration from hyperglycemia - patient dry on presentation. replace inactive diagnosis      Orthostatic hypotension 04/23/2014    Hypertriglyceridemia 04/23/2014    Precordial pain 04/22/2014    Hyperglycemia 04/22/2014     Overview Note:     Admission blood glucose of 120; was >500 prior to presentation to ER, first accucheck in ER was 341.  Axillary abscess 04/22/2014     Overview Note:     Left      Hyperlipidemia     DM type 2, uncontrolled, with retinopathy ([facility] 03/01/2013     Overview Note:     Patient's last A1c was 12.1, and she has been referred in the last month to endocrinology.  She has an appointment this coming month with a specialist.      Hypokalemia 03/01/2013    HTN (hypertension) 11/26/2012    Hyperlipemia 11/26/2012    COPD (chronic obstructive pulmonary disease) ([facility] [address] 11/26/2012    Anxiety and depression 11/26/2012

## 2019-06-21 NOTE — PROGRESS NOTES
Progress Note( Dr. Thao Delgado)    Subjective:   Admit Date: 6/19/2019  PCP: Valla Hashimoto, PA-C    Admitted For : Redness of breath exacerbation of congestive heart failure    Consulted For: Better control of blood glucose    Interval History: Feels somewhat better    Denies any chest pains,   He will SOB . Denies nausea or vomiting. No new bowel or bladder symptoms. Intake/Output Summary (Last 24 hours) at 6/21/2019 0759  Last data filed at 6/20/2019 1412  Gross per 24 hour   Intake 10 ml   Output --   Net 10 ml       DATA    CBC:   Recent Labs     06/20/19  0304 06/21/19  0156   WBC 10.2 7.5   HGB 10.0* 9.3*    150    CMP:  Recent Labs     06/19/19  0525 06/20/19  0623 06/21/19  0156    141 139   K 4.8 4.4 4.2   CL 99 101 101   CO2 31 28 28   BUN 31* 34* 33*   CREATININE 1.1 1.0 1.2*   CALCIUM 9.2 9.1 8.6     Lipids:   Lab Results   Component Value Date    CHOL 122 06/20/2019    CHOL 205 03/17/2016    HDL 51 06/20/2019    TRIG 150 06/20/2019     Glucose:  Recent Labs     06/20/19  1709 06/20/19  2102 06/21/19  0231   POCGLU 239* 190* 170*     [id number]:  Lab Results   Component Value Date    LABA1C 9.5 06/20/2019     High Sensitivity TSH:   Lab Results   Component Value Date    TSHHS 4.730 06/20/2019     Free T3:   Lab Results   Component Value Date    FT3 2.9 03/17/2016     Free T4:  Lab Results   Component Value Date    T4FREE 1.04 06/18/2019       Ct Abdomen Pelvis W Iv Contrast Additional Contrast? Radiologist Recommendation    Result Date: 6/20/2019  EXAMINATION: CT OF THE ABDOMEN AND PELVIS WITH CONTRAST 6/20/2019 7:40 pm TECHNIQUE:       1. Hematoma versus pseudoaneurysm appears to have acute bleed right inguinal region. 2. No CT evidence of an acute intra-abdominal or intrapelvic process. 3. Hepatic morphologic features cirrhosis. No discrete hepatic lesion. 4. Cholecystectomy.      Xr Chest Portable    Result Date: 6/17/2019  EXAMINATION: ONE XRAY VIEW OF THE CHEST 6/17/2019 9:57

## 2019-06-21 NOTE — PROGRESS NOTES
Notified cardiologist that MAP is ranging between 55-57. Pt not complaining of back pain or abdominal pain at this time.

## 2019-06-21 NOTE — PROGRESS NOTES
Fibromyalgia, H/O cardiovascular stress test, H/O complete electrocardiogram, H/O echocardiogram, Headache(784.0), Hepatitis B, History of cardiac cath, History of chest x-ray, Holter monitor, abnormal, Hx of cardiovascular stress test, Hx of echocardiogram, Hyperlipidemia, Hypertension, Insulin pump in place, Liver cirrhosis ([facility] SADI on CPAP, Peripheral vascular disease ([facility] PONV (postoperative nausea and vomiting), Renal disease, Tachycardia, and Wears dentures. has a past surgical history that includes Hysterectomy (2000); back surgery (1998); Tonsillectomy (1966); shoulder surgery (Right, 1960); Cataract removal with implant (Bilateral, 2000s); Diagnostic Cardiac Cath Lab Procedure (2000s); Tonsillectomy and adenoidectomy (1966); Cholecystectomy (60185301); and liver biopsy (9/14/2015). Treatment Diagnosis: Chest pain      Restrictions  Restrictions/Precautions  Restrictions/Precautions: Fall Risk, General Precautions  Required Braces or Orthoses?: No    Subjective   General  Chart Reviewed: Yes  Patient assessed for rehabilitation services?: Yes  Family / Caregiver Present: Yes(sister)  Patient Currently in Pain: Yes  Pain Assessment  Pain Level: 7  Pain Type: Chronic pain  Pain Location: Back  Pain Orientation: Lower  Pain Descriptors:  Throbbing  Pain Intervention: Increased movement, repositioned, RN notified    Social/Functional History    Social/Functional History  Lives With: Family (niece)  Type of Home: House  Home Layout: One level  Home Access: Stairs to enter with rails  Entrance Stairs - Number of Steps: 3  Bathroom Shower/Tub: Tub/Shower unit  Bathroom Toilet: Standard  Bathroom Equipment: Shower chair  Home Equipment: [address] (prefers cane however only uses PRN when back pain increases)  Receives Help From: Family (niece)  ADL Assistance: Independent  Homemaking Assistance: Needs assistance  Homemaking Responsibilities: No  Ambulation Assistance: Independent  Transfer Assistance:

## 2019-06-21 NOTE — PROGRESS NOTES
Message to dr Linton Ing:  patient has scored positive for sepsis.   would you place necessary orders

## 2019-06-22 ENCOUNTER — APPOINTMENT (OUTPATIENT)
Dept: GENERAL RADIOLOGY | Age: 69
DRG: 286 | End: 2019-06-22
Attending: HOSPITALIST
Payer: MEDICARE

## 2019-06-22 LAB
ANION GAP SERPL CALCULATED.3IONS-SCNC: 10 MMOL/L (ref 4–16)
BACTERIA: ABNORMAL /HPF
BILIRUBIN URINE: NEGATIVE MG/DL
BLOOD, URINE: NEGATIVE
BUN BLDV-MCNC: 37 MG/DL (ref 6–23)
CALCIUM SERPL-MCNC: 8.6 MG/DL (ref 8.3–10.6)
CHLORIDE BLD-SCNC: 98 MMOL/L (ref 99–110)
CLARITY: CLEAR
CO2: 29 MMOL/L (ref 21–32)
COLOR: YELLOW
CREAT SERPL-MCNC: 1.2 MG/DL (ref 0.6–1.1)
GFR AFRICAN AMERICAN: 54 ML/MIN/1.73M2
GFR NON-AFRICAN AMERICAN: 45 ML/MIN/1.73M2
GLUCOSE BLD-MCNC: 145 MG/DL (ref 70–99)
GLUCOSE BLD-MCNC: 162 MG/DL (ref 70–99)
GLUCOSE BLD-MCNC: 169 MG/DL (ref 70–99)
GLUCOSE BLD-MCNC: 188 MG/DL (ref 70–99)
GLUCOSE BLD-MCNC: 206 MG/DL (ref 70–99)
GLUCOSE BLD-MCNC: 248 MG/DL (ref 70–99)
GLUCOSE, URINE: NEGATIVE MG/DL
HCT VFR BLD CALC: 30 % (ref 37–47)
HCT VFR BLD CALC: 31.4 % (ref 37–47)
HEMOGLOBIN: 8.7 GM/DL (ref 12.5–16)
HEMOGLOBIN: 8.9 GM/DL (ref 12.5–16)
KETONES, URINE: NEGATIVE MG/DL
LEUKOCYTE ESTERASE, URINE: NEGATIVE
NITRITE URINE, QUANTITATIVE: NEGATIVE
PH, URINE: 5 (ref 5–8)
POTASSIUM SERPL-SCNC: 4.9 MMOL/L (ref 3.5–5.1)
PROCALCITONIN: 0.23
PROTEIN UA: 30 MG/DL
RBC URINE: ABNORMAL /HPF (ref 0–6)
SODIUM BLD-SCNC: 137 MMOL/L (ref 135–145)
SPECIFIC GRAVITY UA: 1.01 (ref 1–1.03)
SQUAMOUS EPITHELIAL: 2 /HPF
TRICHOMONAS: ABNORMAL /HPF
UROBILINOGEN, URINE: 1 MG/DL (ref 0.2–1)
WBC UA: <1 /HPF (ref 0–5)

## 2019-06-22 PROCEDURE — 82962 GLUCOSE BLOOD TEST: CPT

## 2019-06-22 PROCEDURE — 99233 SBSQ HOSP IP/OBS HIGH 50: CPT | Performed by: INTERNAL MEDICINE

## 2019-06-22 PROCEDURE — 6360000002 HC RX W HCPCS: Performed by: INTERNAL MEDICINE

## 2019-06-22 PROCEDURE — 81001 URINALYSIS AUTO W/SCOPE: CPT

## 2019-06-22 PROCEDURE — 94660 CPAP INITIATION&MGMT: CPT

## 2019-06-22 PROCEDURE — 2000000000 HC ICU R&B

## 2019-06-22 PROCEDURE — 70355 PANORAMIC X-RAY OF JAWS: CPT

## 2019-06-22 PROCEDURE — 6370000000 HC RX 637 (ALT 250 FOR IP): Performed by: INTERNAL MEDICINE

## 2019-06-22 PROCEDURE — 84145 PROCALCITONIN (PCT): CPT

## 2019-06-22 PROCEDURE — 94761 N-INVAS EAR/PLS OXIMETRY MLT: CPT

## 2019-06-22 PROCEDURE — 2580000003 HC RX 258: Performed by: INTERNAL MEDICINE

## 2019-06-22 PROCEDURE — 6370000000 HC RX 637 (ALT 250 FOR IP): Performed by: FAMILY MEDICINE

## 2019-06-22 PROCEDURE — 94640 AIRWAY INHALATION TREATMENT: CPT

## 2019-06-22 PROCEDURE — 80048 BASIC METABOLIC PNL TOTAL CA: CPT

## 2019-06-22 PROCEDURE — 85014 HEMATOCRIT: CPT

## 2019-06-22 PROCEDURE — 85018 HEMOGLOBIN: CPT

## 2019-06-22 RX ORDER — IPRATROPIUM BROMIDE AND ALBUTEROL SULFATE 2.5; .5 MG/3ML; MG/3ML
1 SOLUTION RESPIRATORY (INHALATION)
Status: DISCONTINUED | OUTPATIENT
Start: 2019-06-22 | End: 2019-06-23

## 2019-06-22 RX ORDER — DEXTROSE MONOHYDRATE 50 MG/ML
100 INJECTION, SOLUTION INTRAVENOUS PRN
Status: DISCONTINUED | OUTPATIENT
Start: 2019-06-22 | End: 2019-06-26

## 2019-06-22 RX ORDER — IBUPROFEN 600 MG/1
600 TABLET ORAL
Status: DISCONTINUED | OUTPATIENT
Start: 2019-06-22 | End: 2019-06-23

## 2019-06-22 RX ORDER — DIAZEPAM 5 MG/1
5 TABLET ORAL
Status: ACTIVE | OUTPATIENT
Start: 2019-06-23 | End: 2019-06-23

## 2019-06-22 RX ORDER — PREDNISONE 20 MG/1
40 TABLET ORAL DAILY
Status: DISCONTINUED | OUTPATIENT
Start: 2019-06-22 | End: 2019-06-23

## 2019-06-22 RX ORDER — SODIUM CHLORIDE 0.9 % (FLUSH) 0.9 %
10 SYRINGE (ML) INJECTION PRN
Status: DISCONTINUED | OUTPATIENT
Start: 2019-06-22 | End: 2019-06-22 | Stop reason: SDUPTHER

## 2019-06-22 RX ORDER — FUROSEMIDE 40 MG/1
40 TABLET ORAL ONCE
Status: COMPLETED | OUTPATIENT
Start: 2019-06-22 | End: 2019-06-22

## 2019-06-22 RX ORDER — ONDANSETRON 2 MG/ML
4 INJECTION INTRAMUSCULAR; INTRAVENOUS EVERY 6 HOURS PRN
Status: DISCONTINUED | OUTPATIENT
Start: 2019-06-22 | End: 2019-06-23 | Stop reason: SDUPTHER

## 2019-06-22 RX ORDER — DEXTROSE MONOHYDRATE 25 G/50ML
12.5 INJECTION, SOLUTION INTRAVENOUS PRN
Status: DISCONTINUED | OUTPATIENT
Start: 2019-06-22 | End: 2019-06-26

## 2019-06-22 RX ORDER — DIPHENHYDRAMINE HCL 25 MG
25 TABLET ORAL
Status: ACTIVE | OUTPATIENT
Start: 2019-06-23 | End: 2019-06-23

## 2019-06-22 RX ORDER — AZITHROMYCIN 250 MG/1
500 TABLET, FILM COATED ORAL DAILY
Status: DISCONTINUED | OUTPATIENT
Start: 2019-06-22 | End: 2019-06-23

## 2019-06-22 RX ORDER — SIMVASTATIN 40 MG
40 TABLET ORAL NIGHTLY
Status: DISCONTINUED | OUTPATIENT
Start: 2019-06-22 | End: 2019-06-23

## 2019-06-22 RX ORDER — VENLAFAXINE HYDROCHLORIDE 37.5 MG/1
75 CAPSULE, EXTENDED RELEASE ORAL
Status: DISCONTINUED | OUTPATIENT
Start: 2019-06-23 | End: 2019-06-22

## 2019-06-22 RX ORDER — TROSPIUM CHLORIDE 20 MG/1
20 TABLET, FILM COATED ORAL
Status: DISCONTINUED | OUTPATIENT
Start: 2019-06-23 | End: 2019-06-23

## 2019-06-22 RX ORDER — BUSPIRONE HYDROCHLORIDE 7.5 MG/1
15 TABLET ORAL 3 TIMES DAILY
Status: DISCONTINUED | OUTPATIENT
Start: 2019-06-22 | End: 2019-06-23

## 2019-06-22 RX ORDER — ASPIRIN 81 MG/1
81 TABLET, CHEWABLE ORAL DAILY
Status: DISCONTINUED | OUTPATIENT
Start: 2019-06-23 | End: 2019-06-23

## 2019-06-22 RX ORDER — DOCUSATE SODIUM 100 MG/1
100 CAPSULE, LIQUID FILLED ORAL DAILY
Status: DISCONTINUED | OUTPATIENT
Start: 2019-06-23 | End: 2019-06-23

## 2019-06-22 RX ORDER — FUROSEMIDE 40 MG/1
40 TABLET ORAL 2 TIMES DAILY
Status: DISCONTINUED | OUTPATIENT
Start: 2019-06-22 | End: 2019-06-26 | Stop reason: HOSPADM

## 2019-06-22 RX ORDER — ALBUTEROL SULFATE 90 UG/1
1 AEROSOL, METERED RESPIRATORY (INHALATION) EVERY 4 HOURS PRN
Status: DISCONTINUED | OUTPATIENT
Start: 2019-06-22 | End: 2019-06-23

## 2019-06-22 RX ORDER — GABAPENTIN 400 MG/1
800 CAPSULE ORAL 4 TIMES DAILY
Status: DISCONTINUED | OUTPATIENT
Start: 2019-06-22 | End: 2019-06-23

## 2019-06-22 RX ORDER — OXYCODONE HYDROCHLORIDE AND ACETAMINOPHEN 5; 325 MG/1; MG/1
1 TABLET ORAL ONCE
Status: COMPLETED | OUTPATIENT
Start: 2019-06-22 | End: 2019-06-22

## 2019-06-22 RX ORDER — SODIUM CHLORIDE 0.9 % (FLUSH) 0.9 %
10 SYRINGE (ML) INJECTION EVERY 12 HOURS SCHEDULED
Status: DISCONTINUED | OUTPATIENT
Start: 2019-06-22 | End: 2019-06-22 | Stop reason: SDUPTHER

## 2019-06-22 RX ORDER — KETOROLAC TROMETHAMINE 30 MG/ML
15 INJECTION, SOLUTION INTRAMUSCULAR; INTRAVENOUS ONCE
Status: COMPLETED | OUTPATIENT
Start: 2019-06-22 | End: 2019-06-22

## 2019-06-22 RX ORDER — SODIUM CHLORIDE 9 MG/ML
INJECTION, SOLUTION INTRAVENOUS CONTINUOUS
Status: DISCONTINUED | OUTPATIENT
Start: 2019-06-22 | End: 2019-06-25

## 2019-06-22 RX ORDER — NYSTATIN 100000 U/G
CREAM TOPICAL 2 TIMES DAILY
Status: DISCONTINUED | OUTPATIENT
Start: 2019-06-22 | End: 2019-06-23

## 2019-06-22 RX ORDER — AMOXICILLIN AND CLAVULANATE POTASSIUM 875; 125 MG/1; MG/1
1 TABLET, FILM COATED ORAL EVERY 12 HOURS SCHEDULED
Status: DISCONTINUED | OUTPATIENT
Start: 2019-06-22 | End: 2019-06-25

## 2019-06-22 RX ORDER — GUAIFENESIN 600 MG/1
600 TABLET, EXTENDED RELEASE ORAL 2 TIMES DAILY
Status: DISCONTINUED | OUTPATIENT
Start: 2019-06-22 | End: 2019-06-23

## 2019-06-22 RX ORDER — FUROSEMIDE 10 MG/ML
40 INJECTION INTRAMUSCULAR; INTRAVENOUS 2 TIMES DAILY
Status: DISCONTINUED | OUTPATIENT
Start: 2019-06-22 | End: 2019-06-22 | Stop reason: SDUPTHER

## 2019-06-22 RX ORDER — POTASSIUM CHLORIDE 20 MEQ/1
20 TABLET, EXTENDED RELEASE ORAL 2 TIMES DAILY WITH MEALS
Status: DISCONTINUED | OUTPATIENT
Start: 2019-06-22 | End: 2019-06-23

## 2019-06-22 RX ORDER — LEVOTHYROXINE SODIUM 0.03 MG/1
25 TABLET ORAL DAILY
Status: DISCONTINUED | OUTPATIENT
Start: 2019-06-23 | End: 2019-06-23

## 2019-06-22 RX ORDER — INSULIN GLARGINE 100 [IU]/ML
80 INJECTION, SOLUTION SUBCUTANEOUS EVERY 12 HOURS
Status: DISCONTINUED | OUTPATIENT
Start: 2019-06-22 | End: 2019-06-22

## 2019-06-22 RX ORDER — NITROGLYCERIN 0.4 MG/1
0.4 TABLET SUBLINGUAL EVERY 5 MIN PRN
Status: DISCONTINUED | OUTPATIENT
Start: 2019-06-22 | End: 2019-06-22 | Stop reason: SDUPTHER

## 2019-06-22 RX ORDER — NICOTINE POLACRILEX 4 MG
15 LOZENGE BUCCAL PRN
Status: DISCONTINUED | OUTPATIENT
Start: 2019-06-22 | End: 2019-06-26

## 2019-06-22 RX ADMIN — INSULIN GLARGINE 60 UNITS: 100 INJECTION, SOLUTION SUBCUTANEOUS at 22:01

## 2019-06-22 RX ADMIN — GUAIFENESIN 600 MG: 600 TABLET, EXTENDED RELEASE ORAL at 21:51

## 2019-06-22 RX ADMIN — INSULIN LISPRO 3 UNITS: 100 INJECTION, SOLUTION INTRAVENOUS; SUBCUTANEOUS at 22:01

## 2019-06-22 RX ADMIN — INSULIN LISPRO 3 UNITS: 100 INJECTION, SOLUTION INTRAVENOUS; SUBCUTANEOUS at 03:00

## 2019-06-22 RX ADMIN — INSULIN LISPRO 3 UNITS: 100 INJECTION, SOLUTION INTRAVENOUS; SUBCUTANEOUS at 10:19

## 2019-06-22 RX ADMIN — FUROSEMIDE 40 MG: 40 TABLET ORAL at 14:01

## 2019-06-22 RX ADMIN — ALBUTEROL SULFATE 2 PUFF: 90 AEROSOL, METERED RESPIRATORY (INHALATION) at 08:17

## 2019-06-22 RX ADMIN — ASPIRIN 81 MG 81 MG: 81 TABLET ORAL at 09:18

## 2019-06-22 RX ADMIN — Medication 2 PUFF: at 08:17

## 2019-06-22 RX ADMIN — SIMVASTATIN 40 MG: 40 TABLET, FILM COATED ORAL at 21:51

## 2019-06-22 RX ADMIN — OXYCODONE HYDROCHLORIDE AND ACETAMINOPHEN 1 TABLET: 5; 325 TABLET ORAL at 16:43

## 2019-06-22 RX ADMIN — AZITHROMYCIN 500 MG: 250 TABLET, FILM COATED ORAL at 21:51

## 2019-06-22 RX ADMIN — NYSTATIN CREAM: 100000 CREAM TOPICAL at 23:45

## 2019-06-22 RX ADMIN — METOPROLOL TARTRATE 25 MG: 25 TABLET ORAL at 21:51

## 2019-06-22 RX ADMIN — BUSPIRONE HYDROCHLORIDE 15 MG: 7.5 TABLET ORAL at 14:03

## 2019-06-22 RX ADMIN — BUSPIRONE HYDROCHLORIDE 15 MG: 7.5 TABLET ORAL at 09:19

## 2019-06-22 RX ADMIN — KETOROLAC TROMETHAMINE 15 MG: 30 INJECTION, SOLUTION INTRAMUSCULAR at 04:33

## 2019-06-22 RX ADMIN — SODIUM CHLORIDE, PRESERVATIVE FREE 10 ML: 5 INJECTION INTRAVENOUS at 09:20

## 2019-06-22 RX ADMIN — GABAPENTIN 800 MG: 400 CAPSULE ORAL at 16:44

## 2019-06-22 RX ADMIN — Medication 1 PUFF: at 21:11

## 2019-06-22 RX ADMIN — Medication 2 PUFF: at 11:26

## 2019-06-22 RX ADMIN — GABAPENTIN 800 MG: 400 CAPSULE ORAL at 09:19

## 2019-06-22 RX ADMIN — INSULIN LISPRO 6 UNITS: 100 INJECTION, SOLUTION INTRAVENOUS; SUBCUTANEOUS at 13:50

## 2019-06-22 RX ADMIN — GABAPENTIN 800 MG: 400 CAPSULE ORAL at 21:51

## 2019-06-22 RX ADMIN — INSULIN LISPRO 30 UNITS: 100 INJECTION, SOLUTION INTRAVENOUS; SUBCUTANEOUS at 18:38

## 2019-06-22 RX ADMIN — INSULIN LISPRO 30 UNITS: 100 INJECTION, SOLUTION INTRAVENOUS; SUBCUTANEOUS at 10:23

## 2019-06-22 RX ADMIN — SODIUM CHLORIDE, PRESERVATIVE FREE 10 ML: 5 INJECTION INTRAVENOUS at 22:05

## 2019-06-22 RX ADMIN — BUSPIRONE HYDROCHLORIDE 15 MG: 7.5 TABLET ORAL at 21:50

## 2019-06-22 RX ADMIN — Medication 2 PUFF: at 15:43

## 2019-06-22 RX ADMIN — SODIUM CHLORIDE, PRESERVATIVE FREE 10 ML: 5 INJECTION INTRAVENOUS at 09:21

## 2019-06-22 RX ADMIN — ISOSORBIDE MONONITRATE 30 MG: 30 TABLET, EXTENDED RELEASE ORAL at 09:19

## 2019-06-22 RX ADMIN — ALBUTEROL SULFATE 1 PUFF: 90 AEROSOL, METERED RESPIRATORY (INHALATION) at 21:11

## 2019-06-22 RX ADMIN — INSULIN LISPRO 6 UNITS: 100 INJECTION, SOLUTION INTRAVENOUS; SUBCUTANEOUS at 18:37

## 2019-06-22 RX ADMIN — Medication 1000 UNITS: at 09:18

## 2019-06-22 RX ADMIN — AMOXICILLIN AND CLAVULANATE POTASSIUM 1 TABLET: 875; 125 TABLET, FILM COATED ORAL at 16:43

## 2019-06-22 RX ADMIN — PREDNISONE 40 MG: 20 TABLET ORAL at 21:51

## 2019-06-22 RX ADMIN — ALBUTEROL SULFATE 2 PUFF: 90 AEROSOL, METERED RESPIRATORY (INHALATION) at 15:44

## 2019-06-22 RX ADMIN — LEVOTHYROXINE SODIUM 25 MCG: 25 TABLET ORAL at 06:37

## 2019-06-22 RX ADMIN — ALBUTEROL SULFATE 2 PUFF: 90 AEROSOL, METERED RESPIRATORY (INHALATION) at 11:26

## 2019-06-22 RX ADMIN — INSULIN LISPRO 30 UNITS: 100 INJECTION, SOLUTION INTRAVENOUS; SUBCUTANEOUS at 13:52

## 2019-06-22 RX ADMIN — DOCUSATE SODIUM 100 MG: 100 CAPSULE, LIQUID FILLED ORAL at 09:19

## 2019-06-22 RX ADMIN — FUROSEMIDE 40 MG: 40 TABLET ORAL at 17:01

## 2019-06-22 RX ADMIN — GABAPENTIN 800 MG: 400 CAPSULE ORAL at 14:01

## 2019-06-22 RX ADMIN — VENLAFAXINE HYDROCHLORIDE 225 MG: 150 CAPSULE, EXTENDED RELEASE ORAL at 09:18

## 2019-06-22 ASSESSMENT — PAIN DESCRIPTION - DESCRIPTORS
DESCRIPTORS: NAGGING
DESCRIPTORS: ACHING
DESCRIPTORS: ACHING

## 2019-06-22 ASSESSMENT — PAIN SCALES - GENERAL
PAINLEVEL_OUTOF10: 0
PAINLEVEL_OUTOF10: 3
PAINLEVEL_OUTOF10: 8
PAINLEVEL_OUTOF10: 6
PAINLEVEL_OUTOF10: 8
PAINLEVEL_OUTOF10: 0
PAINLEVEL_OUTOF10: 0
PAINLEVEL_OUTOF10: 8

## 2019-06-22 ASSESSMENT — PAIN DESCRIPTION - PROGRESSION
CLINICAL_PROGRESSION: NOT CHANGED

## 2019-06-22 ASSESSMENT — PAIN DESCRIPTION - PAIN TYPE
TYPE: ACUTE PAIN
TYPE: ACUTE PAIN
TYPE: CHRONIC PAIN
TYPE: CHRONIC PAIN

## 2019-06-22 ASSESSMENT — PAIN DESCRIPTION - LOCATION
LOCATION: BACK
LOCATION: BACK;JAW

## 2019-06-22 ASSESSMENT — PAIN - FUNCTIONAL ASSESSMENT
PAIN_FUNCTIONAL_ASSESSMENT: ACTIVITIES ARE NOT PREVENTED

## 2019-06-22 ASSESSMENT — PAIN DESCRIPTION - FREQUENCY
FREQUENCY: CONTINUOUS

## 2019-06-22 ASSESSMENT — PAIN DESCRIPTION - ONSET
ONSET: ON-GOING
ONSET: AWAKENED FROM SLEEP
ONSET: AWAKENED FROM SLEEP

## 2019-06-22 ASSESSMENT — PAIN DESCRIPTION - ORIENTATION
ORIENTATION: OTHER (COMMENT)
ORIENTATION: OTHER (COMMENT)
ORIENTATION: LOWER;MID

## 2019-06-22 NOTE — PROGRESS NOTES
 Anxiety and depression 11/26/2012     Overview Note:     Patient is currently on venlafaxine 150 mg daily for depression and anxiety. She feels like it is not as affective as she would like currently. She does not feel suicidal and is sleeping ok but has decreased motivation and less happy days. Morbid Obesity  SADI  Acute on Chronic hypercapneic resp failure  ? OHS  Cardiomegaly  Moderate AS  Metabolic alkalosis         1. BIPAP  2. OOB\  3. ICS  4. Keep sats > 92%  5. C/w present management  6. Await transfer to the [facility] down  No follow-ups on file.     Electronically signed by Kaden Enamorado MD on 6/22/2019 at 11:05 AM

## 2019-06-22 NOTE — PROGRESS NOTES
Hospitalist Progress Note      Name:  Joann Murphy /Age/Sex: 1950  (71 y.o. female)   MRN & CSN:  [id number] & [id number] Admission Date/Time: 2019  7:36 PM   Location:  -A PCP: Loyda Silva PA-C         Hospital Day: 3    Assessment and Plan:   Creig Galeazzi is a 71 y.o.  female  who presents with:    CHF, acute  -PCWP 24 per cath   -mild pulm HTN per cath   -IV Lasix held  due to low BP  -Metoprolol    Hypotension noted   -follow orthostatics, consider Mestinon if still orthostatic    CAD with angina - 99% diagonal lesion per cath  - Imdur added  -LDL is 57 this admission    Valvular heart disease - moderate aortic stenosis per cath     Small hematoma right groin - monitor H/H    DM - A1c 9.5 this admission    HTN    HGB 10.0  -now 9.4 - monitor    Morbid obesity  Body mass index is 48.12 kg/m². SADI  - - appreciate Dr. Areli Long consult, BiPAP ordered, needs OP PSG    Positive sepsis screen  afternoon - clinically doubt sepsis        History of Present Illness:     Had low BP this am    Objective: Intake/Output Summary (Last 24 hours) at 2019  Last data filed at 2019  Gross per 24 hour   Intake 960 ml   Output 950 ml   Net 10 ml      Vitals:   Vitals:    19 1902   BP: (!) 121/50   Pulse: 92   Resp: 17   Temp:    SpO2: 96%     Physical Exam:      Physical Exam   Constitutional: She appears well-developed. Pulmonary/Chest: Effort normal.   Skin: Skin is warm.          Medications:   Medications:    insulin lispro  30 Units Subcutaneous TID WC    sodium chloride  500 mL Intravenous Once    sodium chloride flush  10 mL Intravenous 2 times per day    metoprolol tartrate  25 mg Oral BID    isosorbide mononitrate  30 mg Oral Daily    aspirin  81 mg Oral Daily    busPIRone  15 mg Oral TID    gabapentin  800 mg Oral 4x Daily    levothyroxine  25 mcg Oral Daily    simvastatin  40 mg Oral Nightly   

## 2019-06-23 LAB
ANION GAP SERPL CALCULATED.3IONS-SCNC: 11 MMOL/L (ref 4–16)
ANION GAP SERPL CALCULATED.3IONS-SCNC: 13 MMOL/L (ref 4–16)
ANION GAP SERPL CALCULATED.3IONS-SCNC: 13 MMOL/L (ref 4–16)
BUN BLDV-MCNC: 39 MG/DL (ref 6–23)
CALCIUM SERPL-MCNC: 8.7 MG/DL (ref 8.3–10.6)
CHLORIDE BLD-SCNC: 91 MMOL/L (ref 99–110)
CHLORIDE BLD-SCNC: 92 MMOL/L (ref 99–110)
CHLORIDE BLD-SCNC: 92 MMOL/L (ref 99–110)
CO2: 25 MMOL/L (ref 21–32)
CO2: 28 MMOL/L (ref 21–32)
CO2: 28 MMOL/L (ref 21–32)
CREAT SERPL-MCNC: 1.2 MG/DL (ref 0.6–1.1)
GFR AFRICAN AMERICAN: 54 ML/MIN/1.73M2
GFR NON-AFRICAN AMERICAN: 45 ML/MIN/1.73M2
GLUCOSE BLD-MCNC: 200 MG/DL (ref 70–99)
GLUCOSE BLD-MCNC: 313 MG/DL (ref 70–99)
GLUCOSE BLD-MCNC: 398 MG/DL (ref 70–99)
GLUCOSE BLD-MCNC: 438 MG/DL (ref 70–99)
GLUCOSE BLD-MCNC: 445 MG/DL (ref 70–99)
GLUCOSE BLD-MCNC: 480 MG/DL (ref 70–99)
HCT VFR BLD CALC: 29.8 % (ref 37–47)
HCT VFR BLD CALC: 30.8 % (ref 37–47)
HEMOGLOBIN: 9.1 GM/DL (ref 12.5–16)
HEMOGLOBIN: 9.5 GM/DL (ref 12.5–16)
MAGNESIUM: 2.1 MG/DL (ref 1.8–2.4)
POTASSIUM SERPL-SCNC: 5.2 MMOL/L (ref 3.5–5.1)
POTASSIUM SERPL-SCNC: 5.2 MMOL/L (ref 3.5–5.1)
POTASSIUM SERPL-SCNC: ABNORMAL MMOL/L (ref 3.5–5.1)
POTASSIUM SERPL-SCNC: ABNORMAL MMOL/L (ref 3.5–5.1)
PRO-BNP: 167.1 PG/ML
REASON FOR REJECTION: NORMAL
REASON FOR REJECTION: NORMAL
REJECTED TEST: NORMAL
SODIUM BLD-SCNC: 130 MMOL/L (ref 135–145)
SODIUM BLD-SCNC: 131 MMOL/L (ref 135–145)
SODIUM BLD-SCNC: 132 MMOL/L (ref 135–145)
TROPONIN T: 0.02 NG/ML

## 2019-06-23 PROCEDURE — 85018 HEMOGLOBIN: CPT

## 2019-06-23 PROCEDURE — 2580000003 HC RX 258: Performed by: INTERNAL MEDICINE

## 2019-06-23 PROCEDURE — 94640 AIRWAY INHALATION TREATMENT: CPT

## 2019-06-23 PROCEDURE — 80048 BASIC METABOLIC PNL TOTAL CA: CPT

## 2019-06-23 PROCEDURE — 83735 ASSAY OF MAGNESIUM: CPT

## 2019-06-23 PROCEDURE — 80051 ELECTROLYTE PANEL: CPT

## 2019-06-23 PROCEDURE — 6370000000 HC RX 637 (ALT 250 FOR IP): Performed by: FAMILY MEDICINE

## 2019-06-23 PROCEDURE — 83880 ASSAY OF NATRIURETIC PEPTIDE: CPT

## 2019-06-23 PROCEDURE — 87040 BLOOD CULTURE FOR BACTERIA: CPT

## 2019-06-23 PROCEDURE — 94761 N-INVAS EAR/PLS OXIMETRY MLT: CPT

## 2019-06-23 PROCEDURE — 84484 ASSAY OF TROPONIN QUANT: CPT

## 2019-06-23 PROCEDURE — 36415 COLL VENOUS BLD VENIPUNCTURE: CPT

## 2019-06-23 PROCEDURE — 2700000000 HC OXYGEN THERAPY PER DAY

## 2019-06-23 PROCEDURE — 2060000000 HC ICU INTERMEDIATE R&B

## 2019-06-23 PROCEDURE — 6370000000 HC RX 637 (ALT 250 FOR IP): Performed by: INTERNAL MEDICINE

## 2019-06-23 PROCEDURE — 99233 SBSQ HOSP IP/OBS HIGH 50: CPT | Performed by: INTERNAL MEDICINE

## 2019-06-23 PROCEDURE — 85014 HEMATOCRIT: CPT

## 2019-06-23 PROCEDURE — 84132 ASSAY OF SERUM POTASSIUM: CPT

## 2019-06-23 PROCEDURE — 6360000002 HC RX W HCPCS: Performed by: FAMILY MEDICINE

## 2019-06-23 PROCEDURE — 94660 CPAP INITIATION&MGMT: CPT

## 2019-06-23 PROCEDURE — 82962 GLUCOSE BLOOD TEST: CPT

## 2019-06-23 RX ORDER — LEVOTHYROXINE SODIUM 0.03 MG/1
25 TABLET ORAL DAILY
Status: DISCONTINUED | OUTPATIENT
Start: 2019-06-24 | End: 2019-06-26 | Stop reason: HOSPADM

## 2019-06-23 RX ORDER — GABAPENTIN 400 MG/1
800 CAPSULE ORAL 4 TIMES DAILY
Status: DISCONTINUED | OUTPATIENT
Start: 2019-06-23 | End: 2019-06-26 | Stop reason: HOSPADM

## 2019-06-23 RX ORDER — SIMVASTATIN 40 MG
40 TABLET ORAL NIGHTLY
Status: DISCONTINUED | OUTPATIENT
Start: 2019-06-23 | End: 2019-06-26 | Stop reason: HOSPADM

## 2019-06-23 RX ORDER — OXYCODONE HYDROCHLORIDE AND ACETAMINOPHEN 5; 325 MG/1; MG/1
1 TABLET ORAL ONCE
Status: COMPLETED | OUTPATIENT
Start: 2019-06-23 | End: 2019-06-23

## 2019-06-23 RX ORDER — DOCUSATE SODIUM 100 MG/1
100 CAPSULE, LIQUID FILLED ORAL DAILY
Status: DISCONTINUED | OUTPATIENT
Start: 2019-06-23 | End: 2019-06-26 | Stop reason: HOSPADM

## 2019-06-23 RX ORDER — ALBUTEROL SULFATE 90 UG/1
2 AEROSOL, METERED RESPIRATORY (INHALATION) 4 TIMES DAILY
Status: DISCONTINUED | OUTPATIENT
Start: 2019-06-23 | End: 2019-06-26 | Stop reason: HOSPADM

## 2019-06-23 RX ORDER — BUSPIRONE HYDROCHLORIDE 7.5 MG/1
15 TABLET ORAL 3 TIMES DAILY
Status: DISCONTINUED | OUTPATIENT
Start: 2019-06-23 | End: 2019-06-26 | Stop reason: HOSPADM

## 2019-06-23 RX ORDER — ALBUTEROL SULFATE 90 UG/1
1 AEROSOL, METERED RESPIRATORY (INHALATION) EVERY 4 HOURS PRN
Status: DISCONTINUED | OUTPATIENT
Start: 2019-06-23 | End: 2019-06-26 | Stop reason: HOSPADM

## 2019-06-23 RX ORDER — ONDANSETRON 2 MG/ML
4 INJECTION INTRAMUSCULAR; INTRAVENOUS EVERY 6 HOURS PRN
Status: DISCONTINUED | OUTPATIENT
Start: 2019-06-23 | End: 2019-06-26 | Stop reason: HOSPADM

## 2019-06-23 RX ORDER — ASPIRIN 81 MG/1
81 TABLET, CHEWABLE ORAL DAILY
Status: DISCONTINUED | OUTPATIENT
Start: 2019-06-23 | End: 2019-06-26 | Stop reason: HOSPADM

## 2019-06-23 RX ORDER — SODIUM POLYSTYRENE SULFONATE 15 G/60ML
15 SUSPENSION ORAL; RECTAL ONCE
Status: COMPLETED | OUTPATIENT
Start: 2019-06-23 | End: 2019-06-23

## 2019-06-23 RX ADMIN — INSULIN GLARGINE 60 UNITS: 100 INJECTION, SOLUTION SUBCUTANEOUS at 21:27

## 2019-06-23 RX ADMIN — Medication 2 PUFF: at 19:56

## 2019-06-23 RX ADMIN — BUSPIRONE HYDROCHLORIDE 15 MG: 7.5 TABLET ORAL at 09:02

## 2019-06-23 RX ADMIN — ASPIRIN 81 MG 81 MG: 81 TABLET ORAL at 14:10

## 2019-06-23 RX ADMIN — TROSPIUM CHLORIDE 20 MG: 20 TABLET, FILM COATED ORAL at 07:55

## 2019-06-23 RX ADMIN — VENLAFAXINE HYDROCHLORIDE 225 MG: 150 CAPSULE, EXTENDED RELEASE ORAL at 09:01

## 2019-06-23 RX ADMIN — PREDNISONE 40 MG: 20 TABLET ORAL at 09:02

## 2019-06-23 RX ADMIN — METOPROLOL TARTRATE 25 MG: 25 TABLET ORAL at 21:16

## 2019-06-23 RX ADMIN — INSULIN LISPRO 30 UNITS: 100 INJECTION, SOLUTION INTRAVENOUS; SUBCUTANEOUS at 16:58

## 2019-06-23 RX ADMIN — NYSTATIN CREAM: 100000 CREAM TOPICAL at 09:04

## 2019-06-23 RX ADMIN — ALBUTEROL SULFATE 2 PUFF: 90 AEROSOL, METERED RESPIRATORY (INHALATION) at 19:56

## 2019-06-23 RX ADMIN — Medication 1 PUFF: at 08:22

## 2019-06-23 RX ADMIN — GABAPENTIN 800 MG: 400 CAPSULE ORAL at 14:10

## 2019-06-23 RX ADMIN — ALBUTEROL SULFATE 1 PUFF: 90 AEROSOL, METERED RESPIRATORY (INHALATION) at 11:45

## 2019-06-23 RX ADMIN — ENOXAPARIN SODIUM 40 MG: 100 INJECTION SUBCUTANEOUS at 09:03

## 2019-06-23 RX ADMIN — GABAPENTIN 800 MG: 400 CAPSULE ORAL at 21:16

## 2019-06-23 RX ADMIN — OXYCODONE HYDROCHLORIDE AND ACETAMINOPHEN 1 TABLET: 5; 325 TABLET ORAL at 18:42

## 2019-06-23 RX ADMIN — INSULIN LISPRO 6 UNITS: 100 INJECTION, SOLUTION INTRAVENOUS; SUBCUTANEOUS at 02:27

## 2019-06-23 RX ADMIN — SODIUM CHLORIDE, PRESERVATIVE FREE 10 ML: 5 INJECTION INTRAVENOUS at 21:17

## 2019-06-23 RX ADMIN — AMOXICILLIN AND CLAVULANATE POTASSIUM 1 TABLET: 875; 125 TABLET, FILM COATED ORAL at 21:16

## 2019-06-23 RX ADMIN — AZITHROMYCIN 500 MG: 250 TABLET, FILM COATED ORAL at 09:01

## 2019-06-23 RX ADMIN — DOCUSATE SODIUM 100 MG: 100 CAPSULE, LIQUID FILLED ORAL at 09:03

## 2019-06-23 RX ADMIN — INSULIN LISPRO 18 UNITS: 100 INJECTION, SOLUTION INTRAVENOUS; SUBCUTANEOUS at 21:26

## 2019-06-23 RX ADMIN — ALBUTEROL SULFATE 2 PUFF: 90 AEROSOL, METERED RESPIRATORY (INHALATION) at 15:30

## 2019-06-23 RX ADMIN — INSULIN LISPRO 18 UNITS: 100 INJECTION, SOLUTION INTRAVENOUS; SUBCUTANEOUS at 14:13

## 2019-06-23 RX ADMIN — GABAPENTIN 800 MG: 400 CAPSULE ORAL at 07:51

## 2019-06-23 RX ADMIN — Medication 1000 UNITS: at 14:20

## 2019-06-23 RX ADMIN — SODIUM POLYSTYRENE SULFONATE 15 G: 15 SUSPENSION ORAL; RECTAL at 18:42

## 2019-06-23 RX ADMIN — SIMVASTATIN 40 MG: 40 TABLET, FILM COATED ORAL at 21:16

## 2019-06-23 RX ADMIN — LEVOTHYROXINE SODIUM 25 MCG: 25 TABLET ORAL at 07:51

## 2019-06-23 RX ADMIN — BUSPIRONE HYDROCHLORIDE 15 MG: 7.5 TABLET ORAL at 14:10

## 2019-06-23 RX ADMIN — BUSPIRONE HYDROCHLORIDE 15 MG: 7.5 TABLET ORAL at 21:16

## 2019-06-23 RX ADMIN — INSULIN LISPRO 30 UNITS: 100 INJECTION, SOLUTION INTRAVENOUS; SUBCUTANEOUS at 14:12

## 2019-06-23 RX ADMIN — Medication 1 PUFF: at 11:45

## 2019-06-23 RX ADMIN — Medication 1000 UNITS: at 09:03

## 2019-06-23 RX ADMIN — INSULIN LISPRO 30 UNITS: 100 INJECTION, SOLUTION INTRAVENOUS; SUBCUTANEOUS at 09:37

## 2019-06-23 RX ADMIN — ASPIRIN 81 MG 81 MG: 81 TABLET ORAL at 09:02

## 2019-06-23 RX ADMIN — SODIUM CHLORIDE, PRESERVATIVE FREE 10 ML: 5 INJECTION INTRAVENOUS at 09:01

## 2019-06-23 RX ADMIN — GUAIFENESIN 600 MG: 600 TABLET, EXTENDED RELEASE ORAL at 09:02

## 2019-06-23 RX ADMIN — GABAPENTIN 800 MG: 400 CAPSULE ORAL at 17:51

## 2019-06-23 RX ADMIN — DOCUSATE SODIUM 100 MG: 100 CAPSULE, LIQUID FILLED ORAL at 14:10

## 2019-06-23 RX ADMIN — INSULIN LISPRO 12 UNITS: 100 INJECTION, SOLUTION INTRAVENOUS; SUBCUTANEOUS at 09:34

## 2019-06-23 RX ADMIN — AMOXICILLIN AND CLAVULANATE POTASSIUM 1 TABLET: 875; 125 TABLET, FILM COATED ORAL at 09:01

## 2019-06-23 RX ADMIN — POTASSIUM CHLORIDE 20 MEQ: 20 TABLET, EXTENDED RELEASE ORAL at 09:02

## 2019-06-23 RX ADMIN — INSULIN LISPRO 18 UNITS: 100 INJECTION, SOLUTION INTRAVENOUS; SUBCUTANEOUS at 16:57

## 2019-06-23 RX ADMIN — Medication 1 PUFF: at 15:31

## 2019-06-23 RX ADMIN — ALBUTEROL SULFATE 1 PUFF: 90 AEROSOL, METERED RESPIRATORY (INHALATION) at 08:23

## 2019-06-23 RX ADMIN — FUROSEMIDE 40 MG: 40 TABLET ORAL at 17:51

## 2019-06-23 ASSESSMENT — PAIN DESCRIPTION - PAIN TYPE: TYPE: CHRONIC PAIN

## 2019-06-23 ASSESSMENT — PAIN DESCRIPTION - LOCATION: LOCATION: BACK;JAW

## 2019-06-23 ASSESSMENT — PAIN DESCRIPTION - ORIENTATION: ORIENTATION: LOWER;MID

## 2019-06-23 ASSESSMENT — PAIN SCALES - GENERAL
PAINLEVEL_OUTOF10: 0
PAINLEVEL_OUTOF10: 8

## 2019-06-23 ASSESSMENT — PAIN DESCRIPTION - DESCRIPTORS: DESCRIPTORS: NAGGING

## 2019-06-23 ASSESSMENT — PAIN DESCRIPTION - ONSET: ONSET: ON-GOING

## 2019-06-23 ASSESSMENT — PAIN DESCRIPTION - FREQUENCY: FREQUENCY: CONTINUOUS

## 2019-06-23 ASSESSMENT — PAIN DESCRIPTION - PROGRESSION: CLINICAL_PROGRESSION: NOT CHANGED

## 2019-06-23 NOTE — CARE COORDINATION
Noted therapy rec SNF. Attempted twice to meet with pt, however, she was sleeping with bipap on and did not wake to soft touch. CM will continue to follow.   Electronically signed by MELI Jarquin on 6/23/2019 at 11:12 AM

## 2019-06-24 LAB
ANION GAP SERPL CALCULATED.3IONS-SCNC: 10 MMOL/L (ref 4–16)
BUN BLDV-MCNC: 44 MG/DL (ref 6–23)
CALCIUM SERPL-MCNC: 8.7 MG/DL (ref 8.3–10.6)
CHLORIDE BLD-SCNC: 93 MMOL/L (ref 99–110)
CO2: 30 MMOL/L (ref 21–32)
CREAT SERPL-MCNC: 1.4 MG/DL (ref 0.6–1.1)
GFR AFRICAN AMERICAN: 45 ML/MIN/1.73M2
GFR NON-AFRICAN AMERICAN: 37 ML/MIN/1.73M2
GLUCOSE BLD-MCNC: 143 MG/DL (ref 70–99)
GLUCOSE BLD-MCNC: 235 MG/DL (ref 70–99)
GLUCOSE BLD-MCNC: 271 MG/DL (ref 70–99)
GLUCOSE BLD-MCNC: 307 MG/DL (ref 70–99)
GLUCOSE BLD-MCNC: 310 MG/DL (ref 70–99)
GLUCOSE BLD-MCNC: 315 MG/DL (ref 70–99)
HCT VFR BLD CALC: 26.6 % (ref 37–47)
HCT VFR BLD CALC: 27.1 % (ref 37–47)
HCT VFR BLD CALC: 27.5 % (ref 37–47)
HCT VFR BLD CALC: 29.6 % (ref 37–47)
HEMOGLOBIN: 8.2 GM/DL (ref 12.5–16)
HEMOGLOBIN: 8.2 GM/DL (ref 12.5–16)
HEMOGLOBIN: 8.3 GM/DL (ref 12.5–16)
HEMOGLOBIN: 8.7 GM/DL (ref 12.5–16)
MAGNESIUM: 2.2 MG/DL (ref 1.8–2.4)
POTASSIUM SERPL-SCNC: 5.1 MMOL/L (ref 3.5–5.1)
PRO-BNP: 315.8 PG/ML
SODIUM BLD-SCNC: 133 MMOL/L (ref 135–145)

## 2019-06-24 PROCEDURE — 2060000000 HC ICU INTERMEDIATE R&B

## 2019-06-24 PROCEDURE — 99232 SBSQ HOSP IP/OBS MODERATE 35: CPT | Performed by: INTERNAL MEDICINE

## 2019-06-24 PROCEDURE — 36415 COLL VENOUS BLD VENIPUNCTURE: CPT

## 2019-06-24 PROCEDURE — 94761 N-INVAS EAR/PLS OXIMETRY MLT: CPT

## 2019-06-24 PROCEDURE — 85018 HEMOGLOBIN: CPT

## 2019-06-24 PROCEDURE — 2700000000 HC OXYGEN THERAPY PER DAY

## 2019-06-24 PROCEDURE — 80048 BASIC METABOLIC PNL TOTAL CA: CPT

## 2019-06-24 PROCEDURE — 97530 THERAPEUTIC ACTIVITIES: CPT

## 2019-06-24 PROCEDURE — 97116 GAIT TRAINING THERAPY: CPT

## 2019-06-24 PROCEDURE — 2580000003 HC RX 258: Performed by: INTERNAL MEDICINE

## 2019-06-24 PROCEDURE — 97110 THERAPEUTIC EXERCISES: CPT

## 2019-06-24 PROCEDURE — 6360000002 HC RX W HCPCS: Performed by: FAMILY MEDICINE

## 2019-06-24 PROCEDURE — 87040 BLOOD CULTURE FOR BACTERIA: CPT

## 2019-06-24 PROCEDURE — 94150 VITAL CAPACITY TEST: CPT

## 2019-06-24 PROCEDURE — 6370000000 HC RX 637 (ALT 250 FOR IP): Performed by: INTERNAL MEDICINE

## 2019-06-24 PROCEDURE — 97535 SELF CARE MNGMENT TRAINING: CPT

## 2019-06-24 PROCEDURE — 82962 GLUCOSE BLOOD TEST: CPT

## 2019-06-24 PROCEDURE — 83735 ASSAY OF MAGNESIUM: CPT

## 2019-06-24 PROCEDURE — 83880 ASSAY OF NATRIURETIC PEPTIDE: CPT

## 2019-06-24 PROCEDURE — 85014 HEMATOCRIT: CPT

## 2019-06-24 PROCEDURE — 94640 AIRWAY INHALATION TREATMENT: CPT

## 2019-06-24 RX ADMIN — INSULIN LISPRO 5 UNITS: 100 INJECTION, SOLUTION INTRAVENOUS; SUBCUTANEOUS at 20:48

## 2019-06-24 RX ADMIN — ENOXAPARIN SODIUM 40 MG: 100 INJECTION SUBCUTANEOUS at 08:43

## 2019-06-24 RX ADMIN — INSULIN GLARGINE 20 UNITS: 100 INJECTION, SOLUTION SUBCUTANEOUS at 20:50

## 2019-06-24 RX ADMIN — ASPIRIN 81 MG 81 MG: 81 TABLET ORAL at 08:42

## 2019-06-24 RX ADMIN — ALBUTEROL SULFATE 2 PUFF: 90 AEROSOL, METERED RESPIRATORY (INHALATION) at 21:09

## 2019-06-24 RX ADMIN — ALBUTEROL SULFATE 2 PUFF: 90 AEROSOL, METERED RESPIRATORY (INHALATION) at 15:14

## 2019-06-24 RX ADMIN — Medication 2 PUFF: at 07:25

## 2019-06-24 RX ADMIN — INSULIN LISPRO 30 UNITS: 100 INJECTION, SOLUTION INTRAVENOUS; SUBCUTANEOUS at 16:16

## 2019-06-24 RX ADMIN — ALBUTEROL SULFATE 2 PUFF: 90 AEROSOL, METERED RESPIRATORY (INHALATION) at 07:25

## 2019-06-24 RX ADMIN — VENLAFAXINE HYDROCHLORIDE 225 MG: 150 CAPSULE, EXTENDED RELEASE ORAL at 08:45

## 2019-06-24 RX ADMIN — INSULIN LISPRO 12 UNITS: 100 INJECTION, SOLUTION INTRAVENOUS; SUBCUTANEOUS at 02:14

## 2019-06-24 RX ADMIN — ISOSORBIDE MONONITRATE 30 MG: 30 TABLET, EXTENDED RELEASE ORAL at 08:43

## 2019-06-24 RX ADMIN — GABAPENTIN 800 MG: 400 CAPSULE ORAL at 08:43

## 2019-06-24 RX ADMIN — SODIUM CHLORIDE, PRESERVATIVE FREE 10 ML: 5 INJECTION INTRAVENOUS at 08:44

## 2019-06-24 RX ADMIN — Medication 1000 UNITS: at 08:43

## 2019-06-24 RX ADMIN — Medication 2 PUFF: at 15:14

## 2019-06-24 RX ADMIN — Medication 2 PUFF: at 11:38

## 2019-06-24 RX ADMIN — FUROSEMIDE 40 MG: 40 TABLET ORAL at 17:20

## 2019-06-24 RX ADMIN — ALBUTEROL SULFATE 2 PUFF: 90 AEROSOL, METERED RESPIRATORY (INHALATION) at 11:40

## 2019-06-24 RX ADMIN — INSULIN LISPRO 30 UNITS: 100 INJECTION, SOLUTION INTRAVENOUS; SUBCUTANEOUS at 12:20

## 2019-06-24 RX ADMIN — INSULIN LISPRO 20 UNITS: 100 INJECTION, SOLUTION INTRAVENOUS; SUBCUTANEOUS at 12:18

## 2019-06-24 RX ADMIN — AMOXICILLIN AND CLAVULANATE POTASSIUM 1 TABLET: 875; 125 TABLET, FILM COATED ORAL at 08:43

## 2019-06-24 RX ADMIN — GABAPENTIN 800 MG: 400 CAPSULE ORAL at 20:40

## 2019-06-24 RX ADMIN — AMOXICILLIN AND CLAVULANATE POTASSIUM 1 TABLET: 875; 125 TABLET, FILM COATED ORAL at 20:41

## 2019-06-24 RX ADMIN — LINAGLIPTIN 5 MG: 5 TABLET, FILM COATED ORAL at 08:43

## 2019-06-24 RX ADMIN — BUSPIRONE HYDROCHLORIDE 15 MG: 7.5 TABLET ORAL at 20:41

## 2019-06-24 RX ADMIN — DOCUSATE SODIUM 100 MG: 100 CAPSULE, LIQUID FILLED ORAL at 08:43

## 2019-06-24 RX ADMIN — GABAPENTIN 800 MG: 400 CAPSULE ORAL at 14:54

## 2019-06-24 RX ADMIN — BUSPIRONE HYDROCHLORIDE 15 MG: 7.5 TABLET ORAL at 14:55

## 2019-06-24 RX ADMIN — METOPROLOL TARTRATE 25 MG: 25 TABLET ORAL at 08:43

## 2019-06-24 RX ADMIN — SIMVASTATIN 40 MG: 40 TABLET, FILM COATED ORAL at 20:46

## 2019-06-24 RX ADMIN — INSULIN LISPRO 10 UNITS: 100 INJECTION, SOLUTION INTRAVENOUS; SUBCUTANEOUS at 16:15

## 2019-06-24 RX ADMIN — SODIUM CHLORIDE, PRESERVATIVE FREE 10 ML: 5 INJECTION INTRAVENOUS at 20:43

## 2019-06-24 RX ADMIN — BUSPIRONE HYDROCHLORIDE 15 MG: 7.5 TABLET ORAL at 08:43

## 2019-06-24 RX ADMIN — FUROSEMIDE 40 MG: 40 TABLET ORAL at 08:43

## 2019-06-24 RX ADMIN — INSULIN LISPRO 15 UNITS: 100 INJECTION, SOLUTION INTRAVENOUS; SUBCUTANEOUS at 07:35

## 2019-06-24 RX ADMIN — LEVOTHYROXINE SODIUM 25 MCG: 25 TABLET ORAL at 06:01

## 2019-06-24 RX ADMIN — GABAPENTIN 800 MG: 400 CAPSULE ORAL at 17:20

## 2019-06-24 RX ADMIN — Medication 2 PUFF: at 21:09

## 2019-06-24 ASSESSMENT — PAIN SCALES - GENERAL
PAINLEVEL_OUTOF10: 0
PAINLEVEL_OUTOF10: 0

## 2019-06-24 NOTE — PROGRESS NOTES
Shruthit will ambulate 100ft mod I with stable BP. Electronically signed by:     Alberto Vizcaino PTA  6/24/2019, 3:12 PM

## 2019-06-24 NOTE — CARE COORDINATION
Met with patient to discuss discharge needs. Patient is awake and able to participate. Patient stated that she needs rehab; agreeable to [facility]. Will call Ira with referral. Doris Jasso RN    [id number] [facility]  Doris Jasso RN

## 2019-06-24 NOTE — PROGRESS NOTES
Home O2 Eval . Pt current recommendation is for SNF placement. Please reorder Home O2 evaluation if needed or if plans change. Thanks.

## 2019-06-24 NOTE — PROGRESS NOTES
Hospitalist Progress Note      Name:  Malissa Patino Standard /Age/Sex: 1950  (71 y.o. female)   MRN & CSN:  [id number] & [id number] Admission Date/Time: 2019  7:36 PM   Location:  -A PCP: Corina Alaniz PA-C         Hospital Day: 5    Assessment and Plan:   Letty Ruiz is a 71 y.o.  female  who presents with:    CHF, acute  -PCWP 24 per cath   -mild pulm HTN per cath   -IV Lasix held  due to low BP  -Metoprolol  -- restart Lasix, but make it PO  - - BP low this am, hold Lasix, Imdur, and metoprolol    Debility - refer to [facility]    Hypotension noted   -follow orthostatics, consider Mestinon if still orthostatic  - - will try to give some PO Lasix today, and RN will contact cardiology about Imdur    Procalcitonin level    CAD with angina - 99% diagonal lesion per cath  - Imdur added  -LDL is 57 this admission    Valvular heart disease - moderate aortic stenosis per cath     Small hematoma right groin - monitor H/H    DM - A1c 9.5 this admission - copy printed and given to patient    HTN    Anemia  -HGB now slightly lower    Morbid obesity  Body mass index is 48.12 kg/m². SADI  - - appreciate Dr. Sumit Way consult, BiPAP ordered, needs OP PSG    Positive sepsis screen  afternoon - clinically doubt sepsis    Cirrhosis  -dx in  by liver bx when had GB surgery    Effexor dose    Gum discomfort  -2 of her teeth fell out  -will start Augmentin        History of Present Illness:     Transferred out of ICU. No dyspnea at rest.     Objective: Intake/Output Summary (Last 24 hours) at 20195  Last data filed at 2019 1214  Gross per 24 hour   Intake --   Output 2200 ml   Net -2200 ml      Vitals:   Vitals:    19   BP: (!) 116/48   Pulse: 104   Resp: 22   Temp: 98.4 °F (36.9 °C)   SpO2:      Physical Exam:      Physical Exam   Constitutional: She appears well-developed.    Pulmonary/Chest: Effort normal.

## 2019-06-24 NOTE — PROGRESS NOTES
List:     HTN (hypertension)     Hyperlipemia     COPD (chronic obstructive pulmonary disease) ([facility]     Anxiety and depression     DM type 2, uncontrolled, with retinopathy ([address]     Hypokalemia     Hyperlipidemia     Precordial pain     Hyperglycemia     Axillary abscess     Light headed     Orthostatic hypotension     Hypertriglyceridemia     CCC (chronic calculous cholecystitis)     Cirrhosis of liver without ascites (HCC)     Mild obstructive sleep apnea     Idiopathic progressive neuropathy     Congestive heart failure (CHF) (HCC)     Type 2 diabetes mellitus (HCC)     Hypothyroidism     Depression     Hypertension     CHF (congestive heart failure), NYHA class I, acute on chronic, diastolic (HCC)     ACS (acute coronary syndrome) (HCC)     Acute coronary syndrome (HCC)     Diastolic dysfunction with acute on chronic heart failure ([facility])     NSTEMI (non-ST elevated myocardial infarction) ([facility]     Acute and chr resp failure, unsp w hypoxia or hypercapnia ([facility])     SADI (obstructive sleep apnea)     Morbid obesity ([facility]     Chest pain     Coronary artery disease involving native coronary artery of native heart with unstable angina pectoris ([facility])     VHD (valvular heart disease)      Plan:     1. Reviewed POC blood glucose . Labs and X ray results   2. Reviewed Current Medicines   3. On meal/ Correction bolus Humalog/ Basal Lantus Insulin regime   4. Monitor Blood glucose frequently   5. Modified  the dose of Insulin/ other medicines as needed   6. Will follow     .      Amador Osler, MD

## 2019-06-24 NOTE — PROGRESS NOTES
Notified Hospitalist, ANABELLA Julian per patient request, small blisters on R hip which pt thinks a flare up of her shingles which has been on & off for 30 years last was 3-4 months ago.  Hospitalist said she will relay to dayshift doctor

## 2019-06-24 NOTE — PROGRESS NOTES
Indep c ADL tasks, func transfers / mobility. Safety  Patient safely in bedside chair at end of session, with call light/phone in reach, and nursing aware. Gait belt was used for func transfers / mobility. Pt refused alarm, stated, \"I know better than to get up by myself. \"         Assessment / Impression:        Patient's tolerance of treatment:  Fair   Adverse Reaction: SOB c sustained activity, managed c cues for use of rest breaks and PLB technique PRN. Significant change in status and impact:  None  Barriers to improvement:  Decreased act tolerance, SOB, decreased functional reaching. Plan for Next Session:    Continue per OT POC until discharged.     Time in:  0945  Time out:  1030  Timed treatment minutes:  45  Total treatment time:  45 minutes    Electronically signed by:    ANGELA Simms  6/24/2019, 9:42 AM    Previously filed values:  Patient Goals   Patient goals : go home  Short term goals  Time Frame for Short term goals: discharge  Short term goal 1: Pt will perform UE bathing/dressing Independent  Short term goal 2: Pt will perform LE bathing/dressing Independent  Short term goal 3: Pt will perform toileting Independent  Short term goal 4: Pt will perform functional transfer w/ AD [place]  Short term goal 5: Pt will perform functional mobility to/from bathroom w/ AD [place]onte

## 2019-06-25 LAB
ANION GAP SERPL CALCULATED.3IONS-SCNC: 13 MMOL/L (ref 4–16)
BUN BLDV-MCNC: 44 MG/DL (ref 6–23)
CALCIUM SERPL-MCNC: 9 MG/DL (ref 8.3–10.6)
CHLORIDE BLD-SCNC: 94 MMOL/L (ref 99–110)
CO2: 30 MMOL/L (ref 21–32)
CREAT SERPL-MCNC: 1.4 MG/DL (ref 0.6–1.1)
GFR AFRICAN AMERICAN: 45 ML/MIN/1.73M2
GFR NON-AFRICAN AMERICAN: 37 ML/MIN/1.73M2
GLUCOSE BLD-MCNC: 169 MG/DL (ref 70–99)
GLUCOSE BLD-MCNC: 174 MG/DL (ref 70–99)
GLUCOSE BLD-MCNC: 207 MG/DL (ref 70–99)
GLUCOSE BLD-MCNC: 222 MG/DL (ref 70–99)
GLUCOSE BLD-MCNC: 255 MG/DL (ref 70–99)
GLUCOSE BLD-MCNC: 311 MG/DL (ref 70–99)
HCT VFR BLD CALC: 27.3 % (ref 37–47)
HCT VFR BLD CALC: 28.6 % (ref 37–47)
HCT VFR BLD CALC: 28.6 % (ref 37–47)
HCT VFR BLD CALC: 29.3 % (ref 37–47)
HEMOGLOBIN: 8.3 GM/DL (ref 12.5–16)
HEMOGLOBIN: 8.5 GM/DL (ref 12.5–16)
HEMOGLOBIN: 8.7 GM/DL (ref 12.5–16)
HEMOGLOBIN: 8.9 GM/DL (ref 12.5–16)
MAGNESIUM: 2.2 MG/DL (ref 1.8–2.4)
POTASSIUM SERPL-SCNC: 5.3 MMOL/L (ref 3.5–5.1)
SODIUM BLD-SCNC: 137 MMOL/L (ref 135–145)

## 2019-06-25 PROCEDURE — 6370000000 HC RX 637 (ALT 250 FOR IP): Performed by: INTERNAL MEDICINE

## 2019-06-25 PROCEDURE — 2580000003 HC RX 258: Performed by: INTERNAL MEDICINE

## 2019-06-25 PROCEDURE — 94761 N-INVAS EAR/PLS OXIMETRY MLT: CPT

## 2019-06-25 PROCEDURE — 2060000000 HC ICU INTERMEDIATE R&B

## 2019-06-25 PROCEDURE — 94640 AIRWAY INHALATION TREATMENT: CPT

## 2019-06-25 PROCEDURE — 82962 GLUCOSE BLOOD TEST: CPT

## 2019-06-25 PROCEDURE — 2700000000 HC OXYGEN THERAPY PER DAY

## 2019-06-25 PROCEDURE — 97530 THERAPEUTIC ACTIVITIES: CPT

## 2019-06-25 PROCEDURE — 97110 THERAPEUTIC EXERCISES: CPT

## 2019-06-25 PROCEDURE — 80048 BASIC METABOLIC PNL TOTAL CA: CPT

## 2019-06-25 PROCEDURE — 36415 COLL VENOUS BLD VENIPUNCTURE: CPT

## 2019-06-25 PROCEDURE — 83735 ASSAY OF MAGNESIUM: CPT

## 2019-06-25 PROCEDURE — 99232 SBSQ HOSP IP/OBS MODERATE 35: CPT | Performed by: INTERNAL MEDICINE

## 2019-06-25 PROCEDURE — 6360000002 HC RX W HCPCS: Performed by: FAMILY MEDICINE

## 2019-06-25 RX ORDER — AMOXICILLIN AND CLAVULANATE POTASSIUM 875; 125 MG/1; MG/1
1 TABLET, FILM COATED ORAL EVERY 12 HOURS SCHEDULED
Status: COMPLETED | OUTPATIENT
Start: 2019-06-25 | End: 2019-06-25

## 2019-06-25 RX ORDER — VENLAFAXINE HYDROCHLORIDE 37.5 MG/1
150 CAPSULE, EXTENDED RELEASE ORAL
Status: DISCONTINUED | OUTPATIENT
Start: 2019-06-25 | End: 2019-06-26 | Stop reason: HOSPADM

## 2019-06-25 RX ADMIN — INSULIN LISPRO 15 UNITS: 100 INJECTION, SOLUTION INTRAVENOUS; SUBCUTANEOUS at 12:37

## 2019-06-25 RX ADMIN — ACETAMINOPHEN 650 MG: 325 TABLET ORAL at 11:18

## 2019-06-25 RX ADMIN — FUROSEMIDE 40 MG: 40 TABLET ORAL at 18:02

## 2019-06-25 RX ADMIN — INSULIN LISPRO 10 UNITS: 100 INJECTION, SOLUTION INTRAVENOUS; SUBCUTANEOUS at 17:58

## 2019-06-25 RX ADMIN — GABAPENTIN 800 MG: 400 CAPSULE ORAL at 09:06

## 2019-06-25 RX ADMIN — LEVOTHYROXINE SODIUM 25 MCG: 25 TABLET ORAL at 06:48

## 2019-06-25 RX ADMIN — INSULIN LISPRO 20 UNITS: 100 INJECTION, SOLUTION INTRAVENOUS; SUBCUTANEOUS at 08:36

## 2019-06-25 RX ADMIN — SODIUM CHLORIDE, PRESERVATIVE FREE 10 ML: 5 INJECTION INTRAVENOUS at 20:09

## 2019-06-25 RX ADMIN — LINAGLIPTIN 5 MG: 5 TABLET, FILM COATED ORAL at 09:05

## 2019-06-25 RX ADMIN — GABAPENTIN 800 MG: 400 CAPSULE ORAL at 20:08

## 2019-06-25 RX ADMIN — BUSPIRONE HYDROCHLORIDE 15 MG: 7.5 TABLET ORAL at 14:23

## 2019-06-25 RX ADMIN — INSULIN LISPRO 20 UNITS: 100 INJECTION, SOLUTION INTRAVENOUS; SUBCUTANEOUS at 20:11

## 2019-06-25 RX ADMIN — ALBUTEROL SULFATE 2 PUFF: 90 AEROSOL, METERED RESPIRATORY (INHALATION) at 20:40

## 2019-06-25 RX ADMIN — INSULIN LISPRO 20 UNITS: 100 INJECTION, SOLUTION INTRAVENOUS; SUBCUTANEOUS at 17:59

## 2019-06-25 RX ADMIN — INSULIN GLARGINE 60 UNITS: 100 INJECTION, SOLUTION SUBCUTANEOUS at 20:09

## 2019-06-25 RX ADMIN — Medication 2 PUFF: at 20:39

## 2019-06-25 RX ADMIN — INSULIN LISPRO 5 UNITS: 100 INJECTION, SOLUTION INTRAVENOUS; SUBCUTANEOUS at 08:33

## 2019-06-25 RX ADMIN — DOCUSATE SODIUM 100 MG: 100 CAPSULE, LIQUID FILLED ORAL at 09:06

## 2019-06-25 RX ADMIN — AMOXICILLIN AND CLAVULANATE POTASSIUM 1 TABLET: 875; 125 TABLET, FILM COATED ORAL at 09:05

## 2019-06-25 RX ADMIN — ACETAMINOPHEN 650 MG: 325 TABLET ORAL at 00:39

## 2019-06-25 RX ADMIN — GABAPENTIN 800 MG: 400 CAPSULE ORAL at 18:02

## 2019-06-25 RX ADMIN — ALBUTEROL SULFATE 2 PUFF: 90 AEROSOL, METERED RESPIRATORY (INHALATION) at 08:22

## 2019-06-25 RX ADMIN — Medication 1000 UNITS: at 09:06

## 2019-06-25 RX ADMIN — SIMVASTATIN 40 MG: 40 TABLET, FILM COATED ORAL at 20:08

## 2019-06-25 RX ADMIN — Medication 2 PUFF: at 16:08

## 2019-06-25 RX ADMIN — METOPROLOL TARTRATE 25 MG: 25 TABLET ORAL at 20:07

## 2019-06-25 RX ADMIN — Medication 2 PUFF: at 08:22

## 2019-06-25 RX ADMIN — GABAPENTIN 800 MG: 400 CAPSULE ORAL at 14:22

## 2019-06-25 RX ADMIN — Medication 2 PUFF: at 11:34

## 2019-06-25 RX ADMIN — INSULIN LISPRO 10 UNITS: 100 INJECTION, SOLUTION INTRAVENOUS; SUBCUTANEOUS at 02:10

## 2019-06-25 RX ADMIN — SODIUM CHLORIDE, PRESERVATIVE FREE 10 ML: 5 INJECTION INTRAVENOUS at 09:11

## 2019-06-25 RX ADMIN — FUROSEMIDE 40 MG: 40 TABLET ORAL at 09:05

## 2019-06-25 RX ADMIN — ALBUTEROL SULFATE 2 PUFF: 90 AEROSOL, METERED RESPIRATORY (INHALATION) at 11:34

## 2019-06-25 RX ADMIN — INSULIN LISPRO 20 UNITS: 100 INJECTION, SOLUTION INTRAVENOUS; SUBCUTANEOUS at 12:38

## 2019-06-25 RX ADMIN — ISOSORBIDE MONONITRATE 30 MG: 30 TABLET, EXTENDED RELEASE ORAL at 09:05

## 2019-06-25 RX ADMIN — VENLAFAXINE HYDROCHLORIDE 150 MG: 37.5 CAPSULE, EXTENDED RELEASE ORAL at 09:05

## 2019-06-25 RX ADMIN — AMOXICILLIN AND CLAVULANATE POTASSIUM 1 TABLET: 875; 125 TABLET, FILM COATED ORAL at 20:07

## 2019-06-25 RX ADMIN — ASPIRIN 81 MG 81 MG: 81 TABLET ORAL at 09:05

## 2019-06-25 RX ADMIN — ALBUTEROL SULFATE 2 PUFF: 90 AEROSOL, METERED RESPIRATORY (INHALATION) at 16:08

## 2019-06-25 RX ADMIN — BUSPIRONE HYDROCHLORIDE 15 MG: 7.5 TABLET ORAL at 23:10

## 2019-06-25 RX ADMIN — BUSPIRONE HYDROCHLORIDE 15 MG: 7.5 TABLET ORAL at 09:05

## 2019-06-25 RX ADMIN — ENOXAPARIN SODIUM 40 MG: 100 INJECTION SUBCUTANEOUS at 09:06

## 2019-06-25 ASSESSMENT — PAIN SCALES - GENERAL
PAINLEVEL_OUTOF10: 9
PAINLEVEL_OUTOF10: 0
PAINLEVEL_OUTOF10: 3
PAINLEVEL_OUTOF10: 0
PAINLEVEL_OUTOF10: 1
PAINLEVEL_OUTOF10: 3
PAINLEVEL_OUTOF10: 0
PAINLEVEL_OUTOF10: 0
PAINLEVEL_OUTOF10: 2
PAINLEVEL_OUTOF10: 3

## 2019-06-25 NOTE — PROGRESS NOTES
Received report from Tabatha Ruiz 694. Skin assessment completed. Pt has excoriation and redness under the abdominal folds and breast. Bruising to the abdomen and hip from heart cath.

## 2019-06-25 NOTE — PROGRESS NOTES
sulfate HFA  2 puff Inhalation 4x daily    vitamin D  1,000 Units Oral Daily    simvastatin  40 mg Oral Nightly    levothyroxine  25 mcg Oral Daily    gabapentin  800 mg Oral 4x Daily    busPIRone  15 mg Oral TID    aspirin  81 mg Oral Daily    ipratropium  2 puff Inhalation Q4H WA    furosemide  40 mg Oral BID    enoxaparin  40 mg Subcutaneous Daily    insulin lispro  30 Units Subcutaneous TID WC    sodium chloride  500 mL Intravenous Once    sodium chloride flush  10 mL Intravenous 2 times per day    metoprolol tartrate  25 mg Oral BID    isosorbide mononitrate  30 mg Oral Daily    venlafaxine  225 mg Oral Daily with breakfast    insulin glargine  60 Units Subcutaneous Nightly      Infusions:    dextrose           Objective:   Vitals: BP (!) 105/57   Pulse 80   Temp 98 °F (36.7 °C) (Oral)   Resp 14   Ht 5' (1.524 m)   Wt 248 lb 3.8 oz (112.6 kg)   SpO2 95%   BMI 48.48 kg/m²   General appearance: alert and cooperative with exam  Neck: no JVD or bruit  Thyroid : Normal lobes   Lungs: Has Vesicular Breath sounds   Heart:  regular rate and rhythm  Abdomen: soft, non-tender; bowel sounds normal; no masses,  no organomegaly  Musculoskeletal: Normal  Extremities: extremities normal, , no edema  Neurologic:  Awake, alert, oriented to name, place and time. Cranial nerves II-XII are grossly intact. Motor is  intact.   Sensory neuropathy t.,  and gait is normal.    Assessment:     Patient Active Problem List:     HTN (hypertension)     Hyperlipemia     COPD (chronic obstructive pulmonary disease) (HCC)     Anxiety and depression     DM type 2, uncontrolled, with retinopathy ([address]     Hypokalemia     Hyperlipidemia     Precordial pain     Hyperglycemia     Axillary abscess     Light headed     Orthostatic hypotension     Hypertriglyceridemia     CCC (chronic calculous cholecystitis)     Cirrhosis of liver without ascites (HCC)     Mild obstructive sleep apnea     Idiopathic progressive neuropathy

## 2019-06-25 NOTE — PROGRESS NOTES
metoprolol, and Imdur        CHF, acute  -PCWP 24 per cath 6/20  -mild pulm HTN per cath 6/20  -IV Lasix held 6/21 due to low BP  -Metoprolol  -6/22- restart Lasix, but make it PO  -6/23 - BP low this am, hold Lasix, Imdur, and metoprolol  -6/24 - tolerated Lasix, stable for dc - but creat increased to 1.4 with diuresis, continue to monitor it, may need to lower Lasix dose     Debility - refer to [facility]     Hypotension noted 6/20  -Imdur started this admission, could not tolerate it, but now seems to be tolerating it with borderline BP     CAD with angina - 99% diagonal lesion per cath 6/20 - Imdur added  -LDL is 57 this admission     Valvular heart disease - moderate aortic stenosis per cath 6/20  -needs outpatient surveillance     Small hematoma right groin post cath 6/20 - HGB now stable at 8.2     DM - A1c 9.5 this admission - copy printed and given to patient  -6/23 - blood sugar spiked to 480 after prednisone accidentally started, stopped prednisone  -6/24 - sugar now better     HTN     Anemia  -HGB now lower than admission     Morbid obesity  Body mass index is 48.48 kg/m².      SADI  -6/21 - appreciate Dr. Lawson Bear consult, BiPAP ordered, needs OP PSG - was not using BiPAP or CPAP at home - compliance will help get her CHF under control     Positive sepsis screen 6/21 afternoon - clinically doubt sepsis     Cirrhosis  -dx in 2015 by liver bx when had GB surgery     Effexor dose is too high per EHR warning - adjusted     Gum discomfort  -2 of her teeth fell out  -will start Augmentin     Hyperkalemia with K 6.3 noted on June 23  -6/23 - K is 5.9     On oxygen - ECF may need to do home oxygen eval     ID - on 5 day course Augmentin for dental reason, it was started while here. Had some teeth fall out while here.        Analy Griggs MD

## 2019-06-25 NOTE — PLAN OF CARE
Problem: Pain:  Goal: Control of acute pain  Description  Control of acute pain  Outcome: Ongoing  Goal: Control of chronic pain  Description  Control of chronic pain  Outcome: Ongoing     Problem: Discharge Planning:  Goal: Discharged to appropriate level of care  Description  Discharged to appropriate level of care  Outcome: Ongoing     Problem: Cardiac Output - Decreased:  Goal: Hemodynamic stability will improve  Description  Hemodynamic stability will improve  Outcome: Ongoing     Problem: Serum Glucose Level - Abnormal:  Goal: Ability to maintain appropriate glucose levels will improve  Description  Ability to maintain appropriate glucose levels will improve  Outcome: Ongoing     Problem: Tissue Perfusion - Cardiopulmonary, Altered:  Goal: Absence of angina  Description  Absence of angina  Outcome: Ongoing  Goal: Circulatory function within specified parameters  Description  Circulatory function within specified parameters  Outcome: Ongoing  Goal: Hemodynamic stability will improve  Description  Hemodynamic stability will improve  Outcome: Ongoing     Problem: Tobacco Use:  Goal: Will participate in inpatient tobacco-use cessation counseling  Description  Will participate in inpatient tobacco-use cessation counseling  Outcome: Ongoing     Problem: Falls - Risk of:  Goal: Will remain free from falls  Description  Will remain free from falls  Outcome: Ongoing  Goal: Absence of physical injury  Description  Absence of physical injury  Outcome: Ongoing     Problem: Sensory Perception - Impaired:  Goal: Ability to maintain a stable neurologic state will improve  Description  Ability to maintain a stable neurologic state will improve  Outcome: Ongoing     Problem: Risk for Impaired Skin Integrity  Goal: Tissue integrity - skin and mucous membranes  Description  Structural intactness and normal physiological function of skin and  mucous membranes.   Outcome: Ongoing
Problem: Pain:  Goal: Pain level will decrease  Description  Pain level will decrease  6/20/2019 1629 by Marietta Elliott RN  Outcome: Ongoing  6/20/2019 0238 by Mo Sweeney  Outcome: Ongoing  Goal: Control of acute pain  Description  Control of acute pain  6/20/2019 1629 by Marietta Elliott RN  Outcome: Ongoing  6/20/2019 0238 by Mo Sweeney  Outcome: Ongoing  Goal: Control of chronic pain  Description  Control of chronic pain  6/20/2019 1629 by Marietta Elliott RN  Outcome: Ongoing  6/20/2019 0238 by Mo Sweeney  Outcome: Ongoing     Problem: Discharge Planning:  Goal: Discharged to appropriate level of care  Description  Discharged to appropriate level of care  6/20/2019 1629 by Marietta Elliott RN  Outcome: Ongoing  6/20/2019 0238 by Mo Sweeney  Outcome: Ongoing     Problem: Cardiac Output - Decreased:  Goal: Hemodynamic stability will improve  Description  Hemodynamic stability will improve  6/20/2019 1629 by Marietta Elliott RN  Outcome: Ongoing  6/20/2019 0238 by Mo Sweeney  Outcome: Ongoing     Problem: Serum Glucose Level - Abnormal:  Goal: Ability to maintain appropriate glucose levels will improve  Description  Ability to maintain appropriate glucose levels will improve  6/20/2019 1629 by Marietta Elliott RN  Outcome: Ongoing  6/20/2019 0238 by Mo Sweeney  Outcome: Ongoing     Problem: Pain:  Goal: Pain level will decrease  Description  Pain level will decrease  6/20/2019 1629 by Marietta Elliott RN  Outcome: Ongoing  6/20/2019 0238 by Mo Sweeney  Outcome: Ongoing  Goal: Control of acute pain  Description  Control of acute pain  6/20/2019 1629 by Marietta Elliott RN  Outcome: Ongoing  6/20/2019 0238 by Mo Sweeney  Outcome: Ongoing  Goal: Control of chronic pain  Description  Control of chronic pain  6/20/2019 1629 by Marietta Elliott RN  Outcome: Ongoing  6/20/2019 0238 by Bronwyn Torrez
RN  Outcome: Ongoing     Problem: Tissue Perfusion - Cardiopulmonary, Altered:  Goal: Absence of angina  Description  Absence of angina  6/21/2019 0228 by Rosalia Moon  Outcome: Ongoing  6/20/2019 1629 by Bairon Ruano RN  Outcome: Ongoing  Goal: Circulatory function within specified parameters  Description  Circulatory function within specified parameters  6/21/2019 0228 by Rosalia Moon  Outcome: Ongoing  6/20/2019 1629 by Bairon Ruano RN  Outcome: Ongoing  Goal: Hemodynamic stability will improve  Description  Hemodynamic stability will improve  6/21/2019 0228 by Rosalia Moon  Outcome: Ongoing  6/20/2019 1629 by Bairon Ruano RN  Outcome: Ongoing     Problem: Tobacco Use:  Goal: Will participate in inpatient tobacco-use cessation counseling  Description  Will participate in inpatient tobacco-use cessation counseling  6/21/2019 0228 by Rosalia Moon  Outcome: Ongoing  6/20/2019 1629 by Bairon Ruano RN  Outcome: Ongoing     Problem: Falls - Risk of:  Goal: Will remain free from falls  Description  Will remain free from falls  6/21/2019 0228 by Rosalia Moon  Outcome: Ongoing  6/20/2019 1629 by Bairon Ruano RN  Outcome: Ongoing  Goal: Absence of physical injury  Description  Absence of physical injury  6/21/2019 0228 by oRsalia Moon  Outcome: Ongoing  6/20/2019 1629 by Bairon Ruaon RN  Outcome: Ongoing     Problem: Sensory Perception - Impaired:  Goal: Ability to maintain a stable neurologic state will improve  Description  Ability to maintain a stable neurologic state will improve  6/21/2019 0228 by Rosalia Moon  Outcome: Ongoing  6/20/2019 1629 by Bairon Ruano RN  Outcome: Ongoing

## 2019-06-25 NOTE — CARE COORDINATION
Awaiting update from [facility]. Rigo Grigsby RN    1000 Received message from [facility]; they can take the patient today. Will arrange for transfer today. Rigo Grigsby RN    5372 Received call from [facility]; spoke to Tierra. AM PAC score is missing off of the original eval done on Friday 6/21. PS Sundar Mendoza (Mgr of PT/OT svcs) with issue. Rigo Grigsby RN    8013 AM PAC score in; sent to Tierra at [facility].  Rigo Grigsby RN

## 2019-06-25 NOTE — PROGRESS NOTES
Pulmonary and Critical Care  Progress Note      VITALS:  BP (!) 112/51   Pulse 77   Temp 97.5 °F (36.4 °C) (Oral)   Resp 19   Ht 5' (1.524 m)   Wt 248 lb 3.8 oz (112.6 kg)   SpO2 98%   BMI 48.48 kg/m²     Subjective:   CHIEF COMPLAINT :SOB     HPI:                The patient is a 71 y.o. female with significant past medical history of CHF, DM, HTN, hypothyroidism, smoker  presents with complaints of SOB. She is being treated for the CHF exacerbation. She has a 21 pk yr smoking quit about 20 years ago. She has been diagnosed with SADI many years ago, But she is non compliant with the CPAP. She still snores, gained weight, sleepy and tired during the day time. At this time she is lying in the bed. She is not marisel cute resp distress.       Objective:   PHYSICAL EXAM:    LUNGS:decreased air entry bilateral bases  Abd-soft, BS+,NT  Ext - no pedal edema  CVs-s1s2 no murmurs      DATA:    CBC:  Recent Labs     06/24/19  1126 06/24/19  1810 06/25/19  0843   HGB 8.7* 8.2* 8.5*   HCT 29.6* 27.5* 28.6*      BMP:  Recent Labs     06/23/19  1040  06/23/19  1532 06/24/19  0201 06/25/19  0843   *   < > 130* 133* 137   K 6.3  K CHECKED & CALLED TO ICU, ANABEL PHOEN[place] RN ON [id number] AT 1135 BY [facility]  RESULTS READ BACK  *   < > 5.9  K CALLED TO E2B, FIFI FARFAN RN ON [id number] AT 1643 BY [facility]  RESULTS READ BACK  * 5.1 5.3*   CL 92*   < > 92* 93* 94*   CO2 28   < > 25 30 30   BUN 39*  --   --  44* 44*   CREATININE 1.2*  --   --  1.4* 1.4*   CALCIUM 8.7  --   --  8.7 9.0   GLUCOSE 398*  --   --  315* 174*    < > = values in this interval not displayed. ABG:  No results for input(s): PH, PO2ART, IGP6FAH, HCO3, BEART, O2SAT in the last 72 hours. BNP  Lab Results   Component Value Date    BNP 9.6 06/08/2016      D-Dimer:  Lab Results   Component Value Date    DDIMER <200 11/04/2017      1.  Radiology: None      Assessment/Plan     Patient Active Problem List    Diagnosis Date Noted    Coronary artery disease Hypokalemia 03/01/2013    HTN (hypertension) 11/26/2012    Hyperlipemia 11/26/2012    COPD (chronic obstructive pulmonary disease) ([facility] 11/26/2012    Anxiety and depression 11/26/2012     Overview Note:     Patient is currently on venlafaxine 150 mg daily for depression and anxiety. She feels like it is not as affective as she would like currently. She does not feel suicidal and is sleeping ok but has decreased motivation and less happy days. Morbid Obesity  SADI  Acute on Chronic hypercapneic resp failure  ? OHS  Cardiomegaly  Moderate AS  Metabolic alkalosis           1. BIPAP  2. ICS  3. OOB  4. C/w present management  5. OP follow up  No follow-ups on file.     Electronically signed by Germán Vega MD on 6/25/2019 at 10:31 AM

## 2019-06-25 NOTE — PROGRESS NOTES
Physical Therapy    Physical Therapy Treatment Note  Name: Garnell Mcardle Standard MRN: [id number] :   1950   Date:  2019   Admission Date: 2019 Room:  A   Restrictions/Precautions:  Restrictions/Precautions  Restrictions/Precautions: Fall Risk, General Precautions  Required Braces or Orthoses?: No       Communication with other providers:  Dejah RN states pt is ok to see for therapy  Subjective:  Patient states:  Her aunt's  is today and She is not feeling right today, pt states she feels a rush of fluid in her abd when she moves  Pain:   Location, Type, Intensity (0/10 to 10/10):  1/10 bilat feet,  3/10 abd with LE ex  Objective:    Observation:  Pt is sitting up in the chair  Treatment, including education/measures:  Sitting Exercises: Ankle pumps x 10  Glute sets x 10  LAQ's x 10  Marching x 10   Pillow squeezes x 10  Hip Abd x 10  Therapeutic Exercise:  Therapeutic exercises were instructed today. Cues were given for technique, safety, recruitment, and rationale. Cues were verbal and/or tactile. Pt did not feel well enough to walk today  Safety  Patient left safely in the chair, with call light/phone in reach. Gait belt was used for transfers and gait. Assessment / Impression:     Patient's tolerance of treatment:  Good, slight sob with ex d/t holding her breath with ex's, very motivated to improve   Adverse Reaction: none  Significant change in status and impact:  none  Barriers to improvement:  Sob, endurance  Plan for Next Session:    Will cont to work towards pt's goals per her tolerance  Time in:  1340  Time out:  1325  Timed treatment minutes:  45  Total treatment time:  45  Previously filed items:     Short term goals  Time Frame for Short term goals: 1 week  Short term goal 1: Patient will perform supine to sit transfer mod I.   Short term goal 2: Patient will perform STS mod I without AD with stable BP./  Short term goal 3: Paitent will ambulate 100ft mod I with

## 2019-06-26 VITALS
HEART RATE: 98 BPM | OXYGEN SATURATION: 100 % | HEIGHT: 60 IN | TEMPERATURE: 98.2 F | RESPIRATION RATE: 19 BRPM | BODY MASS INDEX: 48.39 KG/M2 | DIASTOLIC BLOOD PRESSURE: 47 MMHG | WEIGHT: 246.47 LBS | SYSTOLIC BLOOD PRESSURE: 77 MMHG

## 2019-06-26 PROBLEM — R07.9 CHEST PAIN: Status: RESOLVED | Noted: 2019-06-19 | Resolved: 2019-06-26

## 2019-06-26 LAB
ANION GAP SERPL CALCULATED.3IONS-SCNC: 10 MMOL/L (ref 4–16)
BUN BLDV-MCNC: 42 MG/DL (ref 6–23)
CALCIUM SERPL-MCNC: 9.2 MG/DL (ref 8.3–10.6)
CHLORIDE BLD-SCNC: 90 MMOL/L (ref 99–110)
CO2: 34 MMOL/L (ref 21–32)
CREAT SERPL-MCNC: 1.4 MG/DL (ref 0.6–1.1)
GFR AFRICAN AMERICAN: 45 ML/MIN/1.73M2
GFR NON-AFRICAN AMERICAN: 37 ML/MIN/1.73M2
GLUCOSE BLD-MCNC: 195 MG/DL (ref 70–99)
GLUCOSE BLD-MCNC: 279 MG/DL (ref 70–99)
GLUCOSE BLD-MCNC: 332 MG/DL (ref 70–99)
GLUCOSE BLD-MCNC: 339 MG/DL (ref 70–99)
GLUCOSE BLD-MCNC: 380 MG/DL (ref 70–99)
HCT VFR BLD CALC: 29.5 % (ref 37–47)
HCT VFR BLD CALC: 29.5 % (ref 37–47)
HEMOGLOBIN: 8.7 GM/DL (ref 12.5–16)
HEMOGLOBIN: 8.8 GM/DL (ref 12.5–16)
POTASSIUM SERPL-SCNC: 4.3 MMOL/L (ref 3.5–5.1)
POTASSIUM SERPL-SCNC: ABNORMAL MMOL/L (ref 3.5–5.1)
SODIUM BLD-SCNC: 134 MMOL/L (ref 135–145)

## 2019-06-26 PROCEDURE — 80048 BASIC METABOLIC PNL TOTAL CA: CPT

## 2019-06-26 PROCEDURE — 6370000000 HC RX 637 (ALT 250 FOR IP): Performed by: INTERNAL MEDICINE

## 2019-06-26 PROCEDURE — 76937 US GUIDE VASCULAR ACCESS: CPT

## 2019-06-26 PROCEDURE — 84132 ASSAY OF SERUM POTASSIUM: CPT

## 2019-06-26 PROCEDURE — 82962 GLUCOSE BLOOD TEST: CPT

## 2019-06-26 PROCEDURE — 97110 THERAPEUTIC EXERCISES: CPT

## 2019-06-26 PROCEDURE — 94761 N-INVAS EAR/PLS OXIMETRY MLT: CPT

## 2019-06-26 PROCEDURE — 97116 GAIT TRAINING THERAPY: CPT

## 2019-06-26 PROCEDURE — 85014 HEMATOCRIT: CPT

## 2019-06-26 PROCEDURE — 94640 AIRWAY INHALATION TREATMENT: CPT

## 2019-06-26 PROCEDURE — 2700000000 HC OXYGEN THERAPY PER DAY

## 2019-06-26 PROCEDURE — 6360000002 HC RX W HCPCS: Performed by: FAMILY MEDICINE

## 2019-06-26 PROCEDURE — 2580000003 HC RX 258: Performed by: HOSPITALIST

## 2019-06-26 PROCEDURE — 2500000003 HC RX 250 WO HCPCS: Performed by: HOSPITALIST

## 2019-06-26 PROCEDURE — 36415 COLL VENOUS BLD VENIPUNCTURE: CPT

## 2019-06-26 PROCEDURE — 2580000003 HC RX 258: Performed by: INTERNAL MEDICINE

## 2019-06-26 PROCEDURE — 99232 SBSQ HOSP IP/OBS MODERATE 35: CPT | Performed by: INTERNAL MEDICINE

## 2019-06-26 PROCEDURE — 97530 THERAPEUTIC ACTIVITIES: CPT

## 2019-06-26 PROCEDURE — 6370000000 HC RX 637 (ALT 250 FOR IP): Performed by: HOSPITALIST

## 2019-06-26 PROCEDURE — 85018 HEMOGLOBIN: CPT

## 2019-06-26 RX ORDER — DEXTROSE MONOHYDRATE 25 G/50ML
25 INJECTION, SOLUTION INTRAVENOUS ONCE
Status: COMPLETED | OUTPATIENT
Start: 2019-06-26 | End: 2019-06-26

## 2019-06-26 RX ORDER — DEXTROSE MONOHYDRATE 25 G/50ML
12.5 INJECTION, SOLUTION INTRAVENOUS PRN
Status: DISCONTINUED | OUTPATIENT
Start: 2019-06-26 | End: 2019-06-26 | Stop reason: HOSPADM

## 2019-06-26 RX ORDER — DEXTROSE MONOHYDRATE 50 MG/ML
100 INJECTION, SOLUTION INTRAVENOUS PRN
Status: DISCONTINUED | OUTPATIENT
Start: 2019-06-26 | End: 2019-06-26 | Stop reason: HOSPADM

## 2019-06-26 RX ORDER — GLIPIZIDE 10 MG/1
10 TABLET ORAL
Qty: 60 TABLET | Refills: 3 | Status: SHIPPED | OUTPATIENT
Start: 2019-06-26 | End: 2019-01-01

## 2019-06-26 RX ORDER — SODIUM POLYSTYRENE SULFONATE 15 G/60ML
15 SUSPENSION ORAL; RECTAL ONCE
Status: COMPLETED | OUTPATIENT
Start: 2019-06-26 | End: 2019-06-26

## 2019-06-26 RX ORDER — NICOTINE POLACRILEX 4 MG
15 LOZENGE BUCCAL PRN
Status: DISCONTINUED | OUTPATIENT
Start: 2019-06-26 | End: 2019-06-26 | Stop reason: HOSPADM

## 2019-06-26 RX ORDER — GLIPIZIDE 5 MG/1
10 TABLET ORAL
Status: DISCONTINUED | OUTPATIENT
Start: 2019-06-26 | End: 2019-06-26 | Stop reason: HOSPADM

## 2019-06-26 RX ADMIN — INSULIN LISPRO 20 UNITS: 100 INJECTION, SOLUTION INTRAVENOUS; SUBCUTANEOUS at 09:31

## 2019-06-26 RX ADMIN — GABAPENTIN 800 MG: 400 CAPSULE ORAL at 14:25

## 2019-06-26 RX ADMIN — GLIPIZIDE 10 MG: 5 TABLET ORAL at 16:44

## 2019-06-26 RX ADMIN — ENOXAPARIN SODIUM 40 MG: 100 INJECTION SUBCUTANEOUS at 09:29

## 2019-06-26 RX ADMIN — INSULIN HUMAN 10 UNITS: 100 INJECTION, SOLUTION PARENTERAL at 10:30

## 2019-06-26 RX ADMIN — Medication 2 PUFF: at 08:47

## 2019-06-26 RX ADMIN — LEVOTHYROXINE SODIUM 25 MCG: 25 TABLET ORAL at 06:52

## 2019-06-26 RX ADMIN — INSULIN LISPRO 5 UNITS: 100 INJECTION, SOLUTION INTRAVENOUS; SUBCUTANEOUS at 16:28

## 2019-06-26 RX ADMIN — ALBUTEROL SULFATE 2 PUFF: 90 AEROSOL, METERED RESPIRATORY (INHALATION) at 11:15

## 2019-06-26 RX ADMIN — INSULIN LISPRO 20 UNITS: 100 INJECTION, SOLUTION INTRAVENOUS; SUBCUTANEOUS at 01:37

## 2019-06-26 RX ADMIN — GABAPENTIN 800 MG: 400 CAPSULE ORAL at 16:44

## 2019-06-26 RX ADMIN — BUSPIRONE HYDROCHLORIDE 15 MG: 7.5 TABLET ORAL at 14:25

## 2019-06-26 RX ADMIN — INSULIN LISPRO 25 UNITS: 100 INJECTION, SOLUTION INTRAVENOUS; SUBCUTANEOUS at 13:11

## 2019-06-26 RX ADMIN — SODIUM POLYSTYRENE SULFONATE 15 G: 15 SUSPENSION ORAL; RECTAL at 06:38

## 2019-06-26 RX ADMIN — ALBUTEROL SULFATE 2 PUFF: 90 AEROSOL, METERED RESPIRATORY (INHALATION) at 08:46

## 2019-06-26 RX ADMIN — INSULIN LISPRO 20 UNITS: 100 INJECTION, SOLUTION INTRAVENOUS; SUBCUTANEOUS at 13:11

## 2019-06-26 RX ADMIN — SODIUM CHLORIDE, PRESERVATIVE FREE 10 ML: 5 INJECTION INTRAVENOUS at 10:46

## 2019-06-26 RX ADMIN — Medication 1000 UNITS: at 09:28

## 2019-06-26 RX ADMIN — ISOSORBIDE MONONITRATE 30 MG: 30 TABLET, EXTENDED RELEASE ORAL at 09:29

## 2019-06-26 RX ADMIN — ASPIRIN 81 MG 81 MG: 81 TABLET ORAL at 09:28

## 2019-06-26 RX ADMIN — GABAPENTIN 800 MG: 400 CAPSULE ORAL at 09:28

## 2019-06-26 RX ADMIN — INSULIN LISPRO 15 UNITS: 100 INJECTION, SOLUTION INTRAVENOUS; SUBCUTANEOUS at 09:33

## 2019-06-26 RX ADMIN — INSULIN LISPRO 20 UNITS: 100 INJECTION, SOLUTION INTRAVENOUS; SUBCUTANEOUS at 16:27

## 2019-06-26 RX ADMIN — INSULIN HUMAN 25 UNITS: 100 INJECTION, SUSPENSION SUBCUTANEOUS at 09:31

## 2019-06-26 RX ADMIN — FUROSEMIDE 40 MG: 40 TABLET ORAL at 09:28

## 2019-06-26 RX ADMIN — BUSPIRONE HYDROCHLORIDE 15 MG: 7.5 TABLET ORAL at 10:29

## 2019-06-26 RX ADMIN — LINAGLIPTIN 5 MG: 5 TABLET, FILM COATED ORAL at 09:29

## 2019-06-26 RX ADMIN — VENLAFAXINE HYDROCHLORIDE 150 MG: 37.5 CAPSULE, EXTENDED RELEASE ORAL at 09:28

## 2019-06-26 RX ADMIN — Medication 50 MEQ: at 10:30

## 2019-06-26 RX ADMIN — Medication 2 PUFF: at 11:15

## 2019-06-26 RX ADMIN — GLIPIZIDE 10 MG: 5 TABLET ORAL at 10:29

## 2019-06-26 RX ADMIN — DOCUSATE SODIUM 100 MG: 100 CAPSULE, LIQUID FILLED ORAL at 09:28

## 2019-06-26 RX ADMIN — METOPROLOL TARTRATE 25 MG: 25 TABLET ORAL at 09:29

## 2019-06-26 RX ADMIN — DEXTROSE MONOHYDRATE 25 G: 25 INJECTION, SOLUTION INTRAVENOUS at 10:30

## 2019-06-26 NOTE — PROGRESS NOTES
Physical Therapy    Physical Therapy Treatment Note  Name: Garnell Mcardle Standard MRN: [id number] :   1950   Date:  2019   Admission Date: 2019 Room:  -A   Restrictions/Precautions:  Restrictions/Precautions  Restrictions/Precautions: Fall Risk, General Precautions  Required Braces or Orthoses?: No       Communication with other providers:  Mleiton Knapp RN states pt is ok to see for therapy  Subjective:  Patient states:  Her potassium is up and she doesn't feel very well today, needs to go top the bathroom  Pain:   Location, Type, Intensity (0/10 to 10/10):  /10 abd with LE ex  Objective:    Observation:  Pt is sitting up in the chair  Treatment, including education/measures:  Transfers  Scooting :SBA  Sit to stand :CGA from the chair and toilet  Stand to sit :CGA to the toilet and chair  Gait:  Pt amb with RW for 15 ft x 2 with min assist and A for the tele and O2  Pt needed VC's for pathway and PLB  Pt was unsteady, shaky with trans and gt  Sitting Exercises: Ankle pumps x 10  Glute sets x 10  LAQ's x 10  Marching x 10   Pillow squeezes x 10  Hip Abd x 10  Therapeutic Exercise:  Therapeutic exercises were instructed today. Cues were given for technique, safety, recruitment, and rationale. Cues were verbal and/or tactile. Safety  Patient left safely in the chair, with call light/phone in reach. Gait belt was used for transfers and gait.   Assessment / Impression:     Patient's tolerance of treatment:  Fair, pt was shaky with ex and gt today, unable to walk as far as she did 2 days ago  Adverse Reaction: none  Significant change in status and impact:  none  Barriers to improvement:  Sob, endurance, balance  Plan for Next Session:    Will cont to work towards pt's goals per her tolerance  Time in: 1040  Time out:  1125  Timed treatment minutes:  45  Total treatment time:  45  Previously filed items:     Short term goals  Time Frame for Short term goals: 1 week  Short term goal 1: Patient will

## 2019-06-26 NOTE — DISCHARGE SUMMARY
CREATININE 1.4*  --  1.4*  --   --  1.4*  --   --    HCT 27.1*   < > 28.6*   < > 27.3* 29.5* 29.5*  --     < > = values in this interval not displayed.          Discharge Time of 35 minutes    Electronically signed by Carole Martinez MD on 6/26/2019 at 12:12 PM

## 2019-06-26 NOTE — PROGRESS NOTES
Units Subcutaneous 2 times per day    linagliptin  5 mg Oral Daily    docusate sodium  100 mg Oral Daily    albuterol sulfate HFA  2 puff Inhalation 4x daily    vitamin D  1,000 Units Oral Daily    simvastatin  40 mg Oral Nightly    levothyroxine  25 mcg Oral Daily    gabapentin  800 mg Oral 4x Daily    busPIRone  15 mg Oral TID    aspirin  81 mg Oral Daily    ipratropium  2 puff Inhalation Q4H WA    furosemide  40 mg Oral BID    enoxaparin  40 mg Subcutaneous Daily    sodium chloride flush  10 mL Intravenous 2 times per day    metoprolol tartrate  25 mg Oral BID    isosorbide mononitrate  30 mg Oral Daily    insulin glargine  60 Units Subcutaneous Nightly      Infusions:    dextrose           Objective:   Vitals: BP (!) 129/52   Pulse 99   Temp 97.9 °F (36.6 °C) (Oral)   Resp 18   Ht 5' (1.524 m)   Wt 248 lb 3.8 oz (112.6 kg)   SpO2 94%   BMI 48.48 kg/m²   General appearance: alert and cooperative with exam  Neck: no JVD or bruit  Thyroid : Normal lobes   Lungs: Has Vesicular Breath sounds   Heart:  regular rate and rhythm  Abdomen: soft, non-tender; bowel sounds normal; no masses,  no organomegaly  Musculoskeletal: Normal  Extremities: extremities normal, , no edema  Neurologic:  Awake, alert, oriented to name, place and time. Cranial nerves II-XII are grossly intact. Motor is  intact.   Sensory neuropathy t.,  and gait is normal.    Assessment:     Patient Active Problem List:     HTN (hypertension)     Hyperlipemia     COPD (chronic obstructive pulmonary disease) (HCC)     Anxiety and depression     DM type 2, uncontrolled, with retinopathy ([address]     Hypokalemia     Hyperlipidemia     Precordial pain     Hyperglycemia     Axillary abscess     Light headed     Orthostatic hypotension     Hypertriglyceridemia     CCC (chronic calculous cholecystitis)     Cirrhosis of liver without ascites (HCC)     Mild obstructive sleep apnea     Idiopathic progressive neuropathy     Congestive heart failure (CHF) ([facility])     Type 2 diabetes mellitus ([facility])     Hypothyroidism     Depression     Hypertension     CHF (congestive heart failure), NYHA class I, acute on chronic, diastolic ([facility])     ACS (acute coronary syndrome) ([facility])     Acute coronary syndrome ([facility])     Diastolic dysfunction with acute on chronic heart failure ([facility])     NSTEMI (non-ST elevated myocardial infarction) ([facility])     Acute and chr resp failure, unsp w hypoxia or hypercapnia ([facility])     SADI (obstructive sleep apnea)     Morbid obesity ([facility])     Chest pain     Coronary artery disease involving native coronary artery of native heart with unstable angina pectoris ([facility])     VHD (valvular heart disease)      Plan:     1. Reviewed POC blood glucose . Labs and X ray results   2. Reviewed Current Medicines   3. On meal/ Correction bolus Humalog/ Basal Lantus Insulin regime   4. Monitor Blood glucose frequently   5. Modified  the dose of Insulin/ other medicines as needed   6. Will follow     .      Deion Baca MD

## 2019-06-26 NOTE — PROGRESS NOTES
obesity ([facility]     NSTEMI (non-ST elevated myocardial infarction) ([facility]     ACS (acute coronary syndrome) ([facility] 11/08/2018    Acute coronary syndrome ([facility] 66/40/7351    Diastolic dysfunction with acute on chronic heart failure (HCC) 11/08/2018    Congestive heart failure (CHF) ([facility] 11/07/2018    Type 2 diabetes mellitus ([facility] 11/07/2018    Hypothyroidism 11/07/2018    Depression 11/07/2018    Hypertension 11/07/2018    CHF (congestive heart failure), NYHA class I, acute on chronic, diastolic (HCC) 23/27/4568    Idiopathic progressive neuropathy 04/18/2016     Overview Note:     Patient has chronic peripheral neuropathy to bilateral feet. She is currently on gabapentin 800 mg tid with some benefit. She reports decreased sensation to her feet with intermittent pain.  Mild obstructive sleep apnea 10/14/2015    CCC (chronic calculous cholecystitis) 09/14/2015    Cirrhosis of liver without ascites ([facility] 09/14/2015    Light headed 04/23/2014     Overview Note:     From dehydration from hyperglycemia - patient dry on presentation. replace inactive diagnosis      Orthostatic hypotension 04/23/2014    Hypertriglyceridemia 04/23/2014    Precordial pain 04/22/2014    Hyperglycemia 04/22/2014     Overview Note:     Admission blood glucose of 120; was >500 prior to presentation to ER, first accucheck in ER was 341.  Axillary abscess 04/22/2014     Overview Note:     Left      Hyperlipidemia     DM type 2, uncontrolled, with retinopathy ([facility] 03/01/2013     Overview Note:     Patient's last A1c was 12.1, and she has been referred in the last month to endocrinology.  She has an appointment this coming month with a specialist.      Hypokalemia 03/01/2013    HTN (hypertension) 11/26/2012    Hyperlipemia 11/26/2012    COPD (chronic obstructive pulmonary disease) ([facility] 11/26/2012    Anxiety and depression 11/26/2012     Overview Note:     Patient is currently on venlafaxine 150 mg daily for

## 2019-06-28 ENCOUNTER — HOSPITAL ENCOUNTER (OUTPATIENT)
Age: 69
Discharge: HOME | End: 2019-06-28

## 2019-06-28 LAB
ANION GAP SERPL CALCULATED.3IONS-SCNC: 14 MMOL/L (ref 4–16)
BUN BLDV-MCNC: 37 MG/DL (ref 6–23)
CALCIUM SERPL-MCNC: 9.2 MG/DL (ref 8.3–10.6)
CHLORIDE BLD-SCNC: 92 MMOL/L (ref 99–110)
CO2: 30 MMOL/L (ref 21–32)
CREAT SERPL-MCNC: 1.2 MG/DL (ref 0.6–1.1)
GFR AFRICAN AMERICAN: 54 ML/MIN/1.73M2
GFR NON-AFRICAN AMERICAN: 45 ML/MIN/1.73M2
GLUCOSE BLD-MCNC: 449 MG/DL (ref 70–99)
HCT VFR BLD CALC: 29.8 % (ref 37–47)
HEMOGLOBIN: 8.6 GM/DL (ref 12.5–16)
MCH RBC QN AUTO: 27.3 PG (ref 27–31)
MCHC RBC AUTO-ENTMCNC: 28.9 % (ref 32–36)
MCV RBC AUTO: 94.6 FL (ref 78–100)
PDW BLD-RTO: 15.2 % (ref 11.7–14.9)
PLATELET # BLD: 196 K/CU MM (ref 140–440)
PMV BLD AUTO: 10.9 FL (ref 7.5–11.1)
POTASSIUM SERPL-SCNC: 5.3 MMOL/L (ref 3.5–5.1)
RBC # BLD: 3.15 M/CU MM (ref 4.2–5.4)
SODIUM BLD-SCNC: 136 MMOL/L (ref 135–145)
WBC # BLD: 10 K/CU MM (ref 4–10.5)

## 2019-06-28 PROCEDURE — 36415 COLL VENOUS BLD VENIPUNCTURE: CPT

## 2019-06-28 PROCEDURE — 85027 COMPLETE CBC AUTOMATED: CPT

## 2019-06-28 PROCEDURE — 80048 BASIC METABOLIC PNL TOTAL CA: CPT

## 2019-06-29 LAB
CULTURE: NORMAL
CULTURE: NORMAL
Lab: NORMAL
Lab: NORMAL
SPECIMEN: NORMAL
SPECIMEN: NORMAL

## 2019-07-02 ENCOUNTER — HOSPITAL ENCOUNTER (OUTPATIENT)
Age: 69
Discharge: HOME | End: 2019-07-02

## 2019-07-02 LAB
ANION GAP SERPL CALCULATED.3IONS-SCNC: 13 MMOL/L (ref 4–16)
BUN BLDV-MCNC: 32 MG/DL (ref 6–23)
CALCIUM SERPL-MCNC: 9.6 MG/DL (ref 8.3–10.6)
CHLORIDE BLD-SCNC: 88 MMOL/L (ref 99–110)
CO2: 33 MMOL/L (ref 21–32)
CREAT SERPL-MCNC: 1.4 MG/DL (ref 0.6–1.1)
GFR AFRICAN AMERICAN: 45 ML/MIN/1.73M2
GFR NON-AFRICAN AMERICAN: 37 ML/MIN/1.73M2
GLUCOSE BLD-MCNC: 430 MG/DL (ref 70–99)
HCT VFR BLD CALC: 27.2 % (ref 37–47)
HEMOGLOBIN: 8.3 GM/DL (ref 12.5–16)
MCH RBC QN AUTO: 27.7 PG (ref 27–31)
MCHC RBC AUTO-ENTMCNC: 30.5 % (ref 32–36)
MCV RBC AUTO: 90.7 FL (ref 78–100)
PDW BLD-RTO: 15 % (ref 11.7–14.9)
PLATELET # BLD: 186 K/CU MM (ref 140–440)
PMV BLD AUTO: 10.8 FL (ref 7.5–11.1)
POTASSIUM SERPL-SCNC: 4.6 MMOL/L (ref 3.5–5.1)
RBC # BLD: 3 M/CU MM (ref 4.2–5.4)
SODIUM BLD-SCNC: 134 MMOL/L (ref 135–145)
WBC # BLD: 6.9 K/CU MM (ref 4–10.5)

## 2019-07-02 PROCEDURE — 85027 COMPLETE CBC AUTOMATED: CPT

## 2019-07-02 PROCEDURE — 36415 COLL VENOUS BLD VENIPUNCTURE: CPT

## 2019-07-02 PROCEDURE — 80048 BASIC METABOLIC PNL TOTAL CA: CPT

## 2019-07-09 ENCOUNTER — HOSPITAL ENCOUNTER (OUTPATIENT)
Age: 69
Discharge: HOME | End: 2019-07-09

## 2019-07-09 LAB
ANION GAP SERPL CALCULATED.3IONS-SCNC: 16 MMOL/L (ref 4–16)
BUN BLDV-MCNC: 44 MG/DL (ref 6–23)
CALCIUM SERPL-MCNC: 9.9 MG/DL (ref 8.3–10.6)
CHLORIDE BLD-SCNC: 91 MMOL/L (ref 99–110)
CO2: 30 MMOL/L (ref 21–32)
CREAT SERPL-MCNC: 1.4 MG/DL (ref 0.6–1.1)
GFR AFRICAN AMERICAN: 45 ML/MIN/1.73M2
GFR NON-AFRICAN AMERICAN: 37 ML/MIN/1.73M2
GLUCOSE BLD-MCNC: 112 MG/DL (ref 70–99)
HCT VFR BLD CALC: 26.3 % (ref 37–47)
HEMOGLOBIN: 7.8 GM/DL (ref 12.5–16)
MCH RBC QN AUTO: 28.1 PG (ref 27–31)
MCHC RBC AUTO-ENTMCNC: 29.7 % (ref 32–36)
MCV RBC AUTO: 94.6 FL (ref 78–100)
PDW BLD-RTO: 15.1 % (ref 11.7–14.9)
PLATELET # BLD: 173 K/CU MM (ref 140–440)
PMV BLD AUTO: 11.1 FL (ref 7.5–11.1)
POTASSIUM SERPL-SCNC: 4.4 MMOL/L (ref 3.5–5.1)
RBC # BLD: 2.78 M/CU MM (ref 4.2–5.4)
SODIUM BLD-SCNC: 137 MMOL/L (ref 135–145)
WBC # BLD: 8.3 K/CU MM (ref 4–10.5)

## 2019-07-09 PROCEDURE — 85027 COMPLETE CBC AUTOMATED: CPT

## 2019-07-09 PROCEDURE — 80048 BASIC METABOLIC PNL TOTAL CA: CPT

## 2019-07-09 PROCEDURE — 36415 COLL VENOUS BLD VENIPUNCTURE: CPT

## 2019-07-11 ENCOUNTER — HOSPITAL ENCOUNTER (OUTPATIENT)
Age: 69
Discharge: HOME | End: 2019-07-11
Payer: MEDICARE

## 2019-07-11 PROCEDURE — G0328 FECAL BLOOD SCRN IMMUNOASSAY: HCPCS

## 2019-07-12 LAB — HEMOCCULT SP1 STL QL: POSITIVE

## 2019-07-15 ENCOUNTER — HOSPITAL ENCOUNTER (OUTPATIENT)
Age: 69
Setting detail: SPECIMEN
Discharge: HOME | End: 2019-07-15
Payer: MEDICARE

## 2019-07-15 LAB
HCT VFR BLD CALC: 23.9 % (ref 37–47)
HEMOGLOBIN: ABNORMAL GM/DL (ref 12.5–16)
MCH RBC QN AUTO: 27.8 PG (ref 27–31)
MCHC RBC AUTO-ENTMCNC: 28.5 % (ref 32–36)
MCV RBC AUTO: 97.6 FL (ref 78–100)
PDW BLD-RTO: 15.9 % (ref 11.7–14.9)
PLATELET # BLD: 188 K/CU MM (ref 140–440)
PMV BLD AUTO: 10.8 FL (ref 7.5–11.1)
RBC # BLD: 2.45 M/CU MM (ref 4.2–5.4)
WBC # BLD: 10.5 K/CU MM (ref 4–10.5)

## 2019-07-15 PROCEDURE — 36415 COLL VENOUS BLD VENIPUNCTURE: CPT

## 2019-07-15 PROCEDURE — 85027 COMPLETE CBC AUTOMATED: CPT

## 2019-07-23 ENCOUNTER — OFFICE VISIT (OUTPATIENT)
Dept: CARDIOLOGY CLINIC | Age: 69
End: 2019-07-23
Payer: MEDICARE

## 2019-07-23 VITALS
WEIGHT: 240 LBS | BODY MASS INDEX: 46.87 KG/M2 | DIASTOLIC BLOOD PRESSURE: 58 MMHG | SYSTOLIC BLOOD PRESSURE: 108 MMHG | OXYGEN SATURATION: 97 % | HEART RATE: 95 BPM

## 2019-07-23 DIAGNOSIS — I10 ESSENTIAL HYPERTENSION: ICD-10-CM

## 2019-07-23 DIAGNOSIS — I48.91 ATRIAL FIBRILLATION, UNSPECIFIED TYPE (HCC): Primary | ICD-10-CM

## 2019-07-23 PROCEDURE — 99214 OFFICE O/P EST MOD 30 MIN: CPT | Performed by: INTERNAL MEDICINE

## 2019-07-23 RX ORDER — POTASSIUM CHLORIDE 1.5 G/1.77G
20 POWDER, FOR SOLUTION ORAL
COMMUNITY
Start: 2019-06-19 | End: 2019-01-01

## 2019-07-23 NOTE — PROGRESS NOTES
CARDIOLOGY NOTE      7/23/2019    RE: [facility]  (1950)                               TO:  Dr. Daphnie Bro PA-C            Toy Suazo is a 71 y.o. female who was seen today for management of  htn                                    HPI:   Patient is here for    - Coronary artery disease, does not have chest pain. Patient is  compliant with prescribed medicines. - VHD moderate  - Hypertension,is  well controlled, pt is  compliant with medicines  - Hyperlipidimea, lipids are in acceptable range. Pt  is  compliant with medicines  - Diabetes mellitus, blood glucose level is  well controlled. Pt is compliant with meds and diet  - Atrial fibrillation, pt is  compliant with meds.  Patient does not have symptoms from atrial fibrillation, no anticoag had GI bleed                  The patient does not have cardiac complaints    [facility] has the following history recorded in care path:  Patient Active Problem List    Diagnosis Date Noted    VHD (valvular heart disease)     SADI (obstructive sleep apnea)     Morbid obesity ([address]     NSTEMI (non-ST elevated myocardial infarction) ([address]     ACS (acute coronary syndrome) ([address] 11/08/2018    Acute coronary syndrome ([address] 63/29/9314    Diastolic dysfunction with acute on chronic heart failure ([address] 11/08/2018    Congestive heart failure (CHF) ([address] 11/07/2018    Type 2 diabetes mellitus ([address] 11/07/2018    Hypothyroidism 11/07/2018    Depression 11/07/2018    Hypertension 11/07/2018    CHF (congestive heart failure), NYHA class I, acute on chronic, diastolic (HCC) 78/64/6121    Idiopathic progressive neuropathy 04/18/2016    Mild obstructive sleep apnea 10/14/2015    CCC (chronic calculous cholecystitis) 09/14/2015    Cirrhosis of liver without ascites ([address] 09/14/2015    Light headed 04/23/2014    Orthostatic hypotension 04/23/2014    Hypertriglyceridemia 04/23/2014    Precordial pain 04/22/2014    Hyperglycemia 04/22/2014    Axillary abscess 04/22/2014    Hyperlipidemia     DM type 2, uncontrolled, with retinopathy ([facility] 03/01/2013    Hypokalemia 03/01/2013    HTN (hypertension) 11/26/2012    Hyperlipemia 11/26/2012    COPD (chronic obstructive pulmonary disease) ([facility] 11/26/2012    Anxiety and depression 11/26/2012     Current Outpatient Medications   Medication Sig Dispense Refill    potassium chloride (KLOR-CON) 20 MEQ packet Take 20 mEq by mouth      Ferrous Sulfate Dried (FERROUS SULFATE IRON PO) Take 325 mg by mouth      linagliptin (TRADJENTA) 5 MG tablet Take 1 tablet by mouth daily 30 tablet 1    glipiZIDE (GLUCOTROL) 10 MG tablet Take 1 tablet by mouth 2 times daily (before meals) 60 tablet 3    albuterol-ipratropium (COMBIVENT)  MCG/ACT inhaler Inhale 2 puffs into the lungs 4 times daily 1 Inhaler 0    furosemide (LASIX) 40 MG tablet Take 1 tablet by mouth 2 times daily 60 tablet 0    albuterol sulfate HFA (PROAIR HFA) 108 (90 Base) MCG/ACT inhaler Inhale 2 puffs into the lungs every 4 hours as needed for Wheezing 1 Inhaler 0    busPIRone (BUSPAR) 10 MG tablet Take 1.5 tablets by mouth 3 times daily 160 tablet 0    insulin glargine (LANTUS) 100 UNIT/ML injection vial Inject 60 Units into the skin nightly (Patient taking differently: Inject 80 Units into the skin nightly ) 1 vial 3    docusate sodium (COLACE, DULCOLAX) 100 MG CAPS Take 100 mg by mouth daily 60 capsule 0    sulindac (CLINORIL) 150 MG tablet Take 150 mg by mouth 2 times daily      tolterodine (DETROL) 2 MG tablet Take 2 mg by mouth daily      nitroGLYCERIN (NITROSTAT) 0.4 MG SL tablet up to max of 3 total doses.  If no relief after 1 dose, call 911. 25 tablet 1    simvastatin (ZOCOR) 20 MG tablet Take 1 tablet by mouth nightly (Patient taking differently: Take 40 mg by mouth nightly ) 30 tablet 3    magnesium hydroxide (MILK OF MAGNESIA) 400 MG/5ML suspension Take 30 mLs by mouth daily as needed for 2015    SHOULDER SURGERY Right 1960    fracture surgery-screw at humeral head    TONSILLECTOMY  1966    TONSILLECTOMY AND ADENOIDECTOMY  1966      As reviewed   Family History   Problem Relation Age of Onset    Arthritis Mother     Depression Mother     Emphysema Mother     Dementia Mother     Arthritis Father     Cancer Father         prostate    Vision Loss Father     Other Father         brain aneurysm    Arthritis Sister     Depression Sister     Anxiety Disorder Sister     Arthritis Brother     Cancer Brother         eye    Hearing Loss Brother     High Blood Pressure Brother      Social History     Tobacco Use    Smoking status: Former Smoker     Packs/day: 1.00     Years: 21.00     Pack years: 21.00     Types: Cigarettes     Start date: 1970     Last attempt to quit: 1991     Years since quittin.5    Smokeless tobacco: Never Used    Tobacco comment: lives with smokers   Substance Use Topics    Alcohol use: No     Alcohol/week: 0.0 standard drinks     Comment:           CAFFEINE: 4-6 diet sodas daily      Review of Systems:    Constitutional: Negative for diaphoresis and fatigue  Psychological:Negative for anxiety or depression  HENT: Negative for headaches, nasal congestion, sinus pain or vertigo  Eyes: Negative for visual disturbance.    Endocrine: Negative for polydipsia/polyuria  Respiratory: Negative for shortness of breath  Cardiovascular: Negative for chest pain, dyspnea on exertion, claudication, edema, irregular heartbeat, murmur, palpitations or shortness of breath  Gastrointestinal: Negative for abdominal pain or heartburn  Genito-Urinary: Negative for urinary frequency/urgency  Musculoskeletal: Negative for muscle pain, muscular weakness, negative for pain in arm and leg or swelling in foot and leg  Neurological: Negative for dizziness, headaches, memory loss, numbness/tingling, visual changes, syncope  Dermatological: Negative for rash    Objective:  BP (!) 108/58   Pulse 95   Wt 240 lb (108.9 kg)   SpO2 97%   BMI 46.87 kg/m²   Wt Readings from Last 3 Encounters:   07/23/19 240 lb (108.9 kg)   06/26/19 246 lb 7.6 oz (111.8 kg)   06/19/19 243 lb 4 oz (110.3 kg)     Body mass index is 46.87 kg/m². GENERAL - Alert, oriented, pleasant, in no apparent distress. EYES: No jaundice, no conjunctival pallor. SKIN: It is warm & dry. No rashes. No Echhymosis    HEENT - No clinically significant abnormalities seen. Neck - Supple. No jugular venous distention noted. No carotid bruits. Cardiovascular - Normal S1 and S2 with 2/6 cassy  Extremities - No cyanosis, clubbing, or significant edema. Pulmonary - No respiratory distress. No wheezes or rales. Abdomen - No masses, tenderness, or organomegaly. Musculoskeletal - No significant edema. No joint deformities. No muscle wasting. Neurologic - Cranial nerves II through XII are grossly intact. There were no gross focal neurologic abnormalities.     Lab Review   Lab Results   Component Value Date    CKTOTAL 65 02/28/2013    TROPONINT 0.020 06/23/2019     BNP:    Lab Results   Component Value Date    BNP 9.6 06/08/2016     PT/INR:    Lab Results   Component Value Date    INR 1.04 06/21/2019     Lab Results   Component Value Date    LABA1C 9.5 (H) 06/20/2019    LABA1C 9.2 (H) 06/18/2019     Lab Results   Component Value Date    WBC 10.5 07/15/2019    HCT 23.9 (L) 07/15/2019    MCV 97.6 07/15/2019     07/15/2019     Lab Results   Component Value Date    CHOL 122 06/20/2019    TRIG 150 (H) 06/20/2019    HDL 51 06/20/2019    LDLCALC 118 03/17/2016    LDLDIRECT 57 06/20/2019     Lab Results   Component Value Date    ALT 25 06/21/2019    AST 40 (H) 06/21/2019     BMP:    Lab Results   Component Value Date     07/09/2019    K 4.4 07/09/2019    CL 91 07/09/2019    CO2 30 07/09/2019    BUN 44 07/09/2019    CREATININE 1.4 07/09/2019     CMP:   Lab Results   Component Value Date     07/09/2019    K 4.4 07/09/2019

## 2019-07-30 ENCOUNTER — HOSPITAL ENCOUNTER (OUTPATIENT)
Age: 69
Setting detail: SPECIMEN
Discharge: HOME | End: 2019-07-30
Payer: MEDICARE

## 2019-07-30 LAB
ALBUMIN SERPL-MCNC: 4 GM/DL (ref 3.4–5)
ANION GAP SERPL CALCULATED.3IONS-SCNC: 12 MMOL/L (ref 4–16)
BUN BLDV-MCNC: 33 MG/DL (ref 6–23)
CALCIUM SERPL-MCNC: 10.3 MG/DL (ref 8.3–10.6)
CHLORIDE BLD-SCNC: 91 MMOL/L (ref 99–110)
CO2: 29 MMOL/L (ref 21–32)
CREAT SERPL-MCNC: 1.3 MG/DL (ref 0.6–1.1)
GFR AFRICAN AMERICAN: 49 ML/MIN/1.73M2
GFR NON-AFRICAN AMERICAN: 41 ML/MIN/1.73M2
GLUCOSE BLD-MCNC: 439 MG/DL (ref 70–99)
PHOSPHORUS: 4 MG/DL (ref 2.5–4.9)
POTASSIUM SERPL-SCNC: ABNORMAL MMOL/L (ref 3.5–5.1)
SODIUM BLD-SCNC: 132 MMOL/L (ref 135–145)

## 2019-07-30 PROCEDURE — 80069 RENAL FUNCTION PANEL: CPT

## 2019-08-02 ENCOUNTER — HOSPITAL ENCOUNTER (OUTPATIENT)
Age: 69
Setting detail: SPECIMEN
Discharge: HOME | End: 2019-08-02
Payer: MEDICARE

## 2019-08-02 LAB
ALBUMIN SERPL-MCNC: 4.1 GM/DL (ref 3.4–5)
ALP BLD-CCNC: 154 IU/L (ref 40–128)
ALT SERPL-CCNC: 21 U/L (ref 10–40)
ANION GAP SERPL CALCULATED.3IONS-SCNC: 17 MMOL/L (ref 4–16)
AST SERPL-CCNC: 22 IU/L (ref 15–37)
BASOPHILS ABSOLUTE: 0.1 K/CU MM
BASOPHILS RELATIVE PERCENT: 0.8 % (ref 0–1)
BILIRUB SERPL-MCNC: 0.4 MG/DL (ref 0–1)
BUN BLDV-MCNC: 36 MG/DL (ref 6–23)
CALCIUM SERPL-MCNC: 9.6 MG/DL (ref 8.3–10.6)
CHLORIDE BLD-SCNC: 88 MMOL/L (ref 99–110)
CO2: 28 MMOL/L (ref 21–32)
CREAT SERPL-MCNC: 1.5 MG/DL (ref 0.6–1.1)
DIFFERENTIAL TYPE: ABNORMAL
EOSINOPHILS ABSOLUTE: 0.1 K/CU MM
EOSINOPHILS RELATIVE PERCENT: 1.9 % (ref 0–3)
ESTIMATED AVERAGE GLUCOSE: 200 MG/DL
GFR AFRICAN AMERICAN: 42 ML/MIN/1.73M2
GFR NON-AFRICAN AMERICAN: 34 ML/MIN/1.73M2
GLUCOSE BLD-MCNC: 454 MG/DL (ref 70–99)
HBA1C MFR BLD: 8.6 % (ref 4.2–6.3)
HCT VFR BLD CALC: 36.7 % (ref 37–47)
HEMOGLOBIN: 11.3 GM/DL (ref 12.5–16)
IMMATURE NEUTROPHIL %: 0.3 % (ref 0–0.43)
LYMPHOCYTES ABSOLUTE: 1.4 K/CU MM
LYMPHOCYTES RELATIVE PERCENT: 21.9 % (ref 24–44)
MCH RBC QN AUTO: 27.7 PG (ref 27–31)
MCHC RBC AUTO-ENTMCNC: 30.8 % (ref 32–36)
MCV RBC AUTO: 90 FL (ref 78–100)
MONOCYTES ABSOLUTE: 0.5 K/CU MM
MONOCYTES RELATIVE PERCENT: 7.6 % (ref 0–4)
NUCLEATED RBC %: 0 %
PDW BLD-RTO: 14.7 % (ref 11.7–14.9)
PHOSPHORUS: 3.6 MG/DL (ref 2.5–4.9)
PLATELET # BLD: 182 K/CU MM (ref 140–440)
PMV BLD AUTO: 10.8 FL (ref 7.5–11.1)
POTASSIUM SERPL-SCNC: 4.2 MMOL/L (ref 3.5–5.1)
RBC # BLD: 4.08 M/CU MM (ref 4.2–5.4)
SEGMENTED NEUTROPHILS ABSOLUTE COUNT: 4.4 K/CU MM
SEGMENTED NEUTROPHILS RELATIVE PERCENT: 67.5 % (ref 36–66)
SODIUM BLD-SCNC: 133 MMOL/L (ref 135–145)
TOTAL IMMATURE NEUTOROPHIL: 0.02 K/CU MM
TOTAL NUCLEATED RBC: 0 K/CU MM
TOTAL PROTEIN: 7.4 GM/DL (ref 6.4–8.2)
WBC # BLD: 6.4 K/CU MM (ref 4–10.5)

## 2019-08-02 PROCEDURE — 85025 COMPLETE CBC W/AUTO DIFF WBC: CPT

## 2019-08-02 PROCEDURE — 80053 COMPREHEN METABOLIC PANEL: CPT

## 2019-08-02 PROCEDURE — 83036 HEMOGLOBIN GLYCOSYLATED A1C: CPT

## 2019-08-02 PROCEDURE — 84100 ASSAY OF PHOSPHORUS: CPT

## 2019-11-04 PROBLEM — G47.19 EXCESSIVE DAYTIME SLEEPINESS: Status: ACTIVE | Noted: 2019-01-01

## 2019-11-04 PROBLEM — Z87.891 EX-SMOKER: Status: ACTIVE | Noted: 2019-01-01

## 2020-01-01 ENCOUNTER — ANESTHESIA EVENT (OUTPATIENT)
Dept: OPERATING ROOM | Age: 70
DRG: 291 | End: 2020-01-01
Payer: MEDICARE

## 2020-01-01 ENCOUNTER — APPOINTMENT (OUTPATIENT)
Dept: GENERAL RADIOLOGY | Age: 70
DRG: 291 | End: 2020-01-01
Attending: INTERNAL MEDICINE
Payer: MEDICARE

## 2020-01-01 ENCOUNTER — APPOINTMENT (OUTPATIENT)
Dept: GENERAL RADIOLOGY | Age: 70
End: 2020-01-01
Payer: MEDICARE

## 2020-01-01 ENCOUNTER — TELEPHONE (OUTPATIENT)
Dept: PULMONOLOGY | Age: 70
End: 2020-01-01

## 2020-01-01 ENCOUNTER — APPOINTMENT (OUTPATIENT)
Dept: CT IMAGING | Age: 70
DRG: 291 | End: 2020-01-01
Attending: INTERNAL MEDICINE
Payer: MEDICARE

## 2020-01-01 ENCOUNTER — APPOINTMENT (OUTPATIENT)
Dept: CT IMAGING | Age: 70
End: 2020-01-01
Payer: MEDICARE

## 2020-01-01 ENCOUNTER — HOSPITAL ENCOUNTER (EMERGENCY)
Age: 70
Discharge: ANOTHER HOSPITAL | End: 2020-07-25
Attending: EMERGENCY MEDICINE
Payer: MEDICARE

## 2020-01-01 ENCOUNTER — ANESTHESIA (OUTPATIENT)
Dept: OPERATING ROOM | Age: 70
DRG: 291 | End: 2020-01-01
Payer: MEDICARE

## 2020-01-01 ENCOUNTER — HOSPITAL ENCOUNTER (INPATIENT)
Age: 70
LOS: 19 days | DRG: 291 | End: 2020-08-13
Attending: INTERNAL MEDICINE | Admitting: INTERNAL MEDICINE
Payer: MEDICARE

## 2020-01-01 ENCOUNTER — APPOINTMENT (OUTPATIENT)
Dept: MRI IMAGING | Age: 70
DRG: 291 | End: 2020-01-01
Attending: INTERNAL MEDICINE
Payer: MEDICARE

## 2020-01-01 VITALS
DIASTOLIC BLOOD PRESSURE: 59 MMHG | SYSTOLIC BLOOD PRESSURE: 124 MMHG | WEIGHT: 290 LBS | OXYGEN SATURATION: 100 % | HEART RATE: 90 BPM | TEMPERATURE: 98.2 F | RESPIRATION RATE: 8 BRPM | BODY MASS INDEX: 56.64 KG/M2

## 2020-01-01 VITALS
RESPIRATION RATE: 8 BRPM | SYSTOLIC BLOOD PRESSURE: 125 MMHG | OXYGEN SATURATION: 100 % | TEMPERATURE: 96.8 F | DIASTOLIC BLOOD PRESSURE: 73 MMHG

## 2020-01-01 VITALS
HEART RATE: 77 BPM | RESPIRATION RATE: 43 BRPM | OXYGEN SATURATION: 93 % | TEMPERATURE: 100.8 F | BODY MASS INDEX: 37.92 KG/M2 | WEIGHT: 193.12 LBS | SYSTOLIC BLOOD PRESSURE: 63 MMHG | DIASTOLIC BLOOD PRESSURE: 33 MMHG | HEIGHT: 60 IN

## 2020-01-01 LAB
ABO/RH: NORMAL
ADENOVIRUS DETECTION BY PCR: NOT DETECTED
ALBUMIN SERPL-MCNC: 2.8 GM/DL (ref 3.4–5)
ALBUMIN SERPL-MCNC: 3 GM/DL (ref 3.4–5)
ALP BLD-CCNC: 125 IU/L (ref 40–129)
ALT SERPL-CCNC: 11 U/L (ref 10–40)
AMMONIA: 27 UMOL/L (ref 11–51)
AMMONIA: 59 UMOL/L (ref 11–51)
ANION GAP SERPL CALCULATED.3IONS-SCNC: 10 MMOL/L (ref 4–16)
ANION GAP SERPL CALCULATED.3IONS-SCNC: 10 MMOL/L (ref 4–16)
ANION GAP SERPL CALCULATED.3IONS-SCNC: 11 MMOL/L (ref 4–16)
ANION GAP SERPL CALCULATED.3IONS-SCNC: 12 MMOL/L (ref 4–16)
ANION GAP SERPL CALCULATED.3IONS-SCNC: 13 MMOL/L (ref 4–16)
ANION GAP SERPL CALCULATED.3IONS-SCNC: 14 MMOL/L (ref 4–16)
ANION GAP SERPL CALCULATED.3IONS-SCNC: 14 MMOL/L (ref 4–16)
ANION GAP SERPL CALCULATED.3IONS-SCNC: 15 MMOL/L (ref 4–16)
ANION GAP SERPL CALCULATED.3IONS-SCNC: 17 MMOL/L (ref 4–16)
ANION GAP SERPL CALCULATED.3IONS-SCNC: 8 MMOL/L (ref 4–16)
ANION GAP SERPL CALCULATED.3IONS-SCNC: 9 MMOL/L (ref 4–16)
ANTIBODY SCREEN: NEGATIVE
APTT: 20.3 SECONDS (ref 25.1–37.1)
APTT: 32.9 SECONDS (ref 25.1–37.1)
APTT: 34.5 SECONDS (ref 25.1–37.1)
APTT: 36.5 SECONDS (ref 25.1–37.1)
APTT: 37.6 SECONDS (ref 25.1–37.1)
APTT: 38.2 SECONDS (ref 25.1–37.1)
APTT: 38.6 SECONDS (ref 25.1–37.1)
AST SERPL-CCNC: 23 IU/L (ref 15–37)
BACTERIA: ABNORMAL /HPF
BACTERIA: ABNORMAL /HPF
BASE EXCESS MIXED: 10 (ref 0–2.3)
BASE EXCESS MIXED: 10.3 (ref 0–2.3)
BASE EXCESS MIXED: 10.3 (ref 0–2.3)
BASE EXCESS MIXED: 10.6 (ref 0–2.3)
BASE EXCESS MIXED: 11 (ref 0–2.3)
BASE EXCESS MIXED: 11.5 (ref 0–2.3)
BASE EXCESS MIXED: 12.2 (ref 0–2.3)
BASE EXCESS MIXED: 12.6 (ref 0–2.3)
BASE EXCESS MIXED: 13.5 (ref 0–2.3)
BASE EXCESS MIXED: 13.7 (ref 0–2.3)
BASE EXCESS MIXED: 14.3 (ref 0–2.3)
BASE EXCESS MIXED: 15.3 (ref 0–2.3)
BASE EXCESS MIXED: 15.5 (ref 0–2.3)
BASE EXCESS MIXED: 17.4 (ref 0–2.3)
BASE EXCESS MIXED: 4.3 (ref 0–2.3)
BASE EXCESS MIXED: 7.2 (ref 0–2.3)
BASE EXCESS MIXED: 7.4 (ref 0–2.3)
BASE EXCESS MIXED: 7.4 (ref 0–2.3)
BASE EXCESS MIXED: 8.8 (ref 0–2.3)
BASE EXCESS MIXED: 9.2 (ref 0–2.3)
BASE EXCESS: ABNORMAL (ref 0–2.4)
BASOPHILS ABSOLUTE: 0 K/CU MM
BASOPHILS ABSOLUTE: 0.1 K/CU MM
BASOPHILS RELATIVE PERCENT: 0.2 % (ref 0–1)
BASOPHILS RELATIVE PERCENT: 0.2 % (ref 0–1)
BASOPHILS RELATIVE PERCENT: 0.3 % (ref 0–1)
BASOPHILS RELATIVE PERCENT: 0.4 % (ref 0–1)
BASOPHILS RELATIVE PERCENT: 0.5 % (ref 0–1)
BASOPHILS RELATIVE PERCENT: 0.6 % (ref 0–1)
BASOPHILS RELATIVE PERCENT: 0.7 % (ref 0–1)
BASOPHILS RELATIVE PERCENT: 0.7 % (ref 0–1)
BASOPHILS RELATIVE PERCENT: 0.8 % (ref 0–1)
BILIRUB SERPL-MCNC: 0.3 MG/DL (ref 0–1)
BILIRUBIN URINE: NEGATIVE MG/DL
BILIRUBIN URINE: NEGATIVE MG/DL
BLOOD, URINE: ABNORMAL
BLOOD, URINE: ABNORMAL
BORDETELLA PARAPERTUSSIS BY PCR: NOT DETECTED
BORDETELLA PERTUSSIS PCR: NOT DETECTED
BUN BLDV-MCNC: 22 MG/DL (ref 6–23)
BUN BLDV-MCNC: 23 MG/DL (ref 6–23)
BUN BLDV-MCNC: 24 MG/DL (ref 6–23)
BUN BLDV-MCNC: 24 MG/DL (ref 6–23)
BUN BLDV-MCNC: 25 MG/DL (ref 6–23)
BUN BLDV-MCNC: 26 MG/DL (ref 6–23)
BUN BLDV-MCNC: 28 MG/DL (ref 6–23)
BUN BLDV-MCNC: 28 MG/DL (ref 6–23)
BUN BLDV-MCNC: 31 MG/DL (ref 6–23)
BUN BLDV-MCNC: 31 MG/DL (ref 6–23)
BUN BLDV-MCNC: 32 MG/DL (ref 6–23)
BUN BLDV-MCNC: 33 MG/DL (ref 6–23)
BUN BLDV-MCNC: 33 MG/DL (ref 6–23)
BUN BLDV-MCNC: 36 MG/DL (ref 6–23)
BUN BLDV-MCNC: 38 MG/DL (ref 6–23)
BUN BLDV-MCNC: 39 MG/DL (ref 6–23)
BUN BLDV-MCNC: 40 MG/DL (ref 6–23)
BUN BLDV-MCNC: 40 MG/DL (ref 6–23)
BUN BLDV-MCNC: 41 MG/DL (ref 6–23)
BUN BLDV-MCNC: 41 MG/DL (ref 6–23)
BUN BLDV-MCNC: 42 MG/DL (ref 6–23)
BUN BLDV-MCNC: 44 MG/DL (ref 6–23)
BUN BLDV-MCNC: 45 MG/DL (ref 6–23)
BUN BLDV-MCNC: 49 MG/DL (ref 6–23)
BUN BLDV-MCNC: 55 MG/DL (ref 6–23)
BUN BLDV-MCNC: 61 MG/DL (ref 6–23)
CALCIUM IONIZED: 3.8 MG/DL (ref 4.48–5.28)
CALCIUM IONIZED: 3.8 MG/DL (ref 4.48–5.28)
CALCIUM IONIZED: 3.88 MG/DL (ref 4.48–5.28)
CALCIUM IONIZED: 3.92 MG/DL (ref 4.48–5.28)
CALCIUM IONIZED: 3.96 MG/DL (ref 4.48–5.28)
CALCIUM IONIZED: 4 MG/DL (ref 4.48–5.28)
CALCIUM IONIZED: 4.04 MG/DL (ref 4.48–5.28)
CALCIUM IONIZED: 4.16 MG/DL (ref 4.48–5.28)
CALCIUM IONIZED: 4.24 MG/DL (ref 4.48–5.28)
CALCIUM IONIZED: 4.32 MG/DL (ref 4.48–5.28)
CALCIUM IONIZED: 4.36 MG/DL (ref 4.48–5.28)
CALCIUM IONIZED: 4.36 MG/DL (ref 4.48–5.28)
CALCIUM IONIZED: 4.4 MG/DL (ref 4.48–5.28)
CALCIUM IONIZED: 4.44 MG/DL (ref 4.48–5.28)
CALCIUM IONIZED: 4.44 MG/DL (ref 4.48–5.28)
CALCIUM IONIZED: 4.48 MG/DL (ref 4.48–5.28)
CALCIUM IONIZED: 4.52 MG/DL (ref 4.48–5.28)
CALCIUM IONIZED: 4.68 MG/DL (ref 4.48–5.28)
CALCIUM SERPL-MCNC: 7.9 MG/DL (ref 8.3–10.6)
CALCIUM SERPL-MCNC: 7.9 MG/DL (ref 8.3–10.6)
CALCIUM SERPL-MCNC: 8 MG/DL (ref 8.3–10.6)
CALCIUM SERPL-MCNC: 8 MG/DL (ref 8.3–10.6)
CALCIUM SERPL-MCNC: 8.1 MG/DL (ref 8.3–10.6)
CALCIUM SERPL-MCNC: 8.2 MG/DL (ref 8.3–10.6)
CALCIUM SERPL-MCNC: 8.4 MG/DL (ref 8.3–10.6)
CALCIUM SERPL-MCNC: 8.6 MG/DL (ref 8.3–10.6)
CALCIUM SERPL-MCNC: 8.7 MG/DL (ref 8.3–10.6)
CALCIUM SERPL-MCNC: 8.7 MG/DL (ref 8.3–10.6)
CALCIUM SERPL-MCNC: 8.8 MG/DL (ref 8.3–10.6)
CALCIUM SERPL-MCNC: 8.9 MG/DL (ref 8.3–10.6)
CALCIUM SERPL-MCNC: 9 MG/DL (ref 8.3–10.6)
CALCIUM SERPL-MCNC: 9.2 MG/DL (ref 8.3–10.6)
CARBON MONOXIDE, BLOOD: 1.7 % (ref 0–5)
CARBON MONOXIDE, BLOOD: 2.1 % (ref 0–5)
CARBON MONOXIDE, BLOOD: 2.2 % (ref 0–5)
CARBON MONOXIDE, BLOOD: 2.4 % (ref 0–5)
CARBON MONOXIDE, BLOOD: 2.5 % (ref 0–5)
CARBON MONOXIDE, BLOOD: 2.6 % (ref 0–5)
CARBON MONOXIDE, BLOOD: 2.7 % (ref 0–5)
CARBON MONOXIDE, BLOOD: 2.8 % (ref 0–5)
CARBON MONOXIDE, BLOOD: 2.8 % (ref 0–5)
CARBON MONOXIDE, BLOOD: 3 % (ref 0–5)
CARBON MONOXIDE, BLOOD: 3.1 % (ref 0–5)
CARBON MONOXIDE, BLOOD: 4 % (ref 0–5)
CAST TYPE: NEGATIVE /HPF
CHLAMYDOPHILA PNEUMONIA PCR: NOT DETECTED
CHLORIDE BLD-SCNC: 101 MMOL/L (ref 99–110)
CHLORIDE BLD-SCNC: 86 MMOL/L (ref 99–110)
CHLORIDE BLD-SCNC: 87 MMOL/L (ref 99–110)
CHLORIDE BLD-SCNC: 88 MMOL/L (ref 99–110)
CHLORIDE BLD-SCNC: 90 MMOL/L (ref 99–110)
CHLORIDE BLD-SCNC: 91 MMOL/L (ref 99–110)
CHLORIDE BLD-SCNC: 92 MMOL/L (ref 99–110)
CHLORIDE BLD-SCNC: 93 MMOL/L (ref 99–110)
CHLORIDE BLD-SCNC: 94 MMOL/L (ref 99–110)
CHLORIDE BLD-SCNC: 95 MMOL/L (ref 99–110)
CHLORIDE BLD-SCNC: 96 MMOL/L (ref 99–110)
CHLORIDE BLD-SCNC: 96 MMOL/L (ref 99–110)
CHLORIDE BLD-SCNC: 99 MMOL/L (ref 99–110)
CLARITY: ABNORMAL
CLARITY: CLEAR
CO2 CONTENT: 32.2 MMOL/L (ref 19–24)
CO2 CONTENT: 32.4 MMOL/L (ref 19–24)
CO2 CONTENT: 32.5 MMOL/L (ref 19–24)
CO2 CONTENT: 33.4 MMOL/L (ref 19–24)
CO2 CONTENT: 34 MMOL/L (ref 19–24)
CO2 CONTENT: 35.5 MMOL/L (ref 19–24)
CO2 CONTENT: 35.6 MMOL/L (ref 19–24)
CO2 CONTENT: 35.6 MMOL/L (ref 19–24)
CO2 CONTENT: 36.3 MMOL/L (ref 19–24)
CO2 CONTENT: 36.4 MMOL/L (ref 19–24)
CO2 CONTENT: 36.4 MMOL/L (ref 19–24)
CO2 CONTENT: 37.2 MMOL/L (ref 19–24)
CO2 CONTENT: 37.8 MMOL/L (ref 19–24)
CO2 CONTENT: 38.2 MMOL/L (ref 19–24)
CO2 CONTENT: 38.7 MMOL/L (ref 19–24)
CO2 CONTENT: 40.8 MMOL/L (ref 19–24)
CO2 CONTENT: 40.9 MMOL/L (ref 19–24)
CO2 CONTENT: 41.1 MMOL/L (ref 19–24)
CO2 CONTENT: 41.4 MMOL/L (ref 19–24)
CO2 CONTENT: 41.6 MMOL/L (ref 19–24)
CO2: 27 MMOL/L (ref 21–32)
CO2: 28 MMOL/L (ref 21–32)
CO2: 29 MMOL/L (ref 21–32)
CO2: 30 MMOL/L (ref 21–32)
CO2: 31 MMOL/L (ref 21–32)
CO2: 32 MMOL/L (ref 21–32)
CO2: 33 MMOL/L (ref 21–32)
CO2: 34 MMOL/L (ref 21–32)
CO2: 35 MMOL/L (ref 21–32)
CO2: 36 MMOL/L (ref 21–32)
CO2: 36 MMOL/L (ref 21–32)
CO2: 37 MMOL/L (ref 21–32)
CO2: 37 MMOL/L (ref 21–32)
COLOR: ABNORMAL
COLOR: YELLOW
COMMENT: ABNORMAL
COMPONENT: NORMAL
CORONAVIRUS 229E PCR: NOT DETECTED
CORONAVIRUS HKU1 PCR: NOT DETECTED
CORONAVIRUS NL63 PCR: NOT DETECTED
CORONAVIRUS OC43 PCR: NOT DETECTED
CREAT SERPL-MCNC: 1.1 MG/DL (ref 0.6–1.1)
CREAT SERPL-MCNC: 1.1 MG/DL (ref 0.6–1.1)
CREAT SERPL-MCNC: 1.2 MG/DL (ref 0.6–1.1)
CREAT SERPL-MCNC: 1.3 MG/DL (ref 0.6–1.1)
CREAT SERPL-MCNC: 1.4 MG/DL (ref 0.6–1.1)
CREAT SERPL-MCNC: 1.6 MG/DL (ref 0.6–1.1)
CREAT SERPL-MCNC: 1.7 MG/DL (ref 0.6–1.1)
CROSSMATCH RESULT: NORMAL
CRYSTAL TYPE: NEGATIVE /HPF
CULTURE: ABNORMAL
CULTURE: NORMAL
D DIMER: 1026 NG/ML(DDU)
D DIMER: 1046 NG/ML(DDU)
D DIMER: 1100 NG/ML(DDU)
D DIMER: 759 NG/ML(DDU)
D DIMER: 819 NG/ML(DDU)
D DIMER: 871 NG/ML(DDU)
D DIMER: 895 NG/ML(DDU)
DIFFERENTIAL TYPE: ABNORMAL
DOSE AMOUNT: NORMAL
DOSE TIME: NORMAL
EKG ATRIAL RATE: 84 BPM
EKG DIAGNOSIS: NORMAL
EKG P AXIS: 38 DEGREES
EKG P-R INTERVAL: 198 MS
EKG Q-T INTERVAL: 374 MS
EKG QRS DURATION: 84 MS
EKG QTC CALCULATION (BAZETT): 441 MS
EKG R AXIS: -36 DEGREES
EKG T AXIS: 157 DEGREES
EKG VENTRICULAR RATE: 84 BPM
EOSINOPHILS ABSOLUTE: 0 K/CU MM
EOSINOPHILS ABSOLUTE: 0.1 K/CU MM
EOSINOPHILS ABSOLUTE: 0.2 K/CU MM
EOSINOPHILS ABSOLUTE: 0.3 K/CU MM
EOSINOPHILS ABSOLUTE: 0.3 K/CU MM
EOSINOPHILS ABSOLUTE: 0.4 K/CU MM
EOSINOPHILS ABSOLUTE: 0.4 K/CU MM
EOSINOPHILS ABSOLUTE: 0.5 K/CU MM
EOSINOPHILS ABSOLUTE: 0.7 K/CU MM
EOSINOPHILS RELATIVE PERCENT: 0 % (ref 0–3)
EOSINOPHILS RELATIVE PERCENT: 0.1 % (ref 0–3)
EOSINOPHILS RELATIVE PERCENT: 0.2 % (ref 0–3)
EOSINOPHILS RELATIVE PERCENT: 0.6 % (ref 0–3)
EOSINOPHILS RELATIVE PERCENT: 0.7 % (ref 0–3)
EOSINOPHILS RELATIVE PERCENT: 0.8 % (ref 0–3)
EOSINOPHILS RELATIVE PERCENT: 0.9 % (ref 0–3)
EOSINOPHILS RELATIVE PERCENT: 1.2 % (ref 0–3)
EOSINOPHILS RELATIVE PERCENT: 1.2 % (ref 0–3)
EOSINOPHILS RELATIVE PERCENT: 1.3 % (ref 0–3)
EOSINOPHILS RELATIVE PERCENT: 1.5 % (ref 0–3)
EOSINOPHILS RELATIVE PERCENT: 1.6 % (ref 0–3)
EOSINOPHILS RELATIVE PERCENT: 2.1 % (ref 0–3)
EOSINOPHILS RELATIVE PERCENT: 2.3 % (ref 0–3)
EOSINOPHILS RELATIVE PERCENT: 2.5 % (ref 0–3)
EOSINOPHILS RELATIVE PERCENT: 2.8 % (ref 0–3)
EOSINOPHILS RELATIVE PERCENT: 3.3 % (ref 0–3)
EOSINOPHILS RELATIVE PERCENT: 3.6 % (ref 0–3)
EOSINOPHILS RELATIVE PERCENT: 3.9 % (ref 0–3)
EOSINOPHILS RELATIVE PERCENT: 4.5 % (ref 0–3)
EPITHELIAL CELLS, UA: ABNORMAL /HPF
ESTIMATED AVERAGE GLUCOSE: 298 MG/DL
FERRITIN: 174 NG/ML (ref 15–150)
FERRITIN: 81 NG/ML (ref 15–150)
FERRITIN: 85 NG/ML (ref 15–150)
FIBRINOGEN LEVEL: 368 MG/DL (ref 196.9–442.1)
FIBRINOGEN LEVEL: 445 MG/DL (ref 196.9–442.1)
FIBRINOGEN LEVEL: 467 MG/DL (ref 196.9–442.1)
FIBRINOGEN LEVEL: 478 MG/DL (ref 196.9–442.1)
FIBRINOGEN LEVEL: 500 MG/DL (ref 196.9–442.1)
FIBRINOGEN LEVEL: 513 MG/DL (ref 196.9–442.1)
FIBRINOGEN LEVEL: 585 MG/DL (ref 196.9–442.1)
FOLATE: 8.7 NG/ML (ref 3.1–17.5)
GFR AFRICAN AMERICAN: 36 ML/MIN/1.73M2
GFR AFRICAN AMERICAN: 39 ML/MIN/1.73M2
GFR AFRICAN AMERICAN: 45 ML/MIN/1.73M2
GFR AFRICAN AMERICAN: 49 ML/MIN/1.73M2
GFR AFRICAN AMERICAN: 54 ML/MIN/1.73M2
GFR AFRICAN AMERICAN: 59 ML/MIN/1.73M2
GFR AFRICAN AMERICAN: 59 ML/MIN/1.73M2
GFR NON-AFRICAN AMERICAN: 30 ML/MIN/1.73M2
GFR NON-AFRICAN AMERICAN: 32 ML/MIN/1.73M2
GFR NON-AFRICAN AMERICAN: 37 ML/MIN/1.73M2
GFR NON-AFRICAN AMERICAN: 40 ML/MIN/1.73M2
GFR NON-AFRICAN AMERICAN: 44 ML/MIN/1.73M2
GFR NON-AFRICAN AMERICAN: 49 ML/MIN/1.73M2
GFR NON-AFRICAN AMERICAN: 49 ML/MIN/1.73M2
GLUCOSE BLD-MCNC: 100 MG/DL (ref 70–99)
GLUCOSE BLD-MCNC: 109 MG/DL (ref 70–99)
GLUCOSE BLD-MCNC: 112 MG/DL (ref 70–99)
GLUCOSE BLD-MCNC: 115 MG/DL (ref 70–99)
GLUCOSE BLD-MCNC: 120 MG/DL (ref 70–99)
GLUCOSE BLD-MCNC: 122 MG/DL (ref 70–99)
GLUCOSE BLD-MCNC: 122 MG/DL (ref 70–99)
GLUCOSE BLD-MCNC: 124 MG/DL (ref 70–99)
GLUCOSE BLD-MCNC: 125 MG/DL (ref 70–99)
GLUCOSE BLD-MCNC: 128 MG/DL (ref 70–99)
GLUCOSE BLD-MCNC: 128 MG/DL (ref 70–99)
GLUCOSE BLD-MCNC: 129 MG/DL (ref 70–99)
GLUCOSE BLD-MCNC: 137 MG/DL (ref 70–99)
GLUCOSE BLD-MCNC: 138 MG/DL (ref 70–99)
GLUCOSE BLD-MCNC: 139 MG/DL (ref 70–99)
GLUCOSE BLD-MCNC: 140 MG/DL (ref 70–99)
GLUCOSE BLD-MCNC: 141 MG/DL (ref 70–99)
GLUCOSE BLD-MCNC: 141 MG/DL (ref 70–99)
GLUCOSE BLD-MCNC: 142 MG/DL (ref 70–99)
GLUCOSE BLD-MCNC: 143 MG/DL (ref 70–99)
GLUCOSE BLD-MCNC: 144 MG/DL (ref 70–99)
GLUCOSE BLD-MCNC: 146 MG/DL (ref 70–99)
GLUCOSE BLD-MCNC: 147 MG/DL (ref 70–99)
GLUCOSE BLD-MCNC: 148 MG/DL (ref 70–99)
GLUCOSE BLD-MCNC: 150 MG/DL (ref 70–99)
GLUCOSE BLD-MCNC: 150 MG/DL (ref 70–99)
GLUCOSE BLD-MCNC: 151 MG/DL (ref 70–99)
GLUCOSE BLD-MCNC: 151 MG/DL (ref 70–99)
GLUCOSE BLD-MCNC: 152 MG/DL (ref 70–99)
GLUCOSE BLD-MCNC: 153 MG/DL (ref 70–99)
GLUCOSE BLD-MCNC: 153 MG/DL (ref 70–99)
GLUCOSE BLD-MCNC: 154 MG/DL (ref 70–99)
GLUCOSE BLD-MCNC: 155 MG/DL (ref 70–99)
GLUCOSE BLD-MCNC: 156 MG/DL (ref 70–99)
GLUCOSE BLD-MCNC: 157 MG/DL (ref 70–99)
GLUCOSE BLD-MCNC: 160 MG/DL (ref 70–99)
GLUCOSE BLD-MCNC: 164 MG/DL (ref 70–99)
GLUCOSE BLD-MCNC: 166 MG/DL (ref 70–99)
GLUCOSE BLD-MCNC: 172 MG/DL (ref 70–99)
GLUCOSE BLD-MCNC: 172 MG/DL (ref 70–99)
GLUCOSE BLD-MCNC: 173 MG/DL (ref 70–99)
GLUCOSE BLD-MCNC: 173 MG/DL (ref 70–99)
GLUCOSE BLD-MCNC: 175 MG/DL (ref 70–99)
GLUCOSE BLD-MCNC: 178 MG/DL (ref 70–99)
GLUCOSE BLD-MCNC: 178 MG/DL (ref 70–99)
GLUCOSE BLD-MCNC: 179 MG/DL (ref 70–99)
GLUCOSE BLD-MCNC: 180 MG/DL (ref 70–99)
GLUCOSE BLD-MCNC: 181 MG/DL (ref 70–99)
GLUCOSE BLD-MCNC: 182 MG/DL (ref 70–99)
GLUCOSE BLD-MCNC: 182 MG/DL (ref 70–99)
GLUCOSE BLD-MCNC: 183 MG/DL (ref 70–99)
GLUCOSE BLD-MCNC: 183 MG/DL (ref 70–99)
GLUCOSE BLD-MCNC: 185 MG/DL (ref 70–99)
GLUCOSE BLD-MCNC: 186 MG/DL (ref 70–99)
GLUCOSE BLD-MCNC: 186 MG/DL (ref 70–99)
GLUCOSE BLD-MCNC: 187 MG/DL (ref 70–99)
GLUCOSE BLD-MCNC: 188 MG/DL (ref 70–99)
GLUCOSE BLD-MCNC: 188 MG/DL (ref 70–99)
GLUCOSE BLD-MCNC: 191 MG/DL (ref 70–99)
GLUCOSE BLD-MCNC: 195 MG/DL (ref 70–99)
GLUCOSE BLD-MCNC: 195 MG/DL (ref 70–99)
GLUCOSE BLD-MCNC: 196 MG/DL (ref 70–99)
GLUCOSE BLD-MCNC: 198 MG/DL (ref 70–99)
GLUCOSE BLD-MCNC: 199 MG/DL (ref 70–99)
GLUCOSE BLD-MCNC: 206 MG/DL (ref 70–99)
GLUCOSE BLD-MCNC: 207 MG/DL (ref 70–99)
GLUCOSE BLD-MCNC: 211 MG/DL (ref 70–99)
GLUCOSE BLD-MCNC: 214 MG/DL (ref 70–99)
GLUCOSE BLD-MCNC: 214 MG/DL (ref 70–99)
GLUCOSE BLD-MCNC: 215 MG/DL (ref 70–99)
GLUCOSE BLD-MCNC: 216 MG/DL (ref 70–99)
GLUCOSE BLD-MCNC: 218 MG/DL (ref 70–99)
GLUCOSE BLD-MCNC: 220 MG/DL (ref 70–99)
GLUCOSE BLD-MCNC: 222 MG/DL (ref 70–99)
GLUCOSE BLD-MCNC: 225 MG/DL (ref 70–99)
GLUCOSE BLD-MCNC: 226 MG/DL (ref 70–99)
GLUCOSE BLD-MCNC: 233 MG/DL (ref 70–99)
GLUCOSE BLD-MCNC: 235 MG/DL (ref 70–99)
GLUCOSE BLD-MCNC: 236 MG/DL (ref 70–99)
GLUCOSE BLD-MCNC: 236 MG/DL (ref 70–99)
GLUCOSE BLD-MCNC: 238 MG/DL (ref 70–99)
GLUCOSE BLD-MCNC: 240 MG/DL (ref 70–99)
GLUCOSE BLD-MCNC: 240 MG/DL (ref 70–99)
GLUCOSE BLD-MCNC: 248 MG/DL (ref 70–99)
GLUCOSE BLD-MCNC: 248 MG/DL (ref 70–99)
GLUCOSE BLD-MCNC: 249 MG/DL (ref 70–99)
GLUCOSE BLD-MCNC: 250 MG/DL (ref 70–99)
GLUCOSE BLD-MCNC: 250 MG/DL (ref 70–99)
GLUCOSE BLD-MCNC: 253 MG/DL (ref 70–99)
GLUCOSE BLD-MCNC: 253 MG/DL (ref 70–99)
GLUCOSE BLD-MCNC: 256 MG/DL (ref 70–99)
GLUCOSE BLD-MCNC: 258 MG/DL (ref 70–99)
GLUCOSE BLD-MCNC: 260 MG/DL (ref 70–99)
GLUCOSE BLD-MCNC: 267 MG/DL (ref 70–99)
GLUCOSE BLD-MCNC: 272 MG/DL (ref 70–99)
GLUCOSE BLD-MCNC: 280 MG/DL (ref 70–99)
GLUCOSE BLD-MCNC: 283 MG/DL (ref 70–99)
GLUCOSE BLD-MCNC: 285 MG/DL (ref 70–99)
GLUCOSE BLD-MCNC: 287 MG/DL (ref 70–99)
GLUCOSE BLD-MCNC: 288 MG/DL (ref 70–99)
GLUCOSE BLD-MCNC: 290 MG/DL (ref 70–99)
GLUCOSE BLD-MCNC: 291 MG/DL (ref 70–99)
GLUCOSE BLD-MCNC: 294 MG/DL (ref 70–99)
GLUCOSE BLD-MCNC: 298 MG/DL (ref 70–99)
GLUCOSE BLD-MCNC: 301 MG/DL (ref 70–99)
GLUCOSE BLD-MCNC: 302 MG/DL (ref 70–99)
GLUCOSE BLD-MCNC: 306 MG/DL (ref 70–99)
GLUCOSE BLD-MCNC: 318 MG/DL (ref 70–99)
GLUCOSE BLD-MCNC: 331 MG/DL (ref 70–99)
GLUCOSE BLD-MCNC: 334 MG/DL (ref 70–99)
GLUCOSE BLD-MCNC: 337 MG/DL (ref 70–99)
GLUCOSE BLD-MCNC: 340 MG/DL (ref 70–99)
GLUCOSE BLD-MCNC: 347 MG/DL (ref 70–99)
GLUCOSE BLD-MCNC: 357 MG/DL (ref 70–99)
GLUCOSE BLD-MCNC: 357 MG/DL (ref 70–99)
GLUCOSE BLD-MCNC: 358 MG/DL (ref 70–99)
GLUCOSE BLD-MCNC: 362 MG/DL (ref 70–99)
GLUCOSE BLD-MCNC: 377 MG/DL (ref 70–99)
GLUCOSE BLD-MCNC: 379 MG/DL (ref 70–99)
GLUCOSE BLD-MCNC: 402 MG/DL (ref 70–99)
GLUCOSE BLD-MCNC: 417 MG/DL (ref 70–99)
GLUCOSE BLD-MCNC: 476 MG/DL (ref 70–99)
GLUCOSE BLD-MCNC: 479 MG/DL (ref 70–99)
GLUCOSE BLD-MCNC: 98 MG/DL (ref 70–99)
GLUCOSE BLD-MCNC: ABNORMAL MG/DL (ref 70–99)
GLUCOSE, URINE: 50 MG/DL
GLUCOSE, URINE: >1000 MG/DL
HBA1C MFR BLD: 12 % (ref 4.2–6.3)
HCO3 ARTERIAL: 30.6 MMOL/L (ref 18–23)
HCO3 ARTERIAL: 31.2 MMOL/L (ref 18–23)
HCO3 ARTERIAL: 31.2 MMOL/L (ref 18–23)
HCO3 ARTERIAL: 32 MMOL/L (ref 18–23)
HCO3 ARTERIAL: 32.7 MMOL/L (ref 18–23)
HCO3 ARTERIAL: 34.3 MMOL/L (ref 18–23)
HCO3 ARTERIAL: 34.3 MMOL/L (ref 18–23)
HCO3 ARTERIAL: 34.4 MMOL/L (ref 18–23)
HCO3 ARTERIAL: 34.8 MMOL/L (ref 18–23)
HCO3 ARTERIAL: 35 MMOL/L (ref 18–23)
HCO3 ARTERIAL: 35.1 MMOL/L (ref 18–23)
HCO3 ARTERIAL: 35.7 MMOL/L (ref 18–23)
HCO3 ARTERIAL: 36.6 MMOL/L (ref 18–23)
HCO3 ARTERIAL: 36.8 MMOL/L (ref 18–23)
HCO3 ARTERIAL: 37.1 MMOL/L (ref 18–23)
HCO3 ARTERIAL: 39.4 MMOL/L (ref 18–23)
HCO3 ARTERIAL: 39.5 MMOL/L (ref 18–23)
HCO3 ARTERIAL: 39.8 MMOL/L (ref 18–23)
HCO3 ARTERIAL: 39.8 MMOL/L (ref 18–23)
HCO3 ARTERIAL: 40.2 MMOL/L (ref 18–23)
HCT VFR BLD CALC: 21.2 % (ref 37–47)
HCT VFR BLD CALC: 21.7 % (ref 37–47)
HCT VFR BLD CALC: 23 % (ref 37–47)
HCT VFR BLD CALC: 23.1 % (ref 37–47)
HCT VFR BLD CALC: 23.9 % (ref 37–47)
HCT VFR BLD CALC: 24.4 % (ref 37–47)
HCT VFR BLD CALC: 24.6 % (ref 37–47)
HCT VFR BLD CALC: 24.8 % (ref 37–47)
HCT VFR BLD CALC: 25.1 % (ref 37–47)
HCT VFR BLD CALC: 25.2 % (ref 37–47)
HCT VFR BLD CALC: 25.3 % (ref 37–47)
HCT VFR BLD CALC: 25.7 % (ref 37–47)
HCT VFR BLD CALC: 26.6 % (ref 37–47)
HCT VFR BLD CALC: 26.8 % (ref 37–47)
HCT VFR BLD CALC: 27.2 % (ref 37–47)
HCT VFR BLD CALC: 27.6 % (ref 37–47)
HCT VFR BLD CALC: 27.6 % (ref 37–47)
HCT VFR BLD CALC: 28 % (ref 37–47)
HCT VFR BLD CALC: 29 % (ref 37–47)
HCT VFR BLD CALC: 31.3 % (ref 37–47)
HCT VFR BLD CALC: 33.7 % (ref 37–47)
HEMOGLOBIN: 10.2 GM/DL (ref 12.5–16)
HEMOGLOBIN: 6.5 GM/DL (ref 12.5–16)
HEMOGLOBIN: 6.7 GM/DL (ref 12.5–16)
HEMOGLOBIN: 6.8 GM/DL (ref 12.5–16)
HEMOGLOBIN: 7.2 GM/DL (ref 12.5–16)
HEMOGLOBIN: 7.3 GM/DL (ref 12.5–16)
HEMOGLOBIN: 7.4 GM/DL (ref 12.5–16)
HEMOGLOBIN: 7.5 GM/DL (ref 12.5–16)
HEMOGLOBIN: 7.5 GM/DL (ref 12.5–16)
HEMOGLOBIN: 7.6 GM/DL (ref 12.5–16)
HEMOGLOBIN: 7.7 GM/DL (ref 12.5–16)
HEMOGLOBIN: 7.8 GM/DL (ref 12.5–16)
HEMOGLOBIN: 7.9 GM/DL (ref 12.5–16)
HEMOGLOBIN: 8.2 GM/DL (ref 12.5–16)
HEMOGLOBIN: 8.2 GM/DL (ref 12.5–16)
HEMOGLOBIN: 8.5 GM/DL (ref 12.5–16)
HEMOGLOBIN: 8.6 GM/DL (ref 12.5–16)
HEMOGLOBIN: 8.7 GM/DL (ref 12.5–16)
HEMOGLOBIN: 8.7 GM/DL (ref 12.5–16)
HEMOGLOBIN: 8.8 GM/DL (ref 12.5–16)
HEMOGLOBIN: 9 GM/DL (ref 12.5–16)
HIGH SENSITIVE C-REACTIVE PROTEIN: 129.5 MG/L
HUMAN METAPNEUMOVIRUS PCR: NOT DETECTED
HYALINE CASTS: 0 /LPF
IMMATURE NEUTROPHIL %: 0.3 % (ref 0–0.43)
IMMATURE NEUTROPHIL %: 0.4 % (ref 0–0.43)
IMMATURE NEUTROPHIL %: 0.5 % (ref 0–0.43)
IMMATURE NEUTROPHIL %: 0.6 % (ref 0–0.43)
IMMATURE NEUTROPHIL %: 0.7 % (ref 0–0.43)
IMMATURE NEUTROPHIL %: 0.8 % (ref 0–0.43)
IMMATURE NEUTROPHIL %: 0.9 % (ref 0–0.43)
IMMATURE NEUTROPHIL %: 1 % (ref 0–0.43)
IMMATURE NEUTROPHIL %: 1 % (ref 0–0.43)
IMMATURE NEUTROPHIL %: 1.4 % (ref 0–0.43)
IMMATURE NEUTROPHIL %: 6.3 % (ref 0–0.43)
INFLUENZA A BY PCR: NOT DETECTED
INFLUENZA A H1 (2009) PCR: NOT DETECTED
INFLUENZA A H1 PANDEMIC PCR: NOT DETECTED
INFLUENZA A H3 PCR: NOT DETECTED
INFLUENZA B BY PCR: NOT DETECTED
INR BLD: 1.02 INDEX
INR BLD: 1.06 INDEX
INR BLD: 1.07 INDEX
INR BLD: 1.08 INDEX
INR BLD: 1.09 INDEX
INR BLD: 1.11 INDEX
INR BLD: 1.11 INDEX
INR BLD: 1.2 INDEX
IONIZED CA: 0.95 MMOL/L (ref 1.12–1.32)
IONIZED CA: 0.95 MMOL/L (ref 1.12–1.32)
IONIZED CA: 0.97 MMOL/L (ref 1.12–1.32)
IONIZED CA: 0.98 MMOL/L (ref 1.12–1.32)
IONIZED CA: 0.99 MMOL/L (ref 1.12–1.32)
IONIZED CA: 1 MMOL/L (ref 1.12–1.32)
IONIZED CA: 1.01 MMOL/L (ref 1.12–1.32)
IONIZED CA: 1.04 MMOL/L (ref 1.12–1.32)
IONIZED CA: 1.06 MMOL/L (ref 1.12–1.32)
IONIZED CA: 1.08 MMOL/L (ref 1.12–1.32)
IONIZED CA: 1.09 MMOL/L (ref 1.12–1.32)
IONIZED CA: 1.09 MMOL/L (ref 1.12–1.32)
IONIZED CA: 1.1 MMOL/L (ref 1.12–1.32)
IONIZED CA: 1.11 MMOL/L (ref 1.12–1.32)
IONIZED CA: 1.11 MMOL/L (ref 1.12–1.32)
IONIZED CA: 1.12 MMOL/L (ref 1.12–1.32)
IONIZED CA: 1.13 MMOL/L (ref 1.12–1.32)
IONIZED CA: 1.17 MMOL/L (ref 1.12–1.32)
IRON: 28 UG/DL (ref 37–145)
IRON: 46 UG/DL (ref 37–145)
KETONES, URINE: NEGATIVE MG/DL
KETONES, URINE: NEGATIVE MG/DL
LACTATE DEHYDROGENASE: 252 IU/L (ref 120–246)
LACTATE: 1.4 MMOL/L (ref 0.4–2)
LACTATE: NORMAL MMOL/L (ref 0.4–2)
LEGIONELLA URINARY AG: NEGATIVE
LEUKOCYTE ESTERASE, URINE: ABNORMAL
LEUKOCYTE ESTERASE, URINE: NEGATIVE
LIPASE: 12 IU/L (ref 13–60)
LV EF: 30 %
LVEF MODALITY: NORMAL
LYMPHOCYTES ABSOLUTE: 0.7 K/CU MM
LYMPHOCYTES ABSOLUTE: 1 K/CU MM
LYMPHOCYTES ABSOLUTE: 1.3 K/CU MM
LYMPHOCYTES ABSOLUTE: 1.4 K/CU MM
LYMPHOCYTES ABSOLUTE: 1.5 K/CU MM
LYMPHOCYTES ABSOLUTE: 1.7 K/CU MM
LYMPHOCYTES ABSOLUTE: 1.8 K/CU MM
LYMPHOCYTES ABSOLUTE: 1.8 K/CU MM
LYMPHOCYTES ABSOLUTE: 1.9 K/CU MM
LYMPHOCYTES ABSOLUTE: 2 K/CU MM
LYMPHOCYTES ABSOLUTE: 2.1 K/CU MM
LYMPHOCYTES ABSOLUTE: 2.4 K/CU MM
LYMPHOCYTES RELATIVE PERCENT: 10.4 % (ref 24–44)
LYMPHOCYTES RELATIVE PERCENT: 11 % (ref 24–44)
LYMPHOCYTES RELATIVE PERCENT: 12.4 % (ref 24–44)
LYMPHOCYTES RELATIVE PERCENT: 13.5 % (ref 24–44)
LYMPHOCYTES RELATIVE PERCENT: 13.9 % (ref 24–44)
LYMPHOCYTES RELATIVE PERCENT: 13.9 % (ref 24–44)
LYMPHOCYTES RELATIVE PERCENT: 14.1 % (ref 24–44)
LYMPHOCYTES RELATIVE PERCENT: 14.1 % (ref 24–44)
LYMPHOCYTES RELATIVE PERCENT: 14.5 % (ref 24–44)
LYMPHOCYTES RELATIVE PERCENT: 14.7 % (ref 24–44)
LYMPHOCYTES RELATIVE PERCENT: 14.8 % (ref 24–44)
LYMPHOCYTES RELATIVE PERCENT: 15.4 % (ref 24–44)
LYMPHOCYTES RELATIVE PERCENT: 16.1 % (ref 24–44)
LYMPHOCYTES RELATIVE PERCENT: 16.4 % (ref 24–44)
LYMPHOCYTES RELATIVE PERCENT: 16.6 % (ref 24–44)
LYMPHOCYTES RELATIVE PERCENT: 17.6 % (ref 24–44)
LYMPHOCYTES RELATIVE PERCENT: 17.7 % (ref 24–44)
LYMPHOCYTES RELATIVE PERCENT: 18.6 % (ref 24–44)
LYMPHOCYTES RELATIVE PERCENT: 5.1 % (ref 24–44)
LYMPHOCYTES RELATIVE PERCENT: 8.8 % (ref 24–44)
Lab: ABNORMAL
Lab: NORMAL
MAGNESIUM: 1.4 MG/DL (ref 1.8–2.4)
MAGNESIUM: 1.6 MG/DL (ref 1.8–2.4)
MAGNESIUM: 1.7 MG/DL (ref 1.8–2.4)
MAGNESIUM: 1.7 MG/DL (ref 1.8–2.4)
MAGNESIUM: 1.9 MG/DL (ref 1.8–2.4)
MAGNESIUM: 2 MG/DL (ref 1.8–2.4)
MAGNESIUM: 2.1 MG/DL (ref 1.8–2.4)
MAGNESIUM: 2.2 MG/DL (ref 1.8–2.4)
MCH RBC QN AUTO: 25.5 PG (ref 27–31)
MCH RBC QN AUTO: 25.6 PG (ref 27–31)
MCH RBC QN AUTO: 25.8 PG (ref 27–31)
MCH RBC QN AUTO: 25.9 PG (ref 27–31)
MCH RBC QN AUTO: 26 PG (ref 27–31)
MCH RBC QN AUTO: 26.3 PG (ref 27–31)
MCH RBC QN AUTO: 26.3 PG (ref 27–31)
MCH RBC QN AUTO: 26.6 PG (ref 27–31)
MCH RBC QN AUTO: 26.7 PG (ref 27–31)
MCH RBC QN AUTO: 27.2 PG (ref 27–31)
MCH RBC QN AUTO: 27.3 PG (ref 27–31)
MCH RBC QN AUTO: 27.4 PG (ref 27–31)
MCH RBC QN AUTO: 27.4 PG (ref 27–31)
MCH RBC QN AUTO: 27.6 PG (ref 27–31)
MCH RBC QN AUTO: 27.6 PG (ref 27–31)
MCH RBC QN AUTO: 27.9 PG (ref 27–31)
MCHC RBC AUTO-ENTMCNC: 28.8 % (ref 32–36)
MCHC RBC AUTO-ENTMCNC: 29.1 % (ref 32–36)
MCHC RBC AUTO-ENTMCNC: 29.9 % (ref 32–36)
MCHC RBC AUTO-ENTMCNC: 30 % (ref 32–36)
MCHC RBC AUTO-ENTMCNC: 30 % (ref 32–36)
MCHC RBC AUTO-ENTMCNC: 30.1 % (ref 32–36)
MCHC RBC AUTO-ENTMCNC: 30.2 % (ref 32–36)
MCHC RBC AUTO-ENTMCNC: 30.3 % (ref 32–36)
MCHC RBC AUTO-ENTMCNC: 30.3 % (ref 32–36)
MCHC RBC AUTO-ENTMCNC: 30.6 % (ref 32–36)
MCHC RBC AUTO-ENTMCNC: 30.7 % (ref 32–36)
MCHC RBC AUTO-ENTMCNC: 30.8 % (ref 32–36)
MCHC RBC AUTO-ENTMCNC: 31.1 % (ref 32–36)
MCHC RBC AUTO-ENTMCNC: 31.1 % (ref 32–36)
MCHC RBC AUTO-ENTMCNC: 31.2 % (ref 32–36)
MCHC RBC AUTO-ENTMCNC: 31.2 % (ref 32–36)
MCHC RBC AUTO-ENTMCNC: 31.3 % (ref 32–36)
MCHC RBC AUTO-ENTMCNC: 31.3 % (ref 32–36)
MCHC RBC AUTO-ENTMCNC: 31.4 % (ref 32–36)
MCHC RBC AUTO-ENTMCNC: 31.5 % (ref 32–36)
MCV RBC AUTO: 84.4 FL (ref 78–100)
MCV RBC AUTO: 84.5 FL (ref 78–100)
MCV RBC AUTO: 84.6 FL (ref 78–100)
MCV RBC AUTO: 84.7 FL (ref 78–100)
MCV RBC AUTO: 85.3 FL (ref 78–100)
MCV RBC AUTO: 85.6 FL (ref 78–100)
MCV RBC AUTO: 86.3 FL (ref 78–100)
MCV RBC AUTO: 86.6 FL (ref 78–100)
MCV RBC AUTO: 87.2 FL (ref 78–100)
MCV RBC AUTO: 87.2 FL (ref 78–100)
MCV RBC AUTO: 87.5 FL (ref 78–100)
MCV RBC AUTO: 87.8 FL (ref 78–100)
MCV RBC AUTO: 87.9 FL (ref 78–100)
MCV RBC AUTO: 88.2 FL (ref 78–100)
MCV RBC AUTO: 88.5 FL (ref 78–100)
MCV RBC AUTO: 88.5 FL (ref 78–100)
MCV RBC AUTO: 88.7 FL (ref 78–100)
MCV RBC AUTO: 89.2 FL (ref 78–100)
MCV RBC AUTO: 90.8 FL (ref 78–100)
MCV RBC AUTO: 91 FL (ref 78–100)
METHEMOGLOBIN ARTERIAL: 0.4 %
METHEMOGLOBIN ARTERIAL: 0.4 %
METHEMOGLOBIN ARTERIAL: 0.8 %
METHEMOGLOBIN ARTERIAL: 0.9 %
METHEMOGLOBIN ARTERIAL: 1 %
METHEMOGLOBIN ARTERIAL: 1.1 %
METHEMOGLOBIN ARTERIAL: 1.1 %
METHEMOGLOBIN ARTERIAL: 1.2 %
METHEMOGLOBIN ARTERIAL: 1.3 %
METHEMOGLOBIN ARTERIAL: 1.3 %
METHEMOGLOBIN ARTERIAL: 1.4 %
METHEMOGLOBIN ARTERIAL: 1.4 %
METHEMOGLOBIN ARTERIAL: 1.5 %
METHEMOGLOBIN ARTERIAL: 1.6 %
MONOCYTES ABSOLUTE: 0.6 K/CU MM
MONOCYTES ABSOLUTE: 0.7 K/CU MM
MONOCYTES ABSOLUTE: 0.8 K/CU MM
MONOCYTES ABSOLUTE: 0.9 K/CU MM
MONOCYTES ABSOLUTE: 0.9 K/CU MM
MONOCYTES ABSOLUTE: 1 K/CU MM
MONOCYTES ABSOLUTE: 1.1 K/CU MM
MONOCYTES ABSOLUTE: 1.5 K/CU MM
MONOCYTES ABSOLUTE: 1.5 K/CU MM
MONOCYTES RELATIVE PERCENT: 10.5 % (ref 0–4)
MONOCYTES RELATIVE PERCENT: 6.6 % (ref 0–4)
MONOCYTES RELATIVE PERCENT: 7.2 % (ref 0–4)
MONOCYTES RELATIVE PERCENT: 7.3 % (ref 0–4)
MONOCYTES RELATIVE PERCENT: 7.5 % (ref 0–4)
MONOCYTES RELATIVE PERCENT: 7.5 % (ref 0–4)
MONOCYTES RELATIVE PERCENT: 7.6 % (ref 0–4)
MONOCYTES RELATIVE PERCENT: 8 % (ref 0–4)
MONOCYTES RELATIVE PERCENT: 8.1 % (ref 0–4)
MONOCYTES RELATIVE PERCENT: 8.1 % (ref 0–4)
MONOCYTES RELATIVE PERCENT: 8.3 % (ref 0–4)
MONOCYTES RELATIVE PERCENT: 8.4 % (ref 0–4)
MONOCYTES RELATIVE PERCENT: 8.7 % (ref 0–4)
MONOCYTES RELATIVE PERCENT: 8.7 % (ref 0–4)
MONOCYTES RELATIVE PERCENT: 8.8 % (ref 0–4)
MONOCYTES RELATIVE PERCENT: 8.9 % (ref 0–4)
MONOCYTES RELATIVE PERCENT: 8.9 % (ref 0–4)
MONOCYTES RELATIVE PERCENT: 9.1 % (ref 0–4)
MONOCYTES RELATIVE PERCENT: 9.2 % (ref 0–4)
MONOCYTES RELATIVE PERCENT: 9.2 % (ref 0–4)
MUCUS: ABNORMAL HPF
MYCOPLASMA PNEUMONIAE PCR: NOT DETECTED
NITRITE URINE, QUANTITATIVE: NEGATIVE
NITRITE URINE, QUANTITATIVE: NEGATIVE
NUCLEATED RBC %: 0 %
O2 SATURATION: 85.8 % (ref 96–97)
O2 SATURATION: 93.1 % (ref 96–97)
O2 SATURATION: 93.4 % (ref 96–97)
O2 SATURATION: 94.4 % (ref 96–97)
O2 SATURATION: 94.5 % (ref 96–97)
O2 SATURATION: 94.9 % (ref 96–97)
O2 SATURATION: 95.2 % (ref 96–97)
O2 SATURATION: 95.3 % (ref 96–97)
O2 SATURATION: 95.4 % (ref 96–97)
O2 SATURATION: 95.5 % (ref 96–97)
O2 SATURATION: 95.6 % (ref 96–97)
O2 SATURATION: 95.9 % (ref 96–97)
O2 SATURATION: 96 % (ref 96–97)
O2 SATURATION: 96.3 % (ref 96–97)
O2 SATURATION: 96.4 % (ref 96–97)
O2 SATURATION: 96.4 % (ref 96–97)
O2 SATURATION: 96.9 % (ref 96–97)
O2 SATURATION: 99.9 % (ref 96–97)
PARAINFLUENZA 1 PCR: NOT DETECTED
PARAINFLUENZA 2 PCR: NOT DETECTED
PARAINFLUENZA 3 PCR: NOT DETECTED
PARAINFLUENZA 4 PCR: NOT DETECTED
PCO2 ARTERIAL: 35 MMHG (ref 32–45)
PCO2 ARTERIAL: 37 MMHG (ref 32–45)
PCO2 ARTERIAL: 39 MMHG (ref 32–45)
PCO2 ARTERIAL: 40 MMHG (ref 32–45)
PCO2 ARTERIAL: 41 MMHG (ref 32–45)
PCO2 ARTERIAL: 41 MMHG (ref 32–45)
PCO2 ARTERIAL: 42 MMHG (ref 32–45)
PCO2 ARTERIAL: 42 MMHG (ref 32–45)
PCO2 ARTERIAL: 43 MMHG (ref 32–45)
PCO2 ARTERIAL: 44 MMHG (ref 32–45)
PCO2 ARTERIAL: 47 MMHG (ref 32–45)
PCO2 ARTERIAL: 47 MMHG (ref 32–45)
PCO2 ARTERIAL: 48 MMHG (ref 32–45)
PCO2 ARTERIAL: 50 MMHG (ref 32–45)
PCO2 ARTERIAL: 51 MMHG (ref 32–45)
PCO2 ARTERIAL: 51 MMHG (ref 32–45)
PCO2 ARTERIAL: 51.5 MMHG (ref 32–45)
PCO2 ARTERIAL: 53 MMHG (ref 32–45)
PCT TRANSFERRIN: 11 % (ref 10–44)
PCT TRANSFERRIN: 20 % (ref 10–44)
PDW BLD-RTO: 15.9 % (ref 11.7–14.9)
PDW BLD-RTO: 16 % (ref 11.7–14.9)
PDW BLD-RTO: 16.2 % (ref 11.7–14.9)
PDW BLD-RTO: 16.3 % (ref 11.7–14.9)
PDW BLD-RTO: 16.4 % (ref 11.7–14.9)
PDW BLD-RTO: 16.8 % (ref 11.7–14.9)
PDW BLD-RTO: 17.2 % (ref 11.7–14.9)
PDW BLD-RTO: 17.3 % (ref 11.7–14.9)
PDW BLD-RTO: 17.4 % (ref 11.7–14.9)
PDW BLD-RTO: 17.8 % (ref 11.7–14.9)
PDW BLD-RTO: 18 % (ref 11.7–14.9)
PDW BLD-RTO: 18 % (ref 11.7–14.9)
PH BLOOD: 7.38 (ref 7.34–7.45)
PH BLOOD: 7.45 (ref 7.34–7.45)
PH BLOOD: 7.47 (ref 7.34–7.45)
PH BLOOD: 7.47 (ref 7.34–7.45)
PH BLOOD: 7.48 (ref 7.34–7.45)
PH BLOOD: 7.49 (ref 7.34–7.45)
PH BLOOD: 7.5 (ref 7.34–7.45)
PH BLOOD: 7.5 (ref 7.34–7.45)
PH BLOOD: 7.51 (ref 7.34–7.45)
PH BLOOD: 7.52 (ref 7.34–7.45)
PH BLOOD: 7.53 (ref 7.34–7.45)
PH BLOOD: 7.54 (ref 7.34–7.45)
PH BLOOD: 7.56 (ref 7.34–7.45)
PH BLOOD: 7.58 (ref 7.34–7.45)
PH BLOOD: 7.6 (ref 7.34–7.45)
PH BLOOD: 7.64 (ref 7.34–7.45)
PH, URINE: 5 (ref 5–8)
PH, URINE: 6 (ref 5–8)
PHOSPHORUS: 1.8 MG/DL (ref 2.5–4.9)
PHOSPHORUS: 1.9 MG/DL (ref 2.5–4.9)
PHOSPHORUS: 2 MG/DL (ref 2.5–4.9)
PHOSPHORUS: 2.3 MG/DL (ref 2.5–4.9)
PHOSPHORUS: 2.8 MG/DL (ref 2.5–4.9)
PHOSPHORUS: 2.9 MG/DL (ref 2.5–4.9)
PHOSPHORUS: 3 MG/DL (ref 2.5–4.9)
PHOSPHORUS: 3 MG/DL (ref 2.5–4.9)
PHOSPHORUS: 3.1 MG/DL (ref 2.5–4.9)
PHOSPHORUS: 3.2 MG/DL (ref 2.5–4.9)
PHOSPHORUS: 3.2 MG/DL (ref 2.5–4.9)
PHOSPHORUS: 3.4 MG/DL (ref 2.5–4.9)
PHOSPHORUS: 3.5 MG/DL (ref 2.5–4.9)
PHOSPHORUS: 3.5 MG/DL (ref 2.5–4.9)
PHOSPHORUS: 3.9 MG/DL (ref 2.5–4.9)
PHOSPHORUS: 4 MG/DL (ref 2.5–4.9)
PHOSPHORUS: 4.2 MG/DL (ref 2.5–4.9)
PHOSPHORUS: 4.5 MG/DL (ref 2.5–4.9)
PHOSPHORUS: 5.2 MG/DL (ref 2.5–4.9)
PHOSPHORUS: 5.3 MG/DL (ref 2.5–4.9)
PLATELET # BLD: 192 K/CU MM (ref 140–440)
PLATELET # BLD: 209 K/CU MM (ref 140–440)
PLATELET # BLD: 221 K/CU MM (ref 140–440)
PLATELET # BLD: 223 K/CU MM (ref 140–440)
PLATELET # BLD: 225 K/CU MM (ref 140–440)
PLATELET # BLD: 231 K/CU MM (ref 140–440)
PLATELET # BLD: 233 K/CU MM (ref 140–440)
PLATELET # BLD: 236 K/CU MM (ref 140–440)
PLATELET # BLD: 255 K/CU MM (ref 140–440)
PLATELET # BLD: 275 K/CU MM (ref 140–440)
PLATELET # BLD: 284 K/CU MM (ref 140–440)
PLATELET # BLD: 299 K/CU MM (ref 140–440)
PLATELET # BLD: 308 K/CU MM (ref 140–440)
PLATELET # BLD: 324 K/CU MM (ref 140–440)
PLATELET # BLD: 324 K/CU MM (ref 140–440)
PLATELET # BLD: 333 K/CU MM (ref 140–440)
PLATELET # BLD: 333 K/CU MM (ref 140–440)
PLATELET # BLD: 377 K/CU MM (ref 140–440)
PLATELET # BLD: 411 K/CU MM (ref 140–440)
PLATELET # BLD: 434 K/CU MM (ref 140–440)
PMV BLD AUTO: 10 FL (ref 7.5–11.1)
PMV BLD AUTO: 10.1 FL (ref 7.5–11.1)
PMV BLD AUTO: 10.2 FL (ref 7.5–11.1)
PMV BLD AUTO: 10.3 FL (ref 7.5–11.1)
PMV BLD AUTO: 10.3 FL (ref 7.5–11.1)
PMV BLD AUTO: 10.4 FL (ref 7.5–11.1)
PMV BLD AUTO: 10.7 FL (ref 7.5–11.1)
PMV BLD AUTO: 10.7 FL (ref 7.5–11.1)
PMV BLD AUTO: 10.8 FL (ref 7.5–11.1)
PMV BLD AUTO: 10.8 FL (ref 7.5–11.1)
PMV BLD AUTO: 9.8 FL (ref 7.5–11.1)
PMV BLD AUTO: 9.9 FL (ref 7.5–11.1)
PO2 ARTERIAL: 100 MMHG (ref 75–100)
PO2 ARTERIAL: 107 MMHG (ref 75–100)
PO2 ARTERIAL: 115 MMHG (ref 75–100)
PO2 ARTERIAL: 170 MMHG (ref 75–100)
PO2 ARTERIAL: 279 MMHG (ref 75–100)
PO2 ARTERIAL: 364.2 MMHG (ref 75–100)
PO2 ARTERIAL: 51 MMHG (ref 75–100)
PO2 ARTERIAL: 67 MMHG (ref 75–100)
PO2 ARTERIAL: 69 MMHG (ref 75–100)
PO2 ARTERIAL: 72 MMHG (ref 75–100)
PO2 ARTERIAL: 74 MMHG (ref 75–100)
PO2 ARTERIAL: 78 MMHG (ref 75–100)
PO2 ARTERIAL: 79 MMHG (ref 75–100)
PO2 ARTERIAL: 84 MMHG (ref 75–100)
PO2 ARTERIAL: 86 MMHG (ref 75–100)
PO2 ARTERIAL: 86 MMHG (ref 75–100)
PO2 ARTERIAL: 87 MMHG (ref 75–100)
PO2 ARTERIAL: 90 MMHG (ref 75–100)
PO2 ARTERIAL: 91 MMHG (ref 75–100)
PO2 ARTERIAL: 96 MMHG (ref 75–100)
POTASSIUM SERPL-SCNC: 2.9 MMOL/L (ref 3.5–5.1)
POTASSIUM SERPL-SCNC: 3.3 MMOL/L (ref 3.5–5.1)
POTASSIUM SERPL-SCNC: 3.4 MMOL/L (ref 3.5–5.1)
POTASSIUM SERPL-SCNC: 3.5 MMOL/L (ref 3.5–5.1)
POTASSIUM SERPL-SCNC: 3.5 MMOL/L (ref 3.5–5.1)
POTASSIUM SERPL-SCNC: 3.6 MMOL/L (ref 3.5–5.1)
POTASSIUM SERPL-SCNC: 3.7 MMOL/L (ref 3.5–5.1)
POTASSIUM SERPL-SCNC: 3.8 MMOL/L (ref 3.5–5.1)
POTASSIUM SERPL-SCNC: 3.9 MMOL/L (ref 3.5–5.1)
POTASSIUM SERPL-SCNC: 4 MMOL/L (ref 3.5–5.1)
POTASSIUM SERPL-SCNC: 4 MMOL/L (ref 3.5–5.1)
POTASSIUM SERPL-SCNC: 4.2 MMOL/L (ref 3.5–5.1)
POTASSIUM SERPL-SCNC: 4.2 MMOL/L (ref 3.5–5.1)
POTASSIUM SERPL-SCNC: 4.3 MMOL/L (ref 3.5–5.1)
POTASSIUM SERPL-SCNC: 4.3 MMOL/L (ref 3.5–5.1)
POTASSIUM SERPL-SCNC: 4.5 MMOL/L (ref 3.5–5.1)
POTASSIUM SERPL-SCNC: 5.4 MMOL/L (ref 3.5–5.1)
POTASSIUM SERPL-SCNC: 5.6 MMOL/L (ref 3.5–5.1)
POTASSIUM SERPL-SCNC: ABNORMAL MMOL/L (ref 3.5–5.1)
PREALBUMIN: 7 MG/DL (ref 20–40)
PRO-BNP: ABNORMAL PG/ML
PRO-BNP: ABNORMAL PG/ML
PROCALCITONIN: 0.23
PROCALCITONIN: 0.24
PROCALCITONIN: 0.26
PROCALCITONIN: 0.5
PROTEIN UA: 100 MG/DL
PROTEIN UA: NEGATIVE MG/DL
PROTHROMBIN TIME: 12.3 SECONDS (ref 11.7–14.5)
PROTHROMBIN TIME: 12.7 SECONDS (ref 11.7–14.5)
PROTHROMBIN TIME: 12.8 SECONDS (ref 11.7–14.5)
PROTHROMBIN TIME: 13 SECONDS (ref 11.7–14.5)
PROTHROMBIN TIME: 13.1 SECONDS (ref 11.7–14.5)
PROTHROMBIN TIME: 13.2 SECONDS (ref 11.7–14.5)
PROTHROMBIN TIME: 13.5 SECONDS (ref 11.7–14.5)
PROTHROMBIN TIME: 14.5 SECONDS (ref 11.7–14.5)
RBC # BLD: 2.33 M/CU MM (ref 4.2–5.4)
RBC # BLD: 2.46 M/CU MM (ref 4.2–5.4)
RBC # BLD: 2.61 M/CU MM (ref 4.2–5.4)
RBC # BLD: 2.62 M/CU MM (ref 4.2–5.4)
RBC # BLD: 2.74 M/CU MM (ref 4.2–5.4)
RBC # BLD: 2.83 M/CU MM (ref 4.2–5.4)
RBC # BLD: 2.85 M/CU MM (ref 4.2–5.4)
RBC # BLD: 2.85 M/CU MM (ref 4.2–5.4)
RBC # BLD: 2.86 M/CU MM (ref 4.2–5.4)
RBC # BLD: 2.87 M/CU MM (ref 4.2–5.4)
RBC # BLD: 2.94 M/CU MM (ref 4.2–5.4)
RBC # BLD: 3 M/CU MM (ref 4.2–5.4)
RBC # BLD: 3.12 M/CU MM (ref 4.2–5.4)
RBC # BLD: 3.14 M/CU MM (ref 4.2–5.4)
RBC # BLD: 3.17 M/CU MM (ref 4.2–5.4)
RBC # BLD: 3.26 M/CU MM (ref 4.2–5.4)
RBC # BLD: 3.31 M/CU MM (ref 4.2–5.4)
RBC # BLD: 3.35 M/CU MM (ref 4.2–5.4)
RBC # BLD: 3.51 M/CU MM (ref 4.2–5.4)
RBC # BLD: 3.95 M/CU MM (ref 4.2–5.4)
RBC URINE: 3 /HPF (ref 0–6)
RBC URINE: ABNORMAL /HPF (ref 0–6)
RENAL EPITHELIAL, UA: 1 /HPF
RETICULOCYTE COUNT PCT: 2.1 % (ref 0.2–2.2)
RHINOVIRUS ENTEROVIRUS PCR: NOT DETECTED
RSV PCR: NOT DETECTED
SARS-COV-2: NOT DETECTED
SARS-COV-2: NOT DETECTED
SEGMENTED NEUTROPHILS ABSOLUTE COUNT: 10.2 K/CU MM
SEGMENTED NEUTROPHILS ABSOLUTE COUNT: 10.8 K/CU MM
SEGMENTED NEUTROPHILS ABSOLUTE COUNT: 10.8 K/CU MM
SEGMENTED NEUTROPHILS ABSOLUTE COUNT: 13.6 K/CU MM
SEGMENTED NEUTROPHILS ABSOLUTE COUNT: 13.8 K/CU MM
SEGMENTED NEUTROPHILS ABSOLUTE COUNT: 5.1 K/CU MM
SEGMENTED NEUTROPHILS ABSOLUTE COUNT: 6.2 K/CU MM
SEGMENTED NEUTROPHILS ABSOLUTE COUNT: 6.4 K/CU MM
SEGMENTED NEUTROPHILS ABSOLUTE COUNT: 6.8 K/CU MM
SEGMENTED NEUTROPHILS ABSOLUTE COUNT: 7.7 K/CU MM
SEGMENTED NEUTROPHILS ABSOLUTE COUNT: 8.3 K/CU MM
SEGMENTED NEUTROPHILS ABSOLUTE COUNT: 8.8 K/CU MM
SEGMENTED NEUTROPHILS ABSOLUTE COUNT: 9 K/CU MM
SEGMENTED NEUTROPHILS ABSOLUTE COUNT: 9.3 K/CU MM
SEGMENTED NEUTROPHILS ABSOLUTE COUNT: 9.3 K/CU MM
SEGMENTED NEUTROPHILS ABSOLUTE COUNT: 9.8 K/CU MM
SEGMENTED NEUTROPHILS RELATIVE PERCENT: 65.7 % (ref 36–66)
SEGMENTED NEUTROPHILS RELATIVE PERCENT: 70.9 % (ref 36–66)
SEGMENTED NEUTROPHILS RELATIVE PERCENT: 71 % (ref 36–66)
SEGMENTED NEUTROPHILS RELATIVE PERCENT: 71.9 % (ref 36–66)
SEGMENTED NEUTROPHILS RELATIVE PERCENT: 72.1 % (ref 36–66)
SEGMENTED NEUTROPHILS RELATIVE PERCENT: 72.2 % (ref 36–66)
SEGMENTED NEUTROPHILS RELATIVE PERCENT: 73.1 % (ref 36–66)
SEGMENTED NEUTROPHILS RELATIVE PERCENT: 73.3 % (ref 36–66)
SEGMENTED NEUTROPHILS RELATIVE PERCENT: 73.5 % (ref 36–66)
SEGMENTED NEUTROPHILS RELATIVE PERCENT: 73.6 % (ref 36–66)
SEGMENTED NEUTROPHILS RELATIVE PERCENT: 73.7 % (ref 36–66)
SEGMENTED NEUTROPHILS RELATIVE PERCENT: 74.2 % (ref 36–66)
SEGMENTED NEUTROPHILS RELATIVE PERCENT: 74.2 % (ref 36–66)
SEGMENTED NEUTROPHILS RELATIVE PERCENT: 74.3 % (ref 36–66)
SEGMENTED NEUTROPHILS RELATIVE PERCENT: 75.5 % (ref 36–66)
SEGMENTED NEUTROPHILS RELATIVE PERCENT: 75.9 % (ref 36–66)
SEGMENTED NEUTROPHILS RELATIVE PERCENT: 79 % (ref 36–66)
SEGMENTED NEUTROPHILS RELATIVE PERCENT: 79.3 % (ref 36–66)
SEGMENTED NEUTROPHILS RELATIVE PERCENT: 83.4 % (ref 36–66)
SEGMENTED NEUTROPHILS RELATIVE PERCENT: 85.3 % (ref 36–66)
SODIUM BLD-SCNC: 131 MMOL/L (ref 135–145)
SODIUM BLD-SCNC: 133 MMOL/L (ref 135–145)
SODIUM BLD-SCNC: 134 MMOL/L (ref 135–145)
SODIUM BLD-SCNC: 135 MMOL/L (ref 135–145)
SODIUM BLD-SCNC: 136 MMOL/L (ref 135–145)
SODIUM BLD-SCNC: 137 MMOL/L (ref 135–145)
SODIUM BLD-SCNC: 138 MMOL/L (ref 135–145)
SODIUM BLD-SCNC: 138 MMOL/L (ref 135–145)
SODIUM BLD-SCNC: 139 MMOL/L (ref 135–145)
SODIUM BLD-SCNC: 141 MMOL/L (ref 135–145)
SOURCE, BLOOD GAS: ABNORMAL
SOURCE: NORMAL
SOURCE: NORMAL
SPECIFIC GRAVITY UA: 1.01 (ref 1–1.03)
SPECIFIC GRAVITY UA: 1.02 (ref 1–1.03)
SPECIMEN: ABNORMAL
SPECIMEN: NORMAL
STATUS: NORMAL
STREP PNEUMONIAE ANTIGEN: NORMAL
TOTAL CK: 1342 IU/L (ref 26–140)
TOTAL CK: 161 IU/L (ref 26–140)
TOTAL CK: 1795 IU/L (ref 26–140)
TOTAL CK: 213 IU/L (ref 26–140)
TOTAL CK: 234 IU/L (ref 26–140)
TOTAL CK: 494 IU/L (ref 26–140)
TOTAL CK: 76 IU/L (ref 26–140)
TOTAL IMMATURE NEUTOROPHIL: 0.03 K/CU MM
TOTAL IMMATURE NEUTOROPHIL: 0.03 K/CU MM
TOTAL IMMATURE NEUTOROPHIL: 0.04 K/CU MM
TOTAL IMMATURE NEUTOROPHIL: 0.05 K/CU MM
TOTAL IMMATURE NEUTOROPHIL: 0.05 K/CU MM
TOTAL IMMATURE NEUTOROPHIL: 0.06 K/CU MM
TOTAL IMMATURE NEUTOROPHIL: 0.07 K/CU MM
TOTAL IMMATURE NEUTOROPHIL: 0.07 K/CU MM
TOTAL IMMATURE NEUTOROPHIL: 0.08 K/CU MM
TOTAL IMMATURE NEUTOROPHIL: 0.1 K/CU MM
TOTAL IMMATURE NEUTOROPHIL: 0.12 K/CU MM
TOTAL IMMATURE NEUTOROPHIL: 0.15 K/CU MM
TOTAL IMMATURE NEUTOROPHIL: 0.16 K/CU MM
TOTAL IMMATURE NEUTOROPHIL: 0.49 K/CU MM
TOTAL IRON BINDING CAPACITY: 229 UG/DL (ref 250–450)
TOTAL IRON BINDING CAPACITY: 249 UG/DL (ref 250–450)
TOTAL NUCLEATED RBC: 0 K/CU MM
TOTAL PROTEIN: 6.9 GM/DL (ref 6.4–8.2)
TOTAL RETICULOCYTE COUNT: 0.06 K/CU MM
TRANSFUSION STATUS: NORMAL
TRICHOMONAS: ABNORMAL /HPF
TROPONIN T: 0.04 NG/ML
TROPONIN T: 0.05 NG/ML
TROPONIN T: <0.01 NG/ML
UNIT DIVISION: 0
UNIT NUMBER: NORMAL
UNSATURATED IRON BINDING CAPACITY: 183 UG/DL (ref 110–370)
UNSATURATED IRON BINDING CAPACITY: 221 UG/DL (ref 110–370)
UROBILINOGEN, URINE: 0.2 MG/DL (ref 0.2–1)
UROBILINOGEN, URINE: NORMAL MG/DL (ref 0.2–1)
VANCOMYCIN RANDOM: 15.1 UG/ML
VANCOMYCIN RANDOM: 15.6 UG/ML
VANCOMYCIN RANDOM: 16 UG/ML
VANCOMYCIN RANDOM: 18.5 UG/ML
VANCOMYCIN RANDOM: 21 UG/ML
VANCOMYCIN TROUGH: 19 UG/ML (ref 10–20)
VANCOMYCIN TROUGH: 19.4 UG/ML (ref 10–20)
VITAMIN B-12: 386.5 PG/ML (ref 211–911)
WBC # BLD: 10.2 K/CU MM (ref 4–10.5)
WBC # BLD: 10.4 K/CU MM (ref 4–10.5)
WBC # BLD: 10.9 K/CU MM (ref 4–10.5)
WBC # BLD: 11.3 K/CU MM (ref 4–10.5)
WBC # BLD: 11.7 K/CU MM (ref 4–10.5)
WBC # BLD: 12.2 K/CU MM (ref 4–10.5)
WBC # BLD: 12.3 K/CU MM (ref 4–10.5)
WBC # BLD: 12.5 K/CU MM (ref 4–10.5)
WBC # BLD: 12.7 K/CU MM (ref 4–10.5)
WBC # BLD: 12.7 K/CU MM (ref 4–10.5)
WBC # BLD: 14.5 K/CU MM (ref 4–10.5)
WBC # BLD: 14.7 K/CU MM (ref 4–10.5)
WBC # BLD: 17.2 K/CU MM (ref 4–10.5)
WBC # BLD: 17.5 K/CU MM (ref 4–10.5)
WBC # BLD: 7.8 K/CU MM (ref 4–10.5)
WBC # BLD: 8.6 K/CU MM (ref 4–10.5)
WBC # BLD: 8.7 K/CU MM (ref 4–10.5)
WBC # BLD: 9.1 K/CU MM (ref 4–10.5)
WBC # BLD: 9.2 K/CU MM (ref 4–10.5)
WBC # BLD: 9.3 K/CU MM (ref 4–10.5)
WBC UA: 5 /HPF (ref 0–5)
WBC UA: ABNORMAL /HPF (ref 0–5)

## 2020-01-01 PROCEDURE — 6370000000 HC RX 637 (ALT 250 FOR IP): Performed by: INTERNAL MEDICINE

## 2020-01-01 PROCEDURE — 85014 HEMATOCRIT: CPT

## 2020-01-01 PROCEDURE — 2580000003 HC RX 258: Performed by: HOSPITALIST

## 2020-01-01 PROCEDURE — 2700000000 HC OXYGEN THERAPY PER DAY

## 2020-01-01 PROCEDURE — 3600000013 HC SURGERY LEVEL 3 ADDTL 15MIN: Performed by: SURGERY

## 2020-01-01 PROCEDURE — 85379 FIBRIN DEGRADATION QUANT: CPT

## 2020-01-01 PROCEDURE — 83735 ASSAY OF MAGNESIUM: CPT

## 2020-01-01 PROCEDURE — 82330 ASSAY OF CALCIUM: CPT

## 2020-01-01 PROCEDURE — U0002 COVID-19 LAB TEST NON-CDC: HCPCS

## 2020-01-01 PROCEDURE — 82550 ASSAY OF CK (CPK): CPT

## 2020-01-01 PROCEDURE — 6360000002 HC RX W HCPCS: Performed by: HOSPITALIST

## 2020-01-01 PROCEDURE — 2580000003 HC RX 258: Performed by: INTERNAL MEDICINE

## 2020-01-01 PROCEDURE — 36592 COLLECT BLOOD FROM PICC: CPT

## 2020-01-01 PROCEDURE — 85025 COMPLETE CBC W/AUTO DIFF WBC: CPT

## 2020-01-01 PROCEDURE — 6360000002 HC RX W HCPCS: Performed by: PHYSICIAN ASSISTANT

## 2020-01-01 PROCEDURE — 84100 ASSAY OF PHOSPHORUS: CPT

## 2020-01-01 PROCEDURE — P9047 ALBUMIN (HUMAN), 25%, 50ML: HCPCS | Performed by: INTERNAL MEDICINE

## 2020-01-01 PROCEDURE — P9016 RBC LEUKOCYTES REDUCED: HCPCS

## 2020-01-01 PROCEDURE — 3700000000 HC ANESTHESIA ATTENDED CARE: Performed by: SURGERY

## 2020-01-01 PROCEDURE — 99024 POSTOP FOLLOW-UP VISIT: CPT | Performed by: SURGERY

## 2020-01-01 PROCEDURE — 36600 WITHDRAWAL OF ARTERIAL BLOOD: CPT

## 2020-01-01 PROCEDURE — 85730 THROMBOPLASTIN TIME PARTIAL: CPT

## 2020-01-01 PROCEDURE — 82962 GLUCOSE BLOOD TEST: CPT

## 2020-01-01 PROCEDURE — 2580000003 HC RX 258: Performed by: PHYSICIAN ASSISTANT

## 2020-01-01 PROCEDURE — 2580000003 HC RX 258: Performed by: STUDENT IN AN ORGANIZED HEALTH CARE EDUCATION/TRAINING PROGRAM

## 2020-01-01 PROCEDURE — 82803 BLOOD GASES ANY COMBINATION: CPT

## 2020-01-01 PROCEDURE — 80051 ELECTROLYTE PANEL: CPT

## 2020-01-01 PROCEDURE — 71045 X-RAY EXAM CHEST 1 VIEW: CPT

## 2020-01-01 PROCEDURE — 0DH63UZ INSERTION OF FEEDING DEVICE INTO STOMACH, PERCUTANEOUS APPROACH: ICD-10-PCS | Performed by: INTERNAL MEDICINE

## 2020-01-01 PROCEDURE — 6360000002 HC RX W HCPCS: Performed by: PSYCHIATRY & NEUROLOGY

## 2020-01-01 PROCEDURE — 80048 BASIC METABOLIC PNL TOTAL CA: CPT

## 2020-01-01 PROCEDURE — 85610 PROTHROMBIN TIME: CPT

## 2020-01-01 PROCEDURE — 99233 SBSQ HOSP IP/OBS HIGH 50: CPT | Performed by: INTERNAL MEDICINE

## 2020-01-01 PROCEDURE — 83880 ASSAY OF NATRIURETIC PEPTIDE: CPT

## 2020-01-01 PROCEDURE — 85384 FIBRINOGEN ACTIVITY: CPT

## 2020-01-01 PROCEDURE — 2500000003 HC RX 250 WO HCPCS: Performed by: SURGERY

## 2020-01-01 PROCEDURE — 82140 ASSAY OF AMMONIA: CPT

## 2020-01-01 PROCEDURE — 3700000001 HC ADD 15 MINUTES (ANESTHESIA): Performed by: SURGERY

## 2020-01-01 PROCEDURE — 2000000000 HC ICU R&B

## 2020-01-01 PROCEDURE — 89220 SPUTUM SPECIMEN COLLECTION: CPT

## 2020-01-01 PROCEDURE — 31720 CLEARANCE OF AIRWAYS: CPT

## 2020-01-01 PROCEDURE — 6360000002 HC RX W HCPCS: Performed by: INTERNAL MEDICINE

## 2020-01-01 PROCEDURE — 6370000000 HC RX 637 (ALT 250 FOR IP): Performed by: PHYSICIAN ASSISTANT

## 2020-01-01 PROCEDURE — 81001 URINALYSIS AUTO W/SCOPE: CPT

## 2020-01-01 PROCEDURE — 82607 VITAMIN B-12: CPT

## 2020-01-01 PROCEDURE — 6360000002 HC RX W HCPCS: Performed by: NURSE PRACTITIONER

## 2020-01-01 PROCEDURE — 80053 COMPREHEN METABOLIC PANEL: CPT

## 2020-01-01 PROCEDURE — 2500000003 HC RX 250 WO HCPCS: Performed by: PHYSICIAN ASSISTANT

## 2020-01-01 PROCEDURE — 96374 THER/PROPH/DIAG INJ IV PUSH: CPT

## 2020-01-01 PROCEDURE — 2500000003 HC RX 250 WO HCPCS: Performed by: HOSPITALIST

## 2020-01-01 PROCEDURE — 6370000000 HC RX 637 (ALT 250 FOR IP): Performed by: HOSPITALIST

## 2020-01-01 PROCEDURE — 99232 SBSQ HOSP IP/OBS MODERATE 35: CPT | Performed by: PHYSICIAN ASSISTANT

## 2020-01-01 PROCEDURE — 94750 HC PULMONARY COMPLIANCE STUDY: CPT

## 2020-01-01 PROCEDURE — 6360000002 HC RX W HCPCS: Performed by: NURSE ANESTHETIST, CERTIFIED REGISTERED

## 2020-01-01 PROCEDURE — 95819 EEG AWAKE AND ASLEEP: CPT

## 2020-01-01 PROCEDURE — 86901 BLOOD TYPING SEROLOGIC RH(D): CPT

## 2020-01-01 PROCEDURE — 6360000002 HC RX W HCPCS: Performed by: EMERGENCY MEDICINE

## 2020-01-01 PROCEDURE — 86900 BLOOD TYPING SEROLOGIC ABO: CPT

## 2020-01-01 PROCEDURE — 2580000003 HC RX 258: Performed by: EMERGENCY MEDICINE

## 2020-01-01 PROCEDURE — 6360000002 HC RX W HCPCS: Performed by: STUDENT IN AN ORGANIZED HEALTH CARE EDUCATION/TRAINING PROGRAM

## 2020-01-01 PROCEDURE — 2720000010 HC SURG SUPPLY STERILE: Performed by: SURGERY

## 2020-01-01 PROCEDURE — 94003 VENT MGMT INPAT SUBQ DAY: CPT

## 2020-01-01 PROCEDURE — 2100000000 HC CCU R&B

## 2020-01-01 PROCEDURE — 7100000000 HC PACU RECOVERY - FIRST 15 MIN

## 2020-01-01 PROCEDURE — 6360000002 HC RX W HCPCS

## 2020-01-01 PROCEDURE — 86850 RBC ANTIBODY SCREEN: CPT

## 2020-01-01 PROCEDURE — C9113 INJ PANTOPRAZOLE SODIUM, VIA: HCPCS | Performed by: INTERNAL MEDICINE

## 2020-01-01 PROCEDURE — 2580000003 HC RX 258: Performed by: PSYCHIATRY & NEUROLOGY

## 2020-01-01 PROCEDURE — 94761 N-INVAS EAR/PLS OXIMETRY MLT: CPT

## 2020-01-01 PROCEDURE — 2709999900 HC NON-CHARGEABLE SUPPLY: Performed by: SURGERY

## 2020-01-01 PROCEDURE — 2500000003 HC RX 250 WO HCPCS: Performed by: NURSE ANESTHETIST, CERTIFIED REGISTERED

## 2020-01-01 PROCEDURE — 86922 COMPATIBILITY TEST ANTIGLOB: CPT

## 2020-01-01 PROCEDURE — 85018 HEMOGLOBIN: CPT

## 2020-01-01 PROCEDURE — 93005 ELECTROCARDIOGRAM TRACING: CPT | Performed by: EMERGENCY MEDICINE

## 2020-01-01 PROCEDURE — 80202 ASSAY OF VANCOMYCIN: CPT

## 2020-01-01 PROCEDURE — 84134 ASSAY OF PREALBUMIN: CPT

## 2020-01-01 PROCEDURE — 94002 VENT MGMT INPAT INIT DAY: CPT

## 2020-01-01 PROCEDURE — 2580000003 HC RX 258

## 2020-01-01 PROCEDURE — 36430 TRANSFUSION BLD/BLD COMPNT: CPT

## 2020-01-01 PROCEDURE — 2500000003 HC RX 250 WO HCPCS: Performed by: INTERNAL MEDICINE

## 2020-01-01 PROCEDURE — 99211 OFF/OP EST MAY X REQ PHY/QHP: CPT

## 2020-01-01 PROCEDURE — 96366 THER/PROPH/DIAG IV INF ADDON: CPT

## 2020-01-01 PROCEDURE — 83550 IRON BINDING TEST: CPT

## 2020-01-01 PROCEDURE — 83615 LACTATE (LD) (LDH) ENZYME: CPT

## 2020-01-01 PROCEDURE — 94640 AIRWAY INHALATION TREATMENT: CPT

## 2020-01-01 PROCEDURE — 71275 CT ANGIOGRAPHY CHEST: CPT

## 2020-01-01 PROCEDURE — 87798 DETECT AGENT NOS DNA AMP: CPT

## 2020-01-01 PROCEDURE — 36556 INSERT NON-TUNNEL CV CATH: CPT

## 2020-01-01 PROCEDURE — 82728 ASSAY OF FERRITIN: CPT

## 2020-01-01 PROCEDURE — 93306 TTE W/DOPPLER COMPLETE: CPT

## 2020-01-01 PROCEDURE — 83540 ASSAY OF IRON: CPT

## 2020-01-01 PROCEDURE — 99232 SBSQ HOSP IP/OBS MODERATE 35: CPT | Performed by: OBSTETRICS & GYNECOLOGY

## 2020-01-01 PROCEDURE — 2580000003 HC RX 258: Performed by: NURSE PRACTITIONER

## 2020-01-01 PROCEDURE — 86141 C-REACTIVE PROTEIN HS: CPT

## 2020-01-01 PROCEDURE — 43246 EGD PLACE GASTROSTOMY TUBE: CPT | Performed by: SURGERY

## 2020-01-01 PROCEDURE — 85045 AUTOMATED RETICULOCYTE COUNT: CPT

## 2020-01-01 PROCEDURE — 99285 EMERGENCY DEPT VISIT HI MDM: CPT

## 2020-01-01 PROCEDURE — 99222 1ST HOSP IP/OBS MODERATE 55: CPT | Performed by: OBSTETRICS & GYNECOLOGY

## 2020-01-01 PROCEDURE — 2500000003 HC RX 250 WO HCPCS: Performed by: EMERGENCY MEDICINE

## 2020-01-01 PROCEDURE — 84484 ASSAY OF TROPONIN QUANT: CPT

## 2020-01-01 PROCEDURE — 1200000000 HC SEMI PRIVATE

## 2020-01-01 PROCEDURE — 87449 NOS EACH ORGANISM AG IA: CPT

## 2020-01-01 PROCEDURE — 6360000004 HC RX CONTRAST MEDICATION: Performed by: EMERGENCY MEDICINE

## 2020-01-01 PROCEDURE — 5A1955Z RESPIRATORY VENTILATION, GREATER THAN 96 CONSECUTIVE HOURS: ICD-10-PCS | Performed by: INTERNAL MEDICINE

## 2020-01-01 PROCEDURE — 2580000003 HC RX 258: Performed by: NURSE ANESTHETIST, CERTIFIED REGISTERED

## 2020-01-01 PROCEDURE — 83690 ASSAY OF LIPASE: CPT

## 2020-01-01 PROCEDURE — 99232 SBSQ HOSP IP/OBS MODERATE 35: CPT | Performed by: INTERNAL MEDICINE

## 2020-01-01 PROCEDURE — 87633 RESP VIRUS 12-25 TARGETS: CPT

## 2020-01-01 PROCEDURE — 87040 BLOOD CULTURE FOR BACTERIA: CPT

## 2020-01-01 PROCEDURE — 3600000003 HC SURGERY LEVEL 3 BASE: Performed by: SURGERY

## 2020-01-01 PROCEDURE — 95819 EEG AWAKE AND ASLEEP: CPT | Performed by: PSYCHIATRY & NEUROLOGY

## 2020-01-01 PROCEDURE — 96361 HYDRATE IV INFUSION ADD-ON: CPT

## 2020-01-01 PROCEDURE — 99999 PR OFFICE/OUTPT VISIT,PROCEDURE ONLY: CPT | Performed by: PHYSICIAN ASSISTANT

## 2020-01-01 PROCEDURE — 99291 CRITICAL CARE FIRST HOUR: CPT | Performed by: INTERNAL MEDICINE

## 2020-01-01 PROCEDURE — 70551 MRI BRAIN STEM W/O DYE: CPT

## 2020-01-01 PROCEDURE — 87081 CULTURE SCREEN ONLY: CPT

## 2020-01-01 PROCEDURE — 0BH17EZ INSERTION OF ENDOTRACHEAL AIRWAY INTO TRACHEA, VIA NATURAL OR ARTIFICIAL OPENING: ICD-10-PCS | Performed by: INTERNAL MEDICINE

## 2020-01-01 PROCEDURE — 99223 1ST HOSP IP/OBS HIGH 75: CPT | Performed by: INTERNAL MEDICINE

## 2020-01-01 PROCEDURE — 31502 CHANGE OF WINDPIPE AIRWAY: CPT

## 2020-01-01 PROCEDURE — 87581 M.PNEUMON DNA AMP PROBE: CPT

## 2020-01-01 PROCEDURE — 96365 THER/PROPH/DIAG IV INF INIT: CPT

## 2020-01-01 PROCEDURE — 70450 CT HEAD/BRAIN W/O DYE: CPT

## 2020-01-01 PROCEDURE — 82746 ASSAY OF FOLIC ACID SERUM: CPT

## 2020-01-01 PROCEDURE — 84145 PROCALCITONIN (PCT): CPT

## 2020-01-01 PROCEDURE — 93010 ELECTROCARDIOGRAM REPORT: CPT | Performed by: INTERNAL MEDICINE

## 2020-01-01 PROCEDURE — 84132 ASSAY OF SERUM POTASSIUM: CPT

## 2020-01-01 PROCEDURE — 74018 RADEX ABDOMEN 1 VIEW: CPT

## 2020-01-01 PROCEDURE — 87486 CHLMYD PNEUM DNA AMP PROBE: CPT

## 2020-01-01 PROCEDURE — 99291 CRITICAL CARE FIRST HOUR: CPT | Performed by: SURGERY

## 2020-01-01 PROCEDURE — 83605 ASSAY OF LACTIC ACID: CPT

## 2020-01-01 PROCEDURE — 51702 INSERT TEMP BLADDER CATH: CPT

## 2020-01-01 PROCEDURE — 99233 SBSQ HOSP IP/OBS HIGH 50: CPT | Performed by: NURSE PRACTITIONER

## 2020-01-01 PROCEDURE — 2500000003 HC RX 250 WO HCPCS: Performed by: NURSE PRACTITIONER

## 2020-01-01 PROCEDURE — 87899 AGENT NOS ASSAY W/OPTIC: CPT

## 2020-01-01 PROCEDURE — 31600 PLANNED TRACHEOSTOMY: CPT | Performed by: SURGERY

## 2020-01-01 PROCEDURE — 80069 RENAL FUNCTION PANEL: CPT

## 2020-01-01 PROCEDURE — 83036 HEMOGLOBIN GLYCOSYLATED A1C: CPT

## 2020-01-01 PROCEDURE — 96375 TX/PRO/DX INJ NEW DRUG ADDON: CPT

## 2020-01-01 RX ORDER — INSULIN GLARGINE 100 [IU]/ML
50 INJECTION, SOLUTION SUBCUTANEOUS NIGHTLY
Status: DISCONTINUED | OUTPATIENT
Start: 2020-01-01 | End: 2020-01-01

## 2020-01-01 RX ORDER — ACETYLCYSTEINE 200 MG/ML
600 SOLUTION ORAL; RESPIRATORY (INHALATION) 2 TIMES DAILY
Status: DISCONTINUED | OUTPATIENT
Start: 2020-01-01 | End: 2020-01-01

## 2020-01-01 RX ORDER — PROPOFOL 10 MG/ML
10 INJECTION, EMULSION INTRAVENOUS ONCE
Status: DISCONTINUED | OUTPATIENT
Start: 2020-01-01 | End: 2020-01-01 | Stop reason: HOSPADM

## 2020-01-01 RX ORDER — NICOTINE POLACRILEX 4 MG
15 LOZENGE BUCCAL PRN
Status: DISCONTINUED | OUTPATIENT
Start: 2020-01-01 | End: 2020-01-01 | Stop reason: HOSPADM

## 2020-01-01 RX ORDER — FUROSEMIDE 40 MG/1
40 TABLET ORAL 2 TIMES DAILY
Status: DISCONTINUED | OUTPATIENT
Start: 2020-01-01 | End: 2020-01-01

## 2020-01-01 RX ORDER — MIDODRINE HYDROCHLORIDE 5 MG/1
5 TABLET ORAL
Status: DISCONTINUED | OUTPATIENT
Start: 2020-01-01 | End: 2020-01-01

## 2020-01-01 RX ORDER — 0.9 % SODIUM CHLORIDE 0.9 %
20 INTRAVENOUS SOLUTION INTRAVENOUS ONCE
Status: COMPLETED | OUTPATIENT
Start: 2020-01-01 | End: 2020-01-01

## 2020-01-01 RX ORDER — CHLORHEXIDINE GLUCONATE 0.12 MG/ML
15 RINSE ORAL 2 TIMES DAILY
Status: DISCONTINUED | OUTPATIENT
Start: 2020-01-01 | End: 2020-01-01 | Stop reason: HOSPADM

## 2020-01-01 RX ORDER — FENTANYL CITRATE 50 UG/ML
25 INJECTION, SOLUTION INTRAMUSCULAR; INTRAVENOUS EVERY 10 MIN PRN
Status: DISCONTINUED | OUTPATIENT
Start: 2020-01-01 | End: 2020-01-01 | Stop reason: HOSPADM

## 2020-01-01 RX ORDER — INSULIN GLARGINE 100 [IU]/ML
20 INJECTION, SOLUTION SUBCUTANEOUS ONCE
Status: COMPLETED | OUTPATIENT
Start: 2020-01-01 | End: 2020-01-01

## 2020-01-01 RX ORDER — LEVOTHYROXINE SODIUM 0.05 MG/1
50 TABLET ORAL DAILY
Status: DISCONTINUED | OUTPATIENT
Start: 2020-01-01 | End: 2020-01-01 | Stop reason: HOSPADM

## 2020-01-01 RX ORDER — MIDODRINE HYDROCHLORIDE 5 MG/1
10 TABLET ORAL
Status: DISCONTINUED | OUTPATIENT
Start: 2020-01-01 | End: 2020-01-01 | Stop reason: HOSPADM

## 2020-01-01 RX ORDER — MAGNESIUM SULFATE 1 G/100ML
1 INJECTION INTRAVENOUS PRN
Status: DISCONTINUED | OUTPATIENT
Start: 2020-01-01 | End: 2020-01-01 | Stop reason: HOSPADM

## 2020-01-01 RX ORDER — GLYCOPYRROLATE 1 MG/5 ML
0.2 SYRINGE (ML) INTRAVENOUS EVERY 4 HOURS PRN
Status: CANCELLED | OUTPATIENT
Start: 2020-01-01

## 2020-01-01 RX ORDER — LORAZEPAM 2 MG/ML
2 INJECTION INTRAMUSCULAR ONCE
Status: COMPLETED | OUTPATIENT
Start: 2020-01-01 | End: 2020-01-01

## 2020-01-01 RX ORDER — FUROSEMIDE 10 MG/ML
40 INJECTION INTRAMUSCULAR; INTRAVENOUS DAILY
Status: DISCONTINUED | OUTPATIENT
Start: 2020-01-01 | End: 2020-01-01

## 2020-01-01 RX ORDER — PROPOFOL 10 MG/ML
10 INJECTION, EMULSION INTRAVENOUS
Status: DISCONTINUED | OUTPATIENT
Start: 2020-01-01 | End: 2020-01-01

## 2020-01-01 RX ORDER — POTASSIUM CHLORIDE 7.45 MG/ML
20 INJECTION INTRAVENOUS ONCE
Status: DISCONTINUED | OUTPATIENT
Start: 2020-01-01 | End: 2020-01-01 | Stop reason: HOSPADM

## 2020-01-01 RX ORDER — LEVOTHYROXINE SODIUM ANHYDROUS 100 UG/5ML
12.5 INJECTION, POWDER, LYOPHILIZED, FOR SOLUTION INTRAVENOUS DAILY
Status: DISCONTINUED | OUTPATIENT
Start: 2020-01-01 | End: 2020-01-01

## 2020-01-01 RX ORDER — LIDOCAINE HYDROCHLORIDE 10 MG/ML
INJECTION, SOLUTION INFILTRATION; PERINEURAL
Status: COMPLETED | OUTPATIENT
Start: 2020-01-01 | End: 2020-01-01

## 2020-01-01 RX ORDER — ALBUMIN (HUMAN) 12.5 G/50ML
25 SOLUTION INTRAVENOUS EVERY 8 HOURS
Status: COMPLETED | OUTPATIENT
Start: 2020-01-01 | End: 2020-01-01

## 2020-01-01 RX ORDER — ACETAZOLAMIDE 500 MG/5ML
250 INJECTION, POWDER, LYOPHILIZED, FOR SOLUTION INTRAVENOUS EVERY 6 HOURS
Status: DISCONTINUED | OUTPATIENT
Start: 2020-01-01 | End: 2020-01-01 | Stop reason: ALTCHOICE

## 2020-01-01 RX ORDER — ATORVASTATIN CALCIUM 20 MG/1
20 TABLET, FILM COATED ORAL DAILY
Status: DISCONTINUED | OUTPATIENT
Start: 2020-01-01 | End: 2020-01-01 | Stop reason: HOSPADM

## 2020-01-01 RX ORDER — INSULIN GLARGINE 100 [IU]/ML
20 INJECTION, SOLUTION SUBCUTANEOUS NIGHTLY
Status: DISCONTINUED | OUTPATIENT
Start: 2020-01-01 | End: 2020-01-01

## 2020-01-01 RX ORDER — LEVOTHYROXINE SODIUM 0.05 MG/1
50 TABLET ORAL DAILY PRN
Status: DISCONTINUED | OUTPATIENT
Start: 2020-01-01 | End: 2020-01-01

## 2020-01-01 RX ORDER — DEXTROSE MONOHYDRATE 50 MG/ML
100 INJECTION, SOLUTION INTRAVENOUS PRN
Status: DISCONTINUED | OUTPATIENT
Start: 2020-01-01 | End: 2020-01-01 | Stop reason: HOSPADM

## 2020-01-01 RX ORDER — ACETYLCYSTEINE 200 MG/ML
600 SOLUTION ORAL; RESPIRATORY (INHALATION) ONCE
Status: DISCONTINUED | OUTPATIENT
Start: 2020-01-01 | End: 2020-01-01

## 2020-01-01 RX ORDER — LANSOPRAZOLE
30 KIT
Status: DISCONTINUED | OUTPATIENT
Start: 2020-01-01 | End: 2020-01-01

## 2020-01-01 RX ORDER — LEVOTHYROXINE SODIUM 0.03 MG/1
25 TABLET ORAL DAILY
Status: DISCONTINUED | OUTPATIENT
Start: 2020-01-01 | End: 2020-01-01

## 2020-01-01 RX ORDER — INSULIN GLARGINE 100 [IU]/ML
30 INJECTION, SOLUTION SUBCUTANEOUS NIGHTLY
Status: DISCONTINUED | OUTPATIENT
Start: 2020-01-01 | End: 2020-01-01

## 2020-01-01 RX ORDER — SODIUM CHLORIDE 0.9 % (FLUSH) 0.9 %
10 SYRINGE (ML) INJECTION EVERY 12 HOURS SCHEDULED
Status: DISCONTINUED | OUTPATIENT
Start: 2020-01-01 | End: 2020-01-01 | Stop reason: HOSPADM

## 2020-01-01 RX ORDER — ROCURONIUM BROMIDE 10 MG/ML
INJECTION, SOLUTION INTRAVENOUS PRN
Status: DISCONTINUED | OUTPATIENT
Start: 2020-01-01 | End: 2020-01-01 | Stop reason: SDUPTHER

## 2020-01-01 RX ORDER — INSULIN GLARGINE 100 [IU]/ML
40 INJECTION, SOLUTION SUBCUTANEOUS NIGHTLY
Status: DISCONTINUED | OUTPATIENT
Start: 2020-01-01 | End: 2020-01-01

## 2020-01-01 RX ORDER — 0.9 % SODIUM CHLORIDE 0.9 %
250 INTRAVENOUS SOLUTION INTRAVENOUS ONCE
Status: COMPLETED | OUTPATIENT
Start: 2020-01-01 | End: 2020-01-01

## 2020-01-01 RX ORDER — ACETAMINOPHEN 650 MG/1
650 SUPPOSITORY RECTAL EVERY 6 HOURS PRN
Status: DISCONTINUED | OUTPATIENT
Start: 2020-01-01 | End: 2020-01-01 | Stop reason: HOSPADM

## 2020-01-01 RX ORDER — GABAPENTIN 400 MG/1
800 CAPSULE ORAL 3 TIMES DAILY
Status: DISCONTINUED | OUTPATIENT
Start: 2020-01-01 | End: 2020-01-01

## 2020-01-01 RX ORDER — INSULIN GLARGINE 100 [IU]/ML
15 INJECTION, SOLUTION SUBCUTANEOUS ONCE
Status: COMPLETED | OUTPATIENT
Start: 2020-01-01 | End: 2020-01-01

## 2020-01-01 RX ORDER — FUROSEMIDE 10 MG/ML
40 INJECTION INTRAMUSCULAR; INTRAVENOUS ONCE
Status: COMPLETED | OUTPATIENT
Start: 2020-01-01 | End: 2020-01-01

## 2020-01-01 RX ORDER — ONDANSETRON 2 MG/ML
4 INJECTION INTRAMUSCULAR; INTRAVENOUS EVERY 6 HOURS PRN
Status: DISCONTINUED | OUTPATIENT
Start: 2020-01-01 | End: 2020-01-01 | Stop reason: HOSPADM

## 2020-01-01 RX ORDER — INSULIN GLARGINE 100 [IU]/ML
10 INJECTION, SOLUTION SUBCUTANEOUS NIGHTLY
Status: DISCONTINUED | OUTPATIENT
Start: 2020-01-01 | End: 2020-01-01

## 2020-01-01 RX ORDER — ACETAMINOPHEN 650 MG/1
650 SUPPOSITORY RECTAL EVERY 4 HOURS PRN
Status: CANCELLED | OUTPATIENT
Start: 2020-01-01

## 2020-01-01 RX ORDER — POLYETHYLENE GLYCOL 3350 17 G/17G
17 POWDER, FOR SOLUTION ORAL DAILY PRN
Status: DISCONTINUED | OUTPATIENT
Start: 2020-01-01 | End: 2020-01-01 | Stop reason: HOSPADM

## 2020-01-01 RX ORDER — INSULIN GLARGINE 100 [IU]/ML
10 INJECTION, SOLUTION SUBCUTANEOUS ONCE
Status: COMPLETED | OUTPATIENT
Start: 2020-01-01 | End: 2020-01-01

## 2020-01-01 RX ORDER — POTASSIUM CHLORIDE 29.8 MG/ML
20 INJECTION INTRAVENOUS PRN
Status: DISCONTINUED | OUTPATIENT
Start: 2020-01-01 | End: 2020-01-01 | Stop reason: HOSPADM

## 2020-01-01 RX ORDER — DEXTROSE MONOHYDRATE 25 G/50ML
12.5 INJECTION, SOLUTION INTRAVENOUS PRN
Status: DISCONTINUED | OUTPATIENT
Start: 2020-01-01 | End: 2020-01-01 | Stop reason: HOSPADM

## 2020-01-01 RX ORDER — LORAZEPAM 2 MG/ML
INJECTION INTRAMUSCULAR
Status: COMPLETED
Start: 2020-01-01 | End: 2020-01-01

## 2020-01-01 RX ORDER — MIDAZOLAM HYDROCHLORIDE 1 MG/ML
INJECTION INTRAMUSCULAR; INTRAVENOUS PRN
Status: DISCONTINUED | OUTPATIENT
Start: 2020-01-01 | End: 2020-01-01 | Stop reason: SDUPTHER

## 2020-01-01 RX ORDER — ACETAMINOPHEN 325 MG/1
650 TABLET ORAL EVERY 6 HOURS PRN
Status: DISCONTINUED | OUTPATIENT
Start: 2020-01-01 | End: 2020-01-01 | Stop reason: HOSPADM

## 2020-01-01 RX ORDER — PROMETHAZINE HYDROCHLORIDE 25 MG/1
12.5 TABLET ORAL EVERY 6 HOURS PRN
Status: DISCONTINUED | OUTPATIENT
Start: 2020-01-01 | End: 2020-01-01 | Stop reason: HOSPADM

## 2020-01-01 RX ORDER — SODIUM CHLORIDE 9 MG/ML
INJECTION, SOLUTION INTRAVENOUS
Status: COMPLETED
Start: 2020-01-01 | End: 2020-01-01

## 2020-01-01 RX ORDER — LORAZEPAM 2 MG/ML
0.5 INJECTION INTRAMUSCULAR
Status: CANCELLED | OUTPATIENT
Start: 2020-01-01

## 2020-01-01 RX ORDER — SODIUM CHLORIDE 0.9 % (FLUSH) 0.9 %
10 SYRINGE (ML) INJECTION PRN
Status: DISCONTINUED | OUTPATIENT
Start: 2020-01-01 | End: 2020-01-01 | Stop reason: HOSPADM

## 2020-01-01 RX ORDER — CALCIUM GLUCONATE 94 MG/ML
1 INJECTION, SOLUTION INTRAVENOUS ONCE
Status: COMPLETED | OUTPATIENT
Start: 2020-01-01 | End: 2020-01-01

## 2020-01-01 RX ORDER — MIDAZOLAM HYDROCHLORIDE 1 MG/ML
4 INJECTION INTRAMUSCULAR; INTRAVENOUS ONCE
Status: COMPLETED | OUTPATIENT
Start: 2020-01-01 | End: 2020-01-01

## 2020-01-01 RX ORDER — SODIUM CHLORIDE 9 MG/ML
5 INJECTION INTRAVENOUS DAILY
Status: DISCONTINUED | OUTPATIENT
Start: 2020-01-01 | End: 2020-01-01

## 2020-01-01 RX ORDER — MAGNESIUM SULFATE 4 G/50ML
4 INJECTION INTRAVENOUS ONCE
Status: COMPLETED | OUTPATIENT
Start: 2020-01-01 | End: 2020-01-01

## 2020-01-01 RX ORDER — SODIUM CHLORIDE, SODIUM LACTATE, POTASSIUM CHLORIDE, CALCIUM CHLORIDE 600; 310; 30; 20 MG/100ML; MG/100ML; MG/100ML; MG/100ML
INJECTION, SOLUTION INTRAVENOUS CONTINUOUS PRN
Status: DISCONTINUED | OUTPATIENT
Start: 2020-01-01 | End: 2020-01-01 | Stop reason: SDUPTHER

## 2020-01-01 RX ORDER — ALBUTEROL SULFATE 90 UG/1
4 AEROSOL, METERED RESPIRATORY (INHALATION) EVERY 4 HOURS PRN
Status: DISCONTINUED | OUTPATIENT
Start: 2020-01-01 | End: 2020-01-01

## 2020-01-01 RX ORDER — SODIUM CHLORIDE 0.9 % (FLUSH) 0.9 %
10 SYRINGE (ML) INJECTION EVERY 12 HOURS SCHEDULED
Status: CANCELLED | OUTPATIENT
Start: 2020-01-01

## 2020-01-01 RX ORDER — GLYCOPYRROLATE 1 MG/5 ML
0.2 SYRINGE (ML) INTRAVENOUS EVERY 4 HOURS PRN
Status: DISCONTINUED | OUTPATIENT
Start: 2020-01-01 | End: 2020-01-01 | Stop reason: HOSPADM

## 2020-01-01 RX ORDER — ASPIRIN 81 MG/1
81 TABLET, CHEWABLE ORAL DAILY
Status: DISCONTINUED | OUTPATIENT
Start: 2020-01-01 | End: 2020-01-01 | Stop reason: HOSPADM

## 2020-01-01 RX ORDER — HALOPERIDOL 5 MG/ML
1 INJECTION INTRAMUSCULAR EVERY 4 HOURS PRN
Status: CANCELLED | OUTPATIENT
Start: 2020-01-01

## 2020-01-01 RX ORDER — SODIUM CHLORIDE 0.9 % (FLUSH) 0.9 %
10 SYRINGE (ML) INJECTION PRN
Status: CANCELLED | OUTPATIENT
Start: 2020-01-01

## 2020-01-01 RX ORDER — 0.9 % SODIUM CHLORIDE 0.9 %
500 INTRAVENOUS SOLUTION INTRAVENOUS ONCE
Status: DISCONTINUED | OUTPATIENT
Start: 2020-01-01 | End: 2020-01-01

## 2020-01-01 RX ORDER — FUROSEMIDE 40 MG/1
40 TABLET ORAL DAILY
Status: DISCONTINUED | OUTPATIENT
Start: 2020-01-01 | End: 2020-01-01 | Stop reason: HOSPADM

## 2020-01-01 RX ORDER — INSULIN GLARGINE 100 [IU]/ML
40 INJECTION, SOLUTION SUBCUTANEOUS NIGHTLY
Status: DISCONTINUED | OUTPATIENT
Start: 2020-01-01 | End: 2020-01-01 | Stop reason: HOSPADM

## 2020-01-01 RX ORDER — MIDAZOLAM HYDROCHLORIDE 1 MG/ML
0.5 INJECTION INTRAMUSCULAR; INTRAVENOUS
Status: DISCONTINUED | OUTPATIENT
Start: 2020-01-01 | End: 2020-01-01 | Stop reason: HOSPADM

## 2020-01-01 RX ORDER — PANTOPRAZOLE SODIUM 40 MG/10ML
40 INJECTION, POWDER, LYOPHILIZED, FOR SOLUTION INTRAVENOUS 2 TIMES DAILY
Status: DISCONTINUED | OUTPATIENT
Start: 2020-01-01 | End: 2020-01-01 | Stop reason: HOSPADM

## 2020-01-01 RX ORDER — MAGNESIUM SULFATE IN WATER 40 MG/ML
2 INJECTION, SOLUTION INTRAVENOUS ONCE
Status: COMPLETED | OUTPATIENT
Start: 2020-01-01 | End: 2020-01-01

## 2020-01-01 RX ADMIN — SODIUM CHLORIDE, PRESERVATIVE FREE 10 ML: 5 INJECTION INTRAVENOUS at 08:43

## 2020-01-01 RX ADMIN — PROPOFOL 10 MCG/KG/MIN: 10 INJECTION, EMULSION INTRAVENOUS at 22:10

## 2020-01-01 RX ADMIN — SODIUM CHLORIDE, PRESERVATIVE FREE 10 ML: 5 INJECTION INTRAVENOUS at 22:41

## 2020-01-01 RX ADMIN — ALBUMIN (HUMAN) 25 G: 0.25 INJECTION, SOLUTION INTRAVENOUS at 11:46

## 2020-01-01 RX ADMIN — PIPERACILLIN AND TAZOBACTAM 3.38 G: 3; .375 INJECTION, POWDER, LYOPHILIZED, FOR SOLUTION INTRAVENOUS at 17:52

## 2020-01-01 RX ADMIN — GABAPENTIN 800 MG: 400 CAPSULE ORAL at 08:17

## 2020-01-01 RX ADMIN — SODIUM CHLORIDE, POTASSIUM CHLORIDE, SODIUM LACTATE AND CALCIUM CHLORIDE: 600; 310; 30; 20 INJECTION, SOLUTION INTRAVENOUS at 08:52

## 2020-01-01 RX ADMIN — MICONAZOLE NITRATE: 20 POWDER TOPICAL at 20:52

## 2020-01-01 RX ADMIN — PIPERACILLIN AND TAZOBACTAM 3.38 G: 3; .375 INJECTION, POWDER, LYOPHILIZED, FOR SOLUTION INTRAVENOUS at 00:49

## 2020-01-01 RX ADMIN — POTASSIUM CHLORIDE 20 MEQ: 400 INJECTION, SOLUTION INTRAVENOUS at 16:34

## 2020-01-01 RX ADMIN — LEVOTHYROXINE SODIUM 50 MCG: 0.05 TABLET ORAL at 05:52

## 2020-01-01 RX ADMIN — ACETAZOLAMIDE 500 MG: 500 INJECTION, POWDER, LYOPHILIZED, FOR SOLUTION INTRAVENOUS at 13:12

## 2020-01-01 RX ADMIN — PIPERACILLIN AND TAZOBACTAM 3.38 G: 3; .375 INJECTION, POWDER, LYOPHILIZED, FOR SOLUTION INTRAVENOUS at 08:51

## 2020-01-01 RX ADMIN — CHLORHEXIDINE GLUCONATE 0.12% ORAL RINSE 15 ML: 1.2 LIQUID ORAL at 20:15

## 2020-01-01 RX ADMIN — INSULIN HUMAN 25 UNITS: 100 INJECTION, SOLUTION PARENTERAL at 12:24

## 2020-01-01 RX ADMIN — ATORVASTATIN CALCIUM 20 MG: 20 TABLET, FILM COATED ORAL at 08:45

## 2020-01-01 RX ADMIN — Medication 12 MCG/MIN: at 10:46

## 2020-01-01 RX ADMIN — LANSOPRAZOLE 30 MG: KIT at 08:53

## 2020-01-01 RX ADMIN — CHLORHEXIDINE GLUCONATE 0.12% ORAL RINSE 15 ML: 1.2 LIQUID ORAL at 08:21

## 2020-01-01 RX ADMIN — ENOXAPARIN SODIUM 40 MG: 40 INJECTION SUBCUTANEOUS at 09:28

## 2020-01-01 RX ADMIN — MIDODRINE HYDROCHLORIDE 10 MG: 5 TABLET ORAL at 18:02

## 2020-01-01 RX ADMIN — POTASSIUM CHLORIDE 20 MEQ: 400 INJECTION, SOLUTION INTRAVENOUS at 19:42

## 2020-01-01 RX ADMIN — POTASSIUM CHLORIDE 20 MEQ: 400 INJECTION, SOLUTION INTRAVENOUS at 08:59

## 2020-01-01 RX ADMIN — MICONAZOLE NITRATE: 20 POWDER TOPICAL at 10:16

## 2020-01-01 RX ADMIN — MIDODRINE HYDROCHLORIDE 10 MG: 5 TABLET ORAL at 16:49

## 2020-01-01 RX ADMIN — INSULIN HUMAN 15 UNITS: 100 INJECTION, SOLUTION PARENTERAL at 17:58

## 2020-01-01 RX ADMIN — INSULIN HUMAN 9 UNITS: 100 INJECTION, SOLUTION PARENTERAL at 11:33

## 2020-01-01 RX ADMIN — ROCURONIUM BROMIDE 50 MG: 10 INJECTION INTRAVENOUS at 08:52

## 2020-01-01 RX ADMIN — PIPERACILLIN AND TAZOBACTAM 3.38 G: 3; .375 INJECTION, POWDER, LYOPHILIZED, FOR SOLUTION INTRAVENOUS at 08:16

## 2020-01-01 RX ADMIN — MICONAZOLE NITRATE: 20 POWDER TOPICAL at 20:57

## 2020-01-01 RX ADMIN — SODIUM CHLORIDE, PRESERVATIVE FREE 10 ML: 5 INJECTION INTRAVENOUS at 21:11

## 2020-01-01 RX ADMIN — LEVETIRACETAM 1000 MG: 100 INJECTION, SOLUTION INTRAVENOUS at 01:07

## 2020-01-01 RX ADMIN — VANCOMYCIN HYDROCHLORIDE 1750 MG: 5 INJECTION, POWDER, LYOPHILIZED, FOR SOLUTION INTRAVENOUS at 21:36

## 2020-01-01 RX ADMIN — POTASSIUM CHLORIDE 20 MEQ: 400 INJECTION, SOLUTION INTRAVENOUS at 11:30

## 2020-01-01 RX ADMIN — ENOXAPARIN SODIUM 30 MG: 30 INJECTION SUBCUTANEOUS at 08:08

## 2020-01-01 RX ADMIN — PANTOPRAZOLE SODIUM 40 MG: 40 INJECTION, POWDER, FOR SOLUTION INTRAVENOUS at 13:48

## 2020-01-01 RX ADMIN — INSULIN HUMAN 10 UNITS: 100 INJECTION, SOLUTION PARENTERAL at 05:36

## 2020-01-01 RX ADMIN — PIPERACILLIN AND TAZOBACTAM 3.38 G: 3; .375 INJECTION, POWDER, LYOPHILIZED, FOR SOLUTION INTRAVENOUS at 16:38

## 2020-01-01 RX ADMIN — MICONAZOLE NITRATE: 20 POWDER TOPICAL at 08:56

## 2020-01-01 RX ADMIN — ATORVASTATIN CALCIUM 20 MG: 20 TABLET, FILM COATED ORAL at 09:29

## 2020-01-01 RX ADMIN — LEVETIRACETAM 1000 MG: 100 INJECTION, SOLUTION INTRAVENOUS at 01:03

## 2020-01-01 RX ADMIN — ROCURONIUM BROMIDE 50 MG: 10 INJECTION INTRAVENOUS at 09:22

## 2020-01-01 RX ADMIN — ASPIRIN 81 MG CHEWABLE TABLET 81 MG: 81 TABLET CHEWABLE at 08:58

## 2020-01-01 RX ADMIN — FUROSEMIDE 40 MG: 40 TABLET ORAL at 08:13

## 2020-01-01 RX ADMIN — CALCIUM GLUCONATE 1 G: 98 INJECTION, SOLUTION INTRAVENOUS at 11:49

## 2020-01-01 RX ADMIN — NOREPINEPHRINE BITARTRATE 2 MCG/MIN: 1 INJECTION INTRAVENOUS at 17:12

## 2020-01-01 RX ADMIN — MIDAZOLAM 0.5 MG: 1 INJECTION INTRAMUSCULAR; INTRAVENOUS at 14:26

## 2020-01-01 RX ADMIN — INSULIN HUMAN 8 UNITS: 100 INJECTION, SOLUTION PARENTERAL at 19:56

## 2020-01-01 RX ADMIN — PIPERACILLIN AND TAZOBACTAM 3.38 G: 3; .375 INJECTION, POWDER, LYOPHILIZED, FOR SOLUTION INTRAVENOUS at 16:48

## 2020-01-01 RX ADMIN — INSULIN HUMAN 2 UNITS: 100 INJECTION, SOLUTION PARENTERAL at 17:59

## 2020-01-01 RX ADMIN — INSULIN GLARGINE 50 UNITS: 100 INJECTION, SOLUTION SUBCUTANEOUS at 20:38

## 2020-01-01 RX ADMIN — ALBUMIN (HUMAN) 25 G: 0.25 INJECTION, SOLUTION INTRAVENOUS at 13:15

## 2020-01-01 RX ADMIN — MICONAZOLE NITRATE: 20 POWDER TOPICAL at 20:38

## 2020-01-01 RX ADMIN — ALBUTEROL SULFATE 4 PUFF: 108 AEROSOL, METERED RESPIRATORY (INHALATION) at 15:56

## 2020-01-01 RX ADMIN — LEVOTHYROXINE SODIUM 50 MCG: 0.05 TABLET ORAL at 05:27

## 2020-01-01 RX ADMIN — INSULIN HUMAN 20 UNITS: 100 INJECTION, SOLUTION PARENTERAL at 18:03

## 2020-01-01 RX ADMIN — LANSOPRAZOLE 30 MG: KIT at 09:51

## 2020-01-01 RX ADMIN — Medication 500 ML: at 15:25

## 2020-01-01 RX ADMIN — FUROSEMIDE 5 MG/HR: 10 INJECTION, SOLUTION INTRAVENOUS at 20:47

## 2020-01-01 RX ADMIN — LEVOTHYROXINE SODIUM ANHYDROUS 12.5 MCG: 100 INJECTION, POWDER, LYOPHILIZED, FOR SOLUTION INTRAVENOUS at 08:39

## 2020-01-01 RX ADMIN — SODIUM CHLORIDE 20 ML: 9 INJECTION, SOLUTION INTRAVENOUS at 13:05

## 2020-01-01 RX ADMIN — MAGNESIUM SULFATE 2 G: 2 INJECTION INTRAVENOUS at 08:14

## 2020-01-01 RX ADMIN — CHLORHEXIDINE GLUCONATE 0.12% ORAL RINSE 15 ML: 1.2 LIQUID ORAL at 08:07

## 2020-01-01 RX ADMIN — CHLORHEXIDINE GLUCONATE 0.12% ORAL RINSE 15 ML: 1.2 LIQUID ORAL at 21:24

## 2020-01-01 RX ADMIN — INSULIN HUMAN 9 UNITS: 100 INJECTION, SOLUTION PARENTERAL at 06:12

## 2020-01-01 RX ADMIN — INSULIN HUMAN 20 UNITS: 100 INJECTION, SOLUTION PARENTERAL at 12:01

## 2020-01-01 RX ADMIN — SODIUM CHLORIDE, PRESERVATIVE FREE 5 ML: 5 INJECTION INTRAVENOUS at 09:38

## 2020-01-01 RX ADMIN — SODIUM CHLORIDE, PRESERVATIVE FREE 5 ML: 5 INJECTION INTRAVENOUS at 08:39

## 2020-01-01 RX ADMIN — PIPERACILLIN AND TAZOBACTAM 3.38 G: 3; .375 INJECTION, POWDER, LYOPHILIZED, FOR SOLUTION INTRAVENOUS at 16:35

## 2020-01-01 RX ADMIN — ENOXAPARIN SODIUM 30 MG: 30 INJECTION SUBCUTANEOUS at 21:14

## 2020-01-01 RX ADMIN — ATORVASTATIN CALCIUM 20 MG: 20 TABLET, FILM COATED ORAL at 10:19

## 2020-01-01 RX ADMIN — LANSOPRAZOLE 30 MG: KIT at 05:27

## 2020-01-01 RX ADMIN — SODIUM CHLORIDE, PRESERVATIVE FREE 10 ML: 5 INJECTION INTRAVENOUS at 09:31

## 2020-01-01 RX ADMIN — ENOXAPARIN SODIUM 40 MG: 40 INJECTION SUBCUTANEOUS at 10:13

## 2020-01-01 RX ADMIN — INSULIN HUMAN 8 UNITS: 100 INJECTION, SOLUTION PARENTERAL at 16:50

## 2020-01-01 RX ADMIN — LEVETIRACETAM 1000 MG: 100 INJECTION, SOLUTION INTRAVENOUS at 01:11

## 2020-01-01 RX ADMIN — INSULIN HUMAN 4 UNITS: 100 INJECTION, SOLUTION PARENTERAL at 11:50

## 2020-01-01 RX ADMIN — COLLAGENASE SANTYL: 250 OINTMENT TOPICAL at 08:55

## 2020-01-01 RX ADMIN — Medication 19 MCG/MIN: at 05:21

## 2020-01-01 RX ADMIN — MIDODRINE HYDROCHLORIDE 10 MG: 5 TABLET ORAL at 12:26

## 2020-01-01 RX ADMIN — FUROSEMIDE 5 MG/HR: 10 INJECTION, SOLUTION INTRAVENOUS at 02:43

## 2020-01-01 RX ADMIN — GABAPENTIN 800 MG: 400 CAPSULE ORAL at 21:14

## 2020-01-01 RX ADMIN — ENOXAPARIN SODIUM 30 MG: 30 INJECTION SUBCUTANEOUS at 08:17

## 2020-01-01 RX ADMIN — ENOXAPARIN SODIUM 40 MG: 40 INJECTION SUBCUTANEOUS at 08:21

## 2020-01-01 RX ADMIN — CHLORHEXIDINE GLUCONATE 0.12% ORAL RINSE 15 ML: 1.2 LIQUID ORAL at 20:38

## 2020-01-01 RX ADMIN — LEVETIRACETAM 1000 MG: 100 INJECTION, SOLUTION INTRAVENOUS at 14:58

## 2020-01-01 RX ADMIN — CHLORHEXIDINE GLUCONATE 0.12% ORAL RINSE 15 ML: 1.2 LIQUID ORAL at 20:49

## 2020-01-01 RX ADMIN — MICONAZOLE NITRATE: 20 POWDER TOPICAL at 09:31

## 2020-01-01 RX ADMIN — Medication 4 MCG/MIN: at 22:31

## 2020-01-01 RX ADMIN — MICONAZOLE NITRATE: 20 POWDER TOPICAL at 10:47

## 2020-01-01 RX ADMIN — LEVOTHYROXINE SODIUM ANHYDROUS 12.5 MCG: 100 INJECTION, POWDER, LYOPHILIZED, FOR SOLUTION INTRAVENOUS at 09:27

## 2020-01-01 RX ADMIN — SODIUM CHLORIDE, PRESERVATIVE FREE 10 ML: 5 INJECTION INTRAVENOUS at 21:02

## 2020-01-01 RX ADMIN — LORAZEPAM 2 MG: 2 INJECTION, SOLUTION INTRAMUSCULAR; INTRAVENOUS at 08:09

## 2020-01-01 RX ADMIN — MICONAZOLE NITRATE: 20 POWDER TOPICAL at 08:52

## 2020-01-01 RX ADMIN — FUROSEMIDE 5 MG/HR: 10 INJECTION, SOLUTION INTRAVENOUS at 04:14

## 2020-01-01 RX ADMIN — INSULIN HUMAN 8 UNITS: 100 INJECTION, SOLUTION PARENTERAL at 11:54

## 2020-01-01 RX ADMIN — LEVETIRACETAM 1000 MG: 100 INJECTION, SOLUTION INTRAVENOUS at 00:55

## 2020-01-01 RX ADMIN — INSULIN HUMAN 20 UNITS: 100 INJECTION, SOLUTION PARENTERAL at 05:39

## 2020-01-01 RX ADMIN — CALCIUM GLUCONATE 2 G: 98 INJECTION, SOLUTION INTRAVENOUS at 11:41

## 2020-01-01 RX ADMIN — Medication 2 MCG/MIN: at 10:29

## 2020-01-01 RX ADMIN — MIDODRINE HYDROCHLORIDE 10 MG: 5 TABLET ORAL at 08:12

## 2020-01-01 RX ADMIN — INSULIN HUMAN 20 UNITS: 100 INJECTION, SOLUTION PARENTERAL at 11:56

## 2020-01-01 RX ADMIN — ALBUTEROL SULFATE 4 PUFF: 108 AEROSOL, METERED RESPIRATORY (INHALATION) at 19:47

## 2020-01-01 RX ADMIN — INSULIN HUMAN 6 UNITS: 100 INJECTION, SOLUTION PARENTERAL at 06:07

## 2020-01-01 RX ADMIN — CHLORHEXIDINE GLUCONATE 0.12% ORAL RINSE 15 ML: 1.2 LIQUID ORAL at 08:18

## 2020-01-01 RX ADMIN — MICONAZOLE NITRATE: 20 POWDER TOPICAL at 21:15

## 2020-01-01 RX ADMIN — ALBUMIN (HUMAN) 25 G: 0.25 INJECTION, SOLUTION INTRAVENOUS at 04:38

## 2020-01-01 RX ADMIN — FUROSEMIDE 40 MG: 40 TABLET ORAL at 08:41

## 2020-01-01 RX ADMIN — LANSOPRAZOLE 30 MG: KIT at 06:27

## 2020-01-01 RX ADMIN — LEVETIRACETAM 1000 MG: 100 INJECTION, SOLUTION INTRAVENOUS at 13:48

## 2020-01-01 RX ADMIN — ENOXAPARIN SODIUM 30 MG: 30 INJECTION SUBCUTANEOUS at 20:46

## 2020-01-01 RX ADMIN — INSULIN HUMAN 8 UNITS: 100 INJECTION, SOLUTION PARENTERAL at 00:54

## 2020-01-01 RX ADMIN — PIPERACILLIN AND TAZOBACTAM 3.38 G: 3; .375 INJECTION, POWDER, LYOPHILIZED, FOR SOLUTION INTRAVENOUS at 08:22

## 2020-01-01 RX ADMIN — SODIUM CHLORIDE, PRESERVATIVE FREE 10 ML: 5 INJECTION INTRAVENOUS at 08:13

## 2020-01-01 RX ADMIN — Medication 10 MCG/MIN: at 08:07

## 2020-01-01 RX ADMIN — INSULIN HUMAN 2 UNITS: 100 INJECTION, SOLUTION PARENTERAL at 00:38

## 2020-01-01 RX ADMIN — VANCOMYCIN HYDROCHLORIDE 1500 MG: 5 INJECTION, POWDER, LYOPHILIZED, FOR SOLUTION INTRAVENOUS at 13:35

## 2020-01-01 RX ADMIN — INSULIN GLARGINE 40 UNITS: 100 INJECTION, SOLUTION SUBCUTANEOUS at 20:52

## 2020-01-01 RX ADMIN — SODIUM CHLORIDE, PRESERVATIVE FREE 5 ML: 5 INJECTION INTRAVENOUS at 08:21

## 2020-01-01 RX ADMIN — INSULIN GLARGINE 40 UNITS: 100 INJECTION, SOLUTION SUBCUTANEOUS at 22:30

## 2020-01-01 RX ADMIN — LEVETIRACETAM 1000 MG: 100 INJECTION, SOLUTION INTRAVENOUS at 02:36

## 2020-01-01 RX ADMIN — CHLORHEXIDINE GLUCONATE 0.12% ORAL RINSE 15 ML: 1.2 LIQUID ORAL at 09:00

## 2020-01-01 RX ADMIN — ENOXAPARIN SODIUM 30 MG: 30 INJECTION SUBCUTANEOUS at 08:41

## 2020-01-01 RX ADMIN — ASPIRIN 81 MG CHEWABLE TABLET 81 MG: 81 TABLET CHEWABLE at 09:12

## 2020-01-01 RX ADMIN — PIPERACILLIN AND TAZOBACTAM 3.38 G: 3; .375 INJECTION, POWDER, LYOPHILIZED, FOR SOLUTION INTRAVENOUS at 01:13

## 2020-01-01 RX ADMIN — INSULIN HUMAN 20 UNITS: 100 INJECTION, SOLUTION PARENTERAL at 13:15

## 2020-01-01 RX ADMIN — ALBUTEROL SULFATE 4 PUFF: 108 AEROSOL, METERED RESPIRATORY (INHALATION) at 04:08

## 2020-01-01 RX ADMIN — PIPERACILLIN AND TAZOBACTAM 3.38 G: 3; .375 INJECTION, POWDER, LYOPHILIZED, FOR SOLUTION INTRAVENOUS at 00:12

## 2020-01-01 RX ADMIN — ALBUTEROL SULFATE 4 PUFF: 108 AEROSOL, METERED RESPIRATORY (INHALATION) at 07:33

## 2020-01-01 RX ADMIN — MIDAZOLAM 1 MG: 1 INJECTION INTRAMUSCULAR; INTRAVENOUS at 09:22

## 2020-01-01 RX ADMIN — INSULIN HUMAN 20 UNITS: 100 INJECTION, SOLUTION PARENTERAL at 12:46

## 2020-01-01 RX ADMIN — SODIUM CHLORIDE, PRESERVATIVE FREE 10 ML: 5 INJECTION INTRAVENOUS at 08:52

## 2020-01-01 RX ADMIN — FUROSEMIDE 40 MG: 40 TABLET ORAL at 17:42

## 2020-01-01 RX ADMIN — INSULIN LISPRO 6 UNITS: 100 INJECTION, SOLUTION INTRAVENOUS; SUBCUTANEOUS at 22:17

## 2020-01-01 RX ADMIN — GABAPENTIN 800 MG: 400 CAPSULE ORAL at 15:35

## 2020-01-01 RX ADMIN — CHLORHEXIDINE GLUCONATE 0.12% ORAL RINSE 15 ML: 1.2 LIQUID ORAL at 20:12

## 2020-01-01 RX ADMIN — LANSOPRAZOLE 30 MG: KIT at 06:26

## 2020-01-01 RX ADMIN — FUROSEMIDE 10 MG/HR: 10 INJECTION, SOLUTION INTRAVENOUS at 22:01

## 2020-01-01 RX ADMIN — LEVETIRACETAM 1000 MG: 100 INJECTION, SOLUTION INTRAVENOUS at 01:17

## 2020-01-01 RX ADMIN — INSULIN HUMAN 2 UNITS: 100 INJECTION, SOLUTION PARENTERAL at 06:12

## 2020-01-01 RX ADMIN — ALBUMIN (HUMAN) 25 G: 0.25 INJECTION, SOLUTION INTRAVENOUS at 20:37

## 2020-01-01 RX ADMIN — INSULIN HUMAN 2 UNITS: 100 INJECTION, SOLUTION PARENTERAL at 00:53

## 2020-01-01 RX ADMIN — CEFEPIME HYDROCHLORIDE 2 G: 2 INJECTION, POWDER, FOR SOLUTION INTRAVENOUS at 17:11

## 2020-01-01 RX ADMIN — Medication 25 MCG/HR: at 08:52

## 2020-01-01 RX ADMIN — FUROSEMIDE 40 MG: 10 INJECTION, SOLUTION INTRAVENOUS at 18:32

## 2020-01-01 RX ADMIN — SODIUM CHLORIDE, PRESERVATIVE FREE 10 ML: 5 INJECTION INTRAVENOUS at 09:14

## 2020-01-01 RX ADMIN — LEVOTHYROXINE SODIUM ANHYDROUS 12.5 MCG: 100 INJECTION, POWDER, LYOPHILIZED, FOR SOLUTION INTRAVENOUS at 08:10

## 2020-01-01 RX ADMIN — INSULIN HUMAN 6 UNITS: 100 INJECTION, SOLUTION PARENTERAL at 11:59

## 2020-01-01 RX ADMIN — FENTANYL CITRATE 25 MCG: 50 INJECTION INTRAMUSCULAR; INTRAVENOUS at 14:25

## 2020-01-01 RX ADMIN — PIPERACILLIN AND TAZOBACTAM 3.38 G: 3; .375 INJECTION, POWDER, LYOPHILIZED, FOR SOLUTION INTRAVENOUS at 02:04

## 2020-01-01 RX ADMIN — INSULIN HUMAN 3 UNITS: 100 INJECTION, SOLUTION PARENTERAL at 18:07

## 2020-01-01 RX ADMIN — SODIUM CHLORIDE, PRESERVATIVE FREE 5 ML: 5 INJECTION INTRAVENOUS at 08:10

## 2020-01-01 RX ADMIN — SODIUM CHLORIDE, PRESERVATIVE FREE 10 ML: 5 INJECTION INTRAVENOUS at 20:11

## 2020-01-01 RX ADMIN — INSULIN HUMAN 10 UNITS: 100 INJECTION, SOLUTION PARENTERAL at 08:41

## 2020-01-01 RX ADMIN — Medication 12 MCG/MIN: at 15:51

## 2020-01-01 RX ADMIN — INSULIN GLARGINE 10 UNITS: 100 INJECTION, SOLUTION SUBCUTANEOUS at 20:43

## 2020-01-01 RX ADMIN — INSULIN GLARGINE 15 UNITS: 100 INJECTION, SOLUTION SUBCUTANEOUS at 21:49

## 2020-01-01 RX ADMIN — ATORVASTATIN CALCIUM 20 MG: 20 TABLET, FILM COATED ORAL at 08:13

## 2020-01-01 RX ADMIN — CHLORHEXIDINE GLUCONATE 0.12% ORAL RINSE 15 ML: 1.2 LIQUID ORAL at 21:14

## 2020-01-01 RX ADMIN — ATORVASTATIN CALCIUM 20 MG: 20 TABLET, FILM COATED ORAL at 08:08

## 2020-01-01 RX ADMIN — ATORVASTATIN CALCIUM 20 MG: 20 TABLET, FILM COATED ORAL at 08:17

## 2020-01-01 RX ADMIN — PIPERACILLIN AND TAZOBACTAM 3.38 G: 3; .375 INJECTION, POWDER, LYOPHILIZED, FOR SOLUTION INTRAVENOUS at 02:20

## 2020-01-01 RX ADMIN — ENOXAPARIN SODIUM 40 MG: 40 INJECTION SUBCUTANEOUS at 08:48

## 2020-01-01 RX ADMIN — INSULIN HUMAN 9 UNITS: 100 INJECTION, SOLUTION PARENTERAL at 01:27

## 2020-01-01 RX ADMIN — ATORVASTATIN CALCIUM 20 MG: 20 TABLET, FILM COATED ORAL at 10:12

## 2020-01-01 RX ADMIN — LEVETIRACETAM 1000 MG: 100 INJECTION, SOLUTION INTRAVENOUS at 15:13

## 2020-01-01 RX ADMIN — MICONAZOLE NITRATE: 20 POWDER TOPICAL at 03:46

## 2020-01-01 RX ADMIN — CHLORHEXIDINE GLUCONATE 0.12% ORAL RINSE 15 ML: 1.2 LIQUID ORAL at 20:10

## 2020-01-01 RX ADMIN — POTASSIUM CHLORIDE 20 MEQ: 400 INJECTION, SOLUTION INTRAVENOUS at 10:15

## 2020-01-01 RX ADMIN — CHLORHEXIDINE GLUCONATE 0.12% ORAL RINSE 15 ML: 1.2 LIQUID ORAL at 21:28

## 2020-01-01 RX ADMIN — LANSOPRAZOLE 30 MG: KIT at 06:48

## 2020-01-01 RX ADMIN — PIPERACILLIN AND TAZOBACTAM 3.38 G: 3; .375 INJECTION, POWDER, LYOPHILIZED, FOR SOLUTION INTRAVENOUS at 16:32

## 2020-01-01 RX ADMIN — PIPERACILLIN AND TAZOBACTAM 3.38 G: 3; .375 INJECTION, POWDER, LYOPHILIZED, FOR SOLUTION INTRAVENOUS at 00:54

## 2020-01-01 RX ADMIN — MICONAZOLE NITRATE: 20 POWDER TOPICAL at 08:19

## 2020-01-01 RX ADMIN — SODIUM CHLORIDE, PRESERVATIVE FREE 10 ML: 5 INJECTION INTRAVENOUS at 09:02

## 2020-01-01 RX ADMIN — LANSOPRAZOLE 30 MG: KIT at 08:56

## 2020-01-01 RX ADMIN — SODIUM CHLORIDE, PRESERVATIVE FREE 10 ML: 5 INJECTION INTRAVENOUS at 03:09

## 2020-01-01 RX ADMIN — VANCOMYCIN HYDROCHLORIDE 2000 MG: 1 INJECTION, POWDER, LYOPHILIZED, FOR SOLUTION INTRAVENOUS at 19:11

## 2020-01-01 RX ADMIN — ALBUTEROL SULFATE 4 PUFF: 108 AEROSOL, METERED RESPIRATORY (INHALATION) at 23:01

## 2020-01-01 RX ADMIN — INSULIN LISPRO 5 UNITS: 100 INJECTION, SOLUTION INTRAVENOUS; SUBCUTANEOUS at 10:33

## 2020-01-01 RX ADMIN — MICONAZOLE NITRATE: 20 POWDER TOPICAL at 09:39

## 2020-01-01 RX ADMIN — INSULIN HUMAN 6 UNITS: 100 INJECTION, SOLUTION PARENTERAL at 12:19

## 2020-01-01 RX ADMIN — CEFEPIME HYDROCHLORIDE 2 G: 2 INJECTION, POWDER, FOR SOLUTION INTRAVENOUS at 05:14

## 2020-01-01 RX ADMIN — SODIUM CHLORIDE, PRESERVATIVE FREE 10 ML: 5 INJECTION INTRAVENOUS at 21:14

## 2020-01-01 RX ADMIN — GABAPENTIN 800 MG: 400 CAPSULE ORAL at 10:19

## 2020-01-01 RX ADMIN — INSULIN HUMAN 6 UNITS: 100 INJECTION, SOLUTION PARENTERAL at 12:46

## 2020-01-01 RX ADMIN — CHLORHEXIDINE GLUCONATE 0.12% ORAL RINSE 15 ML: 1.2 LIQUID ORAL at 09:12

## 2020-01-01 RX ADMIN — CHLORHEXIDINE GLUCONATE 0.12% ORAL RINSE 15 ML: 1.2 LIQUID ORAL at 08:16

## 2020-01-01 RX ADMIN — LEVETIRACETAM 1000 MG: 100 INJECTION, SOLUTION INTRAVENOUS at 01:24

## 2020-01-01 RX ADMIN — LEVETIRACETAM 1000 MG: 100 INJECTION, SOLUTION INTRAVENOUS at 13:26

## 2020-01-01 RX ADMIN — FUROSEMIDE 40 MG: 10 INJECTION, SOLUTION INTRAMUSCULAR; INTRAVENOUS at 09:26

## 2020-01-01 RX ADMIN — MIDAZOLAM 0.5 MG: 1 INJECTION INTRAMUSCULAR; INTRAVENOUS at 14:55

## 2020-01-01 RX ADMIN — INSULIN LISPRO 6 UNITS: 100 INJECTION, SOLUTION INTRAVENOUS; SUBCUTANEOUS at 01:23

## 2020-01-01 RX ADMIN — PIPERACILLIN AND TAZOBACTAM 3.38 G: 3; .375 INJECTION, POWDER, LYOPHILIZED, FOR SOLUTION INTRAVENOUS at 09:25

## 2020-01-01 RX ADMIN — INSULIN HUMAN 3 UNITS: 100 INJECTION, SOLUTION PARENTERAL at 16:57

## 2020-01-01 RX ADMIN — GABAPENTIN 800 MG: 400 CAPSULE ORAL at 14:00

## 2020-01-01 RX ADMIN — MICONAZOLE NITRATE: 20 POWDER TOPICAL at 09:14

## 2020-01-01 RX ADMIN — SODIUM CHLORIDE 250 ML: 9 INJECTION, SOLUTION INTRAVENOUS at 10:12

## 2020-01-01 RX ADMIN — ATORVASTATIN CALCIUM 20 MG: 20 TABLET, FILM COATED ORAL at 08:21

## 2020-01-01 RX ADMIN — PROPOFOL 10 MCG/KG/MIN: 10 INJECTION, EMULSION INTRAVENOUS at 21:22

## 2020-01-01 RX ADMIN — INSULIN LISPRO 6 UNITS: 100 INJECTION, SOLUTION INTRAVENOUS; SUBCUTANEOUS at 04:50

## 2020-01-01 RX ADMIN — INSULIN HUMAN 2 UNITS: 100 INJECTION, SOLUTION PARENTERAL at 13:14

## 2020-01-01 RX ADMIN — SODIUM CHLORIDE, PRESERVATIVE FREE 10 ML: 5 INJECTION INTRAVENOUS at 08:40

## 2020-01-01 RX ADMIN — PROPOFOL 14 MCG/KG/MIN: 10 INJECTION, EMULSION INTRAVENOUS at 13:52

## 2020-01-01 RX ADMIN — INSULIN HUMAN 10 UNITS: 100 INJECTION, SOLUTION PARENTERAL at 18:18

## 2020-01-01 RX ADMIN — ACETAMINOPHEN 650 MG: 650 SUPPOSITORY RECTAL at 01:15

## 2020-01-01 RX ADMIN — ALBUTEROL SULFATE 4 PUFF: 108 AEROSOL, METERED RESPIRATORY (INHALATION) at 00:11

## 2020-01-01 RX ADMIN — LEVOTHYROXINE SODIUM 50 MCG: 0.05 TABLET ORAL at 05:43

## 2020-01-01 RX ADMIN — MAGNESIUM SULFATE HEPTAHYDRATE 4 G: 80 INJECTION, SOLUTION INTRAVENOUS at 10:33

## 2020-01-01 RX ADMIN — CHLORHEXIDINE GLUCONATE 0.12% ORAL RINSE 15 ML: 1.2 LIQUID ORAL at 20:29

## 2020-01-01 RX ADMIN — INSULIN HUMAN 4 UNITS: 100 INJECTION, SOLUTION PARENTERAL at 06:03

## 2020-01-01 RX ADMIN — PROPOFOL 10 MCG/KG/MIN: 10 INJECTION, EMULSION INTRAVENOUS at 08:59

## 2020-01-01 RX ADMIN — ENOXAPARIN SODIUM 30 MG: 30 INJECTION SUBCUTANEOUS at 09:30

## 2020-01-01 RX ADMIN — ALBUTEROL SULFATE 4 PUFF: 108 AEROSOL, METERED RESPIRATORY (INHALATION) at 14:59

## 2020-01-01 RX ADMIN — INSULIN GLARGINE 50 UNITS: 100 INJECTION, SOLUTION SUBCUTANEOUS at 21:12

## 2020-01-01 RX ADMIN — CEFEPIME HYDROCHLORIDE 2 G: 2 INJECTION, POWDER, FOR SOLUTION INTRAVENOUS at 06:21

## 2020-01-01 RX ADMIN — CHLORHEXIDINE GLUCONATE 0.12% ORAL RINSE 15 ML: 1.2 LIQUID ORAL at 20:42

## 2020-01-01 RX ADMIN — CHLORHEXIDINE GLUCONATE 0.12% ORAL RINSE 15 ML: 1.2 LIQUID ORAL at 10:12

## 2020-01-01 RX ADMIN — ENOXAPARIN SODIUM 40 MG: 40 INJECTION SUBCUTANEOUS at 08:16

## 2020-01-01 RX ADMIN — POTASSIUM CHLORIDE 20 MEQ: 400 INJECTION, SOLUTION INTRAVENOUS at 07:52

## 2020-01-01 RX ADMIN — INSULIN HUMAN 2 UNITS: 100 INJECTION, SOLUTION PARENTERAL at 23:50

## 2020-01-01 RX ADMIN — MICONAZOLE NITRATE: 20 POWDER TOPICAL at 08:28

## 2020-01-01 RX ADMIN — MIDAZOLAM 4 MG: 1 INJECTION INTRAMUSCULAR; INTRAVENOUS at 17:22

## 2020-01-01 RX ADMIN — POTASSIUM CHLORIDE 20 MEQ: 400 INJECTION, SOLUTION INTRAVENOUS at 17:14

## 2020-01-01 RX ADMIN — MICONAZOLE NITRATE: 20 POWDER TOPICAL at 21:16

## 2020-01-01 RX ADMIN — ATORVASTATIN CALCIUM 20 MG: 20 TABLET, FILM COATED ORAL at 08:58

## 2020-01-01 RX ADMIN — INSULIN HUMAN 10 UNITS: 100 INJECTION, SOLUTION PARENTERAL at 13:18

## 2020-01-01 RX ADMIN — INSULIN HUMAN 2 UNITS: 100 INJECTION, SOLUTION PARENTERAL at 06:25

## 2020-01-01 RX ADMIN — CHLORHEXIDINE GLUCONATE 0.12% ORAL RINSE 15 ML: 1.2 LIQUID ORAL at 20:26

## 2020-01-01 RX ADMIN — ALBUMIN (HUMAN) 25 G: 0.25 INJECTION, SOLUTION INTRAVENOUS at 12:42

## 2020-01-01 RX ADMIN — LEVOTHYROXINE SODIUM 50 MCG: 0.05 TABLET ORAL at 06:28

## 2020-01-01 RX ADMIN — CEFEPIME HYDROCHLORIDE 2 G: 2 INJECTION, POWDER, FOR SOLUTION INTRAVENOUS at 17:12

## 2020-01-01 RX ADMIN — INSULIN GLARGINE 30 UNITS: 100 INJECTION, SOLUTION SUBCUTANEOUS at 20:46

## 2020-01-01 RX ADMIN — POTASSIUM CHLORIDE 20 MEQ: 400 INJECTION, SOLUTION INTRAVENOUS at 15:24

## 2020-01-01 RX ADMIN — CHLORHEXIDINE GLUCONATE 0.12% ORAL RINSE 15 ML: 1.2 LIQUID ORAL at 20:34

## 2020-01-01 RX ADMIN — ENOXAPARIN SODIUM 40 MG: 40 INJECTION SUBCUTANEOUS at 08:12

## 2020-01-01 RX ADMIN — POTASSIUM CHLORIDE 20 MEQ: 400 INJECTION, SOLUTION INTRAVENOUS at 06:47

## 2020-01-01 RX ADMIN — INSULIN HUMAN 20 UNITS: 100 INJECTION, SOLUTION PARENTERAL at 11:33

## 2020-01-01 RX ADMIN — MIDODRINE HYDROCHLORIDE 10 MG: 5 TABLET ORAL at 11:35

## 2020-01-01 RX ADMIN — CHLORHEXIDINE GLUCONATE 0.12% ORAL RINSE 15 ML: 1.2 LIQUID ORAL at 08:46

## 2020-01-01 RX ADMIN — LANSOPRAZOLE 30 MG: KIT at 08:20

## 2020-01-01 RX ADMIN — MICONAZOLE NITRATE: 20 POWDER TOPICAL at 19:50

## 2020-01-01 RX ADMIN — ALBUMIN (HUMAN) 25 G: 0.25 INJECTION, SOLUTION INTRAVENOUS at 20:48

## 2020-01-01 RX ADMIN — SODIUM CHLORIDE, PRESERVATIVE FREE 10 ML: 5 INJECTION INTRAVENOUS at 08:35

## 2020-01-01 RX ADMIN — PANTOPRAZOLE SODIUM 40 MG: 40 INJECTION, POWDER, FOR SOLUTION INTRAVENOUS at 08:12

## 2020-01-01 RX ADMIN — INSULIN HUMAN 8 UNITS: 100 INJECTION, SOLUTION PARENTERAL at 05:09

## 2020-01-01 RX ADMIN — CHLORHEXIDINE GLUCONATE 0.12% ORAL RINSE 15 ML: 1.2 LIQUID ORAL at 08:58

## 2020-01-01 RX ADMIN — CALCIUM GLUCONATE 1 G: 98 INJECTION, SOLUTION INTRAVENOUS at 17:27

## 2020-01-01 RX ADMIN — LEVETIRACETAM 1000 MG: 100 INJECTION, SOLUTION INTRAVENOUS at 01:22

## 2020-01-01 RX ADMIN — MICONAZOLE NITRATE: 20 POWDER TOPICAL at 08:35

## 2020-01-01 RX ADMIN — LEVOTHYROXINE SODIUM 25 MCG: 25 TABLET ORAL at 10:19

## 2020-01-01 RX ADMIN — SODIUM PHOSPHATE, MONOBASIC, MONOHYDRATE 15.03 MMOL: 276; 142 INJECTION, SOLUTION INTRAVENOUS at 13:35

## 2020-01-01 RX ADMIN — ALBUTEROL SULFATE 4 PUFF: 108 AEROSOL, METERED RESPIRATORY (INHALATION) at 08:26

## 2020-01-01 RX ADMIN — SODIUM CHLORIDE, PRESERVATIVE FREE 10 ML: 5 INJECTION INTRAVENOUS at 21:30

## 2020-01-01 RX ADMIN — MICONAZOLE NITRATE: 20 POWDER TOPICAL at 20:26

## 2020-01-01 RX ADMIN — Medication 8 MCG/MIN: at 00:35

## 2020-01-01 RX ADMIN — ALBUTEROL SULFATE 4 PUFF: 108 AEROSOL, METERED RESPIRATORY (INHALATION) at 07:29

## 2020-01-01 RX ADMIN — CHLORHEXIDINE GLUCONATE 0.12% ORAL RINSE 15 ML: 1.2 LIQUID ORAL at 20:46

## 2020-01-01 RX ADMIN — INSULIN HUMAN 20 UNITS: 100 INJECTION, SUSPENSION SUBCUTANEOUS at 09:08

## 2020-01-01 RX ADMIN — INSULIN GLARGINE 20 UNITS: 100 INJECTION, SOLUTION SUBCUTANEOUS at 21:03

## 2020-01-01 RX ADMIN — MIDODRINE HYDROCHLORIDE 5 MG: 5 TABLET ORAL at 11:47

## 2020-01-01 RX ADMIN — ATORVASTATIN CALCIUM 20 MG: 20 TABLET, FILM COATED ORAL at 07:00

## 2020-01-01 RX ADMIN — PIPERACILLIN AND TAZOBACTAM 3.38 G: 3; .375 INJECTION, POWDER, LYOPHILIZED, FOR SOLUTION INTRAVENOUS at 15:51

## 2020-01-01 RX ADMIN — CALCIUM GLUCONATE 1 G: 98 INJECTION, SOLUTION INTRAVENOUS at 10:16

## 2020-01-01 RX ADMIN — INSULIN HUMAN 15 UNITS: 100 INJECTION, SUSPENSION SUBCUTANEOUS at 08:47

## 2020-01-01 RX ADMIN — Medication 9 MCG/MIN: at 01:28

## 2020-01-01 RX ADMIN — ASPIRIN 81 MG CHEWABLE TABLET 81 MG: 81 TABLET CHEWABLE at 08:17

## 2020-01-01 RX ADMIN — FUROSEMIDE 10 MG/HR: 10 INJECTION, SOLUTION INTRAVENOUS at 08:56

## 2020-01-01 RX ADMIN — INSULIN HUMAN 15 UNITS: 100 INJECTION, SOLUTION PARENTERAL at 06:31

## 2020-01-01 RX ADMIN — LEVETIRACETAM 1000 MG: 100 INJECTION, SOLUTION INTRAVENOUS at 12:32

## 2020-01-01 RX ADMIN — MICONAZOLE NITRATE: 20 POWDER TOPICAL at 08:10

## 2020-01-01 RX ADMIN — SODIUM CHLORIDE, PRESERVATIVE FREE 10 ML: 5 INJECTION INTRAVENOUS at 08:17

## 2020-01-01 RX ADMIN — INSULIN HUMAN 20 UNITS: 100 INJECTION, SOLUTION PARENTERAL at 18:01

## 2020-01-01 RX ADMIN — GABAPENTIN 800 MG: 400 CAPSULE ORAL at 15:27

## 2020-01-01 RX ADMIN — FUROSEMIDE 10 MG/HR: 10 INJECTION, SOLUTION INTRAVENOUS at 19:39

## 2020-01-01 RX ADMIN — LANSOPRAZOLE 30 MG: KIT at 07:05

## 2020-01-01 RX ADMIN — LEVOTHYROXINE SODIUM ANHYDROUS 12.5 MCG: 100 INJECTION, POWDER, LYOPHILIZED, FOR SOLUTION INTRAVENOUS at 09:03

## 2020-01-01 RX ADMIN — MAGNESIUM SULFATE HEPTAHYDRATE 1 G: 1 INJECTION, SOLUTION INTRAVENOUS at 06:47

## 2020-01-01 RX ADMIN — SODIUM CHLORIDE, PRESERVATIVE FREE 10 ML: 5 INJECTION INTRAVENOUS at 08:19

## 2020-01-01 RX ADMIN — SODIUM CHLORIDE, PRESERVATIVE FREE 10 ML: 5 INJECTION INTRAVENOUS at 20:13

## 2020-01-01 RX ADMIN — Medication 11 MCG/MIN: at 11:14

## 2020-01-01 RX ADMIN — SODIUM CHLORIDE, PRESERVATIVE FREE 10 ML: 5 INJECTION INTRAVENOUS at 20:29

## 2020-01-01 RX ADMIN — LEVOTHYROXINE SODIUM 50 MCG: 0.05 TABLET ORAL at 05:10

## 2020-01-01 RX ADMIN — LEVETIRACETAM 1500 MG: 100 INJECTION, SOLUTION INTRAVENOUS at 00:39

## 2020-01-01 RX ADMIN — LANSOPRAZOLE 30 MG: KIT at 08:16

## 2020-01-01 RX ADMIN — ASPIRIN 81 MG CHEWABLE TABLET 81 MG: 81 TABLET CHEWABLE at 08:08

## 2020-01-01 RX ADMIN — GABAPENTIN 800 MG: 400 CAPSULE ORAL at 13:42

## 2020-01-01 RX ADMIN — LEVOTHYROXINE SODIUM 50 MCG: 0.05 TABLET ORAL at 05:41

## 2020-01-01 RX ADMIN — ACETAMINOPHEN 650 MG: 650 SUPPOSITORY RECTAL at 06:37

## 2020-01-01 RX ADMIN — SODIUM CHLORIDE, PRESERVATIVE FREE 5 ML: 5 INJECTION INTRAVENOUS at 09:03

## 2020-01-01 RX ADMIN — POTASSIUM CHLORIDE 20 MEQ: 400 INJECTION, SOLUTION INTRAVENOUS at 19:02

## 2020-01-01 RX ADMIN — FUROSEMIDE 5 MG/HR: 10 INJECTION, SOLUTION INTRAVENOUS at 13:20

## 2020-01-01 RX ADMIN — ASPIRIN 81 MG CHEWABLE TABLET 81 MG: 81 TABLET CHEWABLE at 08:46

## 2020-01-01 RX ADMIN — MICONAZOLE NITRATE: 20 POWDER TOPICAL at 08:34

## 2020-01-01 RX ADMIN — IOPAMIDOL 100 ML: 755 INJECTION, SOLUTION INTRAVENOUS at 18:20

## 2020-01-01 RX ADMIN — POTASSIUM CHLORIDE 20 MEQ: 400 INJECTION, SOLUTION INTRAVENOUS at 10:14

## 2020-01-01 RX ADMIN — MICONAZOLE NITRATE: 20 POWDER TOPICAL at 21:25

## 2020-01-01 RX ADMIN — LEVETIRACETAM 500 MG: 100 INJECTION, SOLUTION INTRAVENOUS at 09:37

## 2020-01-01 RX ADMIN — ASPIRIN 81 MG CHEWABLE TABLET 81 MG: 81 TABLET CHEWABLE at 08:21

## 2020-01-01 RX ADMIN — ATORVASTATIN CALCIUM 20 MG: 20 TABLET, FILM COATED ORAL at 08:43

## 2020-01-01 RX ADMIN — ALBUTEROL SULFATE 4 PUFF: 108 AEROSOL, METERED RESPIRATORY (INHALATION) at 11:00

## 2020-01-01 RX ADMIN — PIPERACILLIN AND TAZOBACTAM 3.38 G: 3; .375 INJECTION, POWDER, LYOPHILIZED, FOR SOLUTION INTRAVENOUS at 01:20

## 2020-01-01 RX ADMIN — ENOXAPARIN SODIUM 40 MG: 40 INJECTION SUBCUTANEOUS at 08:43

## 2020-01-01 RX ADMIN — MICONAZOLE NITRATE: 20 POWDER TOPICAL at 21:07

## 2020-01-01 RX ADMIN — CHLORHEXIDINE GLUCONATE 0.12% ORAL RINSE 15 ML: 1.2 LIQUID ORAL at 21:02

## 2020-01-01 RX ADMIN — GABAPENTIN 800 MG: 400 CAPSULE ORAL at 20:26

## 2020-01-01 RX ADMIN — SODIUM CHLORIDE, PRESERVATIVE FREE 10 ML: 5 INJECTION INTRAVENOUS at 20:34

## 2020-01-01 RX ADMIN — LEVOTHYROXINE SODIUM ANHYDROUS 12.5 MCG: 100 INJECTION, POWDER, LYOPHILIZED, FOR SOLUTION INTRAVENOUS at 08:20

## 2020-01-01 RX ADMIN — SODIUM CHLORIDE, PRESERVATIVE FREE 5 ML: 5 INJECTION INTRAVENOUS at 08:20

## 2020-01-01 RX ADMIN — ATORVASTATIN CALCIUM 20 MG: 20 TABLET, FILM COATED ORAL at 09:12

## 2020-01-01 RX ADMIN — FUROSEMIDE 40 MG: 10 INJECTION, SOLUTION INTRAMUSCULAR; INTRAVENOUS at 08:58

## 2020-01-01 RX ADMIN — ENOXAPARIN SODIUM 40 MG: 40 INJECTION SUBCUTANEOUS at 08:15

## 2020-01-01 RX ADMIN — VANCOMYCIN HYDROCHLORIDE 1500 MG: 5 INJECTION, POWDER, LYOPHILIZED, FOR SOLUTION INTRAVENOUS at 11:14

## 2020-01-01 RX ADMIN — CHLORHEXIDINE GLUCONATE 0.12% ORAL RINSE 15 ML: 1.2 LIQUID ORAL at 08:15

## 2020-01-01 RX ADMIN — ENOXAPARIN SODIUM 30 MG: 30 INJECTION SUBCUTANEOUS at 20:15

## 2020-01-01 RX ADMIN — INSULIN HUMAN 8 UNITS: 100 INJECTION, SOLUTION PARENTERAL at 13:17

## 2020-01-01 RX ADMIN — Medication 0.2 MG: at 14:25

## 2020-01-01 RX ADMIN — MIDODRINE HYDROCHLORIDE 10 MG: 5 TABLET ORAL at 12:01

## 2020-01-01 RX ADMIN — Medication 14 MCG/MIN: at 07:31

## 2020-01-01 RX ADMIN — LANSOPRAZOLE 30 MG: KIT at 08:07

## 2020-01-01 RX ADMIN — PIPERACILLIN AND TAZOBACTAM 3.38 G: 3; .375 INJECTION, POWDER, LYOPHILIZED, FOR SOLUTION INTRAVENOUS at 09:58

## 2020-01-01 RX ADMIN — ALBUMIN (HUMAN) 25 G: 0.25 INJECTION, SOLUTION INTRAVENOUS at 22:05

## 2020-01-01 RX ADMIN — SODIUM CHLORIDE, PRESERVATIVE FREE 10 ML: 5 INJECTION INTRAVENOUS at 08:56

## 2020-01-01 RX ADMIN — ENOXAPARIN SODIUM 30 MG: 30 INJECTION SUBCUTANEOUS at 20:29

## 2020-01-01 RX ADMIN — LEVETIRACETAM 1000 MG: 100 INJECTION, SOLUTION INTRAVENOUS at 15:12

## 2020-01-01 RX ADMIN — PIPERACILLIN AND TAZOBACTAM 3.38 G: 3; .375 INJECTION, POWDER, LYOPHILIZED, FOR SOLUTION INTRAVENOUS at 18:00

## 2020-01-01 RX ADMIN — MIDODRINE HYDROCHLORIDE 10 MG: 5 TABLET ORAL at 08:46

## 2020-01-01 RX ADMIN — ATORVASTATIN CALCIUM 20 MG: 20 TABLET, FILM COATED ORAL at 08:46

## 2020-01-01 RX ADMIN — MIDAZOLAM 1 MG: 1 INJECTION INTRAMUSCULAR; INTRAVENOUS at 08:52

## 2020-01-01 RX ADMIN — INSULIN HUMAN 3 UNITS: 100 INJECTION, SOLUTION PARENTERAL at 00:39

## 2020-01-01 RX ADMIN — VANCOMYCIN HYDROCHLORIDE 1500 MG: 5 INJECTION, POWDER, LYOPHILIZED, FOR SOLUTION INTRAVENOUS at 12:57

## 2020-01-01 RX ADMIN — INSULIN HUMAN 20 UNITS: 100 INJECTION, SOLUTION PARENTERAL at 18:07

## 2020-01-01 RX ADMIN — SODIUM CHLORIDE, PRESERVATIVE FREE 10 ML: 5 INJECTION INTRAVENOUS at 21:15

## 2020-01-01 RX ADMIN — MICONAZOLE NITRATE: 20 POWDER TOPICAL at 08:55

## 2020-01-01 RX ADMIN — VANCOMYCIN HYDROCHLORIDE 1750 MG: 5 INJECTION, POWDER, LYOPHILIZED, FOR SOLUTION INTRAVENOUS at 17:47

## 2020-01-01 RX ADMIN — ASPIRIN 81 MG CHEWABLE TABLET 81 MG: 81 TABLET CHEWABLE at 10:12

## 2020-01-01 RX ADMIN — LEVETIRACETAM 1000 MG: 100 INJECTION, SOLUTION INTRAVENOUS at 16:31

## 2020-01-01 RX ADMIN — GABAPENTIN 800 MG: 400 CAPSULE ORAL at 08:07

## 2020-01-01 RX ADMIN — Medication 2 MCG/MIN: at 23:33

## 2020-01-01 RX ADMIN — PROPOFOL 15 MCG/KG/MIN: 10 INJECTION, EMULSION INTRAVENOUS at 00:19

## 2020-01-01 RX ADMIN — INSULIN HUMAN 6 UNITS: 100 INJECTION, SOLUTION PARENTERAL at 02:36

## 2020-01-01 RX ADMIN — LEVETIRACETAM 1000 MG: 100 INJECTION, SOLUTION INTRAVENOUS at 13:42

## 2020-01-01 RX ADMIN — INSULIN GLARGINE 20 UNITS: 100 INJECTION, SOLUTION SUBCUTANEOUS at 21:13

## 2020-01-01 RX ADMIN — INSULIN HUMAN 15 UNITS: 100 INJECTION, SUSPENSION SUBCUTANEOUS at 08:41

## 2020-01-01 RX ADMIN — ALBUMIN (HUMAN) 25 G: 0.25 INJECTION, SOLUTION INTRAVENOUS at 04:14

## 2020-01-01 RX ADMIN — ASPIRIN 81 MG CHEWABLE TABLET 81 MG: 81 TABLET CHEWABLE at 08:13

## 2020-01-01 RX ADMIN — SODIUM CHLORIDE, PRESERVATIVE FREE 10 ML: 5 INJECTION INTRAVENOUS at 20:46

## 2020-01-01 RX ADMIN — MICONAZOLE NITRATE: 20 POWDER TOPICAL at 08:29

## 2020-01-01 RX ADMIN — PIPERACILLIN AND TAZOBACTAM 3.38 G: 3; .375 INJECTION, POWDER, LYOPHILIZED, FOR SOLUTION INTRAVENOUS at 16:44

## 2020-01-01 RX ADMIN — SODIUM CHLORIDE, PRESERVATIVE FREE 10 ML: 5 INJECTION INTRAVENOUS at 03:08

## 2020-01-01 RX ADMIN — INSULIN HUMAN 20 UNITS: 100 INJECTION, SOLUTION PARENTERAL at 06:17

## 2020-01-01 RX ADMIN — ACETAZOLAMIDE 500 MG: 500 INJECTION, POWDER, LYOPHILIZED, FOR SOLUTION INTRAVENOUS at 18:29

## 2020-01-01 RX ADMIN — GABAPENTIN 800 MG: 400 CAPSULE ORAL at 20:15

## 2020-01-01 RX ADMIN — MIDODRINE HYDROCHLORIDE 5 MG: 5 TABLET ORAL at 08:41

## 2020-01-01 RX ADMIN — SODIUM CHLORIDE, PRESERVATIVE FREE 10 ML: 5 INJECTION INTRAVENOUS at 08:42

## 2020-01-01 RX ADMIN — Medication 12 MCG/MIN: at 16:40

## 2020-01-01 RX ADMIN — INSULIN HUMAN 20 UNITS: 100 INJECTION, SOLUTION PARENTERAL at 06:14

## 2020-01-01 RX ADMIN — PIPERACILLIN AND TAZOBACTAM 3.38 G: 3; .375 INJECTION, POWDER, LYOPHILIZED, FOR SOLUTION INTRAVENOUS at 10:12

## 2020-01-01 RX ADMIN — INSULIN HUMAN 15 UNITS: 100 INJECTION, SOLUTION PARENTERAL at 11:48

## 2020-01-01 RX ADMIN — ENOXAPARIN SODIUM 40 MG: 40 INJECTION SUBCUTANEOUS at 09:13

## 2020-01-01 RX ADMIN — ASPIRIN 81 MG CHEWABLE TABLET 81 MG: 81 TABLET CHEWABLE at 09:28

## 2020-01-01 RX ADMIN — PIPERACILLIN AND TAZOBACTAM 3.38 G: 3; .375 INJECTION, POWDER, LYOPHILIZED, FOR SOLUTION INTRAVENOUS at 08:59

## 2020-01-01 RX ADMIN — CEFEPIME HYDROCHLORIDE 2 G: 2 INJECTION, POWDER, FOR SOLUTION INTRAVENOUS at 05:39

## 2020-01-01 RX ADMIN — Medication 11 MCG/MIN: at 08:45

## 2020-01-01 RX ADMIN — MIDODRINE HYDROCHLORIDE 5 MG: 5 TABLET ORAL at 17:42

## 2020-01-01 RX ADMIN — INSULIN HUMAN 20 UNITS: 100 INJECTION, SOLUTION PARENTERAL at 16:52

## 2020-01-01 RX ADMIN — ACETAZOLAMIDE 500 MG: 500 INJECTION, POWDER, LYOPHILIZED, FOR SOLUTION INTRAVENOUS at 01:38

## 2020-01-01 RX ADMIN — ENOXAPARIN SODIUM 30 MG: 30 INJECTION SUBCUTANEOUS at 08:58

## 2020-01-01 RX ADMIN — INSULIN HUMAN 6 UNITS: 100 INJECTION, SOLUTION PARENTERAL at 06:31

## 2020-01-01 RX ADMIN — MICONAZOLE NITRATE: 20 POWDER TOPICAL at 22:37

## 2020-01-01 RX ADMIN — INSULIN HUMAN 10 UNITS: 100 INJECTION, SOLUTION PARENTERAL at 23:33

## 2020-01-01 RX ADMIN — INSULIN HUMAN 2 UNITS: 100 INJECTION, SOLUTION PARENTERAL at 18:00

## 2020-01-01 RX ADMIN — SODIUM PHOSPHATE, MONOBASIC, MONOHYDRATE 15.03 MMOL: 276; 142 INJECTION, SOLUTION INTRAVENOUS at 07:52

## 2020-01-01 RX ADMIN — INSULIN LISPRO 3 UNITS: 100 INJECTION, SOLUTION INTRAVENOUS; SUBCUTANEOUS at 13:45

## 2020-01-01 RX ADMIN — SODIUM CHLORIDE, PRESERVATIVE FREE 10 ML: 5 INJECTION INTRAVENOUS at 09:38

## 2020-01-01 RX ADMIN — ASPIRIN 81 MG CHEWABLE TABLET 81 MG: 81 TABLET CHEWABLE at 08:40

## 2020-01-01 RX ADMIN — INSULIN HUMAN 15 UNITS: 100 INJECTION, SUSPENSION SUBCUTANEOUS at 09:30

## 2020-01-01 RX ADMIN — MICONAZOLE NITRATE: 20 POWDER TOPICAL at 20:42

## 2020-01-01 RX ADMIN — MICONAZOLE NITRATE: 20 POWDER TOPICAL at 08:30

## 2020-01-01 RX ADMIN — Medication 13 MCG/MIN: at 13:20

## 2020-01-01 RX ADMIN — FUROSEMIDE 5 MG/HR: 10 INJECTION, SOLUTION INTRAVENOUS at 06:17

## 2020-01-01 RX ADMIN — FUROSEMIDE 10 MG/HR: 10 INJECTION, SOLUTION INTRAVENOUS at 18:37

## 2020-01-01 RX ADMIN — SODIUM CHLORIDE, PRESERVATIVE FREE 10 ML: 5 INJECTION INTRAVENOUS at 08:51

## 2020-01-01 RX ADMIN — LORAZEPAM 2 MG: 2 INJECTION INTRAMUSCULAR at 08:09

## 2020-01-01 RX ADMIN — SODIUM CHLORIDE, PRESERVATIVE FREE 10 ML: 5 INJECTION INTRAVENOUS at 20:37

## 2020-01-01 RX ADMIN — FUROSEMIDE 40 MG: 40 TABLET ORAL at 08:46

## 2020-01-01 RX ADMIN — COLLAGENASE SANTYL: 250 OINTMENT TOPICAL at 10:06

## 2020-01-01 RX ADMIN — PIPERACILLIN AND TAZOBACTAM 3.38 G: 3; .375 INJECTION, POWDER, LYOPHILIZED, FOR SOLUTION INTRAVENOUS at 00:20

## 2020-01-01 RX ADMIN — PROPOFOL 20 MCG/KG/MIN: 10 INJECTION, EMULSION INTRAVENOUS at 07:31

## 2020-01-01 RX ADMIN — INSULIN GLARGINE 20 UNITS: 100 INJECTION, SOLUTION SUBCUTANEOUS at 21:17

## 2020-01-01 RX ADMIN — LEVETIRACETAM 1000 MG: 100 INJECTION, SOLUTION INTRAVENOUS at 01:36

## 2020-01-01 RX ADMIN — LEVETIRACETAM 1000 MG: 100 INJECTION, SOLUTION INTRAVENOUS at 13:32

## 2020-01-01 RX ADMIN — CEFEPIME HYDROCHLORIDE 2 G: 2 INJECTION, POWDER, FOR SOLUTION INTRAVENOUS at 05:30

## 2020-01-01 RX ADMIN — Medication 12 MCG/MIN: at 15:20

## 2020-01-01 RX ADMIN — GABAPENTIN 800 MG: 400 CAPSULE ORAL at 20:39

## 2020-01-01 RX ADMIN — LEVOTHYROXINE SODIUM 50 MCG: 0.05 TABLET ORAL at 06:26

## 2020-01-01 RX ADMIN — SODIUM CHLORIDE, PRESERVATIVE FREE 10 ML: 5 INJECTION INTRAVENOUS at 20:49

## 2020-01-01 RX ADMIN — MICONAZOLE NITRATE: 20 POWDER TOPICAL at 10:14

## 2020-01-01 RX ADMIN — FUROSEMIDE 10 MG/HR: 10 INJECTION, SOLUTION INTRAVENOUS at 08:52

## 2020-01-01 RX ADMIN — MICONAZOLE NITRATE: 20 POWDER TOPICAL at 21:03

## 2020-01-01 RX ADMIN — CHLORHEXIDINE GLUCONATE 0.12% ORAL RINSE 15 ML: 1.2 LIQUID ORAL at 08:12

## 2020-01-01 RX ADMIN — SODIUM CHLORIDE, PRESERVATIVE FREE 10 ML: 5 INJECTION INTRAVENOUS at 21:34

## 2020-01-01 RX ADMIN — ATORVASTATIN CALCIUM 20 MG: 20 TABLET, FILM COATED ORAL at 08:16

## 2020-01-01 RX ADMIN — POTASSIUM CHLORIDE 20 MEQ: 400 INJECTION, SOLUTION INTRAVENOUS at 13:32

## 2020-01-01 RX ADMIN — CHLORHEXIDINE GLUCONATE 0.12% ORAL RINSE 15 ML: 1.2 LIQUID ORAL at 08:40

## 2020-01-01 RX ADMIN — CHLORHEXIDINE GLUCONATE 0.12% ORAL RINSE 15 ML: 1.2 LIQUID ORAL at 10:24

## 2020-01-01 RX ADMIN — CEFEPIME HYDROCHLORIDE 2 G: 2 INJECTION, POWDER, FOR SOLUTION INTRAVENOUS at 16:35

## 2020-01-01 RX ADMIN — MICONAZOLE NITRATE: 20 POWDER TOPICAL at 08:43

## 2020-01-01 RX ADMIN — LANSOPRAZOLE 30 MG: KIT at 05:38

## 2020-01-01 RX ADMIN — CEFEPIME HYDROCHLORIDE 2 G: 2 INJECTION, POWDER, FOR SOLUTION INTRAVENOUS at 17:36

## 2020-01-01 RX ADMIN — LEVOTHYROXINE SODIUM 50 MCG: 0.05 TABLET ORAL at 06:14

## 2020-01-01 RX ADMIN — ASPIRIN 81 MG CHEWABLE TABLET 81 MG: 81 TABLET CHEWABLE at 09:29

## 2020-01-01 RX ADMIN — CHLORHEXIDINE GLUCONATE 0.12% ORAL RINSE 15 ML: 1.2 LIQUID ORAL at 01:35

## 2020-01-01 RX ADMIN — MAGNESIUM SULFATE HEPTAHYDRATE 1 G: 1 INJECTION, SOLUTION INTRAVENOUS at 07:52

## 2020-01-01 RX ADMIN — ASPIRIN 81 MG CHEWABLE TABLET 81 MG: 81 TABLET CHEWABLE at 08:41

## 2020-01-01 RX ADMIN — INSULIN HUMAN 20 UNITS: 100 INJECTION, SOLUTION PARENTERAL at 05:09

## 2020-01-01 RX ADMIN — SODIUM CHLORIDE, PRESERVATIVE FREE 10 ML: 5 INJECTION INTRAVENOUS at 20:26

## 2020-01-01 RX ADMIN — ASPIRIN 81 MG CHEWABLE TABLET 81 MG: 81 TABLET CHEWABLE at 08:45

## 2020-01-01 RX ADMIN — CALCIUM GLUCONATE 2 G: 98 INJECTION, SOLUTION INTRAVENOUS at 15:41

## 2020-01-01 RX ADMIN — ALBUTEROL SULFATE 4 PUFF: 108 AEROSOL, METERED RESPIRATORY (INHALATION) at 20:33

## 2020-01-01 RX ADMIN — LEVETIRACETAM 1000 MG: 100 INJECTION, SOLUTION INTRAVENOUS at 13:25

## 2020-01-01 RX ADMIN — ACETAMINOPHEN 650 MG: 650 SUPPOSITORY RECTAL at 21:11

## 2020-01-01 RX ADMIN — ACETAZOLAMIDE 500 MG: 500 INJECTION, POWDER, LYOPHILIZED, FOR SOLUTION INTRAVENOUS at 05:25

## 2020-01-01 RX ADMIN — ATORVASTATIN CALCIUM 20 MG: 20 TABLET, FILM COATED ORAL at 08:39

## 2020-01-01 RX ADMIN — ACETAMINOPHEN 650 MG: 325 TABLET ORAL at 16:45

## 2020-01-01 RX ADMIN — LEVETIRACETAM 1000 MG: 100 INJECTION, SOLUTION INTRAVENOUS at 01:38

## 2020-01-01 RX ADMIN — COLLAGENASE SANTYL: 250 OINTMENT TOPICAL at 00:40

## 2020-01-01 RX ADMIN — SODIUM CHLORIDE, PRESERVATIVE FREE 10 ML: 5 INJECTION INTRAVENOUS at 20:43

## 2020-01-01 RX ADMIN — CHLORHEXIDINE GLUCONATE 0.12% ORAL RINSE 15 ML: 1.2 LIQUID ORAL at 08:17

## 2020-01-01 RX ADMIN — ACETAMINOPHEN 650 MG: 325 TABLET ORAL at 04:44

## 2020-01-01 RX ADMIN — PIPERACILLIN AND TAZOBACTAM 3.38 G: 3; .375 INJECTION, POWDER, LYOPHILIZED, FOR SOLUTION INTRAVENOUS at 08:52

## 2020-01-01 RX ADMIN — ENOXAPARIN SODIUM 30 MG: 30 INJECTION SUBCUTANEOUS at 21:02

## 2020-01-01 RX ADMIN — ATORVASTATIN CALCIUM 20 MG: 20 TABLET, FILM COATED ORAL at 09:26

## 2020-01-01 RX ADMIN — SODIUM CHLORIDE, PRESERVATIVE FREE 10 ML: 5 INJECTION INTRAVENOUS at 21:24

## 2020-01-01 RX ADMIN — INSULIN HUMAN 25 UNITS: 100 INJECTION, SOLUTION PARENTERAL at 06:27

## 2020-01-01 RX ADMIN — INSULIN HUMAN 2 UNITS: 100 INJECTION, SOLUTION PARENTERAL at 12:27

## 2020-01-01 RX ADMIN — SODIUM CHLORIDE, PRESERVATIVE FREE 10 ML: 5 INJECTION INTRAVENOUS at 08:08

## 2020-01-01 RX ADMIN — SODIUM CHLORIDE, PRESERVATIVE FREE 10 ML: 5 INJECTION INTRAVENOUS at 19:49

## 2020-01-01 RX ADMIN — LEVOTHYROXINE SODIUM 50 MCG: 0.05 TABLET ORAL at 06:04

## 2020-01-01 RX ADMIN — CHLORHEXIDINE GLUCONATE 0.12% ORAL RINSE 15 ML: 1.2 LIQUID ORAL at 09:28

## 2020-01-01 RX ADMIN — INSULIN GLARGINE 40 UNITS: 100 INJECTION, SOLUTION SUBCUTANEOUS at 19:55

## 2020-01-01 RX ADMIN — VANCOMYCIN HYDROCHLORIDE 1500 MG: 5 INJECTION, POWDER, LYOPHILIZED, FOR SOLUTION INTRAVENOUS at 12:09

## 2020-01-01 RX ADMIN — ASPIRIN 81 MG CHEWABLE TABLET 81 MG: 81 TABLET CHEWABLE at 08:16

## 2020-01-01 RX ADMIN — LEVETIRACETAM 1000 MG: 100 INJECTION, SOLUTION INTRAVENOUS at 00:58

## 2020-01-01 RX ADMIN — ALBUTEROL SULFATE 4 PUFF: 108 AEROSOL, METERED RESPIRATORY (INHALATION) at 11:08

## 2020-01-01 RX ADMIN — LEVETIRACETAM 1000 MG: 100 INJECTION, SOLUTION INTRAVENOUS at 13:10

## 2020-01-01 RX ADMIN — CEFEPIME HYDROCHLORIDE 2 G: 2 INJECTION, POWDER, FOR SOLUTION INTRAVENOUS at 04:38

## 2020-01-01 RX ADMIN — PIPERACILLIN AND TAZOBACTAM 3.38 G: 3; .375 INJECTION, POWDER, LYOPHILIZED, FOR SOLUTION INTRAVENOUS at 09:29

## 2020-01-01 RX ADMIN — ACETAMINOPHEN 650 MG: 325 TABLET ORAL at 16:48

## 2020-01-01 RX ADMIN — ASPIRIN 81 MG CHEWABLE TABLET 81 MG: 81 TABLET CHEWABLE at 08:43

## 2020-01-01 RX ADMIN — ENOXAPARIN SODIUM 40 MG: 40 INJECTION SUBCUTANEOUS at 08:40

## 2020-01-01 RX ADMIN — SODIUM CHLORIDE 500 ML: 9 INJECTION, SOLUTION INTRAVENOUS at 15:25

## 2020-01-01 RX ADMIN — ATORVASTATIN CALCIUM 20 MG: 20 TABLET, FILM COATED ORAL at 08:41

## 2020-01-01 RX ADMIN — CEFEPIME HYDROCHLORIDE 2 G: 2 INJECTION, POWDER, FOR SOLUTION INTRAVENOUS at 18:26

## 2020-01-01 RX ADMIN — INSULIN HUMAN 25 UNITS: 100 INJECTION, SOLUTION PARENTERAL at 11:51

## 2020-01-01 RX ADMIN — VANCOMYCIN 1500 MG: 1 INJECTION, SOLUTION INTRAVENOUS at 10:58

## 2020-01-01 RX ADMIN — LANSOPRAZOLE 30 MG: KIT at 09:13

## 2020-01-01 RX ADMIN — INSULIN HUMAN 9 UNITS: 100 INJECTION, SOLUTION PARENTERAL at 17:55

## 2020-01-01 RX ADMIN — ENOXAPARIN SODIUM 40 MG: 40 INJECTION SUBCUTANEOUS at 08:50

## 2020-01-01 RX ADMIN — INSULIN HUMAN 8 UNITS: 100 INJECTION, SOLUTION PARENTERAL at 18:18

## 2020-01-01 RX ADMIN — INSULIN HUMAN 2 UNITS: 100 INJECTION, SOLUTION PARENTERAL at 00:29

## 2020-01-01 RX ADMIN — LANSOPRAZOLE 30 MG: KIT at 10:30

## 2020-01-01 RX ADMIN — MICONAZOLE NITRATE: 20 POWDER TOPICAL at 20:34

## 2020-01-01 RX ADMIN — SODIUM CHLORIDE, PRESERVATIVE FREE 10 ML: 5 INJECTION INTRAVENOUS at 10:14

## 2020-01-01 RX ADMIN — ALBUMIN (HUMAN) 25 G: 0.25 INJECTION, SOLUTION INTRAVENOUS at 05:24

## 2020-01-01 RX ADMIN — ASPIRIN 81 MG CHEWABLE TABLET 81 MG: 81 TABLET CHEWABLE at 10:19

## 2020-01-01 RX ADMIN — POTASSIUM CHLORIDE 20 MEQ: 400 INJECTION, SOLUTION INTRAVENOUS at 17:59

## 2020-01-01 RX ADMIN — PANTOPRAZOLE SODIUM 40 MG: 40 INJECTION, POWDER, FOR SOLUTION INTRAVENOUS at 21:15

## 2020-01-01 RX ADMIN — CHLORHEXIDINE GLUCONATE 0.12% ORAL RINSE 15 ML: 1.2 LIQUID ORAL at 08:39

## 2020-01-01 RX ADMIN — ACETYLCYSTEINE 600 MG: 200 INHALANT RESPIRATORY (INHALATION) at 00:19

## 2020-01-01 RX ADMIN — FENTANYL CITRATE 25 MCG: 50 INJECTION INTRAMUSCULAR; INTRAVENOUS at 14:55

## 2020-01-01 RX ADMIN — MICONAZOLE NITRATE: 20 POWDER TOPICAL at 10:08

## 2020-01-01 RX ADMIN — LANSOPRAZOLE 30 MG: KIT at 09:29

## 2020-01-01 RX ADMIN — INSULIN HUMAN 6 UNITS: 100 INJECTION, SOLUTION PARENTERAL at 00:06

## 2020-01-01 RX ADMIN — ENOXAPARIN SODIUM 30 MG: 30 INJECTION SUBCUTANEOUS at 20:26

## 2020-01-01 RX ADMIN — GABAPENTIN 800 MG: 400 CAPSULE ORAL at 08:39

## 2020-01-01 RX ADMIN — MICONAZOLE NITRATE: 20 POWDER TOPICAL at 20:17

## 2020-01-01 RX ADMIN — PIPERACILLIN AND TAZOBACTAM 3.38 G: 3; .375 INJECTION, POWDER, LYOPHILIZED, FOR SOLUTION INTRAVENOUS at 23:54

## 2020-01-01 RX ADMIN — INSULIN HUMAN 6 UNITS: 100 INJECTION, SOLUTION PARENTERAL at 11:48

## 2020-01-01 RX ADMIN — CHLORHEXIDINE GLUCONATE 0.12% ORAL RINSE 15 ML: 1.2 LIQUID ORAL at 21:10

## 2020-01-01 RX ADMIN — CHLORHEXIDINE GLUCONATE 0.12% ORAL RINSE 15 ML: 1.2 LIQUID ORAL at 08:43

## 2020-01-01 RX ADMIN — POTASSIUM CHLORIDE 20 MEQ: 400 INJECTION, SOLUTION INTRAVENOUS at 14:32

## 2020-01-01 RX ADMIN — POTASSIUM CHLORIDE 20 MEQ: 400 INJECTION, SOLUTION INTRAVENOUS at 11:15

## 2020-01-01 RX ADMIN — LEVOTHYROXINE SODIUM 50 MCG: 0.05 TABLET ORAL at 05:38

## 2020-01-01 RX ADMIN — CALCIUM GLUCONATE 1 G: 98 INJECTION, SOLUTION INTRAVENOUS at 09:30

## 2020-01-01 RX ADMIN — INSULIN HUMAN 3 UNITS: 100 INJECTION, SOLUTION PARENTERAL at 22:32

## 2020-01-01 RX ADMIN — INSULIN LISPRO 5 UNITS: 100 INJECTION, SOLUTION INTRAVENOUS; SUBCUTANEOUS at 17:36

## 2020-01-01 RX ADMIN — MICONAZOLE NITRATE: 20 POWDER TOPICAL at 21:29

## 2020-01-01 RX ADMIN — LEVOTHYROXINE SODIUM 50 MCG: 0.05 TABLET ORAL at 06:15

## 2020-01-01 RX ADMIN — SODIUM CHLORIDE 20 ML: 9 INJECTION, SOLUTION INTRAVENOUS at 10:01

## 2020-01-01 RX ADMIN — MAGNESIUM SULFATE HEPTAHYDRATE 1 G: 1 INJECTION, SOLUTION INTRAVENOUS at 08:59

## 2020-01-01 RX ADMIN — INSULIN HUMAN 25 UNITS: 100 INJECTION, SOLUTION PARENTERAL at 18:01

## 2020-01-01 RX ADMIN — INSULIN HUMAN 10 UNITS: 100 INJECTION, SOLUTION PARENTERAL at 05:37

## 2020-01-01 RX ADMIN — Medication 8 MCG/MIN: at 16:39

## 2020-01-01 RX ADMIN — PIPERACILLIN AND TAZOBACTAM 3.38 G: 3; .375 INJECTION, POWDER, LYOPHILIZED, FOR SOLUTION INTRAVENOUS at 17:04

## 2020-01-01 RX ADMIN — GABAPENTIN 800 MG: 400 CAPSULE ORAL at 09:26

## 2020-01-01 RX ADMIN — ACETAMINOPHEN 650 MG: 650 SUPPOSITORY RECTAL at 09:47

## 2020-01-01 RX ADMIN — MICONAZOLE NITRATE: 20 POWDER TOPICAL at 20:10

## 2020-01-01 RX ADMIN — INSULIN HUMAN 4 UNITS: 100 INJECTION, SOLUTION PARENTERAL at 05:38

## 2020-01-01 RX ADMIN — INSULIN GLARGINE 10 UNITS: 100 INJECTION, SOLUTION SUBCUTANEOUS at 21:35

## 2020-01-01 RX ADMIN — INSULIN GLARGINE 20 UNITS: 100 INJECTION, SOLUTION SUBCUTANEOUS at 20:58

## 2020-01-01 RX ADMIN — CALCIUM GLUCONATE 2 G: 94 INJECTION, SOLUTION INTRAVENOUS at 05:38

## 2020-01-01 RX ADMIN — LANSOPRAZOLE 30 MG: KIT at 05:02

## 2020-01-01 ASSESSMENT — PULMONARY FUNCTION TESTS
PIF_VALUE: 30
PIF_VALUE: 20
PIF_VALUE: 21
PIF_VALUE: 15
PIF_VALUE: 20
PIF_VALUE: 24
PIF_VALUE: 21
PIF_VALUE: 30
PIF_VALUE: 20
PIF_VALUE: 19
PIF_VALUE: 22
PIF_VALUE: 33
PIF_VALUE: 24
PIF_VALUE: 22
PIF_VALUE: 40
PIF_VALUE: 35
PIF_VALUE: 29
PIF_VALUE: 15
PIF_VALUE: 18
PIF_VALUE: 25
PIF_VALUE: 19
PIF_VALUE: 27
PIF_VALUE: 20
PIF_VALUE: 22
PIF_VALUE: 27
PIF_VALUE: 34
PIF_VALUE: 34
PIF_VALUE: 29
PIF_VALUE: 22
PIF_VALUE: 24
PIF_VALUE: 25
PIF_VALUE: 21
PIF_VALUE: 23
PIF_VALUE: 32
PIF_VALUE: 19
PIF_VALUE: 24
PIF_VALUE: 15
PIF_VALUE: 33
PIF_VALUE: 25
PIF_VALUE: 21
PIF_VALUE: 28
PIF_VALUE: 15
PIF_VALUE: 25
PIF_VALUE: 22
PIF_VALUE: 21
PIF_VALUE: 34
PIF_VALUE: 24
PIF_VALUE: 32
PIF_VALUE: 15
PIF_VALUE: 28
PIF_VALUE: 26
PIF_VALUE: 25
PIF_VALUE: 15
PIF_VALUE: 23
PIF_VALUE: 16
PIF_VALUE: 21
PIF_VALUE: 23
PIF_VALUE: 22
PIF_VALUE: 29
PIF_VALUE: 32
PIF_VALUE: 19
PIF_VALUE: 36
PIF_VALUE: 15
PIF_VALUE: 26
PIF_VALUE: 25
PIF_VALUE: 27
PIF_VALUE: 20
PIF_VALUE: 22
PIF_VALUE: 20
PIF_VALUE: 21
PIF_VALUE: 20
PIF_VALUE: 20
PIF_VALUE: 32
PIF_VALUE: 23
PIF_VALUE: 35
PIF_VALUE: 18
PIF_VALUE: 19
PIF_VALUE: 33
PIF_VALUE: 29
PIF_VALUE: 37
PIF_VALUE: 39
PIF_VALUE: 21
PIF_VALUE: 36
PIF_VALUE: 36
PIF_VALUE: 27
PIF_VALUE: 22
PIF_VALUE: 27
PIF_VALUE: 32
PIF_VALUE: 26
PIF_VALUE: 24
PIF_VALUE: 24
PIF_VALUE: 37
PIF_VALUE: 20
PIF_VALUE: 24
PIF_VALUE: 20
PIF_VALUE: 33
PIF_VALUE: 16
PIF_VALUE: 27
PIF_VALUE: 28
PIF_VALUE: 18
PIF_VALUE: 20
PIF_VALUE: 28
PIF_VALUE: 15
PIF_VALUE: 33
PIF_VALUE: 23
PIF_VALUE: 32
PIF_VALUE: 27
PIF_VALUE: 22
PIF_VALUE: 23
PIF_VALUE: 28
PIF_VALUE: 37
PIF_VALUE: 15
PIF_VALUE: 30
PIF_VALUE: 24
PIF_VALUE: 21
PIF_VALUE: 20
PIF_VALUE: 19
PIF_VALUE: 21
PIF_VALUE: 23
PIF_VALUE: 27
PIF_VALUE: 23
PIF_VALUE: 22
PIF_VALUE: 21
PIF_VALUE: 32
PIF_VALUE: 36
PIF_VALUE: 25
PIF_VALUE: 21
PIF_VALUE: 22
PIF_VALUE: 28
PIF_VALUE: 20
PIF_VALUE: 21
PIF_VALUE: 21
PIF_VALUE: 26
PIF_VALUE: 20
PIF_VALUE: 20
PIF_VALUE: 25
PIF_VALUE: 30
PIF_VALUE: 16
PIF_VALUE: 21
PIF_VALUE: 19
PIF_VALUE: 36
PIF_VALUE: 35
PIF_VALUE: 21
PIF_VALUE: 19
PIF_VALUE: 27
PIF_VALUE: 24
PIF_VALUE: 24
PIF_VALUE: 22
PIF_VALUE: 24
PIF_VALUE: 16
PIF_VALUE: 23
PIF_VALUE: 21
PIF_VALUE: 23
PIF_VALUE: 22
PIF_VALUE: 29
PIF_VALUE: 20
PIF_VALUE: 35
PIF_VALUE: 15
PIF_VALUE: 29
PIF_VALUE: 33
PIF_VALUE: 32
PIF_VALUE: 22
PIF_VALUE: 27
PIF_VALUE: 23
PIF_VALUE: 29
PIF_VALUE: 29
PIF_VALUE: 15
PIF_VALUE: 33
PIF_VALUE: 25
PIF_VALUE: 23
PIF_VALUE: 35
PIF_VALUE: 24
PIF_VALUE: 28
PIF_VALUE: 23
PIF_VALUE: 22
PIF_VALUE: 16
PIF_VALUE: 23
PIF_VALUE: 28
PIF_VALUE: 22
PIF_VALUE: 19
PIF_VALUE: 25
PIF_VALUE: 28
PIF_VALUE: 27
PIF_VALUE: 26
PIF_VALUE: 37
PIF_VALUE: 33
PIF_VALUE: 20
PIF_VALUE: 18
PIF_VALUE: 31
PIF_VALUE: 15
PIF_VALUE: 22
PIF_VALUE: 28
PIF_VALUE: 23
PIF_VALUE: 22
PIF_VALUE: 23
PIF_VALUE: 29
PIF_VALUE: 24
PIF_VALUE: 21
PIF_VALUE: 27
PIF_VALUE: 26
PIF_VALUE: 32
PIF_VALUE: 26
PIF_VALUE: 33
PIF_VALUE: 19
PIF_VALUE: 24
PIF_VALUE: 33
PIF_VALUE: 23
PIF_VALUE: 18
PIF_VALUE: 23
PIF_VALUE: 28
PIF_VALUE: 24
PIF_VALUE: 15
PIF_VALUE: 24
PIF_VALUE: 24
PIF_VALUE: 19
PIF_VALUE: 30
PIF_VALUE: 22
PIF_VALUE: 23
PIF_VALUE: 61
PIF_VALUE: 22
PIF_VALUE: 32
PIF_VALUE: 40
PIF_VALUE: 16
PIF_VALUE: 31
PIF_VALUE: 22
PIF_VALUE: 15
PIF_VALUE: 16
PIF_VALUE: 15
PIF_VALUE: 27
PIF_VALUE: 28
PIF_VALUE: 29
PIF_VALUE: 23
PIF_VALUE: 21
PIF_VALUE: 27
PIF_VALUE: 27
PIF_VALUE: 28
PIF_VALUE: 25
PIF_VALUE: 21
PIF_VALUE: 16
PIF_VALUE: 21
PIF_VALUE: 22
PIF_VALUE: 37
PIF_VALUE: 23
PIF_VALUE: 25
PIF_VALUE: 30
PIF_VALUE: 23
PIF_VALUE: 23
PIF_VALUE: 26
PIF_VALUE: 26
PIF_VALUE: 22
PIF_VALUE: 29
PIF_VALUE: 34
PIF_VALUE: 34
PIF_VALUE: 35
PIF_VALUE: 24
PIF_VALUE: 30
PIF_VALUE: 29
PIF_VALUE: 16
PIF_VALUE: 34
PIF_VALUE: 16
PIF_VALUE: 33
PIF_VALUE: 19
PIF_VALUE: 15
PIF_VALUE: 33
PIF_VALUE: 15
PIF_VALUE: 15
PIF_VALUE: 33
PIF_VALUE: 22
PIF_VALUE: 29
PIF_VALUE: 32
PIF_VALUE: 37
PIF_VALUE: 22
PIF_VALUE: 16
PIF_VALUE: 24
PIF_VALUE: 20
PIF_VALUE: 21
PIF_VALUE: 23
PIF_VALUE: 17
PIF_VALUE: 27
PIF_VALUE: 23
PIF_VALUE: 32
PIF_VALUE: 30
PIF_VALUE: 36
PIF_VALUE: 24
PIF_VALUE: 23
PIF_VALUE: 32
PIF_VALUE: 35
PIF_VALUE: 34
PIF_VALUE: 21
PIF_VALUE: 15
PIF_VALUE: 30
PIF_VALUE: 23
PIF_VALUE: 20
PIF_VALUE: 24
PIF_VALUE: 21
PIF_VALUE: 20
PIF_VALUE: 31
PIF_VALUE: 18
PIF_VALUE: 15
PIF_VALUE: 23
PIF_VALUE: 22
PIF_VALUE: 29
PIF_VALUE: 33
PIF_VALUE: 21
PIF_VALUE: 23
PIF_VALUE: 16
PIF_VALUE: 23
PIF_VALUE: 16
PIF_VALUE: 33
PIF_VALUE: 30
PIF_VALUE: 29
PIF_VALUE: 21
PIF_VALUE: 22
PIF_VALUE: 23
PIF_VALUE: 21
PIF_VALUE: 21
PIF_VALUE: 40
PIF_VALUE: 15
PIF_VALUE: 16
PIF_VALUE: 28
PIF_VALUE: 24
PIF_VALUE: 23
PIF_VALUE: 33
PIF_VALUE: 25
PIF_VALUE: 30
PIF_VALUE: 20
PIF_VALUE: 19
PIF_VALUE: 22
PIF_VALUE: 29
PIF_VALUE: 33
PIF_VALUE: 24
PIF_VALUE: 28
PIF_VALUE: 21
PIF_VALUE: 23
PIF_VALUE: 16
PIF_VALUE: 32
PIF_VALUE: 22
PIF_VALUE: 30
PIF_VALUE: 21
PIF_VALUE: 22
PIF_VALUE: 23
PIF_VALUE: 19
PIF_VALUE: 22
PIF_VALUE: 24
PIF_VALUE: 36
PIF_VALUE: 25
PIF_VALUE: 32
PIF_VALUE: 22
PIF_VALUE: 27
PIF_VALUE: 25
PIF_VALUE: 20
PIF_VALUE: 16
PIF_VALUE: 15
PIF_VALUE: 21
PIF_VALUE: 23
PIF_VALUE: 15
PIF_VALUE: 29
PIF_VALUE: 37
PIF_VALUE: 34
PIF_VALUE: 21
PIF_VALUE: 25
PIF_VALUE: 20
PIF_VALUE: 26
PIF_VALUE: 21
PIF_VALUE: 30
PIF_VALUE: 19
PIF_VALUE: 16
PIF_VALUE: 3
PIF_VALUE: 15
PIF_VALUE: 15
PIF_VALUE: 28
PIF_VALUE: 25
PIF_VALUE: 15
PIF_VALUE: 34
PIF_VALUE: 34
PIF_VALUE: 27
PIF_VALUE: 23
PIF_VALUE: 22
PIF_VALUE: 31
PIF_VALUE: 34
PIF_VALUE: 22
PIF_VALUE: 31
PIF_VALUE: 19
PIF_VALUE: 22
PIF_VALUE: 32
PIF_VALUE: 35
PIF_VALUE: 16
PIF_VALUE: 17
PIF_VALUE: 22
PIF_VALUE: 28
PIF_VALUE: 19
PIF_VALUE: 24
PIF_VALUE: 35
PIF_VALUE: 20
PIF_VALUE: 19
PIF_VALUE: 27
PIF_VALUE: 19
PIF_VALUE: 20
PIF_VALUE: 16
PIF_VALUE: 21
PIF_VALUE: 18
PIF_VALUE: 15
PIF_VALUE: 23
PIF_VALUE: 29
PIF_VALUE: 23
PIF_VALUE: 27
PIF_VALUE: 30
PIF_VALUE: 26
PIF_VALUE: 16
PIF_VALUE: 20
PIF_VALUE: 25
PIF_VALUE: 20
PIF_VALUE: 21
PIF_VALUE: 19
PIF_VALUE: 21
PIF_VALUE: 24
PIF_VALUE: 21
PIF_VALUE: 18
PIF_VALUE: 22
PIF_VALUE: 26
PIF_VALUE: 23
PIF_VALUE: 29
PIF_VALUE: 21
PIF_VALUE: 22
PIF_VALUE: 27
PIF_VALUE: 23
PIF_VALUE: 15
PIF_VALUE: 21
PIF_VALUE: 26
PIF_VALUE: 23
PIF_VALUE: 20
PIF_VALUE: 34
PIF_VALUE: 20
PIF_VALUE: 32
PIF_VALUE: 31
PIF_VALUE: 31
PIF_VALUE: 22
PIF_VALUE: 15
PIF_VALUE: 16
PIF_VALUE: 22
PIF_VALUE: 23
PIF_VALUE: 22
PIF_VALUE: 32
PIF_VALUE: 31
PIF_VALUE: 27
PIF_VALUE: 26
PIF_VALUE: 15
PIF_VALUE: 26
PIF_VALUE: 35
PIF_VALUE: 16
PIF_VALUE: 23
PIF_VALUE: 25
PIF_VALUE: 37
PIF_VALUE: 22
PIF_VALUE: 30
PIF_VALUE: 32
PIF_VALUE: 21
PIF_VALUE: 35
PIF_VALUE: 33
PIF_VALUE: 20
PIF_VALUE: 22
PIF_VALUE: 28
PIF_VALUE: 26
PIF_VALUE: 29
PIF_VALUE: 32
PIF_VALUE: 21
PIF_VALUE: 28
PIF_VALUE: 23
PIF_VALUE: 23
PIF_VALUE: 16
PIF_VALUE: 16
PIF_VALUE: 21
PIF_VALUE: 16
PIF_VALUE: 23
PIF_VALUE: 27
PIF_VALUE: 19
PIF_VALUE: 33
PIF_VALUE: 22
PIF_VALUE: 23
PIF_VALUE: 33
PIF_VALUE: 21
PIF_VALUE: 19
PIF_VALUE: 19
PIF_VALUE: 30
PIF_VALUE: 34
PIF_VALUE: 22
PIF_VALUE: 31
PIF_VALUE: 0
PIF_VALUE: 31
PIF_VALUE: 21
PIF_VALUE: 26
PIF_VALUE: 15
PIF_VALUE: 24
PIF_VALUE: 23
PIF_VALUE: 16
PIF_VALUE: 19
PIF_VALUE: 31
PIF_VALUE: 16
PIF_VALUE: 27
PIF_VALUE: 31
PIF_VALUE: 23
PIF_VALUE: 33
PIF_VALUE: 25
PIF_VALUE: 37
PIF_VALUE: 15
PIF_VALUE: 23
PIF_VALUE: 33
PIF_VALUE: 16
PIF_VALUE: 36
PIF_VALUE: 22
PIF_VALUE: 21
PIF_VALUE: 33
PIF_VALUE: 19
PIF_VALUE: 16
PIF_VALUE: 28
PIF_VALUE: 35
PIF_VALUE: 29
PIF_VALUE: 24
PIF_VALUE: 16
PIF_VALUE: 24
PIF_VALUE: 32
PIF_VALUE: 24
PIF_VALUE: 25
PIF_VALUE: 28
PIF_VALUE: 19
PIF_VALUE: 37
PIF_VALUE: 24
PIF_VALUE: 26
PIF_VALUE: 24
PIF_VALUE: 22
PIF_VALUE: 26
PIF_VALUE: 19
PIF_VALUE: 30
PIF_VALUE: 23
PIF_VALUE: 26
PIF_VALUE: 15
PIF_VALUE: 26
PIF_VALUE: 26
PIF_VALUE: 37
PIF_VALUE: 21
PIF_VALUE: 33
PIF_VALUE: 22
PIF_VALUE: 20
PIF_VALUE: 36
PIF_VALUE: 34
PIF_VALUE: 33
PIF_VALUE: 25
PIF_VALUE: 27
PIF_VALUE: 24
PIF_VALUE: 25
PIF_VALUE: 32
PIF_VALUE: 16
PIF_VALUE: 21
PIF_VALUE: 32
PIF_VALUE: 16
PIF_VALUE: 15
PIF_VALUE: 23
PIF_VALUE: 21
PIF_VALUE: 18
PIF_VALUE: 22
PIF_VALUE: 30
PIF_VALUE: 26
PIF_VALUE: 34
PIF_VALUE: 36
PIF_VALUE: 28
PIF_VALUE: 23
PIF_VALUE: 21
PIF_VALUE: 20
PIF_VALUE: 33
PIF_VALUE: 32
PIF_VALUE: 25
PIF_VALUE: 22
PIF_VALUE: 30
PIF_VALUE: 16
PIF_VALUE: 20
PIF_VALUE: 29
PIF_VALUE: 28
PIF_VALUE: 26
PIF_VALUE: 27
PIF_VALUE: 23
PIF_VALUE: 15
PIF_VALUE: 15
PIF_VALUE: 24
PIF_VALUE: 26
PIF_VALUE: 20
PIF_VALUE: 33
PIF_VALUE: 23
PIF_VALUE: 24
PIF_VALUE: 24
PIF_VALUE: 21
PIF_VALUE: 25
PIF_VALUE: 30
PIF_VALUE: 29
PIF_VALUE: 24
PIF_VALUE: 27
PIF_VALUE: 23
PIF_VALUE: 34
PIF_VALUE: 22
PIF_VALUE: 20
PIF_VALUE: 30
PIF_VALUE: 38
PIF_VALUE: 23
PIF_VALUE: 32
PIF_VALUE: 24
PIF_VALUE: 18
PIF_VALUE: 23
PIF_VALUE: 28
PIF_VALUE: 22
PIF_VALUE: 20
PIF_VALUE: 26
PIF_VALUE: 26
PIF_VALUE: 23
PIF_VALUE: 21
PIF_VALUE: 21
PIF_VALUE: 19
PIF_VALUE: 36
PIF_VALUE: 20
PIF_VALUE: 26
PIF_VALUE: 32
PIF_VALUE: 27
PIF_VALUE: 28
PIF_VALUE: 24
PIF_VALUE: 23
PIF_VALUE: 23
PIF_VALUE: 16
PIF_VALUE: 23
PIF_VALUE: 19
PIF_VALUE: 22
PIF_VALUE: 22
PIF_VALUE: 30
PIF_VALUE: 31
PIF_VALUE: 23
PIF_VALUE: 20
PIF_VALUE: 20
PIF_VALUE: 27
PIF_VALUE: 23
PIF_VALUE: 20
PIF_VALUE: 25
PIF_VALUE: 31
PIF_VALUE: 35
PIF_VALUE: 29
PIF_VALUE: 18
PIF_VALUE: 21
PIF_VALUE: 34
PIF_VALUE: 29
PIF_VALUE: 22
PIF_VALUE: 27
PIF_VALUE: 24
PIF_VALUE: 28
PIF_VALUE: 31
PIF_VALUE: 23
PIF_VALUE: 25
PIF_VALUE: 32
PIF_VALUE: 21
PIF_VALUE: 27
PIF_VALUE: 34
PIF_VALUE: 23
PIF_VALUE: 27
PIF_VALUE: 22
PIF_VALUE: 19
PIF_VALUE: 30
PIF_VALUE: 35
PIF_VALUE: 25
PIF_VALUE: 22
PIF_VALUE: 23
PIF_VALUE: 33
PIF_VALUE: 30
PIF_VALUE: 23
PIF_VALUE: 19
PIF_VALUE: 15
PIF_VALUE: 25
PIF_VALUE: 29
PIF_VALUE: 35
PIF_VALUE: 20
PIF_VALUE: 32
PIF_VALUE: 21
PIF_VALUE: 20
PIF_VALUE: 23
PIF_VALUE: 20
PIF_VALUE: 30
PIF_VALUE: 16
PIF_VALUE: 15
PIF_VALUE: 26
PIF_VALUE: 31
PIF_VALUE: 22
PIF_VALUE: 28
PIF_VALUE: 21
PIF_VALUE: 32
PIF_VALUE: 23
PIF_VALUE: 26
PIF_VALUE: 20
PIF_VALUE: 16
PIF_VALUE: 21
PIF_VALUE: 33
PIF_VALUE: 30
PIF_VALUE: 33
PIF_VALUE: 20
PIF_VALUE: 22
PIF_VALUE: 30
PIF_VALUE: 16
PIF_VALUE: 16
PIF_VALUE: 17
PIF_VALUE: 23
PIF_VALUE: 37
PIF_VALUE: 30
PIF_VALUE: 16
PIF_VALUE: 26
PIF_VALUE: 29
PIF_VALUE: 34
PIF_VALUE: 23
PIF_VALUE: 32
PIF_VALUE: 36
PIF_VALUE: 23
PIF_VALUE: 34
PIF_VALUE: 30
PIF_VALUE: 31
PIF_VALUE: 27
PIF_VALUE: 23
PIF_VALUE: 24
PIF_VALUE: 24
PIF_VALUE: 18
PIF_VALUE: 23
PIF_VALUE: 28
PIF_VALUE: 37
PIF_VALUE: 30
PIF_VALUE: 29
PIF_VALUE: 24
PIF_VALUE: 26
PIF_VALUE: 24
PIF_VALUE: 31
PIF_VALUE: 37
PIF_VALUE: 20
PIF_VALUE: 19
PIF_VALUE: 24
PIF_VALUE: 16
PIF_VALUE: 21
PIF_VALUE: 28
PIF_VALUE: 22
PIF_VALUE: 25
PIF_VALUE: 31
PIF_VALUE: 25
PIF_VALUE: 21
PIF_VALUE: 31
PIF_VALUE: 24
PIF_VALUE: 22
PIF_VALUE: 35
PIF_VALUE: 21
PIF_VALUE: 22
PIF_VALUE: 16
PIF_VALUE: 27
PIF_VALUE: 27
PIF_VALUE: 15
PIF_VALUE: 36
PIF_VALUE: 22
PIF_VALUE: 20
PIF_VALUE: 21
PIF_VALUE: 23
PIF_VALUE: 27
PIF_VALUE: 15
PIF_VALUE: 22
PIF_VALUE: 34
PIF_VALUE: 32
PIF_VALUE: 30
PIF_VALUE: 15
PIF_VALUE: 24
PIF_VALUE: 23
PIF_VALUE: 28
PIF_VALUE: 23
PIF_VALUE: 21
PIF_VALUE: 27
PIF_VALUE: 32
PIF_VALUE: 22
PIF_VALUE: 19
PIF_VALUE: 24
PIF_VALUE: 25
PIF_VALUE: 35
PIF_VALUE: 25
PIF_VALUE: 17
PIF_VALUE: 25
PIF_VALUE: 20
PIF_VALUE: 36
PIF_VALUE: 23
PIF_VALUE: 27
PIF_VALUE: 19
PIF_VALUE: 15
PIF_VALUE: 37
PIF_VALUE: 16
PIF_VALUE: 16
PIF_VALUE: 27
PIF_VALUE: 17
PIF_VALUE: 18
PIF_VALUE: 19
PIF_VALUE: 27
PIF_VALUE: 28
PIF_VALUE: 23
PIF_VALUE: 31
PIF_VALUE: 21
PIF_VALUE: 37
PIF_VALUE: 19
PIF_VALUE: 37
PIF_VALUE: 22
PIF_VALUE: 15
PIF_VALUE: 34
PIF_VALUE: 20
PIF_VALUE: 29
PIF_VALUE: 29
PIF_VALUE: 31
PIF_VALUE: 32
PIF_VALUE: 20
PIF_VALUE: 15
PIF_VALUE: 18
PIF_VALUE: 21
PIF_VALUE: 16
PIF_VALUE: 22
PIF_VALUE: 21
PIF_VALUE: 21
PIF_VALUE: 29
PIF_VALUE: 16
PIF_VALUE: 19
PIF_VALUE: 21
PIF_VALUE: 31
PIF_VALUE: 23
PIF_VALUE: 23
PIF_VALUE: 35
PIF_VALUE: 25
PIF_VALUE: 22
PIF_VALUE: 27
PIF_VALUE: 24
PIF_VALUE: 21
PIF_VALUE: 19
PIF_VALUE: 15
PIF_VALUE: 27
PIF_VALUE: 22
PIF_VALUE: 24
PIF_VALUE: 15
PIF_VALUE: 22
PIF_VALUE: 22
PIF_VALUE: 23
PIF_VALUE: 18
PIF_VALUE: 16
PIF_VALUE: 33
PIF_VALUE: 16
PIF_VALUE: 34
PIF_VALUE: 15
PIF_VALUE: 26
PIF_VALUE: 29
PIF_VALUE: 24
PIF_VALUE: 23
PIF_VALUE: 22
PIF_VALUE: 16
PIF_VALUE: 23
PIF_VALUE: 30
PIF_VALUE: 15
PIF_VALUE: 20
PIF_VALUE: 25
PIF_VALUE: 28
PIF_VALUE: 20
PIF_VALUE: 24
PIF_VALUE: 15
PIF_VALUE: 36
PIF_VALUE: 21
PIF_VALUE: 15
PIF_VALUE: 16

## 2020-01-01 ASSESSMENT — PAIN SCALES - GENERAL
PAINLEVEL_OUTOF10: 0
PAINLEVEL_OUTOF10: 6
PAINLEVEL_OUTOF10: 0
PAINLEVEL_OUTOF10: 6
PAINLEVEL_OUTOF10: 0

## 2020-01-01 ASSESSMENT — PAIN SCALES - WONG BAKER: WONGBAKER_NUMERICALRESPONSE: 0

## 2020-07-25 PROBLEM — J96.00 ACUTE RESPIRATORY FAILURE (HCC): Status: ACTIVE | Noted: 2020-01-01

## 2020-07-25 NOTE — ED PROVIDER NOTES
eMERGENCY dEPARTMENT eNCOUnter      PCP: Carlos Bailey PA-C    CHIEF COMPLAINT    Chief Complaint   Patient presents with    Respiratory Arrest       HPI    Les Oliver is a 79 y.o. female who presents following arrest.  Medics were called to her residence due to shortness of breath. On arrival patient was using one of her breathing treatments. They were transferring her to the cot when she arrested. They report the initial rhythm was asystole. They given 1 epi, with return of spontaneous circulation within less than 5 minutes. Reported glucose of 250 at that time. I did intubate the patient, reported copious amounts of frothy sputum. They had reported that family stated she had been ill with shortness of breath worsening for about a month and she was retaining fluid. REVIEW OF SYSTEMS    Unable to obtain as the patient is unresponsive    All other review of systems are negative  See HPI and nursing notes for additional information     PAST MEDICAL AND SURGICAL HISTORY    Past Medical History:   Diagnosis Date    Anxiety     Arthritis     bilats hands, right shoulder    Atrial fibrillation [facility])     Cardioversion @ [facility] - no trouble with it ever since. Sees Dr. Cherelle Turner Atrial fibrillation with RVR [facility]) 2013    Atrial fibrillation with RVR [facility] 11/26/2012    Atrial fibrillation with RVR ([facility] 3/1/2013    Cancer (HCC)     skin (right upper chest & face)    Cervicalgia     Constipation     COPD (chronic obstructive pulmonary disease) (HCC)     Depression     Diabetes mellitus (HCC)     IDDM    Diabetic neuropathy (HCC)     Disc herniation     lower back    Fatigue     Fibromyalgia     H/O cardiovascular stress test 05/08 EF 70%,07/09 EF 66%,02/10, 03/10 EF 63% 04/10    04/26/10 if LAD and circ are compared, the LAD in its mid segment has about 60% stenosis around a small diag.  circumflex vessel is a dominant vessel and is without any significant disease,  rca is nondominant with about 50% stenosis , will continue medical management for now.  H/O complete electrocardiogram 02/10    02/03/10 on chart    H/O echocardiogram 05/08,EF 55%, 02/10    02/25/10 EF>55% left ventricular systolic function is normal,mild mitral regurg. there is no PE    Headache(784.0)     Not migraines, usually from a high blood sugar.  Hepatitis B     History of cardiac cath 06/08    06/17/10 heart cath, patient has single vessel cad with RCA which is nondominant, being 50% stenosed, rest of vessels are without any significant disease, medical therapy and risk factor modification will be continued    History of chest x-ray 06/08    06/15/08 chest xray, nonacute chest with borderline cardiomegaly    Holter monitor, abnormal 10/28/13    48hr-Predominantly SR w/only SVT ectopy in single beat form noted.  Hx of cardiovascular stress test 4/1/2013    EF70%, normal    Hx of echocardiogram 4/1/2013    EF55%,mild tricuspid regurg    Hyperlipidemia     Hypertension     Insulin pump in place     Liver cirrhosis (HCC)     Dr Nabil Harding SADI on CPAP     In the process of getting a c-pap. Does not currently have one.     Peripheral vascular disease (HCC)     PONV (postoperative nausea and vomiting) 1960    with shoulder surgery    Renal disease     Tachycardia     Wears dentures     upper plate     Past Surgical History:   Procedure Laterality Date    BACK SURGERY  1998    herniated disc - no hardware    CATARACT REMOVAL WITH IMPLANT Bilateral 2000s    CHOLECYSTECTOMY  [id number]    laproscopic with liver biopsy    DIAGNOSTIC CARDIAC CATH LAB PROCEDURE  2000s    no stents (mimimal blockage)    HYSTERECTOMY  2000    total    LIVER BIOPSY  9/14/2015    SHOULDER SURGERY Right 1960    fracture surgery-screw at humeral head    TONSILLECTOMY  1966    TONSILLECTOMY AND ADENOIDECTOMY  1966       CURRENT MEDICATIONS    Current Outpatient Rx   Medication Sig Dispense Refill    MIDODRINE HCL PO Take by mouth 3 times daily      LORazepam (ATIVAN) 1 MG tablet Take 1 mg by mouth every 6 hours as needed for Anxiety.  pantoprazole sodium (PROTONIX) 40 MG PACK packet Take 40 mg by mouth every morning (before breakfast)      albuterol-ipratropium (COMBIVENT)  MCG/ACT inhaler Inhale 2 puffs into the lungs 4 times daily 1 Inhaler 0    furosemide (LASIX) 40 MG tablet Take 1 tablet by mouth 2 times daily 60 tablet 0    potassium chloride (KLOR-CON M) 20 MEQ extended release tablet Take 1 tablet by mouth daily for 5 days 5 tablet 0    albuterol sulfate HFA (PROAIR HFA) 108 (90 Base) MCG/ACT inhaler Inhale 2 puffs into the lungs every 4 hours as needed for Wheezing 1 Inhaler 0    busPIRone (BUSPAR) 10 MG tablet Take 1.5 tablets by mouth 3 times daily 160 tablet 0    insulin glargine (LANTUS) 100 UNIT/ML injection vial Inject 60 Units into the skin nightly (Patient taking differently: Inject 80 Units into the skin nightly ) 1 vial 3    docusate sodium (COLACE, DULCOLAX) 100 MG CAPS Take 100 mg by mouth daily 60 capsule 0    tolterodine (DETROL) 2 MG tablet Take 2 mg by mouth daily      nitroGLYCERIN (NITROSTAT) 0.4 MG SL tablet up to max of 3 total doses. If no relief after 1 dose, call 911. 25 tablet 1    simvastatin (ZOCOR) 20 MG tablet Take 1 tablet by mouth nightly (Patient taking differently: Take 40 mg by mouth nightly ) 30 tablet 3    levothyroxine (SYNTHROID) 25 MCG tablet Take 25 mcg by mouth Daily      venlafaxine (EFFEXOR XR) 150 MG extended release capsule Take one capsule daily at the same time as the 75 mg capsule 90 capsule 0    ONE TOUCH LANCETS MISC 1 each by Does not apply route daily 100 each 11    Insulin Lispro, Human, (HUMALOG SC) Inject into the skin Insulin pump      gabapentin (NEURONTIN) 800 MG tablet Take 1 tablet by mouth 3 times daily (Patient taking differently: Take 800 mg by mouth 4 times daily. Thomasena Dilling ) 270 tablet 1    glucose blood VI test strips (ASCENSIA AUTODISC VI;ONE TOUCH ULTRA TEST VI) strip 1 each by In Vitro route 4 times daily as needed As needed. ALLERGIES    Allergies   Allergen Reactions    Latex Other (See Comments)     Blisters    Adhesive Tape      Paper tape ok to use    Codeine Nausea And Vomiting       SOCIAL AND FAMILY HISTORY    Social History     Socioeconomic History    Marital status:       Spouse name: Not on file    Number of children: Not on file    Years of education: Not on file    Highest education level: Not on file   Occupational History    Occupation:    Social Needs    Financial resource strain: Not on file    Food insecurity     Worry: Not on file     Inability: Not on file   addwish needs     Medical: Not on file     Non-medical: Not on file   Tobacco Use    Smoking status: Former Smoker     Packs/day: 1.00     Years: 21.00     Pack years: 21.00     Types: Cigarettes     Start date: 1970     Last attempt to quit: 1991     Years since quittin.5    Smokeless tobacco: Never Used    Tobacco comment: lives with smokers   Substance and Sexual Activity    Alcohol use: No     Alcohol/week: 0.0 standard drinks     Comment:           CAFFEINE: 4-6 diet sodas daily    Drug use: No    Sexual activity: Not on file   Lifestyle    Physical activity     Days per week: Not on file     Minutes per session: Not on file    Stress: Not on file   Relationships    Social connections     Talks on phone: Not on file     Gets together: Not on file     Attends Protestant service: Not on file     Active member of club or organization: Not on file     Attends meetings of clubs or organizations: Not on file     Relationship status: Not on file    Intimate partner violence     Fear of current or ex partner: Not on file     Emotionally abused: Not on file     Physically abused: Not on file     Forced sexual activity: Not on file   Other Topics Concern    Not on file   Social History Narrative    Not components:    Lipase 12 (*)     All other components within normal limits   POCT BLOOD GASES - Abnormal; Notable for the following components:    pCO2, Arterial 51.5 (*)     pO2, Arterial 364.2 (*)     Base Exc, Mixed 4.3 (*)     HCO3, Arterial 30.6 (*)     CO2 Content 32.2 (*)     O2 Sat 99.9 (*)     All other components within normal limits   CULTURE, BLOOD 1   CULTURE, BLOOD 2   TROPONIN   LACTIC ACID, PLASMA   PROTIME-INR   BLOOD GAS, ARTERIAL   URINALYSIS   COVID-19         EKG    This EKG was interpreted by me. Rate is 84, rhythm is sinus. NJ and QT intervals are within normal limits. There is no ST segment or T wave changes. T waves are inverted in leads I, aVL which was present on previous EKG, flattened in V5 and V6 which appears new from previous EKG. This EKG was compared to previous EKG from date 6/20/19. RADIOLOGY    Xr Chest Portable    Result Date: 7/25/2020  EXAMINATION: ONE XRAY VIEW OF THE CHEST 7/25/2020 4:14 pm COMPARISON: July 25, 2020 HISTORY: ORDERING SYSTEM PROVIDED HISTORY: CVC placement TECHNOLOGIST PROVIDED HISTORY: Reason for exam:->CVC placement Reason for Exam: post central line right side Acuity: Acute Type of Exam: Initial Additional signs and symptoms: post arrest Line placement FINDINGS: ETT tip is approximately 3 cm above the elijah. Right IJ catheter tip overlies the lower SVC. Cardiac silhouette is mildly enlarged but stable. Diffuse hazy opacity overlying the right lung has not appreciably changed. There is no pneumothorax. Persistent small to moderate right pleural effusion. 1. Interval placement of a right IJ catheter with catheter tip at the level of the lower SVC. No pneumothorax. 2. Persistent hazy opacity over the right hemithorax with small to moderate bilateral pleural effusions.      Xr Chest Portable    Result Date: 7/25/2020  EXAMINATION: ONE XRAY VIEW OF THE CHEST 7/25/2020 3:37 pm COMPARISON: Chest 06/17/2019 HISTORY: ORDERING SYSTEM PROVIDED HISTORY: is elevated, with chest x-ray findings, lower extremity edema and family report the patient has stopped taking her Lasix IV Lasix was ordered, also calcium for hyperkalemia. CTA performed to rule out pulmonary embolism and further assess lungs. This is currently pending. Given the need for ICU level of care I spoke with the hospitalist at [facility] for transfer, Dr. Basilio Keys, accepted the patient in transfer. Procedure Note - Central Line:  The benefits, risks, and alternatives of central venous access were discussed with the  patient and family and written consent was obtained for the procedure or was considered emergent. [place] Cue Standard was prepped and draped in standard bedside fashion in the right neck. Physical landmarks with ultrasound guidance was used to locate the central vein, right IJ. Seldinger technique was used for placement of the CVC in a non-pulsatile vasculature. All ports sujit and flushed. The patient tolerated the procedure well and no acute complications occurred. Due to the immediate potential for life-threatening deterioration due to arrest, I spent 40 minutes providing critical care. This time is excluding time spent performing procedures.       Clinical  IMPRESSION    Respiratory arrest causing cardiac arrest with return of spontaneous circulation, congestive heart failure, hyperkalemia          (Please note the MDM and HPI sections of this note were completed with a voice recognition program.  Efforts were made to edit the dictations but occasionally words are mis-transcribed.)      Elmira Cohen DO  07/25/20 [id number]

## 2020-07-25 NOTE — ED NOTES
COVID specimen collected from the right nostril. This nurse wore appropriate PPE including gown, gloves, N95 and face shield. Specimen taken to the lab.      Elena Younger RN  07/25/20 [id number]

## 2020-07-25 NOTE — ED NOTES
[facility] called for [facility] mercyist     Alphonso Kaufman RN  07/25/20 [id number]

## 2020-07-25 NOTE — ED NOTES
Patient was escorted to CT scan and back to room 5 by this nurse and Orlene Cowden, respiratory therapist.      Miguel Arita RN  07/25/20 [id number]

## 2020-07-26 NOTE — CONSULTS
Endocrinology   Consult Note  Dear Doctor Najma Woodard for the Consult     Pt. Was Admitted for : Cardiac arrest but very soon was resuscitated intubated and placed on the ventilator    Reason for Consult: Better control of blood glucose      History Obtained From:  Patient/ EMR       HISTORY OF PRESENT ILLNESS:                The patient is a 79 y.o. female with significant past medical history of nonoperable coronary artery disease, atrial fibrillation neck pain COPD diabetes mellitus, neck pain with cervical disc herniation Was admitted to hospital after having cardiac arrest and immediately resuscitated patient was intubated on admission placed on ventilator patient was transferred to COVID unit to rule out. I was  consulted for better control of blood glucose. ROS:   Pt's ROS done in detail. Abnormal ROS are noted in Medical and Surgical History Section below: Other Medical History:        Diagnosis Date    Anxiety     Arthritis     bilats hands, right shoulder    Atrial fibrillation [facility])     Cardioversion @ [facility] - no trouble with it ever since. Sees Dr. Sebastian Half Atrial fibrillation with RVR [facility]) 2013    Atrial fibrillation with RVR ([facility] [address] 11/26/2012    Atrial fibrillation with RVR ([facility] [address] 3/1/2013    Cancer (HCC)     skin (right upper chest & face)    Cervicalgia     Constipation     COPD (chronic obstructive pulmonary disease) (HCC)     Depression     Diabetes mellitus (HCC)     IDDM    Diabetic neuropathy (HCC)     Disc herniation     lower back    Fatigue     Fibromyalgia     H/O cardiovascular stress test 05/08 EF 70%,07/09 EF 66%,02/10, 03/10 EF 63% 04/10    04/26/10 if LAD and circ are compared, the LAD in its mid segment has about 60% stenosis around a small diag. circumflex vessel is a dominant vessel and is without any significant disease,  rca is nondominant with about 50% stenosis , will continue medical management for now.     H/O complete electrocardiogram 02/10    02/03/10 on chart    H/O echocardiogram 05/08,EF 55%, 02/10    02/25/10 EF>55% left ventricular systolic function is normal,mild mitral regurg. there is no PE    Headache(784.0)     Not migraines, usually from a high blood sugar.  Hepatitis B     History of cardiac cath 06/08    06/17/10 heart cath, patient has single vessel cad with RCA which is nondominant, being 50% stenosed, rest of vessels are without any significant disease, medical therapy and risk factor modification will be continued    History of chest x-ray 06/08    06/15/08 chest xray, nonacute chest with borderline cardiomegaly    Holter monitor, abnormal 10/28/13    48hr-Predominantly SR w/only SVT ectopy in single beat form noted.  Hx of cardiovascular stress test 4/1/2013    EF70%, normal    Hx of echocardiogram 4/1/2013    EF55%,mild tricuspid regurg    Hyperlipidemia     Hypertension     Insulin pump in place     Liver cirrhosis (HCC)     Dr Darlyn Hirsch SADI on CPAP     In the process of getting a c-pap. Does not currently have one.  Peripheral vascular disease (HCC)     PONV (postoperative nausea and vomiting) 1960    with shoulder surgery    Renal disease     Tachycardia     Wears dentures     upper plate     Surgical History:        Procedure Laterality Date    BACK SURGERY  1998    herniated disc - no hardware    CATARACT REMOVAL WITH IMPLANT Bilateral 2000s    CHOLECYSTECTOMY  [id number]    laproscopic with liver biopsy    DIAGNOSTIC CARDIAC CATH LAB PROCEDURE  2000s    no stents (mimimal blockage)    HYSTERECTOMY  2000    total    LIVER BIOPSY  9/14/2015    SHOULDER SURGERY Right 1960    fracture surgery-screw at humeral head    TONSILLECTOMY  1966    TONSILLECTOMY AND ADENOIDECTOMY  1966       Allergies:  Latex;  Adhesive tape; and Codeine    Family History:       Problem Relation Age of Onset    Arthritis Mother     Depression Mother     Emphysema Mother     Dementia Mother     Arthritis Father  Cancer Father         prostate    Vision Loss Father     Other Father         brain aneurysm    Arthritis Sister     Depression Sister     Anxiety Disorder Sister     Arthritis Brother     Cancer Brother         eye    Hearing Loss Brother     High Blood Pressure Brother      REVIEW OF SYSTEMS:  Review of System Done as noted above     PHYSICAL EXAM:      Vitals:    BP (!) 85/37   Pulse 95   Temp 100.9 °F (38.3 °C) (Rectal)   Resp 11   Wt 257 lb 15 oz (117 kg)   SpO2 100%   BMI 50.38 kg/m²     CONSTITUTIONAL: Patient intubated sedated and on ventilator  EYES:  vision intact Fundoscopic Exam not performed   ENT:Normal  NECK:  Supple, No JVD. Thyroid Exam:Normal   LUNGS:  Has Vesicular Breath Sounds, has some rales and wheezing of the lungs intubated and on ventilator  CARDIOVASCULAR: Atrial fibrillation  ABDOMEN:  No scars, normal bowel sounds, soft, non-distended, non-tender, no masses palpated, no hepatolienomegaly  Musculoskeletal: Normal  Extremities: Normal, peripheral pulses normal, , has no edema   NEUROLOGIC: Patient is sedated intubated on ventilator l.     DATA:    CBC:   Recent Labs     07/25/20  1530 07/25/20 2127 07/26/20  0440   WBC 7.8 12.7* 11.7*   HGB 9.0* 8.8* 8.2*    209 221    CMP:  Recent Labs     07/25/20  1530 07/25/20 2127 07/26/20  0440   * 131* 134*   K K+ 5.8 CALLED AND RB TO EVELINA MERA RN [id number] EZEKIEL GIN 5.6* 5.4*   CL 93* 91* 92*   CO2 27 31 32   BUN 23 25* 25*   CREATININE 1.2* 1.3* 1.4*   CALCIUM 8.7 8.9 8.9   PROT 6.9  --   --    LABALBU 2.8* 3.0*  --    BILITOT 0.3  --   --    ALKPHOS 125  --   --    AST 23  --   --    ALT 11  --   --      Lipids:   Lab Results   Component Value Date    CHOL 122 06/20/2019    CHOL 205 03/17/2016    HDL 51 06/20/2019    TRIG 150 06/20/2019     Glucose:   Recent Labs     07/26/20  1032 07/26/20  1340 07/26/20  1731   POCGLU 362* 298* 283*     Hemoglobin A1C:   Lab Results   Component Value Date    LABA1C 12.0 Interval placement of a right IJ catheter with catheter tip at the level of the lower SVC. No pneumothorax. 2. Persistent hazy opacity over the right hemithorax with small to moderate bilateral pleural effusions. Xr Chest Portable    Result Date: 7/25/2020  EXAMINATION: ONE XRAY VIEW OF THE CHEST 7/25/2020 3:37 pm COMPARISON: Chest 06/17/2019    1. Nonspecific pulmonary findings may be related to sequela from pulmonary edema, diffuse infection or ARDS. Pulmonary contusion or sequela during resuscitation (? aspiration) may contribute to these opacities as well. 2. Calcific atherosclerosis aorta. 3. Cardiomegaly. 4. Endotracheal tube tip measures about 2.7 cm superior to the elijah. Xr Chest 1 View    Result Date: 7/25/2020  EXAMINATION: ONE XRAY VIEW OF THE CHEST 7/25/2020 11:01 pm     1. Unchanged findings most typical of congestive heart failure; pneumonia is a consideration in areas of consolidation with pleural effusion. 2. Calcific atherosclerosis aorta. 3. Cardiomegaly. 4.  Life support appliances appear appropriately positioned. Cta Pulmonary W Contrast    Result Date: 7/25/2020  EXAMINATION: CTA OF THE CHEST 7/25/2020 5:51 pm     1. No pulmonary embolism. 2. Findings of fluid overload with small to moderate pleural effusions, diffuse soft tissue edema, and upper abdominal ascites. 3. Consolidative opacities within the upper to mid lungs could represent pneumonia or atelectasis/scarring. 4. Cirrhosis. 5. Borderline cardiomegaly with severe atherosclerotic disease. Xr Abdomen For Ng/og/ne Tube Placement    Result Date: 7/26/2020  EXAMINATION: ONE SUPINE XRAY VIEW(S) OF THE ABDOMEN 7/26/2020 8:18 am COMPARISON: None HISTORY: ORDERING SYSTEM PROVIDED HISTORY: Confirmation of course of NG/OG/NE tube and location of tip of tube TECHNOLOGIST PROVIDED HISTORY: Reason for exam:->Confirmation of course of NG/OG/NE tube and location of tip of tube Portable? ->Yes Reason for Exam: ng location Acuity: on chronic heart failure (HCC)    NSTEMI (non-ST elevated myocardial infarction) ([facility] [address]    SADI (obstructive sleep apnea)    Morbid obesity (HCC)    VHD (valvular heart disease)    Excessive daytime sleepiness    Ex-smoker    Acute respiratory failure ([facility])         RECOMMENDATIONS:      1. Reviewed POC blood glucose . Labs and X ray results   2. Reviewed Home and Current Medicines   3. Will Start On Correction bolus Humalog/ Lantus Insulin regime  4. Monitor Blood glucose frequently   5. Modify  the dose of Insuli  as needed        Will follow with you  Again thank you for sharing pt's care with me.      Truly yours,       Teresa Manning MD

## 2020-07-26 NOTE — CONSULTS
CARDIOLOGY CONSULT NOTE   Reason for consultation: cardiac arrest    Referring physician:  Tunde Brewer MD     Primary care physician: Blaze Riggs PA-C      Dear Dr. Anahi Toure  Thanks for the consult. History of present Santa Warren is a 79 y. o.year old who  presents with cardiac arrest, had out of hospital cardiac arrest, she is intubated, her CXR showed large congestion, she has known small diagonal branch 90% stenosis and mild LAD stenosis in her last [facility], has moderate AS with valve area of 1.3 and has medial non compliance    Blood pressure, cholesterol, blood glucose and weight are well controlled. Past medical history:    has a past medical history of Anxiety, Arthritis, Atrial fibrillation ([address] Atrial fibrillation with RVR ([address] Atrial fibrillation with RVR ([address] Atrial fibrillation with RVR ([address] Cancer ([address] Cervicalgia, Constipation, COPD (chronic obstructive pulmonary disease) ([address] Depression, Diabetes mellitus ([address] Diabetic neuropathy ([address] Disc herniation, Fatigue, Fibromyalgia, H/O cardiovascular stress test, H/O complete electrocardiogram, H/O echocardiogram, Headache(784.0), Hepatitis B, History of cardiac cath, History of chest x-ray, Holter monitor, abnormal, Hx of cardiovascular stress test, Hx of echocardiogram, Hyperlipidemia, Hypertension, Insulin pump in place, Liver cirrhosis ([address] SADI on CPAP, Peripheral vascular disease ([address] PONV (postoperative nausea and vomiting), Renal disease, Tachycardia, and Wears dentures. Past surgical history:   has a past surgical history that includes Hysterectomy (2000); back surgery (1998); Tonsillectomy (1966); shoulder surgery (Right, 1960); Cataract removal with implant (Bilateral, 2000s); Diagnostic Cardiac Cath Lab Procedure (2000s); Tonsillectomy and adenoidectomy (1966); Cholecystectomy ([id number]); and liver biopsy (9/14/2015). Social History:   reports that she quit smoking about 29 years ago. Her smoking use included cigarettes. She started smoking about 50 years ago. She has a 21.00 pack-year smoking history. She has never used smokeless tobacco. She reports that she does not drink alcohol or use drugs.   Family history:   no family history of CAD, STROKE of DM    Allergies   Allergen Reactions    Latex Other (See Comments)     Blisters    Adhesive Tape      Paper tape ok to use    Codeine Nausea And Vomiting       sodium chloride flush 0.9 % injection 10 mL, 2 times per day  sodium chloride flush 0.9 % injection 10 mL, PRN  acetaminophen (TYLENOL) tablet 650 mg, Q6H PRN    Or  acetaminophen (TYLENOL) suppository 650 mg, Q6H PRN  polyethylene glycol (GLYCOLAX) packet 17 g, Daily PRN  promethazine (PHENERGAN) tablet 12.5 mg, Q6H PRN    Or  ondansetron (ZOFRAN) injection 4 mg, Q6H PRN  enoxaparin (LOVENOX) injection 40 mg, Daily  gabapentin (NEURONTIN) capsule 800 mg, TID  levothyroxine (SYNTHROID) tablet 25 mcg, Daily  atorvastatin (LIPITOR) tablet 20 mg, Daily  aspirin chewable tablet 81 mg, Daily  lansoprazole suspension SUSP 30 mg, QAM AC  glucose (GLUTOSE) 40 % oral gel 15 g, PRN  dextrose 50 % IV solution, PRN  glucagon (rDNA) injection 1 mg, PRN  dextrose 5 % solution, PRN  insulin lispro (HUMALOG) injection vial 0-6 Units, Q4H  furosemide (LASIX) 100 mg in dextrose 5 % 100 mL infusion, Continuous  propofol injection, Titrated  fentanyl (SUBLIMAZE) 1,000 mcg in sodium chloride 0.9% 100 mL infusion, Continuous  norepinephrine (LEVOPHED) 16 mg in dextrose 5% 250 mL infusion, Continuous  chlorhexidine (PERIDEX) 0.12 % solution 15 mL, BID  albuterol sulfate  (90 Base) MCG/ACT inhaler 4 puff, Q4H PRN      Current Facility-Administered Medications   Medication Dose Route Frequency Provider Last Rate Last Dose    sodium chloride flush 0.9 % injection 10 mL  10 mL Intravenous 2 times per day Loc Felix MD        sodium chloride flush 0.9 % injection 10 mL  10 mL Intravenous PRN Loc Felix MD        acetaminophen (TYLENOL) tablet 650 mg  650 mg Oral Q6H PRN Arlene Terrazas MD        Or    acetaminophen (TYLENOL) suppository 650 mg  650 mg Rectal Q6H PRN Arlene Terrazas MD   650 mg at 07/26/20 0987    polyethylene glycol (GLYCOLAX) packet 17 g  17 g Oral Daily PRN Arlene Terrazas MD        promethazine (PHENERGAN) tablet 12.5 mg  12.5 mg Oral Q6H PRN Arlene Terrazas MD        Or    ondansetron (ZOFRAN) injection 4 mg  4 mg Intravenous Q6H PRN Arlene Terrazas MD        enoxaparin (LOVENOX) injection 40 mg  40 mg Subcutaneous Daily Arlene Terrazas MD   40 mg at 07/26/20 0815    gabapentin (NEURONTIN) capsule 800 mg  800 mg Oral TID Arlene Terrazas MD   800 mg at 07/26/20 1019    levothyroxine (SYNTHROID) tablet 25 mcg  25 mcg Oral Daily Arlene Terrazas MD   25 mcg at 07/26/20 1019    atorvastatin (LIPITOR) tablet 20 mg  20 mg Oral Daily Arlene Terrazas MD   20 mg at 07/26/20 1019    aspirin chewable tablet 81 mg  81 mg Per NG tube Daily Arlene Terrazas MD   81 mg at 07/26/20 1019    lansoprazole suspension SUSP 30 mg  30 mg Per NG tube QAM AC Arlene Terrazas MD   30 mg at 07/26/20 0627    glucose (GLUTOSE) 40 % oral gel 15 g  15 g Oral PRN Arlene Terrazas MD        dextrose 50 % IV solution  12.5 g Intravenous PRN Arlene Terrazas MD        glucagon (rDNA) injection 1 mg  1 mg Intramuscular PRN Arlene Terrazas MD        dextrose 5 % solution  100 mL/hr Intravenous PRN Arlene Terrazas MD        insulin lispro (HUMALOG) injection vial 0-6 Units  0-6 Units Subcutaneous Q4H Arlene Terrazas MD   5 Units at 07/26/20 1033    furosemide (LASIX) 100 mg in dextrose 5 % 100 mL infusion  10 mg/hr Intravenous Continuous Arlene Terrazas MD 10 mL/hr at 07/26/20 0856 10 mg/hr at 07/26/20 0856    propofol injection  10 mcg/kg/min Intravenous Titrated Cyndy Finn PA-C 7 mL/hr at 07/26/20 0859 10 mcg/kg/min at 07/26/20 0859    fentanyl (SUBLIMAZE) 1,000 mcg in sodium chloride 0.9% 100 mL infusion  25 mcg/hr Intravenous stridor,no apical-carotid delay, no carotid bruit  Eyes:  PERRL, EOMI, Conjunctiva normal, No discharge. Respiratory:  Normal breath sounds, No respiratory distress, No wheezing, No chest tenderness. ,no use of accessory muscles, diaphragm movement is normal  Cardiovascular: (PMI) apex non displaced,no lifts no thrills, no s3,no s4, Normal heart rate, Normal rhythm, No murmurs, No rubs, No gallops. Carotid arteries pulse and amplitude are normal no bruit, no abdominal bruit noted ( normal abdominal aorta ausculation), femoral arteries pulse and amplitude are normal no bruit, pedal pulses are normal  GI:  Bowel sounds normal, Soft, No tenderness, No masses, No pulsatile masses, no hepatosplenomegally, no bruits  : External genitalia appear normal, No masses or lesions. No discharge. No CVA tenderness. Musculoskeletal:  Intact distal pulses, No edema, No tenderness, No cyanosis, No clubbing. Good range of motion in all major joints. No tenderness to palpation or major deformities noted. Back- No tenderness. Integument:  Warm, Dry, No erythema, No rash. Skin: no rash, no ulcers  Lymphatic:  No lymphadenopathy noted. Neurologic:  Alert & oriented x 3, Normal motor function, Normal sensory function, No focal deficits noted. Psychiatric:  Affect normal, Judgment normal, Mood normal.   Lab Review   Recent Labs     07/26/20  0440   WBC 11.7*   HGB 8.2*   HCT 26.8*         Recent Labs     07/26/20  0440   *   K 5.4*   CL 92*   CO2 32   PHOS 2.9   BUN 25*   CREATININE 1.4*     Recent Labs     07/25/20  1530   AST 23   ALT 11   BILITOT 0.3   ALKPHOS 125     No results for input(s): TROPONINI in the last 72 hours.   Lab Results   Component Value Date    BNP 9.6 06/08/2016    BNP 34.6 02/28/2013     (H) 11/27/2012     Lab Results   Component Value Date    INR 1.11 07/25/2020    PROTIME 12.7 07/25/2020         EKG:sinus, low voltage, septal qs,inferior qs    Chest Xray:  Patchy airspace opacities bilaterally, may be related to pulmonary edema    versus pneumonia, likely improved in the left hemithorax.         Mild to moderate right pleural effusion.         Stable cardiomegaly.         Low lung volumes.               ECHO:normal LVEF, moderate to severe AS  Labs, echo, meds reviewed  Assessment: 79 y. o.year old with PMH of  has a past medical history of Anxiety, Arthritis, Atrial fibrillation ([address] Atrial fibrillation with RVR ([address] Atrial fibrillation with RVR ([address] Atrial fibrillation with RVR ([address] Cancer ([address] Cervicalgia, Constipation, COPD (chronic obstructive pulmonary disease) ([address] Depression, Diabetes mellitus ([address] Diabetic neuropathy ([address] Disc herniation, Fatigue, Fibromyalgia, H/O cardiovascular stress test, H/O complete electrocardiogram, H/O echocardiogram, Headache(784.0), Hepatitis B, History of cardiac cath, History of chest x-ray, Holter monitor, abnormal, Hx of cardiovascular stress test, Hx of echocardiogram, Hyperlipidemia, Hypertension, Insulin pump in place, Liver cirrhosis ([address] SADI on CPAP, Peripheral vascular disease ([address] PONV (postoperative nausea and vomiting), Renal disease, Tachycardia, and Wears dentures. Recommendations:    1. Cardiopulmonary arrest: most likely from CHF,given the history of medical non compliance, recommend to continue lasix drip and once lung starts to clear, will recommend weaning trial, will get echo tomorrow as the EKG is suggesting low volume, will rule out pericardial effusion. 2. Moderate AS: will watch for now  3. PAF: not on anticoagulation due to GI bleeding  4. HTN: stable  5. Dyslipidemia: stable  6. Critical care 36 mins  7. ealth maintenance: exerise and diet  All labs, medications and tests reviewed, continue all other medications of all above medical condition listed as is.          Nadia Estrada MD, 7/26/2020 1:18 PM

## 2020-07-26 NOTE — CONSULTS
Pulmonary Consult Note      Reason for Consult: Vent management  Requesting Physician: Dr. Brian Contreras:   CHIEF COMPLAINT :SOB    Patient Active Problem List    Diagnosis Date Noted    Acute respiratory failure ([facility] 07/25/2020    Excessive daytime sleepiness 11/04/2019    Ex-smoker 11/04/2019    VHD (valvular heart disease)     SADI (obstructive sleep apnea)     Morbid obesity ([facility]     NSTEMI (non-ST elevated myocardial infarction) ([facility]     ACS (acute coronary syndrome) ([facility] 11/08/2018    Acute coronary syndrome ([facility] 65/68/7706    Diastolic dysfunction with acute on chronic heart failure ([facility] 11/08/2018    Congestive heart failure (CHF) ([facility] 11/07/2018    Type 2 diabetes mellitus ([facility] 11/07/2018    Hypothyroidism 11/07/2018    Depression 11/07/2018    Hypertension 11/07/2018    CHF (congestive heart failure), NYHA class I, acute on chronic, diastolic (HCC) 84/58/8787    Idiopathic progressive neuropathy 04/18/2016     Overview Note:     Patient has chronic peripheral neuropathy to bilateral feet. She is currently on gabapentin 800 mg tid with some benefit. She reports decreased sensation to her feet with intermittent pain.  Mild obstructive sleep apnea 10/14/2015    CCC (chronic calculous cholecystitis) 09/14/2015    Cirrhosis of liver without ascites ([facility] 09/14/2015    Light headed 04/23/2014     Overview Note:     From dehydration from hyperglycemia - patient dry on presentation. replace inactive diagnosis      Orthostatic hypotension 04/23/2014    Hypertriglyceridemia 04/23/2014    Precordial pain 04/22/2014    Hyperglycemia 04/22/2014     Overview Note:     Admission blood glucose of 120; was >500 prior to presentation to ER, first accucheck in ER was 341.       Axillary abscess 04/22/2014     Overview Note:     Left      Hyperlipidemia     DM type 2, uncontrolled, with retinopathy ([facility] 03/01/2013     Overview Note:     Patient's last A1c was 12.1, and she has been referred in the last month to endocrinology. She has an appointment this coming month with a specialist.      Hypokalemia 03/01/2013    HTN (hypertension) 11/26/2012    COPD (chronic obstructive pulmonary disease) ([facility] 11/26/2012    Anxiety and depression 11/26/2012     Overview Note:     Patient is currently on venlafaxine 150 mg daily for depression and anxiety. She feels like it is not as affective as she would like currently. She does not feel suicidal and is sleeping ok but has decreased motivation and less happy days. HPI:                The patient is a 79 y.o. female with significant past medical history of CHF, DM, HTN, hypothyroidism,15 pk yr smoker quit 20 years ago, Morbid obesity, SADI  presents with complaints of SOB getting worse. EMS was called and had a witness out of hospital Cardiac arrest.She was Intubated in [place]. She had CPR with ROSC in 5 minutes. She was found to have  Suspected pneumonia. She is being treated with ABX. She had worsening LE edema. She had a EF of 55% and moderate Aortic stenosis. She had Pulmonary edema. She is on Lasix drip.she has good urine output. She is sedated now. Her PBNPT is 21,085 and troponin mildly elevated. Past Medical History:      Diagnosis Date    Anxiety     Arthritis     bilats hands, right shoulder    Atrial fibrillation [facility])     Cardioversion @ [facility] - no trouble with it ever since.  Sees Dr. Hannah Arias Atrial fibrillation with RVR [facility]) 2013    [facility] 11/26/2012    Atrial fibrillation with RVR ([facility] 3/1/2013    Cancer (HCC)     skin (right upper chest & face)    Cervicalgia     Constipation     COPD [facility]ch[facility] disease) [facility]     Depression     Diabetes mellitus (HCC)     IDDM    Diabetic neuropathy ([facility])     Disc herniation     lower back    Fatigue     Fibromyalgia     H/O cardiovascular stress test 05/08 EF 70%,07/09 EF 66%,02/10, 03/10 EF 63% 04/10    04/26/10 if LAD and circ are compared, the LAD in its mid segment has about 60% stenosis around a small diag. circumflex vessel is a dominant vessel and is without any significant disease,  rca is nondominant with about 50% stenosis , will continue medical management for now.  H/O complete electrocardiogram 02/10    02/03/10 on chart    H/O echocardiogram 05/08,EF 55%, 02/10    02/25/10 EF>55% left ventricular systolic function is normal,mild mitral regurg. there is no PE    Headache(784.0)     Not migraines, usually from a high blood sugar.  Hepatitis B     History of cardiac cath 06/08    06/17/10 heart cath, patient has single vessel cad with RCA which is nondominant, being 50% stenosed, rest of vessels are without any significant disease, medical therapy and risk factor modification will be continued    History of chest x-ray 06/08    06/15/08 chest xray, nonacute chest with borderline cardiomegaly    Holter monitor, abnormal 10/28/13    48hr-Predominantly SR w/only SVT ectopy in single beat form noted.  Hx of cardiovascular stress test 4/1/2013    EF70%, normal    Hx of echocardiogram 4/1/2013    EF55%,mild tricuspid regurg    Hyperlipidemia     Hypertension     Insulin pump in place     Liver cirrhosis (HCC)     Dr Ilya Barlow SADI on CPAP     In the process of getting a c-pap. Does not currently have one.     Peripheral vascular disease (HCC)     PONV (postoperative nausea and vomiting) 1960    with shoulder surgery    Renal disease     Tachycardia     Wears dentures     upper plate      Past Surgical History:        Procedure Laterality Date    BACK SURGERY  1998    herniated disc - no hardware    CATARACT REMOVAL WITH IMPLANT Bilateral 2000s    CHOLECYSTECTOMY  [id number]    laproscopic with liver biopsy    DIAGNOSTIC CARDIAC CATH LAB PROCEDURE  2000s    no stents (mimimal blockage)    HYSTERECTOMY  2000    total    LIVER BIOPSY  9/14/2015    SHOULDER SURGERY Right 1960    fracture surgery-screw at humeral head    TONSILLECTOMY  1966    TONSILLECTOMY AND ADENOIDECTOMY  1966     Current Medications:     sodium chloride flush  10 mL Intravenous 2 times per day    enoxaparin  40 mg Subcutaneous Daily    gabapentin  800 mg Oral TID    levothyroxine  25 mcg Oral Daily    atorvastatin  20 mg Oral Daily    aspirin  81 mg Per NG tube Daily    lansoprazole  30 mg Per NG tube QAM AC    insulin lispro  0-6 Units Subcutaneous Q4H    chlorhexidine  15 mL Mouth/Throat BID     Allergies:    Social History:    TOBACCO:   reports that she quit smoking about 29 years ago. Her smoking use included cigarettes. She started smoking about 50 years ago. She has a 21.00 pack-year smoking history. She has never used smokeless tobacco.  ETOH:   reports no history of alcohol use. Patient currently lives independently    Family History:       Problem Relation Age of Onset    Arthritis Mother     Depression Mother     Emphysema Mother     Dementia Mother     Arthritis Father     Cancer Father         prostate    Vision Loss Father     Other Father         brain aneurysm    Arthritis Sister     Depression Sister     Anxiety Disorder Sister     Arthritis Brother     Cancer Brother         eye    Hearing Loss Brother     High Blood Pressure Brother        REVIEW OF SYSTEMS:    CONSTITUTIONAL:  negative for fevers, chills, diaphoresis, activity change, appetite change, fatigue, night sweats and unexpected weight change.    EYES:  negative for blurred vision, eye discharge, visual disturbance and icterus  HEENT:  negative for hearing loss, tinnitus, ear drainage, sinus pressure, nasal congestion, epistaxis and snoring  RESPIRATORY:  See HPI  CARDIOVASCULAR:  negative for chest pain, palpitations, exertional chest pressure/discomfort, edema, syncope  GASTROINTESTINAL:  negative for nausea, vomiting, diarrhea, constipation, blood in stool and abdominal pain  GENITOURINARY:  negative for frequency, Diagnosis Date Noted    Acute respiratory failure ([facility] 07/25/2020    Excessive daytime sleepiness 11/04/2019    Ex-smoker 11/04/2019    VHD (valvular heart disease)     SADI (obstructive sleep apnea)     Morbid obesity ([facility]     [facility]STEM[facility]non-ST elevated [facility]) [facility]     ACS (acute coronary syndrome) ([facility] 11/08/2018    Acute coronary syndrome ([facility]) 22/41/2483    Diastolic dysfunction with acute on chronic heart failure (HCC) 11/08/2018    Congestive heart failure (CHF) ([facility] 11/07/2018    Type 2 diabetes mellitus ([facility] 11/07/2018    Hypothyroidism 11/07/2018    Depression 11/07/2018    Hypertension 11/07/2018    CHF (congestive heart failure), NYHA class I, acute on chronic, diastolic ([facility]) 01/08/0489    Idiopathic progressive neuropathy 04/18/2016     Overview Note:     Patient has chronic peripheral neuropathy to bilateral feet. She is currently on gabapentin 800 mg tid with some benefit. She reports decreased sensation to her feet with intermittent pain.  Mild obstructive sleep apnea 10/14/2015    CCC (chronic calculous cholecystitis) 09/14/2015    Cirrhosis of liver without ascites ([facility] 09/14/2015    Light headed 04/23/2014     Overview Note:     From dehydration from hyperglycemia - patient dry on presentation. replace inactive diagnosis      Orthostatic hypotension 04/23/2014    Hypertriglyceridemia 04/23/2014    Precordial pain 04/22/2014    Hyperglycemia 04/22/2014     Overview Note:     Admission blood glucose of 120; was >500 prior to presentation to ER, first accucheck in ER was 341.  Axillary abscess 04/22/2014     Overview Note:     Left      Hyperlipidemia     DM type 2, uncontrolled, with retinopathy ([facility] 03/01/2013     Overview Note:     Patient's last A1c was 12.1, and she has been referred in the last month to endocrinology.  She has an appointment this coming month with a specialist.      Hypokalemia 03/01/2013    HTN (hypertension) 11/26/2012    COPD (chronic obstructive pulmonary disease) ([facility] 11/26/2012    Anxiety and depression 11/26/2012     Overview Note:     Patient is currently on venlafaxine 150 mg daily for depression and anxiety. She feels like it is not as affective as she would like currently. She does not feel suicidal and is sleeping ok but has decreased motivation and less happy days. Moderate AS  Morbid obesity  SADI  Smoker  Pulmonary edema  Out of Hospital Cardiac arrest  VDRF      PLAN  1. Abx  2. F/u C&S  3. Diuresis  4. I/O Chart  5. CXR in am  6. Will do SAT and SBT on Tuesday  7. DVT and GI Prophylaxis  8. Levophed for a MAP > 65 mmhg      Electronically signed by Oneyda Barrientos MD on 7/26/2020 at 1:18 PM

## 2020-07-26 NOTE — H&P
History and Physical      Name:  Tres Murphy /Age/Sex: 1950  (79 y.o. female)   MRN & CSN:  [id number] & [id number] Admission Date/Time: 2020  8:40 PM   Location:  -A PCP: Sarah Wolf PA-C       Hospital Day: 1    Assessment and Plan:   Roger Mcgregor is a 79 y.o.  female with coronary artery disease had more than 80% obstruction of one of her diagonal branches since its small vessel no intervention was done, moderate aortic stenosis, diabetes mellitus type 2, chronic diastolic CHF who presents with shortness of breath    -Out of hospital cardiac arrest due to compensated congestive heart failure had CPR for less than 5 minutes required one-time dose of epinephrine  -Acute on chronic diastolic congestive heart failure due to noncompliance  -Acute hypoxic respiratory failure due to pulmonary edema acute on chronic systolic congestive heart failure currently on mechanical ventilation  -Diabetes mellitus type 2 poorly controlled  -Hypotension requiring Levophed  -Presumptive COVID-19  -Cirrhosis  -Anemia    Plan  Lasix drip 10 mg/h  Obtain anemia panel  2D echo  Consult cardiology  Consult pulmonology  Wean off Levophed goal mean arterial pressure above 65 mmHg        Diet Diet NPO Effective Now   DVT Prophylaxis [] Lovenox, []  Heparin, [] SCDs, [] Ambulation   GI Prophylaxis [] PPI,  [] H2 Blocker,  [] Carafate,  [] Diet/Tube Feeds   Code Status Full Code   Disposition Patient requires continued admission due to    MDM [] Low, [] Moderate,[]  High  Patient's risk as above due to      History of Present Illness:     Chief Complaint: Shortness of breath  Roger Mcgregor is a 79 y.o.  female  who presents with shortness of breath. History was obtained from the chart. Patient is intubated and sedated. EMS was called to patient's residence due to shortness of breath. On arrival patient was using 1 of her breathing treatments.   Upon transferring her to the cot she was arrested. Initial rhythm was asystole. She had CPR for less than 5 minutes and was given 1 round of epinephrine subsequently had return of spontaneous circulation. Patient was intubated by ED physician and Bibi jackson. Upon intubation she was noted to have copious amount of frothy sputum. According to her family patient had shortness of breath and worsening lower extremity edema over the past month. Review of systems unable to be obtained patient is intubated and sedated    Objective:   No intake or output data in the 24 hours ending 07/25/20 2110   Vitals:   Vitals:    07/25/20 2044   BP: (!) 162/91   Pulse: 99   Resp: 20   Temp: 96.4 °F (35.8 °C)   SpO2: 100%     Physical Exam:   GEN intubated and sedated  EYES Pupils are equally round. No scleral erythema, discharge, or conjunctivitis. HENT Mucous membranes are moist. Oral pharynx without exudates, no evidence of thrush. NECK Supple, no apparent thyromegaly or masses. RESP Clear to auscultation, no wheezes, rales or rhonchi. Symmetric chest movement while on room air. CARDIO/VASC S1/S2 auscultated. Regular rate without appreciable murmurs, rubs, or gallops. No JVD or carotid bruits. Peripheral pulses equal bilaterally and palpable. No peripheral edema. GI Abdomen is soft without significant tenderness, masses, or guarding. Bowel sounds are normoactive. Rectal exam deferred.  No costovertebral angle tenderness. Bullard catheter is present. HEME/LYMPH No palpable cervical lymphadenopathy and no hepatosplenomegaly. No petechiae or ecchymoses. MSK No gross joint deformities. SKIN Normal coloration, warm, dry. NEURO deferred  PSYCH deferred    Past Medical History:      Past Medical History:   Diagnosis Date    Anxiety     Arthritis     bilats hands, right shoulder    Atrial fibrillation [facility])     Cardioversion @ [facility] - no trouble with it ever since.  Sees Dr. Vish Gaspar Atrial fibrillation with RVR [facility]) 2013    Atrial fibrillation with RVR ([facility] 11/26/2012    Atrial fibrillation with RVR (HCC) 3/1/2013    Cancer (HCC)     skin (right upper chest & face)    Cervicalgia     Constipation     COPD (chronic obstructive pulmonary disease) (HCC)     Depression     Diabetes mellitus (HCC)     IDDM    Diabetic neuropathy (HCC)     Disc herniation     lower back    Fatigue     Fibromyalgia     H/O cardiovascular stress test 05/08 EF 70%,07/09 EF 66%,02/10, 03/10 EF 63% 04/10    04/26/10 if LAD and circ are compared, the LAD in its mid segment has about 60% stenosis around a small diag. circumflex vessel is a dominant vessel and is without any significant disease,  rca is nondominant with about 50% stenosis , will continue medical management for now.  H/O complete electrocardiogram 02/10    02/03/10 on chart    H/O echocardiogram 05/08,EF 55%, 02/10    02/25/10 EF>55% left ventricular systolic function is normal,mild mitral regurg. there is no PE    Headache(784.0)     Not migraines, usually from a high blood sugar.  Hepatitis B     History of cardiac cath 06/08    06/17/10 heart cath, patient has single vessel cad with RCA which is nondominant, being 50% stenosed, rest of vessels are without any significant disease, medical therapy and risk factor modification will be continued    History of chest x-ray 06/08    06/15/08 chest xray, nonacute chest with borderline cardiomegaly    Holter monitor, abnormal 10/28/13    48hr-Predominantly SR w/only SVT ectopy in single beat form noted.  Hx of cardiovascular stress test 4/1/2013    EF70%, normal    Hx of echocardiogram 4/1/2013    EF55%,mild tricuspid regurg    Hyperlipidemia     Hypertension     Insulin pump in place     Liver cirrhosis (HCC)     Dr Estefania Keene SADI on CPAP     In the process of getting a c-pap. Does not currently have one.     Peripheral vascular disease (HCC)     PONV (postoperative nausea and vomiting) 1960    with shoulder surgery    Renal disease     Tachycardia     Wears dentures     upper plate     PSHX:  has a past surgical history that includes Hysterectomy (); back surgery (); Tonsillectomy (); shoulder surgery (Right, ); Cataract removal with implant (Bilateral, ); Diagnostic Cardiac Cath Lab Procedure (); Tonsillectomy and adenoidectomy (); Cholecystectomy (); and liver biopsy (2015). Allergies: Allergies   Allergen Reactions    Latex Other (See Comments)     Blisters    Adhesive Tape      Paper tape ok to use    Codeine Nausea And Vomiting       FAM HX: family history includes Anxiety Disorder in her sister; Arthritis in her brother, father, mother, and sister; Cancer in her brother and father; Dementia in her mother; Depression in her mother and sister; Emphysema in her mother; Hearing Loss in her brother; High Blood Pressure in her brother; Other in her father; Vision Loss in her father. Soc HX:   Social History     Socioeconomic History    Marital status:       Spouse name: Not on file    Number of children: Not on file    Years of education: Not on file    Highest education level: Not on file   Occupational History    Occupation:    Social Needs    Financial resource strain: Not on file    Food insecurity     Worry: Not on file     Inability: Not on file   [employer] needs     Medical: Not on file     Non-medical: Not on file   Tobacco Use    Smoking status: Former Smoker     Packs/day: 1.00     Years: 21.00     Pack years: 21.00     Types: Cigarettes     Start date: 1970     Last attempt to quit: 1991     Years since quittin.5    Smokeless tobacco: Never Used    Tobacco comment: lives with smokers   Substance and Sexual Activity    Alcohol use: No     Alcohol/week: 0.0 standard drinks     Comment:           CAFFEINE: 4-6 diet sodas daily    Drug use: No    Sexual activity: Not on file   Lifestyle    Physical activity     Days per week: Not on file Minutes per session: Not on file    Stress: Not on file   Relationships    Social connections     Talks on phone: Not on file     Gets together: Not on file     Attends Spiritism service: Not on file     Active member of club or organization: Not on file     Attends meetings of clubs or organizations: Not on file     Relationship status: Not on file    Intimate partner violence     Fear of current or ex partner: Not on file     Emotionally abused: Not on file     Physically abused: Not on file     Forced sexual activity: Not on file   Other Topics Concern    Not on file   Social History Narrative    Not on file       Medications:   Medications:    sodium chloride flush  10 mL Intravenous 2 times per day    [START ON 7/26/2020] enoxaparin  40 mg Subcutaneous Daily    gabapentin  800 mg Oral TID    [START ON 7/26/2020] levothyroxine  25 mcg Oral Daily    [START ON 7/26/2020] atorvastatin  20 mg Oral Daily    [START ON 7/26/2020] aspirin  81 mg Per NG tube Daily    [START ON 7/26/2020] lansoprazole  30 mg Per NG tube QAM AC      Infusions:    norepinephrine 6 mcg/min (07/25/20 2100)     PRN Meds: sodium chloride flush, 10 mL, PRN  acetaminophen, 650 mg, Q6H PRN    Or  acetaminophen, 650 mg, Q6H PRN  polyethylene glycol, 17 g, Daily PRN  promethazine, 12.5 mg, Q6H PRN    Or  ondansetron, 4 mg, Q6H PRN      Prior to Admission medications    Medication Sig Start Date End Date Taking? Authorizing Provider   MIDODRINE HCL PO Take by mouth 3 times daily    Historical Provider, MD   LORazepam (ATIVAN) 1 MG tablet Take 1 mg by mouth every 6 hours as needed for Anxiety.     Historical Provider, MD   pantoprazole sodium (PROTONIX) 40 MG PACK packet Take 40 mg by mouth every morning (before breakfast)    Historical Provider, MD   albuterol-ipratropium (COMBIVENT)  MCG/ACT inhaler Inhale 2 puffs into the lungs 4 times daily 6/19/19 6/18/20  Wan Lynn MD   furosemide (LASIX) 40 MG tablet Take 1 tablet Electronically signed by Loc Felix MD on 7/25/2020 at 9:10 PM

## 2020-07-26 NOTE — PROGRESS NOTES
[address] [facility]  consulted by Dr. Nigel Menard for monitoring and adjustment. Indication for treatment: sepsis  Goal trough: 15-20 mcg/mL     Pertinent Laboratory Values:   Temp Readings from Last 3 Encounters:   07/26/20 101.1 °F (38.4 °C) (Rectal)   07/25/20 98.2 °F (36.8 °C) (Axillary)   06/26/19 98.2 °F (36.8 °C) (Oral)     Recent Labs     07/25/20  1530 07/25/20 2127 07/25/20  2131 07/26/20  0440   WBC 7.8 12.7*  --  11.7*   LACTATE LACTIC 4.4 CALLED AND RB TO [id number] [facility] ED [id number] [address]  --  1.4  --      Recent Labs     07/25/20  1530 07/25/20 2127 07/26/20  0440   BUN 23 25* 25*   CREATININE 1.2* 1.3* 1.4*     CrCl cannot be calculated (Unknown ideal weight.). Intake/Output Summary (Last 24 hours) at 7/26/2020 1807  Last data filed at 7/26/2020 1600  Gross per 24 hour   Intake 254.15 ml   Output 4050 ml   Net -3795.85 ml       Pertinent Cultures:  Date    Source    Results  7/26/20  blood    Pending collection  7/26/20  urine    Pending collection    Vancomycin level:   TROUGH:  No results for input(s): VANCOTROUGH in the last 72 hours. RANDOM:  No results for input(s): VANCORANDOM in the last 72 hours. Assessment:  WBC and temperature: WBC elevated, febrile  SCr, BUN, and urine output: SCr and BUN rising  Day(s) of therapy: day 1  Vancomycin level: scheduled for 7/28    Plan:  Dosing comments: Vancomycin 2000 mg x 1 dose, followed by vancomycin 1750 mg every 24 hrs  Pharmacy will continue to monitor patient and adjust therapy as indicated     VANCOMYCIN TROUGH SCHEDULED FOR 7/28/2020 @18:00    Thank you for the consult.   Keyon Jansen, [address]  7/26/2020 6:07 PM

## 2020-07-26 NOTE — PROGRESS NOTES
Hospitalist Progress Note      Name:  Mike Matos Standard /Age/Sex: 1950  (79 y.o. female)   MRN & CSN:  [id number] & [id number] Admission Date/Time: 2020  8:40 PM   Location:  -A PCP: Rosalba Wolf PA-C         Hospital Day: 2    Assessment and Plan:   David Ortiz is a 79 y.o.  female  who presents with <principal problem not specified>    > Severe sepsis with septic shock  > most likely gram negative pneumonia  -Presumptive COVID-19  - IV Vanc, cefepime, Levophed  - check procalcitonin, CRP, resp panel  - check blood cx, lactate, UA , covid pending    > Out of hospital cardiac arrest due to compensated congestive heart failure   > Acute hypoxic respiratory failure  > Acute on chronic diastolic congestive heart failure due to noncompliance  > Moderate AS  -had CPR for less than 5 minutes required one-time dose of epinephrine  -currently on mechanical ventilation  -Lasix drip 10 mg/h, 2D echo, Consulted cardiology, pulm    >Diabetes mellitus type 2 poorly controlled  - Lantus, SSI    > paroxysmal Atrial fibrillation status post ablation in the past  No anticoagulation due to GI bleed    > H/o COPD  > Chronic Anemia - stable, monitor  > Liver Cirrhosis on CT     Diet Diet NPO Effective Now   DVT Prophylaxis ? Lovenox   Code Status Full Code   Disposition  pending clinical improvement. History of Present Illness:     Pt S&E.     limited due to pt sedated intubated on Vent    10-14 point ROS limited due to pt sedated intubated on Vent    Objective: Intake/Output Summary (Last 24 hours) at 2020 1007  Last data filed at 2020 5831  Gross per 24 hour   Intake --   Output 2150 ml   Net -2150 ml      Vitals:   Vitals:    20 0800   BP: (!) 102/47   Pulse: 89   Resp: 12   Temp:    SpO2: 100%     Physical Exam:    GEN  female, intubated , sedated on Vent  RESP  intubated , sedated on Vent  CARDIO/VASC Regular rate. No peripheral edema.   GI Abdomen is soft , no guarding,   MSK No gross joint deformities  SKIN Normal coloration,   NEURO/PSYCH  intubated , sedated on Vent    Medications:   Medications:    sodium chloride flush  10 mL Intravenous 2 times per day    enoxaparin  40 mg Subcutaneous Daily    gabapentin  800 mg Oral TID    levothyroxine  25 mcg Oral Daily    atorvastatin  20 mg Oral Daily    aspirin  81 mg Per NG tube Daily    lansoprazole  30 mg Per NG tube QAM AC    insulin lispro  0-6 Units Subcutaneous Q4H    chlorhexidine  15 mL Mouth/Throat BID      Infusions:    dextrose      furosemide (LASIX) 1mg/ml infusion 10 mg/hr (07/26/20 0856)    propofol 10 mcg/kg/min (07/26/20 0859)    fentanyl Stopped (07/25/20 2215)    norepinephrine 5 mcg/min (07/26/20 0900)     PRN Meds: sodium chloride flush, 10 mL, PRN  acetaminophen, 650 mg, Q6H PRN    Or  acetaminophen, 650 mg, Q6H PRN  polyethylene glycol, 17 g, Daily PRN  promethazine, 12.5 mg, Q6H PRN    Or  ondansetron, 4 mg, Q6H PRN  glucose, 15 g, PRN  dextrose, 12.5 g, PRN  glucagon (rDNA), 1 mg, PRN  dextrose, 100 mL/hr, PRN  albuterol sulfate HFA, 4 puff, Q4H PRN          Electronically signed by Osvaldo Reilly MD on 7/26/2020 at 10:07 AM

## 2020-07-26 NOTE — FLOWSHEET NOTE
Arrived to unit via med flight. 2 person skin assessment done. Wound to coccyx and l buttock/ redness under breasts, and dressing in place LLE from removed IO.

## 2020-07-26 NOTE — PROGRESS NOTES
This note also relates to the following rows which could not be included:  SpO2 - Cannot attach notes to unvalidated device data  Pulse - Cannot attach notes to unvalidated device data  Resp - Cannot attach notes to unvalidated device data       07/26/20 0151   Vent Information   Vent Mode AC/VC   Vt Ordered 600 mL   Rate Set 12 bmp   Peak Flow 65 L/min   Pressure Support 0 cmH20   FiO2  65 %   Sensitivity 3   PEEP/CPAP 5   I Time/ I Time % 0 s   Vent Patient Data   High Peep/I Pressure 0   Peak Inspiratory Pressure 33 cmH2O   Mean Airway Pressure 12 cmH20   Rate Measured 13 br/min   Vt Exhaled 670 mL   Minute Volume 8.43 Liters   I:E Ratio 1:4.00   Plateau Pressure 28 [id number]   Static Compliance 26 mL/cmH2O   Total PEEP 7.3 cmH20   Auto PEEP 2.2 cmH20   Alarm Settings   High Pressure Alarm 40 cmH2O   Delay Alarm 20 sec(s)   Low Minute Volume Alarm 2.5 L/min   Low Exhaled Vt  250 mL   Non-Surgical Airway Endo Tracheal Tube   Placement Date/Time: 07/25/20 1516   Placed By:  In place on arrival to facility  Inserted by: Bibi jackson [facility]  Airway Device: Endo Tracheal Tube  Size: 7  Placement Verified By[de-[email or web address]] Chest X-ray   Secured at 24 cm   Measured From Lips   Secured Location Right   Secured By Commercial tube mariano   Cuff Pressure   (MLT no value)

## 2020-07-27 NOTE — PROGRESS NOTES
07/26/20 2307   Vent Information   Vent Type 980   Vent Mode AC/VC   Vt Ordered 600 mL   Rate Set 12 bmp   Peak Flow 65 L/min   Pressure Support 0 cmH20   FiO2  40 %   SpO2 99 %   SpO2/FiO2 ratio 247.5   Sensitivity 3   PEEP/CPAP 5   I Time/ I Time % 0 s   Humidification Source HME   Vent Patient Data   High Peep/I Pressure 0   Peak Inspiratory Pressure 32 cmH2O   Mean Airway Pressure 15 cmH20   Rate Measured 17 br/min   Vt Exhaled 631 mL   Minute Volume 11 Liters   I:E Ratio 1:3.00   Cough/Sputum   Sputum How Obtained Suctioned   Cough Productive   Sputum Amount Small   Sputum Color Yellow;Red; Tan   Tenacity Thick   Spontaneous Breathing Trial (SBT) RT Doc   Pulse 104   Breath Sounds   Right Upper Lobe Diminished   Right Middle Lobe Diminished   Right Lower Lobe Diminished   Left Upper Lobe Diminished   Left Lower Lobe Diminished   Additional Respiratory  Assessments   Resp 26   Alarm Settings   High Pressure Alarm 40 cmH2O   Delay Alarm 20 sec(s)   Low Minute Volume Alarm 2.5 L/min   High Respiratory Rate 40 br/min   Low Exhaled Vt  250 mL   Non-Surgical Airway Endo Tracheal Tube   Placement Date/Time: 07/25/20 1516   Placed By:  In place on arrival to facility  Inserted by: Bibi jackson [facility]  Airway Device: Endo Tracheal Tube  Size: 7  Placement Verified By[de-identified] Chest X-ray   Secured at 24 cm   Measured From Lips   Secured By Commercial tube mariano   Site Condition Dry   Cuff Pressure   (MLT)

## 2020-07-27 NOTE — PROGRESS NOTES
Dr. Loreatha Duverney updated on patient heart rate 144 sinus tach, blood glucose 220, Dr. Loreatha Duverney placed new sliding scale order.

## 2020-07-27 NOTE — CONSULTS
started in the next 24-48 hours       Nutrition Monitoring and Evaluation:   Food/Nutrient Intake Outcomes:  Diet Advancement/Tolerance  Physical Signs/Symptoms Outcomes:  Biochemical Data, Skin, Weight, Hemodynamic Status     Discharge Planning:     Too soon to determine     Electronically signed by Giselle Werner RD, JENNIFER on 9/34/73 at 9:40 AM EDT    Contact: [phone]

## 2020-07-27 NOTE — PROGRESS NOTES
Progress Note( Dr. Columba Harvey)  7/27/2020  Subjective:   Admit Date: 7/25/2020  PCP: Barron Dawn PA-C    Admitted For : Cardiac arrest but very soon was resuscitated intubated and placed on the ventilator    Consulted For: Better control of blood glucose    Interval History: No major changes in her mental or respiratory status    Patient is still sedated intubated and on ventilator      Intake/Output Summary (Last 24 hours) at 7/27/2020 0630  Last data filed at 7/27/2020 0506  Gross per 24 hour   Intake 284.15 ml   Output 7350 ml   Net -7065.85 ml       DATA    CBC:   Recent Labs     07/25/20 2127 07/26/20  0440 07/27/20  0500   WBC 12.7* 11.7* 17.2*   HGB 8.8* 8.2* 8.7*    221 231    CMP:  Recent Labs     07/25/20  1530 07/25/20 2127 07/26/20  0440 07/27/20  0500   * 131* 134* 136   K K+ 5.8 CALLED AND RB TO EVELINA MERA RN [id number] EZEKIEL GRE[place]FIELD 5.6* 5.4* 4.5   CL 93* 91* 92* 91*   CO2 27 31 32 34*   BUN 23 25* 25* 24*   CREATININE 1.2* 1.3* 1.4* 1.6*   CALCIUM 8.7 8.9 8.9 8.9   PROT 6.9  --   --   --    LABALBU 2.8* 3.0*  --   --    BILITOT 0.3  --   --   --    ALKPHOS 125  --   --   --    AST 23  --   --   --    ALT 11  --   --   --      Lipids:   Lab Results   Component Value Date    CHOL 122 06/20/2019    CHOL 205 03/17/2016    HDL 51 06/20/2019    TRIG 150 06/20/2019     Glucose:  Recent Labs     07/26/20 2011 07/27/20  0021 07/27/20  0437   POCGLU 318* 250* 236*     [id number]:  Lab Results   Component Value Date    LABA1C 12.0 07/26/2020     High Sensitivity TSH:   Lab Results   Component Value Date    TSHHS 4.730 06/20/2019     Free T3:   Lab Results   Component Value Date    FT3 2.9 03/17/2016     Free T4:  Lab Results   Component Value Date    T4FREE 1.04 06/18/2019       Xr Chest Portable    Result Date: 7/26/2020  EXAMINATION: ONE XRAY VIEW OF THE CHEST 7/26/2020 4:49 am COMPARISON: July 25, 2020 HISTORY: ORDERING SYSTEM PROVIDED HISTORY: Vent management T    Patchy airspace opacities bilaterally, may be related to pulmonary edema versus pneumonia, likely improved in the left hemithorax. Mild to moderate right pleural effusion. Stable cardiomegaly. Low lung volumes. Kang Be[place] Chest Portable    Result Date: 7/25/2020  EXAMINATION: ONE XRAY VIEW OF THE CHEST 7/25/2020 3:37 pm COMPARISON: Chest 06/17/2019 HISTORY: ORDERING SYSTEM PROVIDED HISTORY: post arrest        1. Nonspecific pulmonary findings may be related to sequela from pulmonary edema, diffuse infection or ARDS. Pulmonary contusion or sequela during resuscitation (? aspiration) may contribute to these opacities as well. 2. Calcific atherosclerosis aorta. 3. Cardiomegaly. 4. Endotracheal tube tip measures about 2.7 cm superior to the elijah. Amanda Ariza Pulmonary W Contrast    Result Date: 7/25/2020  EXAMINATION: CTA OF THE CHEST 7/25/2020 5:51 pm     1. No pulmonary embolism. 2. Findings of fluid overload with small to moderate pleural effusions, diffuse soft tissue edema, and upper abdominal ascites. 3. Consolidative opacities within the upper to mid lungs could represent pneumonia or atelectasis/scarring. 4. Cirrhosis. 5. Borderline cardiomegaly with severe atherosclerotic disease. Xr Abdomen For Ng/og/ne Tube Placement    Result Date: 7/26/2020  EXAMINATION: ONE SUPINE XRAY VIEW(S) OF THE ABDOMEN 7/26/2020 8:18 am COMPARISON: None       NG tube tip in the gastric antrum with the proximal side-port in the gastric body.        Scheduled Medicines   Medications:    miconazole   Topical BID    insulin glargine  30 Units Subcutaneous Nightly    insulin lispro  0-18 Units Subcutaneous Q4H    insulin NPH  10 Units Subcutaneous Once    cefepime  2 g Intravenous Q12H    enoxaparin  30 mg Subcutaneous BID    vancomycin  1,750 mg Intravenous Q24H    levothyroxine  12.5 mcg Intravenous Daily    And    sodium chloride (PF)  5 mL Intravenous Daily    sodium chloride flush  10 mL Intravenous 2 times per day    gabapentin  800 mg Oral TID    atorvastatin  20 mg Oral Daily    aspirin  81 mg Per NG tube Daily    lansoprazole  30 mg Per NG tube QAM AC    chlorhexidine  15 mL Mouth/Throat BID      Infusions:    dextrose      furosemide (LASIX) 1mg/ml infusion 10 mg/hr (07/26/20 1837)    propofol 20 mcg/kg/min (07/27/20 0221)    fentanyl Stopped (07/25/20 2215)    norepinephrine 14 mcg/min (07/27/20 6262)         Objective:   Vitals: BP (!) 113/49   Pulse 97   Temp 101.5 °F (38.6 °C) (Rectal)   Resp 12   Wt 257 lb 15 oz (117 kg)   SpO2 98%   BMI 50.38 kg/m²   General appearance: Patient is sedated intubated on ventilator  Neck: no JVD or bruit  Thyroid : Normal lobes   Lungs: Has Vesicular Breath sounds intubated and on ventilator  Heart:  regular rate and rhythm  Abdomen: soft, non-tender; bowel sounds normal; no masses,  no organomegaly  Musculoskeletal: Normal  Extremities: extremities normal, , no edema  Neurologic: Patient is sedated intubated and on ventilator.     Assessment:     Patient Active Problem List:     HTN (hypertension)     COPD (chronic obstructive pulmonary disease) ([facility] [address]     Anxiety and depression     DM type 2, uncontrolled, with retinopathy ([facility] [address]     Hypokalemia     Hyperlipidemia     Precordial pain     Hyperglycemia     Axillary abscess     Light headed     Orthostatic hypotension     Hypertriglyceridemia     CCC (chronic calculous cholecystitis)     Cirrhosis of liver without ascites (HCC)     Mild obstructive sleep apnea     Idiopathic progressive neuropathy     Congestive heart failure (CHF) ([facility])     Type 2 diabetes mellitus (HCC)     Hypothyroidism     Depression     Hypertension     CHF (congestive heart failure), NYHA class I, acute on chronic, diastolic (HCC)     ACS (acute coronary syndrome) (HCC)     Acute coronary syndrome (HCC)     Diastolic dysfunction with acute on chronic heart failure ([facility])     NSTEMI (non-ST elevated myocardial infarction) ([facility])     SADI (obstructive sleep apnea)     Morbid obesity (HCC)     VHD (valvular heart disease)     Excessive daytime sleepiness     Ex-smoker     Acute respiratory failure ([facility] [address]      Plan:     1. Reviewed POC blood glucose . Labs and X ray results   2. Reviewed Current Medicines   3. On Correction bolus Humalog/ Basal Lantus Insulin regime /   4. Monitor Blood glucose frequently   5. Modified  the dose of Insulin/ other medicines as needed   6. Will follow     .      Jonas Delaney MD

## 2020-07-27 NOTE — PROGRESS NOTES
Pulmonary and Critical Care  Progress Note      VITALS:  BP (!) 102/56   Pulse 103   Temp 101.7 °F (38.7 °C) (Rectal)   Resp 12   Ht 5' (1.524 m)   Wt 257 lb 15 oz (117 kg)   SpO2 100%   BMI 50.38 kg/m²     Subjective:   CHIEF COMPLAINT :SOB     HPI:                The patient is a 79 y.o. female with significant past medical history of CHF, DM, HTN, hypothyroidism,15 pk yr smoker quit 20 years ago, Morbid obesity, SADI  presents with complaints of SOB getting worse. EMS was called and had a witness out of hospital Cardiac arrest.She was Intubated in [place]. She had CPR with ROSC in 5 minutes. She was found to have  Suspected pneumonia. She is being treated with ABX. She had worsening LE edema. She had a EF of 55% and moderate Aortic stenosis. She had Pulmonary edema. She is on Lasix drip.she has good urine output. She is sedated now. Her PBNPT is 21,085 and troponin mildly elevated. Objective:   PHYSICAL EXAM:    LUNGS:Bilateral basal crackles  Abd-soft, BS+,NT  Ext - no pedal edema  CVS-s1s2, no murmurs      DATA:    CBC:  Recent Labs     07/25/20 2127 07/26/20 0440 07/27/20  0500   WBC 12.7* 11.7* 17.2*   RBC 3.35* 3.17* 3.31*   HGB 8.8* 8.2* 8.7*   HCT 29.0* 26.8* 28.0*    221 231   MCV 86.6 84.5 84.6   MCH 26.3* 25.9* 26.3*   MCHC 30.3* 30.6* 31.1*   RDW 15.9* 15.9* 16.4*   SEGSPCT 85.3* 83.4* 79.3*      BMP:  Recent Labs     07/25/20 2127 07/26/20  0440 07/27/20  0500   * 134* 136   K 5.6* 5.4* 4.5   CL 91* 92* 91*   CO2 31 32 34*   BUN 25* 25* 24*   CREATININE 1.3* 1.4* 1.6*   CALCIUM 8.9 8.9 8.9   GLUCOSE 476* 377* 236*      ABG:  Recent Labs     07/25/20  2115 07/26/20  0600 07/27/20  0600   PH 7.49* 7.60* 7.53*   PO2ART 279* 170* 86   GJI1AXC 41.0 35.0 44.0   O2SAT 96.9 96.4 95.4*     BNP  Lab Results   Component Value Date    BNP 9.6 06/08/2016      D-Dimer:  Lab Results   Component Value Date    DDIMER 1046 (H) 07/27/2020      Radiology:   Satisfactory position of support devices. Patient's last A1c was 12.1, and she has been referred in the last month to endocrinology. She has an appointment this coming month with a specialist.      Hypokalemia 03/01/2013    HTN (hypertension) 11/26/2012    COPD (chronic obstructive pulmonary disease) ([facility] [address] 11/26/2012    Anxiety and depression 11/26/2012     Overview Note:     Patient is currently on venlafaxine 150 mg daily for depression and anxiety. She feels like it is not as affective as she would like currently. She does not feel suicidal and is sleeping ok but has decreased motivation and less happy days. Moderate AS  Morbid obesity  SADI  Smoker  Pulmonary edema  Out of Hospital Cardiac arrest  VDRF  ? Aspiration pneumonia        1. Abx  2. F/u C&S  3. CXR in am  4. Keep sats . 92%  5. Diuresis   6. I/O Chart  7. DVT and GI Prophylaxis  8. Await COVID  9. SAT and SBT trial in am  10. C/w present management  No follow-ups on file.     Electronically signed by Niesha Padilla MD on 7/27/2020 at 11:32 AM

## 2020-07-27 NOTE — PROGRESS NOTES
07/26/20 2024   Vent Information   Vent Type 980   Vent Mode AC/VC   Vt Ordered 600 mL   Rate Set 12 bmp   Peak Flow 65 L/min   Pressure Support 0 cmH20   FiO2  40 %   Sensitivity 3   PEEP/CPAP 5   I Time/ I Time % 0 s   Humidification Source HME   Vent Patient Data   High Peep/I Pressure 0   Peak Inspiratory Pressure 33 cmH2O   Mean Airway Pressure 10 cmH20   Rate Measured 12 br/min   Vt Exhaled 613 mL   Minute Volume 7.35 Liters   I:E Ratio 1:4.00   Plateau Pressure 24 ADO48   Static Compliance 30 mL/cmH2O   Total PEEP 5.1 cmH20   Auto PEEP 0 cmH20   Cough/Sputum   Sputum How Obtained Suctioned   $Obtained Sample $Induced Sputum   Cough Productive   Sputum Amount Moderate   Sputum Color Creamy; Yellow   Tenacity Thick; Tenacious   Breath Sounds   Right Upper Lobe Clear   Right Middle Lobe Clear   Right Lower Lobe Clear   Left Upper Lobe Clear   Left Lower Lobe Clear   Alarm Settings   High Pressure Alarm 40 cmH2O   Press Low Alarm 250 cmH2O   Delay Alarm 20 sec(s)   Low Minute Volume Alarm 2.5 L/min   High Respiratory Rate 40 br/min   Low Exhaled Vt  250 mL

## 2020-07-27 NOTE — PROGRESS NOTES
[address] [facility]  consulted by Dr. Marciano Lee for monitoring and adjustment. Indication for treatment: sepsis  Goal trough: 15-20 mcg/mL     Pertinent Laboratory Values:   Temp Readings from Last 3 Encounters:   07/27/20 100 °F (37.8 °C) (Rectal)   07/25/20 98.2 °F (36.8 °C) (Axillary)   06/26/19 98.2 °F (36.8 °C) (Oral)     Recent Labs     07/25/20  1530 07/25/20 2127 07/25/20  2131 07/26/20  0440 07/27/20  0500   WBC 7.8 12.7*  --  11.7* 17.2*   LACTATE LACTIC 4.4 CALLED AND RB TO [facility] [id number] [address] GIN  --  1.4  --   --      Recent Labs     07/25/20 2127 07/26/20  0440 07/27/20  0500   BUN 25* 25* 24*   CREATININE 1.3* 1.4* 1.6*     Estimated Creatinine Clearance: 38 mL/min (A) (based on SCr of 1.6 mg/dL (H)). Intake/Output Summary (Last 24 hours) at 7/27/2020 1456  Last data filed at 7/27/2020 1314  Gross per 24 hour   Intake 164.15 ml   Output 86564 ml   Net -16789.85 ml       Pertinent Cultures:  Date    Source    Results  7/26/20  blood    Pending collection  7/26/20  urine    Pending collection  7/26/20  Covid-19   Pending  7/26/20  Respiratory   Negative    Vancomycin level:   TROUGH:  No results for input(s): VANCOTROUGH in the last 72 hours. RANDOM:  No results for input(s): VANCORANDOM in the last 72 hours. Assessment:  · WBC and temperature: Leukocytosis; Tmax = 101.7F  · SCr, BUN, and urine output:   · Scr and BUN with increase  · +UOP  · Day(s) of therapy: 2  · Vancomycin level: To be collected    Plan:  · Vancomycin 2000 mg x 1 dose, followed by vancomycin 1750 mg every 24 hrs  · Renal trends with increase in trends. If continues to increase, may need to adjust to intermittent dosing per levels. · Patient is having good UOP. · Plan to check a trough level prior to 3rd dose given renal trends. · Pharmacy will continue to monitor patient and adjust therapy as indicated.      VANCOMYCIN TROUGH SCHEDULED FOR 7/28/2020 @18:00    Thank you for the consult,  Prosper Dave, PharmD, [facility]  7/27/2020 2:56 PM

## 2020-07-27 NOTE — PROGRESS NOTES
07/27/20 0359   Vent Information   Vent Type 980   Vent Mode AC/VC   Vt Ordered 600 mL   Rate Set 12 bmp   Peak Flow 65 L/min   Pressure Support 0 cmH20   FiO2  40 %   SpO2 98 %   SpO2/FiO2 ratio 245   Sensitivity 3   PEEP/CPAP 5   I Time/ I Time % 0 s   Humidification Source HME   Vent Patient Data   High Peep/I Pressure 0   Peak Inspiratory Pressure 33 cmH2O   Mean Airway Pressure 10 cmH20   Rate Measured 12 br/min   Vt Exhaled 610 mL   Minute Volume 7.32 Liters   I:E Ratio 1:4.00   Cough/Sputum   Sputum How Obtained Suctioned   $Obtained Sample $Induced Sputum   Cough Productive   Sputum Amount Small   Sputum Color Creamy; White   Tenacity Thick   Spontaneous Breathing Trial (SBT) RT Doc   Pulse 99   Additional Respiratory  Assessments   Resp 11   Alarm Settings   High Pressure Alarm 40 cmH2O   Delay Alarm 20 sec(s)   Low Minute Volume Alarm 2.5 L/min   High Respiratory Rate 40 br/min   Low Exhaled Vt  250 mL

## 2020-07-27 NOTE — PROGRESS NOTES
Hospitalist Progress Note      Name:  Paula Mccarthy Standard /Age/Sex: 1950  (79 y.o. female)   MRN & CSN:  [id number] & [id number] Admission Date/Time: 2020  8:40 PM   Location:  -A PCP: Pura Hernandez PA-C         Hospital Day: 3    Assessment and Plan:   Antoine Michael is a 79 y.o.  female  who presents with <principal problem not specified>    > Severe sepsis with septic shock  > most likely gram negative pneumonia  -Presumptive COVID-19  - CT chest with consolidative opacities, small to moderate pleural eff, ascites. - IV Vanc, cefepime, Levophed  - procalcitonin 0.5, , resp panel neg  - blood cx pending, lactate 1.4, UA with no pyuria , covid pending  -  covid negative , repeat a second covid since pt on Vent.     > Out of hospital cardiac arrest due to compensated congestive heart failure   > Acute hypoxic respiratory failure  > Acute on chronic diastolic congestive heart failure due to noncompliance  > Moderate AS  -had CPR for less than 5 minutes required one-time dose of epinephrine  -currently on mechanical ventilation  - CT chest with consolidative opacities, small to moderate pleural eff, ascites.  -Lasix drip 10 mg/h, Abx as above, 2D echo, Consulted cardiology, pulm    >Diabetes mellitus type 2 poorly controlled  - Lantus, SSI    > paroxysmal Atrial fibrillation status post ablation in the past  No anticoagulation due to GI bleed    > H/o COPD  > Chronic Anemia - stable, monitor  > Liver Cirrhosis on CT     Diet Diet NPO Effective Now   DVT Prophylaxis ? Lovenox   Code Status Full Code   Disposition  pending clinical improvement. History of Present Illness:     Pt S&E.     limited due to pt sedated intubated on Vent    10-14 point ROS limited due to pt sedated intubated on Vent    Objective:        Intake/Output Summary (Last 24 hours) at 2020 1307  Last data filed at 2020 1100  Gross per 24 hour   Intake 224.15 ml   Output 9620 ml   Net -9395.85 ml      Vitals:   Vitals:    07/27/20 1123   BP:    Pulse: 103   Resp: 12   Temp:    SpO2: 100%     Physical Exam:    GEN  female, intubated , sedated on Vent  RESP  intubated , sedated on Vent  CARDIO/VASC Regular rate.    GI Abdomen is soft , no distention  MSK No gross joint deformities  SKIN Normal coloration,   NEURO/PSYCH  intubated , sedated on Vent    Medications:   Medications:    miconazole   Topical BID    insulin glargine  30 Units Subcutaneous Nightly    insulin NPH  10 Units Subcutaneous Once    insulin lispro  0-12 Units Subcutaneous Q4H    cefepime  2 g Intravenous Q12H    enoxaparin  30 mg Subcutaneous BID    vancomycin  1,750 mg Intravenous Q24H    levothyroxine  12.5 mcg Intravenous Daily    And    sodium chloride (PF)  5 mL Intravenous Daily    sodium chloride flush  10 mL Intravenous 2 times per day    gabapentin  800 mg Oral TID    atorvastatin  20 mg Oral Daily    aspirin  81 mg Per NG tube Daily    lansoprazole  30 mg Per NG tube QAM AC    chlorhexidine  15 mL Mouth/Throat BID      Infusions:    dextrose      furosemide (LASIX) 1mg/ml infusion 10 mg/hr (07/27/20 0852)    propofol 20 mcg/kg/min (07/27/20 0731)    fentanyl 25 mcg/hr (07/27/20 0852)    norepinephrine 9 mcg/min (07/27/20 1234)     PRN Meds: sodium chloride flush, 10 mL, PRN  acetaminophen, 650 mg, Q6H PRN    Or  acetaminophen, 650 mg, Q6H PRN  polyethylene glycol, 17 g, Daily PRN  promethazine, 12.5 mg, Q6H PRN    Or  ondansetron, 4 mg, Q6H PRN  glucose, 15 g, PRN  dextrose, 12.5 g, PRN  glucagon (rDNA), 1 mg, PRN  dextrose, 100 mL/hr, PRN  albuterol sulfate HFA, 4 puff, Q4H PRN          Electronically signed by Genevieve Odell MD on 7/27/2020 at 1:07 PM

## 2020-07-28 NOTE — PROGRESS NOTES
Comprehensive Nutrition Assessment    Type and Reason for Visit:  Reassess    Nutrition Recommendations/Plan:   · EN Order: semi-elemental at 60 mL/hr to provide the pt with 1728 kcal and 108 g of protein per day     Nutrition Assessment:  Pt is now hemodynamically stable. Will order EN    Malnutrition Assessment:  Malnutrition Status:   At risk for malnutrition (Comment)    Context:  Acute Illness     Findings of the 6 clinical characteristics of malnutrition:  Energy Intake:  No significant decrease in energy intake  Weight Loss:  No significant weight loss     Body Fat Loss:  Unable to assess     Muscle Mass Loss:  Unable to assess    Fluid Accumulation:  7 - Moderate to Severe Generalized   Strength:  Not Performed    Estimated Daily Nutrient Needs:  Energy (kcal):  1810; Weight Used for Energy Requirements:  Current     Protein (g):  90+; Weight Used for Protein Requirements:  Ideal        Fluid (ml/day):  1810; Weight Used for Fluid Requirements:  Current      Nutrition Related Findings:  None      Wounds:  None       Current Nutrition Therapies:    Diet NPO Effective Now    Anthropometric Measures:  · Height: 5' (152.4 cm)  · Current Body Weight: 257 lb (116.6 kg)   · Admission Body Weight: 257 lb (116.6 kg)    · Usual Body Weight: 232 lb (105.2 kg)     · Ideal Body Weight: 100 lbs; % Ideal Body Weight 257 %   · BMI: 50.2  · BMI Categories: Obese Class 3 (BMI 40.0 or greater)       Nutrition Diagnosis:   · Inadequate oral intake related to impaired respiratory funtion as evidenced by NPO or clear liquid status due to medical condition, intubation      Nutrition Interventions:   Food and/or Nutrient Delivery:  Continue NPO  Nutrition Education/Counseling:  No recommendation at this time   Coordination of Nutrition Care:  Continued Inpatient Monitoring    Goals:  pt will have source of nutrition started in the next 24-48 hours       Nutrition Monitoring and Evaluation:   Food/Nutrient Intake Outcomes: Diet Advancement/Tolerance  Physical Signs/Symptoms Outcomes:  Biochemical Data, Skin, Weight, Hemodynamic Status     Discharge Planning:     Too soon to determine     Electronically signed by Ruddy Fisher RD, LD on 4/17/75 at 9:42 AM EDT    Contact: [phone]

## 2020-07-28 NOTE — PROGRESS NOTES
Updated sister by phone with security code given, voiced understanding. Updated phone numbers in chart.

## 2020-07-28 NOTE — PROGRESS NOTES
Pt temp 101.1 rectal. Tylenol suppository given at 2111. Ice bags applied to groin and axillary.      Electronically signed by Gopal Lindsay RN on 7/27/2020 at 10:56 PM

## 2020-07-28 NOTE — PROGRESS NOTES
Hospitalist Progress Note      Name:  Christiane Cruz Standard /Age/Sex: 1950  (79 y.o. female)   MRN & CSN:  [id number] & [id number] Admission Date/Time: 2020  8:40 PM   Location:  [facility] PCP: Heather Schmitt, [facility] Day: 4    Assessment and Plan:   Letty Rick is a 79 y.o.  female  who presents with:     Severe sepsis with septic shock. Tmax 101.7 on . · Continue antibiotics; vent management per pulm    Acute respiratory failure with hypoxia, found to have GN pneumonia. CT chest +opacities, pleural effusions  · As above   Covid 19, suspected. · Tested : negative  · Retested : pending. Will transfer to ICU once negative   Acute exacerbation of chronic HFpEF due to noncompliance   Moderate AS   Acute cardiac arrest in the field and received CPR <5 minutes, epinephrine, as intubated.  DM 2 on long term insulin, poorly controlled   Paroxysmal Atrial fibrillation. No anticoagulation due to hx of GI bleed   Anemia of chronic disease, stable   Liver cirrhosis per Ct imaging      Diet DIET TUBE FEED CONTINUOUS/CYCLIC NPO; Semi-elemental (Vital AF); Nasogastric; Continuous; 25; 60; 24   DVT Prophylaxis [] Lovenox, []  Heparin, [] SCDs, []No VTE prophylaxis, patient ambulating   GI Prophylaxis [] PPI, [] H2 Blocker, [] No GI prophylaxis, patient is receiving diet/Tube Feeds   Code Status Full Code   Disposition Patient requires continued admission due to vent dependency    Dc plan in progress   MDM [] Low, [] Moderate,[x]  High  Patient's risk as above due to     [] One or more chronic illnesses with severe exacerbation or progression    [] Acute or chronic illnesses or injuries that pose a threat to life or bodily function    [] An abrupt change in neurological status    [] Decision not to resuscitate     [x] Drug therapy requiring intensive monitoring for toxicity      History of Present Illness:     Pt S&E.  Laying in bed without family at bedside  No problems reported overnight. No ROS as she is intubated and sedated    Objective: Intake/Output Summary (Last 24 hours) at 7/28/2020 1358  Last data filed at 7/28/2020 0708  Gross per 24 hour   Intake 2824.31 ml   Output 3915 ml   Net -1090.69 ml      Vitals:   Vitals:    07/28/20 1203   BP: (!) 114/38   Pulse: 99   Resp: 23   Temp: 98.3 °F (36.8 °C)   SpO2: 91%     Physical Exam:    GEN sedated female, laying in bed in no apparent distress. EYES Pupils are equally round. No scleral erythema, discharge, or conjunctivitis. HENT Mucous membranes are moist. +ETT +NGT  NECK No apparent thyromegaly or masses. RESP +rales or rhonchi. Symmetric chest movement while on room air. CARDIO/VASC S1/S2 auscultated. Regular rate without appreciable murmurs, rubs, or gallops. Peripheral pulses equal bilaterally and palpable. +1 pitting edema in extremities   GI Abdomen is soft without significant tenderness, masses, or guarding. Bowel sounds are normoactive. Rectal exam deferred.  Bullard catheter is present. MSK No gross joint deformities. No spontaneous movement noted  SKIN Normal coloration, warm, dry. NEURO Does not follow directions. No response to verbal or painful stimuli  PSYCH Sedated on propofol.     Medications:   Medications:    insulin glargine  20 Units Subcutaneous Nightly    magnesium sulfate  4 g Intravenous Once    vancomycin (VANCOCIN) intermittent dosing (placeholder)   Other RX Placeholder    miconazole   Topical BID    insulin NPH  10 Units Subcutaneous Once    insulin lispro  0-12 Units Subcutaneous Q4H    cefepime  2 g Intravenous Q12H    enoxaparin  30 mg Subcutaneous BID    levothyroxine  12.5 mcg Intravenous Daily    And    sodium chloride (PF)  5 mL Intravenous Daily    sodium chloride flush  10 mL Intravenous 2 times per day    gabapentin  800 mg Oral TID    atorvastatin  20 mg Oral Daily    aspirin  81 mg Per NG tube Daily    lansoprazole  30 mg Per NG tube QAM AC   

## 2020-07-28 NOTE — PROGRESS NOTES
Pulmonary and Critical Care  Progress Note      VITALS:  BP (!) 107/52   Pulse 107   Temp 100.3 °F (37.9 °C) (Rectal)   Resp 24   Ht 5' (1.524 m)   Wt 257 lb 15 oz (117 kg)   SpO2 90%   BMI 50.38 kg/m²     Subjective:   CHIEF COMPLAINT :SOB     HPI:                The patient is a 79 y.o. female with significant past medical history of CHF, DM, HTN, hypothyroidism,15 pk yr smoker quit 20 years ago, Morbid obesity, SADI  presents with complaints of SOB getting worse. EMS was called and had a witness out of hospital Cardiac arrest.She was Intubated in [facility]. She had CPR with ROSC in 5 minutes. She was found to have  Suspected pneumonia. She is being treated with ABX. She had worsening LE edema. She had a EF of 55% and moderate Aortic stenosis. She had Pulmonary edema. She is on Lasix drip.she has good urine output. She is sedated now. Her PBNPT is 21,085 and troponin mildly elevated.     Objective:   PHYSICAL EXAM:    LUNGS: Occasional basal crackles  Abd-soft, BS+,NT  Ext - no pedal edema  CVS-s1s2, no murmurs      DATA:    CBC:  Recent Labs     07/26/20 0440 07/27/20  0500 07/28/20  0431   WBC 11.7* 17.2* 17.5*   RBC 3.17* 3.31* 3.95*   HGB 8.2* 8.7* 10.2*   HCT 26.8* 28.0* 33.7*    231 236   MCV 84.5 84.6 85.3   MCH 25.9* 26.3* 25.8*   MCHC 30.6* 31.1* 30.3*   RDW 15.9* 16.4* 16.3*   SEGSPCT 83.4* 79.3* 79.0*      BMP:  Recent Labs     07/26/20 0440 07/27/20  0500 07/28/20  0431   * 136 137   K 5.4* 4.5 4.3   CL 92* 91* 88*   CO2 32 34* 37*   BUN 25* 24* 26*   CREATININE 1.4* 1.6* 1.7*   CALCIUM 8.9 8.9 8.8   GLUCOSE 377* 236* 137*      ABG:  Recent Labs     07/26/20  0600 07/27/20  0600 07/28/20  0600   PH 7.60* 7.53* 7.48*   PO2ART 170* 86 51*   YCP2AYD 35.0 44.0 53.0*   O2SAT 96.4 95.4* 85.8*     BNP  Lab Results   Component Value Date    BNP 9.6 06/08/2016      D-Dimer:  Lab Results   Component Value Date    DDIMER 1026 (H) 07/28/2020      Radiology:   Overall improvement in the bilateral airspace opacities in the mid and lower    lungs as well as the pleural effusions     1. Assessment/Plan     Patient Active Problem List    Diagnosis Date Noted    [facility] 07/25/2020    Excessive daytime sleepiness 11/04/2019    Ex-smoker 11/04/2019    VHD (valvular heart disease)     SADI (obstructive sleep apnea)     Morbid obesity ([facility]     NSTEMI (non-ST elevated myocardial infarction) ([facility]     ACS (acute coronary syndrome) ([facility] 11/08/2018    Acute coronary syndrome ([facility] 92/20/2465    Diastolic dysfunction with acute on chronic heart failure ([facility] 11/08/2018    Congestive heart failure (CHF) ([facility] 11/07/2018    Type 2 diabetes mellitus ([facility] 11/07/2018    Hypothyroidism 11/07/2018    Depression 11/07/2018    Hypertension 11/07/2018    CHF (congestive heart failure), NYHA class I, acute on chronic, diastolic (HCC) 79/11/6945    Idiopathic progressive neuropathy 04/18/2016     Overview Note:     Patient has chronic peripheral neuropathy to bilateral feet. She is currently on gabapentin 800 mg tid with some benefit. She reports decreased sensation to her feet with intermittent pain.  Mild obstructive sleep apnea 10/14/2015    CCC (chronic calculous cholecystitis) 09/14/2015    Cirrhosis of liver without ascites ([facility] 09/14/2015    Light headed 04/23/2014     Overview Note:     From dehydration from hyperglycemia - patient dry on presentation. replace inactive diagnosis      Orthostatic hypotension 04/23/2014    Hypertriglyceridemia 04/23/2014    Precordial pain 04/22/2014    Hyperglycemia 04/22/2014     Overview Note:     Admission blood glucose of 120; was >500 prior to presentation to ER, first accucheck in ER was 341.       Axillary abscess 04/22/2014     Overview Note:     Left      Hyperlipidemia     DM type 2, uncontrolled, with retinopathy ([facility] 03/01/2013     Overview Note:     Patient's last A1c was 12.1, and she has been referred in the last month to endocrinology. She has an appointment this coming month with a specialist.      Hypokalemia 03/01/2013    HTN (hypertension) 11/26/2012    COPD ([facility]ronic obstructive pulmonary disease) [facility] 11/26/2012    Anxiety and depression 11/26/2012     Overview Note:     Patient is currently on venlafaxine 150 mg daily for depression and anxiety. She feels like it is not as affective as she would like currently. She does not feel suicidal and is sleeping ok but has decreased motivation and less happy days. Patient is on the vent and sedated. He is responding to the painful stimuli. Moderate AS  Morbid obesity  SADI  Smoker  Pulmonary edema  Out of Hospital Cardiac arrest  VDRF  ? Aspiration pneumonia       1. Abx  2. F/u C&S  3. Diuresis  4. Keep sats > 92%  5. SAT and SBT trial in am  6. C/w present management  No follow-ups on file.     Electronically signed by Adelaida Pandya MD on 7/28/2020 at 11:18 AM

## 2020-07-28 NOTE — PROGRESS NOTES
[facility]  consulted by Dr. Sukhjinder Rose for monitoring and adjustment. Indication for treatment: Sepsis, possible pneumonia, post-cardiac arrest  Goal trough: 15 mcg/mL     Pertinent Laboratory Values:   Temp Readings from Last 3 Encounters:   07/28/20 100.3 °F (37.9 °C) (Rectal)   07/25/20 98.2 °F (36.8 °C) (Axillary)   06/26/19 98.2 °F (36.8 °C) (Oral)     Recent Labs     07/25/20  1530  07/25/20  2131 07/26/20  0440 07/27/20  0500 07/28/20  0431   WBC 7.8   < >  --  11.7* 17.2* 17.5*   LACTATE LACTIC 4.4 CALLED AND RB TO [facility] [id number]  --  1.4  --   --   --     < > = values in this interval not displayed. Recent Labs     07/26/20  0440 07/27/20  0500 07/28/20  0431   BUN 25* 24* 26*   CREATININE 1.4* 1.6* 1.7*     Estimated Creatinine Clearance: 36 mL/min (A) (based on SCr of 1.7 mg/dL (H)). Intake/Output Summary (Last 24 hours) at 7/28/2020 1023  Last data filed at 7/28/2020 0708  Gross per 24 hour   Intake 2824.31 ml   Output 7065 ml   Net -4240.69 ml       Pertinent Cultures:  Date    Source    Results  7/26/20  Blood    Pending  7/26/20  Covid-19   Pending  7/26/20  Respiratory Panel  Negative    Vancomycin level:   TROUGH:  No results for input(s): VANCOTROUGH in the last 72 hours. RANDOM:  No results for input(s): VANCORANDOM in the last 72 hours. Assessment:  · WBC and temperature: Leukocytosis;  Tmax = 100.3F  · SCr, BUN, and urine output: renal labs trending up   · Day(s) of therapy: 2  · Vancomycin level: to be collected this evening    Plan:  · Received vancomycin 2000 mg x 1 followed by 1750 mg q24h   · Trough to be collected tonight prior to third total dose   · Given clinical picture and pressor requirements, would be wise to switch to intermittent dosing based on levels   · Trough level tonight @ 1800, will follow-up when available and dose based on future levels   · Pharmacy will continue to monitor patient and adjust therapy

## 2020-07-28 NOTE — PROGRESS NOTES
Per Osmany Pott Pause Lasix GTT due to blood pressure MAP dropping below 65 and sustaining.  Titrate Levo up until MAP is 65, restart lasix after blood pressure MAP is above 65 Per Osmany Pott

## 2020-07-28 NOTE — PROGRESS NOTES
Today's plan:  Continue present care, recommend neuro- evaluation      Admit Date:  7/25/2020    Subjective: intubated      Chief complaints on admission\" cardiopulmonary arrest        History of present Cindy Umaña is a 79 y. o.year old who  presents with cardiopulmonary arrest    Past medical history:    has a past medical history of Anxiety, Arthritis, Atrial fibrillation ([address] Atrial fibrillation with RVR ([address] Atrial fibrillation with RVR ([address] Atrial fibrillation with RVR ([address] Cancer ([address] Cervicalgia, Constipation, COPD (chronic obstructive pulmonary disease) ([address] Depression, Diabetes mellitus ([address] Diabetic neuropathy ([address] Disc herniation, Fatigue, Fibromyalgia, H/O cardiovascular stress test, H/O complete electrocardiogram, H/O echocardiogram, Headache(784.0), Hepatitis B, History of cardiac cath, History of chest x-ray, Holter monitor, abnormal, Hx of cardiovascular stress test, Hx of echocardiogram, Hyperlipidemia, Hypertension, Insulin pump in place, Liver cirrhosis ([address] SADI on CPAP, Peripheral vascular disease ([address] PONV (postoperative nausea and vomiting), Renal disease, Tachycardia, and Wears dentures. Past surgical history:   has a past surgical history that includes Hysterectomy (2000); back surgery (1998); Tonsillectomy (1966); shoulder surgery (Right, 1960); Cataract removal with implant (Bilateral, 2000s); Diagnostic Cardiac Cath Lab Procedure (2000s); Tonsillectomy and adenoidectomy (1966); Cholecystectomy ([id number]); and liver biopsy (9/14/2015). Social History:   reports that she quit smoking about 29 years ago. Her smoking use included cigarettes. She started smoking about 50 years ago. She has a 21.00 pack-year smoking history. She has never used smokeless tobacco. She reports that she does not drink alcohol or use drugs. Family history:  family history includes Anxiety Disorder in her sister;  Arthritis in her brother, father, mother, and sister; Cancer in her brother and father; Dementia in her mother; Depression in her mother and sister; Emphysema in her mother; Hearing Loss in her brother; High Blood Pressure in her brother; Other in her father; Vision Loss in her father. Allergies   Allergen Reactions    Latex Other (See Comments)     Blisters    Adhesive Tape      Paper tape ok to use    Codeine Nausea And Vomiting         Objective:   /65   Pulse 113   Temp 100 °F (37.8 °C) (Rectal)   Resp 23   Ht 5' (1.524 m)   Wt 257 lb 15 oz (117 kg)   SpO2 90%   BMI 50.38 kg/m²       Intake/Output Summary (Last 24 hours) at 7/28/2020 0929  Last data filed at 7/28/2020 0708  Gross per 24 hour   Intake 2824.31 ml   Output 7285 ml   Net -4460.69 ml         Physical Exam:  Constitutional:  Well developed, Well nourished, No acute distress, Non-toxic appearance. HENT:  Normocephalic, Atraumatic, Bilateral external ears normal, Oropharynx moist, No oral exudates, Nose normal. Neck- Normal range of motion, No tenderness, Supple, No stridor. Eyes:  PERRL, EOMI, Conjunctiva normal, No discharge. Respiratory:  Normal breath sounds, No respiratory distress, No wheezing, No chest tenderness. ,no use of accessory muscles, diaphragm movement is normal  Cardiovascular: (PMI) apex non displaced,no lifts no thrills, no s3,no s4, Normal heart rate, Normal rhythm, No murmurs, No rubs, No gallops. Carotid arteries pulse and amplitude are normal no bruit, no abdominal bruit noted ( normal abdominal aorta ausculation), femoral arteries pulse and amplitude are normal no bruit, pedal pulses are normal  GI:  Bowel sounds normal, Soft, No tenderness, No masses, No pulsatile masses. : External genitalia appear normal, No masses or lesions. No discharge. No CVA tenderness. Musculoskeletal:  Intact distal pulses, No edema, No tenderness, No cyanosis, No clubbing. Good range of motion in all major joints. No tenderness to palpation or major deformities noted. Back- No tenderness. Integument:  Warm, Dry, No erythema, No rash. Lymphatic:  No lymphadenopathy noted.    Neurologic:  Intubated, unresponsive    Medications:    insulin glargine  20 Units Subcutaneous Nightly    magnesium sulfate  4 g Intravenous Once    miconazole   Topical BID    insulin NPH  10 Units Subcutaneous Once    insulin lispro  0-12 Units Subcutaneous Q4H    cefepime  2 g Intravenous Q12H    enoxaparin  30 mg Subcutaneous BID    vancomycin  1,750 mg Intravenous Q24H    levothyroxine  12.5 mcg Intravenous Daily    And    sodium chloride (PF)  5 mL Intravenous Daily    sodium chloride flush  10 mL Intravenous 2 times per day    gabapentin  800 mg Oral TID    atorvastatin  20 mg Oral Daily    aspirin  81 mg Per NG tube Daily    lansoprazole  30 mg Per NG tube QAM AC    chlorhexidine  15 mL Mouth/Throat BID      dextrose      furosemide (LASIX) 1mg/ml infusion Stopped (07/27/20 2209)    propofol Stopped (07/28/20 0450)    fentanyl Stopped (07/28/20 0447)    norepinephrine 15 mcg/kg/min (07/28/20 0815)     sodium chloride flush, acetaminophen **OR** acetaminophen, polyethylene glycol, promethazine **OR** ondansetron, glucose, dextrose, glucagon (rDNA), dextrose, albuterol sulfate HFA    Lab Data:  CBC:   Recent Labs     07/26/20  0440 07/27/20  0500 07/28/20  0431   WBC 11.7* 17.2* 17.5*   HGB 8.2* 8.7* 10.2*   HCT 26.8* 28.0* 33.7*   MCV 84.5 84.6 85.3    231 236     BMP:   Recent Labs     07/26/20  0440 07/27/20  0500 07/28/20  0431   * 136 137   K 5.4* 4.5 4.3   CL 92* 91* 88*   CO2 32 34* 37*   PHOS 2.9 3.5 5.2*   BUN 25* 24* 26*   CREATININE 1.4* 1.6* 1.7*     LIVER PROFILE:   Recent Labs     07/25/20  1530   AST 23   ALT 11   LIPASE 12*   BILITOT 0.3   ALKPHOS 125     PT/INR:   Recent Labs     07/26/20  1800 07/27/20  0500 07/28/20  0431   PROTIME 13.1 13.0 14.5   INR 1.08 1.07 1.20     APTT:   Recent Labs     07/26/20  1800 07/27/20  0500 07/28/20  0431   APTT 32.9 20.3* 34.5 BNP:  No results for input(s): BNP in the last 72 hours. TROPONIN: @TROPONINI:3@      Assessment:  79 y. o.year old who is admitted for          Plan:  1. Cardiopulmonary arrest: most likely from CHF,given the history of medical non compliance, recommend to continue lasix drip and once lung starts to clear, will recommend weaning trial,   2. Echo showed severe AS and depressed LVEF, OFF LASIX drip for now,will watch for now, if she wakes up and has good neuro function, will recommend further treatment  3. PAF: not on anticoagulation due to GI bleeding  4. HTN: stable  Dyslipidemia: stableHealth maintenance: exerise and diet  All labs, medications and tests reviewed, continue all other medications of all above medical condition listed as is.       Boubacar Sutton MD 7/28/2020 9:29 AM

## 2020-07-29 NOTE — PROGRESS NOTES
[facility]  consulted by Dr. Lucero Manual for monitoring and adjustment. Indication for treatment: Sepsis, possible pneumonia & post-cardiac arrest  Goal trough: 15-20 mcg/mL     Pertinent Laboratory Values:   Temp Readings from Last 3 Encounters:   07/29/20 99 °F (37.2 °C) (Rectal)   07/25/20 98.2 °F (36.8 °C) (Axillary)   06/26/19 98.2 °F (36.8 °C) (Oral)     Recent Labs     07/27/20  0500 07/28/20  0431 07/29/20  0514   WBC 17.2* 17.5* 12.3*     Recent Labs     07/27/20  0500 07/28/20  0431 07/29/20  0514   BUN 24* 26* 31*   CREATININE 1.6* 1.7* 1.7*     Estimated Creatinine Clearance: 33 mL/min (A) (based on SCr of 1.7 mg/dL (H)). Intake/Output Summary (Last 24 hours) at 7/29/2020 1121  Last data filed at 7/29/2020 0648  Gross per 24 hour   Intake 1011.31 ml   Output 1395 ml   Net -383.69 ml       Pertinent Cultures:  Date                             Source                                     Results  7/26/20                        Blood                                       NGTD  7/26/20                        Covid-19                                  Pending  7/26/20                        Respiratory Panel                   Negative    Vancomycin level:   TROUGH:    Recent Labs     07/28/20  1720   VANCOTROUGH 19.4     RANDOM:  No results for input(s): VANCORANDOM in the last 72 hours. Assessment:  · WBC and temperature:wbc improved to 12.3 with 24-h Tmax 100.8F  · SCr, BUN, and urine output: SCr same; I/o within range. · Day(s) of therapy: #4  · Vancomycin level: Therapeutic [from 7/28]. Plan:    · The vancomycin will be dosed per levels due to worsening renal function as evident by increasing SCr. · No Vancomycin IV dose today. · Vancomycin \"random\" concentration collected with AM labs on 7/30/2020.   · Pharmacy will continue to monitor patient and adjust therapy as indicated    REPEAT VANCOMYCIN RANDOM SCHEDULED FOR 7/30/2020 @ 0600    Thank you for the

## 2020-07-29 NOTE — FLOWSHEET NOTE
Per Zeke Anderson from [facility], they will not be following case but they do wish to be called with updates.

## 2020-07-29 NOTE — PROGRESS NOTES
COMPARISON: Chest 06/17/2019 HISTORY: ORDERING SYSTEM PROVIDED HISTORY: post arrest        1. Nonspecific pulmonary findings may be related to sequela from pulmonary edema, diffuse infection or ARDS. Pulmonary contusion or sequela during resuscitation (? aspiration) may contribute to these opacities as well. 2. Calcific atherosclerosis aorta. 3. Cardiomegaly. 4. Endotracheal tube tip measures about 2.7 cm superior to the elijah. Lelan Roller Pulmonary W Contrast    Result Date: 7/25/2020  EXAMINATION: CTA OF THE CHEST 7/25/2020 5:51 pm     1. No pulmonary embolism. 2. Findings of fluid overload with small to moderate pleural effusions, diffuse soft tissue edema, and upper abdominal ascites. 3. Consolidative opacities within the upper to mid lungs could represent pneumonia or atelectasis/scarring. 4. Cirrhosis. 5. Borderline cardiomegaly with severe atherosclerotic disease. Xr Abdomen For Ng/og/ne Tube Placement    Result Date: 7/26/2020  EXAMINATION: ONE SUPINE XRAY VIEW(S) OF THE ABDOMEN 7/26/2020 8:18 am COMPARISON: None       NG tube tip in the gastric antrum with the proximal side-port in the gastric body.        Scheduled Medicines   Medications:    insulin glargine  20 Units Subcutaneous Nightly    vancomycin (VANCOCIN) intermittent dosing (placeholder)   Other RX Placeholder    miconazole   Topical BID    insulin NPH  10 Units Subcutaneous Once    insulin lispro  0-12 Units Subcutaneous Q4H    cefepime  2 g Intravenous Q12H    enoxaparin  30 mg Subcutaneous BID    levothyroxine  12.5 mcg Intravenous Daily    And    sodium chloride (PF)  5 mL Intravenous Daily    sodium chloride flush  10 mL Intravenous 2 times per day    gabapentin  800 mg Oral TID    atorvastatin  20 mg Oral Daily    aspirin  81 mg Per NG tube Daily    lansoprazole  30 mg Per NG tube QAM AC    chlorhexidine  15 mL Mouth/Throat BID      Infusions:    dextrose      norepinephrine 9 mcg/min (07/29/20 0129) Objective:   Vitals: BP 89/62   Pulse 93   Temp 98.9 °F (37.2 °C) (Rectal)   Resp 22   Ht 5' (1.524 m)   Wt 257 lb 15 oz (117 kg)   SpO2 96%   BMI 50.38 kg/m²   General appearance: Patient is sedated intubated on ventilator  Neck: no JVD or bruit  Thyroid : Normal lobes   Lungs: Has Vesicular Breath sounds intubated and on ventilator  Heart:  regular rate and rhythm  Abdomen: soft, non-tender; bowel sounds normal; no masses,  no organomegaly  Musculoskeletal: Normal  Extremities: extremities normal, , no edema  Neurologic: Patient is sedated intubated and on ventilator. Assessment:     Patient Active Problem List:     HTN (hypertension)     COPD (chronic obstructive pulmonary disease) ([address]     Anxiety and depression     DM type 2, uncontrolled, with retinopathy ([address]     Hypokalemia     Hyperlipidemia     Precordial pain     Hyperglycemia     Axillary abscess     Light headed     Orthostatic hypotension     Hypertriglyceridemia     CCC (chronic calculous cholecystitis)     Cirrhosis of liver without ascites (HCC)     Mild obstructive sleep apnea     Idiopathic progressive neuropathy     Congestive heart failure (CHF) (HCC)     Type 2 diabetes mellitus (HCC)     Hypothyroidism     Depression     Hypertension     CHF (congestive heart failure), NYHA class I, acute on chronic, diastolic (HCC)     ACS (acute coronary syndrome) (HCC)     Acute coronary syndrome (HCC)     Diastolic dysfunction with acute on chronic heart failure (HCC)     NSTEMI (non-ST elevated myocardial infarction) (HCC)     SADI (obstructive sleep apnea)     Morbid obesity (HCC)     VHD (valvular heart disease)     Excessive daytime sleepiness     Ex-smoker     Acute respiratory failure ([address]      Plan:     1. Reviewed POC blood glucose . Labs and X ray results   2. Reviewed Current Medicines   3. On Correction bolus Humalog/ Basal Lantus Insulin regime /   4. Monitor Blood glucose frequently   5.  Modified  the dose of Insulin/ other medicines as needed  6. Consider starting tube feeding if able to control  7. Her blood pressure without pressor  8. Will follow     .      Marybeth Perez MD

## 2020-07-29 NOTE — PLAN OF CARE
Problem: Falls - Risk of:  Goal: Will remain free from falls  Description: Will remain free from falls  Outcome: Ongoing  Goal: Absence of physical injury  Description: Absence of physical injury  Outcome: Ongoing     Problem: Skin Integrity:  Goal: Will show no infection signs and symptoms  Description: Will show no infection signs and symptoms  Outcome: Ongoing  Goal: Absence of new skin breakdown  Description: Absence of new skin breakdown  Outcome: Ongoing     Problem: Restraint Use - Nonviolent/Non-Self-Destructive Behavior:  Goal: Absence of restraint indications  Description: Absence of restraint indications  Outcome: Ongoing  Goal: Absence of restraint-related injury  Description: Absence of restraint-related injury  Outcome: Ongoing     Problem: Nutrition  Goal: Optimal nutrition therapy  Outcome: Ongoing

## 2020-07-29 NOTE — PROGRESS NOTES
Progress Note( Dr. Les Wolfe)  7/28/2020  Subjective:   Admit Date: 7/25/2020  PCP: Elda Smith PA-C    Admitted For : Cardiac arrest but very soon was resuscitated intubated and placed on the ventilator    Consulted For: Better control of blood glucose    Interval History: No major changes in her mental or respiratory status    Patient is still sedated intubated and on ventilator      Intake/Output Summary (Last 24 hours) at 7/28/2020 2011  Last data filed at 7/28/2020 1945  Gross per 24 hour   Intake 1191.31 ml   Output 2540 ml   Net -1348.69 ml       DATA    CBC:   Recent Labs     07/26/20  0440 07/27/20  0500 07/28/20  0431   WBC 11.7* 17.2* 17.5*   HGB 8.2* 8.7* 10.2*    231 236    CMP:  Recent Labs     07/25/20  2127 07/26/20  0440 07/27/20  0500 07/28/20  0431   * 134* 136 137   K 5.6* 5.4* 4.5 4.3   CL 91* 92* 91* 88*   CO2 31 32 34* 37*   BUN 25* 25* 24* 26*   CREATININE 1.3* 1.4* 1.6* 1.7*   CALCIUM 8.9 8.9 8.9 8.8   LABALBU 3.0*  --   --   --      Lipids:   Lab Results   Component Value Date    CHOL 122 06/20/2019    CHOL 205 03/17/2016    HDL 51 06/20/2019    TRIG 150 06/20/2019     Glucose:  Recent Labs     07/28/20  0847 07/28/20  1206 07/28/20  1651   POCGLU 164* 109* 125*     [id number]:  Lab Results   Component Value Date    LABA1C 12.0 07/26/2020     High Sensitivity TSH:   Lab Results   Component Value Date    TSHHS 4.730 06/20/2019     Free T3:   Lab Results   Component Value Date    FT3 2.9 03/17/2016     Free T4:  Lab Results   Component Value Date    T4FREE 1.04 06/18/2019       Xr Chest Portable    Result Date: 7/26/2020  EXAMINATION: ONE XRAY VIEW OF THE CHEST 7/26/2020 4:49 am COMPARISON: July 25, 2020 HISTORY: ORDERING SYSTEM PROVIDED HISTORY: Vent management T    Patchy airspace opacities bilaterally, may be related to pulmonary edema versus pneumonia, likely improved in the left hemithorax. Mild to moderate right pleural effusion. Stable cardiomegaly. Low lung volumes. [facility] [place] Chest Portable    Result Date: 7/25/2020  EXAMINATION: ONE XRAY VIEW OF THE CHEST 7/25/2020 3:37 pm COMPARISON: Chest 06/17/2019 HISTORY: ORDERING SYSTEM PROVIDED HISTORY: post arrest        1. Nonspecific pulmonary findings may be related to sequela from pulmonary edema, diffuse infection or ARDS. Pulmonary contusion or sequela during resuscitation (? aspiration) may contribute to these opacities as well. 2. Calcific atherosclerosis aorta. 3. Cardiomegaly. 4. Endotracheal tube tip measures about 2.7 cm superior to the elijah. Zygmunt Lumberton Pulmonary W Contrast    Result Date: 7/25/2020  EXAMINATION: CTA OF THE CHEST 7/25/2020 5:51 pm     1. No pulmonary embolism. 2. Findings of fluid overload with small to moderate pleural effusions, diffuse soft tissue edema, and upper abdominal ascites. 3. Consolidative opacities within the upper to mid lungs could represent pneumonia or atelectasis/scarring. 4. Cirrhosis. 5. Borderline cardiomegaly with severe atherosclerotic disease. Xr Abdomen For Ng/og/ne Tube Placement    Result Date: 7/26/2020  EXAMINATION: ONE SUPINE XRAY VIEW(S) OF THE ABDOMEN 7/26/2020 8:18 am COMPARISON: None       NG tube tip in the gastric antrum with the proximal side-port in the gastric body.        Scheduled Medicines   Medications:    insulin glargine  20 Units Subcutaneous Nightly    vancomycin (VANCOCIN) intermittent dosing (placeholder)   Other RX Placeholder    miconazole   Topical BID    insulin NPH  10 Units Subcutaneous Once    insulin lispro  0-12 Units Subcutaneous Q4H    cefepime  2 g Intravenous Q12H    enoxaparin  30 mg Subcutaneous BID    levothyroxine  12.5 mcg Intravenous Daily    And    sodium chloride (PF)  5 mL Intravenous Daily    sodium chloride flush  10 mL Intravenous 2 times per day    gabapentin  800 mg Oral TID    atorvastatin  20 mg Oral Daily    aspirin  81 mg Per NG tube Daily    lansoprazole  30 mg Per NG tube QAM AC    chlorhexidine ([facility]      Plan:     1. Reviewed POC blood glucose . Labs and X ray results   2. Reviewed Current Medicines   3. On Correction bolus Humalog/ Basal Lantus Insulin regime /   4. Monitor Blood glucose frequently   5. Modified  the dose of Insulin/ other medicines as needed   6. Will follow     .      Jonas Delaney MD

## 2020-07-29 NOTE — PROGRESS NOTES
Pulmonary and Critical Care  Progress Note      VITALS:  BP (!) 105/52   Pulse 84   Temp 99 °F (37.2 °C) (Rectal)   Resp 25   Ht 5' (1.524 m)   Wt 225 lb 15.5 oz (102.5 kg)   SpO2 98%   BMI 44.13 kg/m²     Subjective:   CHIEF COMPLAINT :SOB     HPI:                The patient is a 79 y.o. female with significant past medical history of CHF, DM, HTN, hypothyroidism,15 pk yr smoker quit 20 years ago, Morbid obesity, SADI  presents with complaints of SOB getting worse. EMS was called and had a witness out of hospital Cardiac arrest.She was Intubated in [place]. She had CPR with ROSC in 5 minutes. She was found to have  Suspected pneumonia. She is being treated with ABX. She had worsening LE edema. She had a EF of 55% and moderate Aortic stenosis. She had Pulmonary edema. She is on Lasix drip.she has good urine output. She is sedated now. Her PBNPT is 21,085 and troponin mildly elevated. Objective:   PHYSICAL EXAM:    LUNGS: Occasional basal crackles  Abd-soft, BS+,NT  Ext - no pedal edema  CVS-s1s2, no murmurs      DATA:    CBC:  Recent Labs     07/27/20  0500 07/28/20 0431 07/29/20  0514   WBC 17.2* 17.5* 12.3*   RBC 3.31* 3.95* 3.26*   HGB 8.7* 10.2* 8.7*   HCT 28.0* 33.7* 27.6*    236 225   MCV 84.6 85.3 84.7   MCH 26.3* 25.8* 26.7*   MCHC 31.1* 30.3* 31.5*   RDW 16.4* 16.3* 16.3*   SEGSPCT 79.3* 79.0* 75.9*      BMP:  Recent Labs     07/27/20  0500 07/28/20  0431 07/29/20  0514    137 134*   K 4.5 4.3 4.3   CL 91* 88* 86*   CO2 34* 37* 36*   BUN 24* 26* 31*   CREATININE 1.6* 1.7* 1.7*   CALCIUM 8.9 8.8 8.1*   GLUCOSE 236* 137* 139*      ABG:  Recent Labs     07/27/20  0600 07/28/20  0600   PH 7.53* 7.48*   PO2ART 86 51*   QHQ4RDT 44.0 53.0*   O2SAT 95.4* 85.8*     BNP  Lab Results   Component Value Date    BNP 9.6 06/08/2016      D-Dimer:  Lab Results   Component Value Date    DDIMER 1100 (H) 07/29/2020      1.  Radiology: reviewed      Assessment/Plan     Patient Active Problem List Diagnosis Date Noted    Acute respiratory failure ([facility] 07/25/2020    Excessive daytime sleepiness 11/04/2019    Ex-smoker 11/04/2019    VHD (valvular heart disease)     SADI (obstructive sleep apnea)     Morbid obesity ([facility])     NSTEMI (non-ST elevated [facility] infarction) [facility]     ACS (acute coronary syndrome) ([facility] 11/08/2018    Acute coronary syndrome ([facility]) 95/54/5522    Diastolic dysfunction with acute on chronic heart failure (HCC) 11/08/2018    Congestive heart failure (CHF) ([facility] 11/07/2018    Type 2 diabetes mellitus ([facility] 11/07/2018    Hypothyroidism 11/07/2018    Depression 11/07/2018    Hypertension 11/07/2018    CHF (congestive heart failure), NYHA class I, acute on chronic, diastolic ([facility]) 87/58/0625    Idiopathic progressive neuropathy 04/18/2016     Overview Note:     Patient has chronic peripheral neuropathy to bilateral feet. She is currently on gabapentin 800 mg tid with some benefit. She reports decreased sensation to her feet with intermittent pain.  Mild obstructive sleep apnea 10/14/2015    CCC (chronic calculous cholecystitis) 09/14/2015    Cirrhosis of liver without ascites ([facility] 09/14/2015    Light headed 04/23/2014     Overview Note:     From dehydration from hyperglycemia - patient dry on presentation. replace inactive diagnosis      Orthostatic hypotension 04/23/2014    Hypertriglyceridemia 04/23/2014    Precordial pain 04/22/2014    Hyperglycemia 04/22/2014     Overview Note:     Admission blood glucose of 120; was >500 prior to presentation to ER, first accucheck in ER was 341.  Axillary abscess 04/22/2014     Overview Note:     Left      Hyperlipidemia     DM type 2, uncontrolled, with retinopathy ([facility] 03/01/2013     Overview Note:     Patient's last A1c was 12.1, and she has been referred in the last month to endocrinology.  She has an appointment this coming month with a specialist.      Hypokalemia 03/01/2013    HTN (hypertension) 11/26/2012    COPD (chronic obstructive pulmonary disease) ([facility] 11/26/2012    Anxiety and depression 11/26/2012     Overview Note:     Patient is currently on venlafaxine 150 mg daily for depression and anxiety. She feels like it is not as affective as she would like currently. She does not feel suicidal and is sleeping ok but has decreased motivation and less happy days. Patient is on the vent, off sedation but still obtunded, responding to the painful stimuli    Moderate AS  Morbid obesity  SADI  Smoker  Pulmonary edema  Out of Hospital Cardiac arrest  VDRF  ? Aspiration pneumonia       1. Diuresis  2. Abx  3. F/u C&S  4. SAT and SBT trial  5. C/w present management  No follow-ups on file.     Electronically signed by Lito Contreras MD on 7/29/2020 at 11:44 AM

## 2020-07-29 NOTE — PROGRESS NOTES
7/30/2020. Pharmacy will continue to monitor patient and adjust therapy as indicated    REPEAT VANCOMYCIN RANDOM SCHEDULED FOR 7/30/2020 @ 0600    Thank you for the consult.   Bill Willams RPh  7/28/2020 8:21 PM

## 2020-07-29 NOTE — PROGRESS NOTES
Hospitalist Progress Note      Name:  Swapna Wei Standard /Age/Sex: 1950  (79 y.o. female)   MRN & CSN:  [id number] & [id number] Admission Date/Time: 2020  8:40 PM   Location:  -A PCP: Gerhardt Conners, [facility] Day: 5    Assessment and Plan:   Shereen Newsome is a 79 y.o.  female  who presents with:     Severe sepsis with septic shock. Tmax 101.7 on . · Continue antibiotics; vent management per pulm   · Start tube feeds   Acute respiratory failure with hypoxia, found to have GN pneumonia. CT chest +opacities, pleural effusions  · As above   Covid 19, suspected. · Tested : negative  · Retested : pending. Will transfer to ICU once negative   Acute exacerbation of chronic HFpEF due to noncompliance   Moderate AS   Acute cardiac arrest in the field and received CPR <5 minutes, epinephrine, as intubated.  DM 2 on long term insulin, poorly controlled   Paroxysmal Atrial fibrillation. No anticoagulation due to hx of GI bleed   Anemia of chronic disease, stable   Liver cirrhosis per Ct imaging      Diet DIET TUBE FEED CONTINUOUS/CYCLIC NPO; Semi-elemental (Vital AF); Nasogastric; Continuous; 25; 60; 24   DVT Prophylaxis [] Lovenox, []  Heparin, [] SCDs, []No VTE prophylaxis, patient ambulating   GI Prophylaxis [] PPI, [] H2 Blocker, [] No GI prophylaxis, patient is receiving diet/Tube Feeds   Code Status Full Code   Disposition Patient requires continued admission due to vent dependency    Dc plan in progress   MDM [] Low, [x] Moderate,[]  High  Patient's risk as above due to    [] One or more chronic illnesses with mild exacerbation progression    [x] Two or more stable chronic illnesses    [] Undiagnosed new problem with uncertain prognosis    [] Elective major surgery    []Prescription drug management     History of Present Illness:     Pt S&E.    On vent  No problems overnight  No ROS as she is intubated and sedated    Objective: Intake/Output Summary (Last 24 hours) at 7/29/2020 1733  Last data filed at 7/29/2020 0648  Gross per 24 hour   Intake 844.31 ml   Output 845 ml   Net -0.69 ml      Vitals:   Vitals:    07/29/20 1603   BP: (!) 101/53   Pulse: 86   Resp: 20   Temp:    SpO2:      Physical Exam:    GEN sedated female, laying in bed in no apparent distress. EYES Pupils are equally round. No scleral erythema, discharge, or conjunctivitis. HENT Mucous membranes are moist. +ETT +NGT  NECK No apparent thyromegaly or masses. RESP +rales or rhonchi. Symmetric chest movement while on room air. CARDIO/VASC S1/S2 auscultated. Regular rate without appreciable murmurs, rubs, or gallops. Peripheral pulses equal bilaterally and palpable. +1 pitting edema in extremities   GI Abdomen is soft without significant tenderness, masses, or guarding. Bowel sounds are normoactive. Rectal exam deferred.  Bullard catheter is present. MSK No gross joint deformities. No spontaneous movement noted  SKIN Normal coloration, warm, dry. NEURO Does not follow directions. No response to verbal or painful stimuli  PSYCH Sedated on propofol.     Medications:   Medications:    insulin glargine  20 Units Subcutaneous Nightly    vancomycin (VANCOCIN) intermittent dosing (placeholder)   Other RX Placeholder    miconazole   Topical BID    insulin NPH  10 Units Subcutaneous Once    insulin lispro  0-12 Units Subcutaneous Q4H    cefepime  2 g Intravenous Q12H    enoxaparin  30 mg Subcutaneous BID    levothyroxine  12.5 mcg Intravenous Daily    And    sodium chloride (PF)  5 mL Intravenous Daily    sodium chloride flush  10 mL Intravenous 2 times per day    gabapentin  800 mg Oral TID    atorvastatin  20 mg Oral Daily    aspirin  81 mg Per NG tube Daily    lansoprazole  30 mg Per NG tube QAM AC    chlorhexidine  15 mL Mouth/Throat BID      Infusions:    dextrose      norepinephrine 9 mcg/min (07/29/20 0129)     PRN Meds: sodium chloride flush, 10 mL, PRN  acetaminophen, 650 mg, Q6H PRN    Or  acetaminophen, 650 mg, Q6H PRN  polyethylene glycol, 17 g, Daily PRN  promethazine, 12.5 mg, Q6H PRN    Or  ondansetron, 4 mg, Q6H PRN  glucose, 15 g, PRN  dextrose, 12.5 g, PRN  glucagon (rDNA), 1 mg, PRN  dextrose, 100 mL/hr, PRN  albuterol sulfate HFA, 4 puff, Q4H PRN          Electronically signed by Festus Scott DO on 7/29/2020 at 5:33 PM

## 2020-07-30 NOTE — PROGRESS NOTES
Pulmonary and Critical Care  Progress Note    Subjective: The patient is not responsive off sedation. Shortness of breath none  Chest pain none  Addressing respiratory complaints Patient is negative for  hemoptysis and cyanosis  CONSTITUTIONAL:  negative for fevers and chills      Past Medical History:     has a past medical history of Anxiety, Arthritis, Atrial fibrillation ([address] Atrial fibrillation with RVR ([address] Atrial fibrillation with RVR ([address] Atrial fibrillation with RVR ([address] Cancer ([address] Cervicalgia, Constipation, COPD (chronic obstructive pulmonary disease) ([address] Depression, Diabetes mellitus ([address] Diabetic neuropathy ([address] Disc herniation, Fatigue, Fibromyalgia, H/O cardiovascular stress test, H/O complete electrocardiogram, H/O echocardiogram, Headache(784.0), Hepatitis B, History of cardiac cath, History of chest x-ray, Holter monitor, abnormal, Hx of cardiovascular stress test, Hx of echocardiogram, Hyperlipidemia, Hypertension, Insulin pump in place, Liver cirrhosis ([address] SADI on CPAP, Peripheral vascular disease ([address] PONV (postoperative nausea and vomiting), Renal disease, Tachycardia, and Wears dentures. has a past surgical history that includes Hysterectomy (2000); back surgery (1998); Tonsillectomy (1966); shoulder surgery (Right, 1960); Cataract removal with implant (Bilateral, 2000s); Diagnostic Cardiac Cath Lab Procedure (2000s); Tonsillectomy and adenoidectomy (1966); Cholecystectomy (16386756); and liver biopsy (9/14/2015). reports that she quit smoking about 29 years ago. Her smoking use included cigarettes. She started smoking about 50 years ago. She has a 21.00 pack-year smoking history. She has never used smokeless tobacco. She reports that she does not drink alcohol or use drugs. Family history:  family history includes Anxiety Disorder in her sister;  Arthritis in her brother, father, mother, and sister; Cancer in her brother and father; Dementia in her mother; Depression in her mother and sister; Emphysema in her mother; Hearing Loss in her brother; High Blood Pressure in her brother; Other in her father; Vision Loss in her father. Allergies   Allergen Reactions    Latex Other (See Comments)     Blisters    Adhesive Tape      Paper tape ok to use    Codeine Nausea And Vomiting     Social History:    Reviewed; no changes    Objective:   PHYSICAL EXAM:        VITALS:  BP (!) 107/48   Pulse 85   Temp 98.1 °F (36.7 °C) (Rectal)   Resp 23   Ht 5' (1.524 m)   Wt 226 lb 3.1 oz (102.6 kg)   SpO2 95%   BMI 44.18 kg/m²     24HR INTAKE/OUTPUT:      Intake/Output Summary (Last 24 hours) at 7/30/2020 1133  Last data filed at 7/30/2020 1125  Gross per 24 hour   Intake 497.58 ml   Output 1875 ml   Net -1377.42 ml       CONSTITUTIONAL:  unarousable  LUNGS:  decreased breath sounds, basilar crackles. CARDIOVASCULAR:  normal S1 and S2 and negative JVD  ABD:Abdomen soft, non-tender. BS normal. No masses,  No organomegaly  NEURO:Unresponsive. DATA:    CBC:  Recent Labs     07/28/20  0431 07/29/20  0514 07/30/20  0325   WBC 17.5* 12.3* 10.9*   RBC 3.95* 3.26* 2.85*   HGB 10.2* 8.7* 7.3*   HCT 33.7* 27.6* 24.4*    225 233   MCV 85.3 84.7 85.6   MCH 25.8* 26.7* 25.6*   MCHC 30.3* 31.5* 29.9*   RDW 16.3* 16.3* 16.3*   SEGSPCT 79.0* 75.9* 71.0*      BMP:  Recent Labs     07/28/20  0431 07/29/20  0514 07/30/20  0325    134* 135   K 4.3 4.3 3.5   CL 88* 86* 87*   CO2 37* 36* 35*   BUN 26* 31* 32*   CREATININE 1.7* 1.7* 1.7*   CALCIUM 8.8 8.1* 7.9*   GLUCOSE 137* 139* 153*      ABG:  Recent Labs     07/28/20  0600 07/30/20  0600   PH 7.48* 7.50*   PO2ART 51* 72*   SKT2HRM 53.0* 51.0*   O2SAT 85.8* 93.4*     Lab Results   Component Value Date    PROBNP 21,085 (H) 07/25/2020    PROBNP 315.8 (H) 06/24/2019    PROBNP 167.1 06/23/2019     No results found for: [facility]    Radiology Review:  Pertinent images / reports were reviewed as a part of this visit.     Assessment: Patient Active Problem List   Diagnosis    HTN (hypertension)    COPD (chronic obstructive [facility] disease) [facility]    Anxiety and depression    DM type 2, uncontrolled, with retinopathy ([address]    Hypokalemia    Hyperlipidemia    Precordial pain    Hyperglycemia    Axillary abscess    Light headed    Orthostatic hypotension    Hypertriglyceridemia    CCC (chronic calculous cholecystitis)    Cirrhosis of liver without ascites (HCC)    Mild obstructive sleep apnea    Idiopathic progressive neuropathy    [facility]est[facility] failure ([facility]F) ([facility]    Type 2 diabetes mellitus ([facility]    Hypothyroidism    Depression    Hypertension    CHF (congestive heart failure), NYHA class I, acute on chronic, diastolic (HCC)    ACS (acute coronary syndrome) (HCC)    Acute coronary syndrome (HCC)    Diastolic dysfunction with acute on chronic heart failure ([facility])    NSTEMI (non-ST elevated myocardial infarction) ([facility]    SADI (obstructive sleep apnea)    Morbid obesity (HCC)    VHD (valvular heart disease)    Excessive daytime sleepiness    Ex-smoker    Acute respiratory failure ([facility]       Plan:   1. Will place pt on full vent support. 2. Discussed with Dr Guille Sutton.       Josh Kessler MD  7/30/2020  11:33 AM

## 2020-07-30 NOTE — PROGRESS NOTES
[facility]  consulted by Dr. Sukhjinder Rose for monitoring and adjustment. Indication for treatment: Sepsis, possible pneumonia & post-cardiac arrest  Goal trough: 15 mcg/mL     Pertinent Laboratory Values:   Temp Readings from Last 3 Encounters:   07/30/20 97.3 °F (36.3 °C) (Rectal)   07/25/20 98.2 °F (36.8 °C) (Axillary)   06/26/19 98.2 °F (36.8 °C) (Oral)     Recent Labs     07/28/20  0431 07/29/20  0514 07/30/20  0325   WBC 17.5* 12.3* 10.9*     Recent Labs     07/28/20  0431 07/29/20  0514 07/30/20  0325   BUN 26* 31* 32*   CREATININE 1.7* 1.7* 1.7*     Estimated Creatinine Clearance: 33 mL/min (A) (based on SCr of 1.7 mg/dL (H)). Intake/Output Summary (Last 24 hours) at 7/30/2020 0745  Last data filed at 7/30/2020 5585  Gross per 24 hour   Intake 296.58 ml   Output 1250 ml   Net -953.42 ml       Pertinent Cultures:  Date                             Source                                     Results  7/26/20                        Blood                                       NGTD  7/26/20                        Covid-19                                  Negative  7/26/20                        Respiratory Panel                   Negative    Vancomycin level:   RANDOM:    Recent Labs     07/30/20  0325   VANCORANDOM 21.0       Assessment:  · WBC and temperature: WBC improving; 24-hr Tmax 99.7F  · SCr, BUN, and urine output:   · SHER, Scr trend stable  · UOP decreased  · Day(s) of therapy: #4  · Vancomycin level: 21, supra-therapeutic    Plan:  · Level this AM is supra-therapeutic and UOP trend is decreased. Scr stable but remains elevated.   · Hold additional vancomycin and look to re-dose when level ~15  · Continue intermittent dosing 2/2 SHER  · Repeat next random level tomorrow AM  · Pharmacy will continue to monitor patient and adjust therapy as indicated    REPEAT VANCOMYCIN RANDOM SCHEDULED FOR 7/31/2020 @ 0600    Thank you for the consult,  Carlos Jolley [facility]

## 2020-07-30 NOTE — FLOWSHEET NOTE
Pt to ICU room 2106 this time, skin check shows wound to coccyx, redness pannus and under breasts with nystatin powder applied and scattered bruising.

## 2020-07-30 NOTE — CONSULTS
[facility] Nurse  Consult Note       Paul Murphy  AGE: 79 y.o. GENDER: female  : 1950  TODAY'S DATE:  2020    Subjective:     Reason for  Evaluation and Assessment: wound assessment      Paul Murphy is a 79 y.o. female referred by:   [x] Physician  [] Nursing  [] Other:     Wound Identification:  Wound Type: pressure  Contributing Factors: diabetes, chronic pressure, decreased mobility and obesity        PAST MEDICAL HISTORY        Diagnosis Date    Anxiety     Arthritis     bilats hands, right shoulder    Atrial fibrillation [facility])     Cardioversion @ [facility] - no trouble with it ever since. [facility]es [facility] Atrial fibrillation with RVR [facility])     Atrial fibrillation with RVR ([facility] 2012    Atrial fibrillation with RVR ([facility] 3/1/2013    Cancer (HCC)     skin (right upper chest & face)    Cervicalgia     Constipation     COPD (chronic obstructive pulmonary disease) (HCC)     Depression     Diabetes mellitus (HCC)     IDDM    Diabetic neuropathy ([facility]     Disc herniation     lower back    Fatigue     Fibromyalgia     H/O cardiovascular stress test  EF 70%, EF 66%,02/10, 03/10 EF 63% 04/10    04/26/10 if LAD and circ are compared, the LAD in its mid segment has about 60% stenosis around a small diag. circumflex vessel is a dominant vessel and is without any significant disease,  rca is nondominant with about 50% stenosis , will continue medical management for now.  H/O complete electrocardiogram 02/10    02/03/10 on chart    H/O echocardiogram ,EF 55%, 02/10    02/25/10 EF>55% left ventricular systolic function is normal,mild mitral regurg. there is no PE    Headache(784.0)     Not migraines, usually from a high blood sugar.     Hepatitis B     History of cardiac cath 06/08    06/17/10 heart cath, patient has single vessel cad with RCA which is nondominant, being 50% stenosed, rest of vessels are without any significant disease, medical therapy and risk factor modification will be continued    History of chest x-ray 06/08    06/15/08 chest xray, nonacute chest with borderline cardiomegaly    Holter monitor, abnormal 10/28/13    48hr-Predominantly SR w/only SVT ectopy in single beat form noted.  Hx of cardiovascular stress test 4/1/2013    EF70%, normal    Hx of echocardiogram 4/1/2013    EF55%,mild tricuspid regurg    Hyperlipidemia     Hypertension     Insulin pump in place     Liver cirrhosis (HCC)     Dr Dominick Parmar SADI on CPAP     In the process of getting a c-pap. Does not currently have one.     Peripheral vascular disease ([address]     PONV (postoperative nausea and vomiting) 1960    with shoulder surgery    Renal disease     Tachycardia     Wears dentures     upper plate       PAST SURGICAL HISTORY    Past Surgical History:   Procedure Laterality Date    BACK SURGERY  1998    herniated disc - no hardware    CATARACT REMOVAL WITH IMPLANT Bilateral 2000s    CHOLECYSTECTOMY  [id number]    laproscopic with liver biopsy    DIAGNOSTIC CARDIAC CATH LAB PROCEDURE  2000s    no stents (mimimal blockage)    HYSTERECTOMY  2000    total    LIVER BIOPSY  9/14/2015    SHOULDER SURGERY Right 1960    fracture surgery-screw at humeral head    TONSILLECTOMY  1966    TONSILLECTOMY AND ADENOIDECTOMY  1966       FAMILY HISTORY    Family History   Problem Relation Age of Onset    Arthritis Mother     Depression Mother     Emphysema Mother     Dementia Mother     Arthritis Father     Cancer Father         prostate    Vision Loss Father     Other Father         brain aneurysm    Arthritis Sister     Depression Sister     Anxiety Disorder Sister     Arthritis Brother     Cancer Brother         eye    Hearing Loss Brother     High Blood Pressure Brother        SOCIAL HISTORY    Social History     Tobacco Use    Smoking status: Former Smoker     Packs/day: 1.00     Years: 21.00     Pack years: 21.00     Types: Cigarettes     Start date: 1970     Last attempt to quit: 1991     Years since quittin.5    Smokeless tobacco: Never Used    Tobacco comment: lives with smokers   Substance Use Topics    Alcohol use: No     Alcohol/week: 0.0 standard drinks     Comment:           CAFFEINE: 4-6 diet sodas daily    Drug use: No       ALLERGIES    Allergies   Allergen Reactions    Latex Other (See Comments)     Blisters    Adhesive Tape      Paper tape ok to use    Codeine Nausea And Vomiting       MEDICATIONS    No current facility-administered medications on file prior to encounter. Current Outpatient Medications on File Prior to Encounter   Medication Sig Dispense Refill    MIDODRINE HCL PO Take by mouth 3 times daily      LORazepam (ATIVAN) 1 MG tablet Take 1 mg by mouth every 6 hours as needed for Anxiety.  pantoprazole sodium (PROTONIX) 40 MG PACK packet Take 40 mg by mouth every morning (before breakfast)      albuterol-ipratropium (COMBIVENT)  MCG/ACT inhaler Inhale 2 puffs into the lungs 4 times daily 1 Inhaler 0    furosemide (LASIX) 40 MG tablet Take 1 tablet by mouth 2 times daily 60 tablet 0    potassium chloride (KLOR-CON M) 20 MEQ extended release tablet Take 1 tablet by mouth daily for 5 days 5 tablet 0    albuterol sulfate HFA (PROAIR HFA) 108 (90 Base) MCG/ACT inhaler Inhale 2 puffs into the lungs every 4 hours as needed for Wheezing 1 Inhaler 0    busPIRone (BUSPAR) 10 MG tablet Take 1.5 tablets by mouth 3 times daily 160 tablet 0    insulin glargine (LANTUS) 100 UNIT/ML injection vial Inject 60 Units into the skin nightly (Patient taking differently: Inject 80 Units into the skin nightly ) 1 vial 3    docusate sodium (COLACE, DULCOLAX) 100 MG CAPS Take 100 mg by mouth daily 60 capsule 0    tolterodine (DETROL) 2 MG tablet Take 2 mg by mouth daily      nitroGLYCERIN (NITROSTAT) 0.4 MG SL tablet up to max of 3 total doses.  If no relief after 1 dose, call 151. 18 tablet 1  simvastatin (ZOCOR) 20 MG tablet Take 1 tablet by mouth nightly (Patient taking differently: Take 40 mg by mouth nightly ) 30 tablet 3    levothyroxine (SYNTHROID) 25 MCG tablet Take 25 mcg by mouth Daily      venlafaxine (EFFEXOR XR) 150 MG extended release capsule Take one capsule daily at the same time as the 75 mg capsule 90 capsule 0    ONE TOUCH LANCETS MISC 1 each by Does not apply route daily 100 each 11    Insulin Lispro, Human, (HUMALOG SC) Inject into the skin Insulin pump      gabapentin (NEURONTIN) 800 MG tablet Take 1 tablet by mouth 3 times daily (Patient taking differently: Take 800 mg by mouth 4 times daily. Debbi Malhotra ) 270 tablet 1    glucose blood VI test strips (ASCENSIA AUTODISC VI;ONE TOUCH ULTRA TEST VI) strip 1 each by In Vitro route 4 times daily as needed As needed.            Objective:      BP (!) 107/48   Pulse 85   Temp 98.1 °F (36.7 °C) (Rectal)   Resp 23   Ht 5' (1.524 m)   Wt 226 lb 3.1 oz (102.6 kg)   SpO2 95%   BMI 44.18 kg/m²   Frank Risk Score: Frank Scale Score: 8    LABS    CBC:   Lab Results   Component Value Date    WBC 10.9 07/30/2020    RBC 2.85 07/30/2020    HGB 7.3 07/30/2020    HCT 24.4 07/30/2020    MCV 85.6 07/30/2020    MCH 25.6 07/30/2020    MCHC 29.9 07/30/2020    RDW 16.3 07/30/2020     07/30/2020    MPV 9.8 07/30/2020     CMP:    Lab Results   Component Value Date     07/30/2020    K 3.5 07/30/2020    CL 87 07/30/2020    CO2 35 07/30/2020    BUN 32 07/30/2020    CREATININE 1.7 07/30/2020    GFRAA 36 07/30/2020    AGRATIO 1.3 06/08/2016    LABGLOM 30 07/30/2020    GLUCOSE 153 07/30/2020    PROT 6.9 07/25/2020    PROT 6.7 03/02/2013    LABALBU 3.0 07/25/2020    LABALBU 74 09/11/2018    CALCIUM 7.9 07/30/2020    BILITOT 0.3 07/25/2020    ALKPHOS 125 07/25/2020    AST 23 07/25/2020    ALT 11 07/25/2020     Albumin:    Lab Results   Component Value Date    LABALBU 3.0 07/25/2020    LABALBU 74 09/11/2018     PT/INR:    Lab Results   Component Value Starts ___ O'Clock 0 07/30/20 1017   Undermining Ends___ O'Clock 0 07/30/20 1017   Undermining Maxium Distance (cm) 0 07/30/20 1017   Wound Assessment Red;Purple;Yellow 07/30/20 1017   Drainage Amount Small 07/30/20 1017   Drainage Description Serosanguinous 07/30/20 1017   Odor None 07/30/20 1017   Margins Defined edges; Attached edges 07/30/20 1017   Kendy-wound Assessment Pink 07/30/20 1017   Non-staged Wound Description Full thickness 07/30/20 1017   Red%Wound Bed 40 07/30/20 1017   Yellow%Wound Bed 10 07/30/20 1017   Purple%Wound Bed 50 07/30/20 1017   Number of days: 4       Wound 07/25/20 Left (Active)   Dressing Status Clean;Dry; Intact 07/30/20 0003   Dressing Changed Changed/New 07/28/20 1600   Dressing/Treatment Open to air 07/30/20 0812   Wound Assessment Clean;Dry; Intact 07/30/20 0812   Number of days: 4       Response to treatment:  Well tolerated by patient. Pain Assessment:  Severity:  none  Quality of pain: na  Wound Pain Timing/Severity: na  Premedicated: no    Plan:     Plan of Care: Wound 07/25/20 Sacrum cluster with communicating DTI-Dressing/Treatment: (puracol, mepilex)  Wound 07/25/20 Left-Dressing/Treatment: Open to air      Pt in bed. Intubated. Nurse assist with turning. Stage 2 with communicating DTI cluster noted to sacrum. Pictures and measurements taken. Treatment as above. Bilateral heels intact with heel protector boots. Repositioned to right side with pillow support. Pt is at very high risk of skin breakdown AEB Frank. Follow Frank orders. Specialty Bed Required : yes  [x] Low Air Loss   [x] Pressure Redistribution  [] Fluid Immersion  [] Bariatric  [] Total Pressure Relief  [] Other:     Discharge Plan:  Placement for patient upon discharge: tbd  Hospice Care: tbd  Patient appropriate for Outpatient [address]: tb    Patient/Caregiver Teaching:  Level of patient/caregiver understanding able to:   Not able to voice understanding.         Electronically signed by Dario Delaney Del Pabon RN,  on 7/30/2020 at 11:25 AM

## 2020-07-30 NOTE — PROGRESS NOTES
Progress Note( Dr. Chris Ramos)  7/30/2020  Subjective:   Admit Date: 7/25/2020  PCP: Valentina Machuca PA-C    Admitted For : Cardiac arrest but very soon was resuscitated intubated and placed on the ventilator    Consulted For: Better control of blood glucose    Interval History: No major changes in her mental or respiratory status    Patient is still sedated intubated and on ventilator  Patient is COVID negative so she will be transferred to regular ICU later on          Intake/Output Summary (Last 24 hours) at 7/30/2020 0613  Last data filed at 7/29/2020 2334  Gross per 24 hour   Intake 146.58 ml   Output 595 ml   Net -448. 42 ml       DATA    CBC:   Recent Labs     07/28/20  0431 07/29/20  0514 07/30/20  0325   WBC 17.5* 12.3* 10.9*   HGB 10.2* 8.7* 7.3*    225 233    CMP:  Recent Labs     07/28/20  0431 07/29/20  0514 07/30/20  0325    134* 135   K 4.3 4.3 3.5   CL 88* 86* 87*   CO2 37* 36* 35*   BUN 26* 31* 32*   CREATININE 1.7* 1.7* 1.7*   CALCIUM 8.8 8.1* 7.9*     Lipids:   Lab Results   Component Value Date    CHOL 122 06/20/2019    CHOL 205 03/17/2016    HDL 51 06/20/2019    TRIG 150 06/20/2019     Glucose:  Recent Labs     07/29/20  2136 07/30/20  0217 07/30/20  0544   POCGLU 150* 182* 172*     [id number]:  Lab Results   Component Value Date    LABA1C 12.0 07/26/2020     High Sensitivity TSH:   Lab Results   Component Value Date    TSHHS 4.730 06/20/2019     Free T3:   Lab Results   Component Value Date    FT3 2.9 03/17/2016     Free T4:  Lab Results   Component Value Date    T4FREE 1.04 06/18/2019       Xr Chest Portable    Result Date: 7/26/2020  EXAMINATION: ONE XRAY VIEW OF THE CHEST 7/26/2020 4:49 am COMPARISON: July 25, 2020 HISTORY: ORDERING SYSTEM PROVIDED HISTORY: Vent management T    Patchy airspace opacities bilaterally, may be related to pulmonary edema versus pneumonia, likely improved in the left hemithorax. Mild to moderate right pleural effusion. Stable cardiomegaly.  Low lung volumes. Maggie Waggoner Chest Portable    Result Date: 7/25/2020  EXAMINATION: ONE XRAY VIEW OF THE CHEST 7/25/2020 3:37 pm COMPARISON: Chest 06/17/2019 HISTORY: ORDERING SYSTEM PROVIDED HISTORY: post arrest        1. Nonspecific pulmonary findings may be related to sequela from pulmonary edema, diffuse infection or ARDS. Pulmonary contusion or sequela during resuscitation (? aspiration) may contribute to these opacities as well. 2. Calcific atherosclerosis aorta. 3. Cardiomegaly. 4. Endotracheal tube tip measures about 2.7 cm superior to the elijah. [place] Pulmonary W Contrast    Result Date: 7/25/2020  EXAMINATION: CTA OF THE CHEST 7/25/2020 5:51 pm     1. No pulmonary embolism. 2. Findings of fluid overload with small to moderate pleural effusions, diffuse soft tissue edema, and upper abdominal ascites. 3. Consolidative opacities within the upper to mid lungs could represent pneumonia or atelectasis/scarring. 4. Cirrhosis. 5. Borderline cardiomegaly with severe atherosclerotic disease. Xr Abdomen For Ng/og/ne Tube Placement    Result Date: 7/26/2020  EXAMINATION: ONE SUPINE XRAY VIEW(S) OF THE ABDOMEN 7/26/2020 8:18 am COMPARISON: None       NG tube tip in the gastric antrum with the proximal side-port in the gastric body.        Scheduled Medicines   Medications:    calcium gluconate  2 g Intravenous Once    insulin glargine  20 Units Subcutaneous Nightly    vancomycin (VANCOCIN) intermittent dosing (placeholder)   Other RX Placeholder    miconazole   Topical BID    insulin NPH  10 Units Subcutaneous Once    insulin lispro  0-12 Units Subcutaneous Q4H    cefepime  2 g Intravenous Q12H    enoxaparin  30 mg Subcutaneous BID    levothyroxine  12.5 mcg Intravenous Daily    And    sodium chloride (PF)  5 mL Intravenous Daily    sodium chloride flush  10 mL Intravenous 2 times per day    gabapentin  800 mg Oral TID    atorvastatin  20 mg Oral Daily    aspirin  81 mg Per NG tube Daily    lansoprazole  30 mg Per NG tube QAM AC    chlorhexidine  15 mL Mouth/Throat BID      Infusions:    dextrose      norepinephrine 10 mcg/min (07/30/20 0319)         Objective:   Vitals: BP (!) 120/48   Pulse 82   Temp 97.3 °F (36.3 °C) (Rectal)   Resp 13   Ht 5' (1.524 m)   Wt 225 lb 15.5 oz (102.5 kg)   SpO2 96%   BMI 44.13 kg/m²   General appearance: Patient is sedated intubated on ventilator  Neck: no JVD or bruit  Thyroid : Normal lobes   Lungs: Has Vesicular Breath sounds intubated and on ventilator  Heart:  regular rate and rhythm  Abdomen: soft, non-tender; bowel sounds normal; no masses,  no organomegaly  Musculoskeletal: Normal  Extremities: extremities normal, , no edema  Neurologic: Patient is sedated intubated and on ventilator. Assessment:     Patient Active Problem List:     HTN (hypertension)     COPD (chronic obstructive pulmonary disease) ([facility]     Anxiety and depression     DM type 2, uncontrolled, with retinopathy ([facility]     Hypokalemia     Hyperlipidemia     Precordial pain     Hyperglycemia     Axillary abscess     Light headed     Orthostatic hypotension     Hypertriglyceridemia     CCC (chronic calculous cholecystitis)     Cirrhosis of liver without ascites (HCC)     Mild obstructive sleep apnea     Idiopathic progressive neuropathy     Congestive heart failure (CHF) (HCC)     Type 2 diabetes mellitus (HCC)     Hypothyroidism     Depression     Hypertension     CHF (congestive heart failure), NYHA class I, acute on chronic, diastolic (HCC)     ACS (acute coronary syndrome) (HCC)     Acute coronary syndrome (HCC)     Diastolic dysfunction with acute on chronic heart failure (HCC)     NSTEMI (non-ST elevated myocardial infarction) ([facility])     SADI (obstructive sleep apnea)     Morbid obesity (HCC)     VHD (valvular heart disease)     Excessive daytime sleepiness     Ex-smoker     Acute respiratory failure ([facility]      Plan:     1. Reviewed POC blood glucose .  Labs and X ray results 2. Reviewed Current Medicines   3. On Correction bolus Humalog/ Basal Lantus Insulin regime /   4. Monitor Blood glucose frequently   5. Modified  the dose of Insulin/ other medicines as needed  6. Consider starting tube feeding if able to control her blood pressure without pressor  7. Will follow     .      Brandi Ojeda MD

## 2020-07-30 NOTE — PROGRESS NOTES
Hospitalist Progress Note      Name:  Tres Gabriel Standard /Age/Sex: 1950  (79 y.o. female)   MRN & CSN:  [id number] & [id number] Admission Date/Time: 2020  8:40 PM   Location:  -A PCP: [facility] Day: 6    Assessment and Plan:   Roger Mcgregor is a 79 y.o.  female  who presents with:     Severe sepsis with septic shock. Tmax 101.7 on . · Continue antibiotics; vent management per pulm   · Start tube feeds  · Consult hospice as she has been off sedation and nonresponsive to pain x2 days. · Transfer out of covid unit as she is negative x2   Acute respiratory failure with hypoxia, found to have GN pneumonia. CT chest +opacities, pleural effusions  · As above   Covid 19, suspected. · Tested : negative; Retested : negative   Acute exacerbation of chronic HFpEF due to noncompliance   Moderate AS   Acute cardiac arrest in the field and received CPR <5 minutes, epinephrine, as intubated.  DM 2 on long term insulin, poorly controlled   Paroxysmal Atrial fibrillation. No anticoagulation due to hx of GI bleed   Anemia of chronic disease, stable   Liver cirrhosis per Ct imaging      Diet DIET TUBE FEED CONTINUOUS/CYCLIC NPO; Semi-elemental (Vital AF); Nasogastric; Continuous; 25; 60; 24   DVT Prophylaxis [] Lovenox, []  Heparin, [] SCDs, []No VTE prophylaxis, patient ambulating   GI Prophylaxis [] PPI, [] H2 Blocker, [] No GI prophylaxis, patient is receiving diet/Tube Feeds   Code Status Full Code   Disposition Patient requires continued admission due to vent dependency    Dc plan in progress   MDM [] Low, [x] Moderate,[]  High  Patient's risk as above due to    [] One or more chronic illnesses with mild exacerbation progression    [x] Two or more stable chronic illnesses    [] Undiagnosed new problem with uncertain prognosis    [] Elective major surgery    []Prescription drug management     History of Present Illness:     Pt S&E.    On vent  No problems overnight  Not requiring sedation    No ROS as she is intubated and sedated    Objective: Intake/Output Summary (Last 24 hours) at 7/30/2020 1442  Last data filed at 7/30/2020 1200  Gross per 24 hour   Intake 497.58 ml   Output 1925 ml   Net -1427.42 ml      Vitals:   Vitals:    07/30/20 1325   BP: (!) 114/47   Pulse: 82   Resp: 17   Temp: 99.5 °F (37.5 °C)   SpO2: 98%     Physical Exam:  NO sedation during this exam   GEN sedated female, laying in bed in no apparent distress. EYES Pupils are equally round. No scleral erythema, discharge, or conjunctivitis. HENT Mucous membranes are moist. +ETT +NGT  NECK No apparent thyromegaly or masses. RESP +rales or rhonchi. Symmetric chest movement while on room air. CARDIO/VASC S1/S2 auscultated. Regular rate without appreciable murmurs, rubs, or gallops. Peripheral pulses equal bilaterally and palpable. +1 pitting edema in extremities   GI Abdomen is soft without significant tenderness, masses, or guarding. Bowel sounds are normoactive. Rectal exam deferred.  Bullard catheter is present. MSK No gross joint deformities. No spontaneous movement noted  SKIN Normal coloration, warm, dry. NEURO Does not follow directions. No response to verbal or painful stimuli.     Medications:   Medications:    insulin glargine  20 Units Subcutaneous Nightly    vancomycin (VANCOCIN) intermittent dosing (placeholder)   Other RX Placeholder    miconazole   Topical BID    insulin NPH  10 Units Subcutaneous Once    insulin lispro  0-12 Units Subcutaneous Q4H    cefepime  2 g Intravenous Q12H    enoxaparin  30 mg Subcutaneous BID    levothyroxine  12.5 mcg Intravenous Daily    And    sodium chloride (PF)  5 mL Intravenous Daily    sodium chloride flush  10 mL Intravenous 2 times per day    gabapentin  800 mg Oral TID    atorvastatin  20 mg Oral Daily    aspirin  81 mg Per NG tube Daily    lansoprazole  30 mg Per NG tube QAM AC    chlorhexidine  15 mL Mouth/Throat BID      Infusions:    dextrose      norepinephrine 8 mcg/min (07/30/20 1407)     PRN Meds: sodium chloride flush, 10 mL, PRN  acetaminophen, 650 mg, Q6H PRN    Or  acetaminophen, 650 mg, Q6H PRN  polyethylene glycol, 17 g, Daily PRN  promethazine, 12.5 mg, Q6H PRN    Or  ondansetron, 4 mg, Q6H PRN  glucose, 15 g, PRN  dextrose, 12.5 g, PRN  glucagon (rDNA), 1 mg, PRN  dextrose, 100 mL/hr, PRN  albuterol sulfate HFA, 4 puff, Q4H PRN          Electronically signed by Richard Pacheco DO on 7/30/2020 at 2:42 PM

## 2020-07-31 NOTE — PROGRESS NOTES
Returned from CT scan per bed. No distress noted. No rapid eye twitching noted. This nurse will continue to monitor.   Sunday Ashraf RN

## 2020-07-31 NOTE — PROGRESS NOTES
Comprehensive Nutrition Assessment    Type and Reason for Visit:  Reassess    Nutrition Recommendations/Plan:   · Please start EN today as ordered    Nutrition Assessment:  Pt EN has not been started during her los. She remains on vasopressors, however, she continues to be hemodynamically stable and is producing urine and having BM. Please start EN today. Malnutrition Assessment:  Malnutrition Status: At risk for malnutrition (Comment)    Context:  Acute Illness     Findings of the 6 clinical characteristics of malnutrition:  Energy Intake:  No significant decrease in energy intake  Weight Loss:  No significant weight loss     Body Fat Loss:  Unable to assess     Muscle Mass Loss:  Unable to assess    Fluid Accumulation:  7 - Moderate to Severe Generalized   Strength:  Not Performed    Estimated Daily Nutrient Needs:  Energy (kcal):  1810; Weight Used for Energy Requirements:  Current     Protein (g):  90+; Weight Used for Protein Requirements:  Ideal        Fluid (ml/day):  1810; Weight Used for Fluid Requirements:  Current      Nutrition Related Findings:  None      Wounds:  None       Current Nutrition Therapies:    DIET TUBE FEED CONTINUOUS/CYCLIC NPO; Semi-elemental (Vital AF);  Nasogastric; Continuous; 25; 60; 24    Anthropometric Measures:  · Height: 5' (152.4 cm)  · Current Body Weight: 211 lb (95.7 kg)   · Admission Body Weight: 257 lb (116.6 kg)    · Usual Body Weight: 232 lb (105.2 kg)     · Ideal Body Weight: 100 lbs; % Ideal Body Weight 257 %   · BMI: 41.2   · BMI Categories: Obese Class 3 (BMI 40.0 or greater)       Nutrition Diagnosis:   · Inadequate oral intake related to impaired respiratory funtion as evidenced by NPO or clear liquid status due to medical condition, intubation      Nutrition Interventions:   Food and/or Nutrient Delivery:  Start Tube Feeding  Nutrition Education/Counseling:  No recommendation at this time   Coordination of Nutrition Care:  Continued Inpatient Monitoring    Goals:  pt will have source of nutrition started in the next 24-48 hours       Nutrition Monitoring and Evaluation:   Food/Nutrient Intake Outcomes:  Diet Advancement/Tolerance  Physical Signs/Symptoms Outcomes:  Biochemical Data, Skin, Weight, Hemodynamic Status     Discharge Planning:     Too soon to determine     Electronically signed by Halina Morgan RD, LD on 4/95/80 at 10:40 AM EDT    Contact: [phone]

## 2020-07-31 NOTE — PROGRESS NOTES
Today's plan:  Continue present care, recommend neuro- evaluation,WILL SIGN OFF, PLEASE CALL US AGAIN IF SHE WAKES UP, HER LVEF 30% WILL NEED TO CONSIDER ISCHEMIA WORK UP AND AORTIC STENOSIS TREATMENT      Admit Date:  7/25/2020    Subjective: intubated      Chief complaints on admission\" cardiopulmonary arrest        History of present Patti Almanza is a 79 y. o.year old who  presents with cardiopulmonary arrest    Past medical history:    has a past medical history of Anxiety, Arthritis, Atrial fibrillation ([address] Atrial fibrillation with RVR ([address] Atrial fibrillation with RVR ([address] Atrial fibrillation with RVR ([address] Cancer ([address] Cervicalgia, Constipation, COPD (chronic obstructive pulmonary disease) ([address] Depression, Diabetes mellitus ([address] Diabetic neuropathy ([address] Disc herniation, Fatigue, Fibromyalgia, H/O cardiovascular stress test, H/O complete electrocardiogram, H/O echocardiogram, Headache(784.0), Hepatitis B, History of cardiac cath, History of chest x-ray, Holter monitor, abnormal, Hx of cardiovascular stress test, Hx of echocardiogram, Hyperlipidemia, Hypertension, Insulin pump in place, Liver cirrhosis ([address] SADI on CPAP, Peripheral vascular disease ([address] PONV (postoperative nausea and vomiting), Renal disease, Tachycardia, and Wears dentures. Past surgical history:   has a past surgical history that includes Hysterectomy (2000); back surgery (1998); Tonsillectomy (1966); shoulder surgery (Right, 1960); Cataract removal with implant (Bilateral, 2000s); Diagnostic Cardiac Cath Lab Procedure (2000s); Tonsillectomy and adenoidectomy (1966); Cholecystectomy (77677719); and liver biopsy (9/14/2015). Social History:   reports that she quit smoking about 29 years ago. Her smoking use included cigarettes. She started smoking about 50 years ago. She has a 21.00 pack-year smoking history. She has never used smokeless tobacco. She reports that she does not drink alcohol or use drugs.   Family history:  family history includes Anxiety Disorder in her sister; Arthritis in her brother, father, mother, and sister; Cancer in her brother and father; Dementia in her mother; Depression in her mother and sister; Emphysema in her mother; Hearing Loss in her brother; High Blood Pressure in her brother; Other in her father; Vision Loss in her father. Allergies   Allergen Reactions    Latex Other (See Comments)     Blisters    Adhesive Tape      Paper tape ok to use    Codeine Nausea And Vomiting         Objective:   BP (!) 127/49   Pulse 79   Temp 98.7 °F (37.1 °C) (Oral)   Resp 12   Ht 5' (1.524 m)   Wt 211 lb 10.3 oz (96 kg)   SpO2 96%   BMI 41.33 kg/m²       Intake/Output Summary (Last 24 hours) at 7/31/2020 1644  Last data filed at 7/31/2020 1345  Gross per 24 hour   Intake 958.25 ml   Output 2490 ml   Net -1531.75 ml         Physical Exam:  Constitutional:  Well developed, Well nourished, No acute distress, Non-toxic appearance. HENT:  Normocephalic, Atraumatic, Bilateral external ears normal, Oropharynx moist, No oral exudates, Nose normal. Neck- Normal range of motion, No tenderness, Supple, No stridor. Eyes:  PERRL, EOMI, Conjunctiva normal, No discharge. Respiratory:  Normal breath sounds, No respiratory distress, No wheezing, No chest tenderness. ,no use of accessory muscles, diaphragm movement is normal  Cardiovascular: (PMI) apex non displaced,no lifts no thrills, no s3,no s4, Normal heart rate, Normal rhythm, No murmurs, No rubs, No gallops. Carotid arteries pulse and amplitude are normal no bruit, no abdominal bruit noted ( normal abdominal aorta ausculation), femoral arteries pulse and amplitude are normal no bruit, pedal pulses are normal  GI:  Bowel sounds normal, Soft, No tenderness, No masses, No pulsatile masses. : External genitalia appear normal, No masses or lesions. No discharge. No CVA tenderness.    Musculoskeletal:  Intact distal pulses, No edema, No tenderness, No cyanosis, No clubbing. Good range of motion in all major joints. No tenderness to palpation or major deformities noted. Back- No tenderness. Integument:  Warm, Dry, No erythema, No rash. Lymphatic:  No lymphadenopathy noted. Neurologic:  Intubated, unresponsive    Medications:    piperacillin-tazobactam  3.375 g Intravenous Q8H    furosemide  40 mg Intravenous Daily    levetiracetam  1,000 mg Intravenous Q12H    insulin glargine  20 Units Subcutaneous Nightly    vancomycin (VANCOCIN) intermittent dosing (placeholder)   Other RX Placeholder    miconazole   Topical BID    insulin lispro  0-12 Units Subcutaneous Q4H    enoxaparin  30 mg Subcutaneous BID    levothyroxine  12.5 mcg Intravenous Daily    And    sodium chloride (PF)  5 mL Intravenous Daily    sodium chloride flush  10 mL Intravenous 2 times per day    atorvastatin  20 mg Oral Daily    aspirin  81 mg Per NG tube Daily    lansoprazole  30 mg Per NG tube QAM AC    chlorhexidine  15 mL Mouth/Throat BID      dextrose      norepinephrine 10 mcg/min (07/31/20 1345)     [START ON 8/1/2020] levothyroxine, sodium chloride flush, acetaminophen **OR** acetaminophen, polyethylene glycol, promethazine **OR** ondansetron, glucose, dextrose, glucagon (rDNA), dextrose, albuterol sulfate HFA    Lab Data:  CBC:   Recent Labs     07/29/20 0514 07/30/20 0325 07/31/20 0620   WBC 12.3* 10.9* 10.2   HGB 8.7* 7.3* 7.4*   HCT 27.6* 24.4* 24.6*   MCV 84.7 85.6 86.3    233 275     BMP:   Recent Labs     07/29/20 0514 07/30/20 0325 07/31/20  0620   * 135 136   K 4.3 3.5 3.6   CL 86* 87* 88*   CO2 36* 35* 31   PHOS 5.3* 4.5 3.2   BUN 31* 32* 28*   CREATININE 1.7* 1.7* 1.3*     LIVER PROFILE:   No results for input(s): AST, ALT, LIPASE, BILIDIR, BILITOT, ALKPHOS in the last 72 hours. Invalid input(s):   AMYLASE,  ALB  PT/INR:   Recent Labs     07/29/20 0514 07/30/20 0325 07/31/20  0620   PROTIME 13.2 12.3 12.8   INR 1.09 1.02 1.06     APTT: Recent Labs     07/29/20  0514 07/30/20  0325 07/31/20  0620   APTT 38.6* 36.5 37.6*     BNP:  No results for input(s): BNP in the last 72 hours. TROPONIN: @TROPONINI:3@      Assessment:  79 y. o.year old who is admitted for          Plan:  1. Cardiopulmonary arrest: most likely from CHF,given the history of medical non compliance, recommend to continue lasix drip and once lung starts to clear, will recommend weaning trial,   2. Echo showed severe AS and depressed LVEF, OFF LASIX drip for now,will watch for now, if she wakes up and has good neuro function, will recommend further treatment  3. PAF: not on anticoagulation due to GI bleeding  4. HTN: stable  Dyslipidemia: stableHealth maintenance: exerise and diet  All labs, medications and tests reviewed, continue all other medications of all above medical condition listed as is.       Gopal Guardado MD 7/31/2020 4:44 PM

## 2020-07-31 NOTE — PROGRESS NOTES
Ran into Dr. Dinah Brewer  Before seeing the patient. He was concerned that medications may be causing the mental status issues and suggested that I come back on Monday to see how the patient does over the weekend. Neurology is to see the patient also.

## 2020-07-31 NOTE — PROGRESS NOTES
Perfect serve sent to Aden Wong NP regarding rapid eye twitching. Waiting for response. Henry Darby RN

## 2020-07-31 NOTE — PROGRESS NOTES
During bedside handoff patient noted to have rapid eye movement. Dr. Mario Trotter notified via perfect serve. Called back and states he is placing new orders. This RN to follow orders as prescribed.

## 2020-07-31 NOTE — PROGRESS NOTES
Patient having rapid twitching of eyes lasting 1 1/2 minutes. This nurse will notify physician.   Chris Sarah RN

## 2020-07-31 NOTE — PROGRESS NOTES
vascular    congestion has improved.  Right-sided pleural effusion is either decreased in    size or redistributed       Assessment:     Patient Active Problem List   Diagnosis    HTN (hypertension)    COPD ([facility]ronic obstructive pulmonary disease) [facility]    Anxiety and depression    DM type 2, uncontrolled, with retinopathy ([facility]    Hypokalemia    Hyperlipidemia    Precordial pain    Hyperglycemia    Axillary abscess    Light headed    Orthostatic hypotension    Hypertriglyceridemia    CCC (chronic calculous cholecystitis)    Cirrhosis of liver without ascites (HCC)    Mild obstructive sleep apnea    Idiopathic progressive neuropathy    Congestive heart failure (CHF) (HCC)    Type 2 diabetes mellitus ([address]    Hypothyroidism    Depression    Hypertension    CHF (congestive heart failure), NYHA class I, acute on chronic, diastolic (HCC)    ACS (acute coronary syndrome) (HCC)    Acute coronary syndrome (HCC)    Diastolic dysfunction with acute on chronic heart failure (HCC)    NSTEMI (non-ST elevated myocardial infarction) ([facility]    SADI (obstructive sleep apnea)    Morbid obesity (HCC)    VHD (valvular heart disease)    Excessive daytime sleepiness    Ex-smoker    Acute respiratory failure ([facility]       Plan:   1. continue present treatment  2. Neurology to evaluate mental status  3. Await both hospice and palliative care evaluations- some family dynamics concerning determination of CODE STATUS  4.  Continue present mechanical ventilation with aggressive bronchopulmonary toilet    Kate Donaldson MD  7/31/2020  12:06 PM

## 2020-07-31 NOTE — PROGRESS NOTES
[facility] Pac Standard is a 79 y.o. female patient intubated    Current Facility-Administered Medications   Medication Dose Route Frequency Provider Last Rate Last Dose    levothyroxine (SYNTHROID) tablet 50 mcg  50 mcg Oral Daily TAMMIE Navas MD        insulin glargine (LANTUS) injection vial 20 Units  20 Units Subcutaneous Nightly Xander Simpson MD   Stopped at 07/29/20 2020    vancomycin (VANCOCIN) intermittent dosing (placeholder)   Other RX Placeholder Malaika Reddy DO        miconazole (MICOTIN) 2 % powder   Topical BID Cyndy Finn PA-C        insulin lispro (HUMALOG) injection vial 0-12 Units  0-12 Units Subcutaneous Q4H Xander Simpson MD   2 Units at 07/31/20 9415    cefepime (MAXIPIME) 2 g IVPB minibag  2 g Intravenous Q12H Terra Romo  mL/hr at 07/31/20 0621 2 g at 07/31/20 0621    enoxaparin (LOVENOX) injection 30 mg  30 mg Subcutaneous BID Terra Romo MD   30 mg at 07/30/20 2114    levothyroxine (SYNTHROID) injection 12.5 mcg  12.5 mcg Intravenous Daily Xander Simpson MD   12.5 mcg at 07/30/20 0810    And    sodium chloride (PF) 0.9 % injection 5 mL  5 mL Intravenous Daily Xander Simpson MD   5 mL at 07/30/20 0810    sodium chloride flush 0.9 % injection 10 mL  10 mL Intravenous 2 times per day Susu Call MD   10 mL at 07/30/20 2114    sodium chloride flush 0.9 % injection 10 mL  10 mL Intravenous PRN Susu Call MD        acetaminophen (TYLENOL) tablet 650 mg  650 mg Oral Q6H PRN Susu Call MD   650 mg at 07/30/20 1648    Or    acetaminophen (TYLENOL) suppository 650 mg  650 mg Rectal Q6H PRN Susu Call MD   650 mg at 07/27/20 2111    polyethylene glycol (GLYCOLAX) packet 17 g  17 g Oral Daily PRN Susu Call MD        promethazine (PHENERGAN) tablet 12.5 mg  12.5 mg Oral Q6H PRN Susu Call MD        Or    ondansetron (ZOFRAN) injection 4 mg  4 mg Intravenous Q6H PRN Susu Call MD        gabapentin (NEURONTIN) capsule 800 mg 800 mg Oral TID Jocelyne Barrera MD   800 mg at 07/30/20 2114    atorvastatin (LIPITOR) tablet 20 mg  20 mg Oral Daily Jocelyne Barrera MD   20 mg at 07/30/20 0808    aspirin chewable tablet 81 mg  81 mg Per NG tube Daily Jocelyne Barrera MD   81 mg at 07/30/20 0808    lansoprazole suspension SUSP 30 mg  30 mg Per NG tube QAM AC Jocelyne Barrera MD   30 mg at 07/30/20 0807    glucose (GLUTOSE) 40 % oral gel 15 g  15 g Oral PRN Jocelyne Barrera MD        dextrose 50 % IV solution  12.5 g Intravenous PRN Jocelyne Barrera MD        glucagon (rDNA) injection 1 mg  1 mg Intramuscular PRN Jocelyne Barrera MD        dextrose 5 % solution  100 mL/hr Intravenous PRN Jocelyne Barrera MD        norepinephrine (LEVOPHED) 16 mg in dextrose 5% 250 mL infusion  2 mcg/min Intravenous Continuous Cyndy Finn PA-C 10.3 mL/hr at 07/30/20 1852 11 mcg/min at 07/30/20 1852    chlorhexidine (PERIDEX) 0.12 % solution 15 mL  15 mL Mouth/Throat BID Dorinda Morgan MD   15 mL at 07/30/20 2114    albuterol sulfate  (90 Base) MCG/ACT inhaler 4 puff  4 puff Inhalation Q4H PRN Dorinda Morgan MD   4 puff at 07/30/20 1556     Allergies   Allergen Reactions    Latex Other (See Comments)     Blisters    Adhesive Tape      Paper tape ok to use    Codeine Nausea And Vomiting     Active Problems:    Acute respiratory failure ([address]  Resolved Problems:    * No resolved hospital problems. *    Blood pressure (!) 126/53, pulse 83, temperature 97.9 °F (36.6 °C), temperature source Rectal, resp. rate 12, height 5' (1.524 m), weight 211 lb 10.3 oz (96 kg), SpO2 97 %, not currently breastfeeding. Subjective:  Symptoms:  Stable. Diet:  NPO. Objective:  General Appearance:  Comfortable. Vital signs: (most recent): Blood pressure (!) 118/44, pulse 79, temperature 99.2 °F (37.3 °C), temperature source Oral, resp. rate 12, height 5' (1.524 m), weight 211 lb 10.3 oz (96 kg), SpO2 97 %, not currently breastfeeding.   Vital signs are normal.  (Pressor). HEENT: Normal HEENT exam.    Lungs: There are decreased breath sounds. Heart: Normal rate. Abdomen: Abdomen is soft. Extremities: Decreased range of motion. Neurological: (Sedated). Pupils:  Pupils are equal.   Skin:  Warm. Assessment & Plan  Septic shock with acute resp failure susptect gram neg pna  -CT chest opacities and fluid overload, No PE  -covid neg  -vanc and zosyn  -pressor  -bld cx neg 4 days, resp PCR neg, check strep and legionella  Acute on chronic CHF with AS and cardiac arrest  -EF 30% and grade 3  -IV lasix  SHER 2/2 ATN wit CKD 3  -resolving  DM  -SSI  PAF  -no AC due to hx fo Gi bleed  Severe PCM  Cardiac cirhosis  -possible cardiac related  -LFT stable  AOCD  -monitor and transfuse < 7  Twitching/possible seizure  -CT head, keppra IV  DVT prophylaxis  -lovenox      Discussed plan with daughter as hardly no response off sedation but did have some eye twitching. Could be Hypoxic ischemic encephalopathy(from cardiac arrest) vs toxic(stopped neurontin as she was not taking this and also stop cefepime) vs metabolic(from sepsis).  Consult neuro    Critical care time 30min from 9-930am  Chloe Miranda MD  7/31/2020

## 2020-07-31 NOTE — PROGRESS NOTES
07/31/20 0351   Vent Information   Vent Mode AC/VC+   Vt Ordered 550 mL   Rate Set 12 bmp   Peak Flow 0 L/min   Pressure Support 0 cmH20   FiO2  30 %   SpO2 98 %   SpO2/FiO2 ratio 326.67   Sensitivity 3   PEEP/CPAP 5   I Time/ I Time % 1 s   Humidification Source HME   Nitric Oxide/Epoprostenol In Use? No   Vent Patient Data   High Peep/I Pressure 0   Peak Inspiratory Pressure 30 cmH2O   Mean Airway Pressure 11 cmH20   Rate Measured 12 br/min   Vt Exhaled 546 mL   Minute Volume 6.51 Liters   I:E Ratio 1:3.20   Cough/Sputum   Sputum How Obtained Endotracheal   Sputum Amount Small   Sputum Color Creamy   Tenacity Thick   Spontaneous Breathing Trial (SBT) RT Doc   Pulse 82   Breath Sounds   Right Upper Lobe Diminished   Right Middle Lobe Diminished   Right Lower Lobe Diminished   Left Upper Lobe Diminished   Left Lower Lobe Diminished   Additional Respiratory  Assessments   Position Semi-Varma's   Oral Care Completed? No   Subglottic Suction Done? Yes   Cuff Pressure (cm H2O) 30 cm H2O   Alarm Settings   High Pressure Alarm 40 cmH2O   Low Minute Volume Alarm 2.5 L/min   Apnea (secs) 20 secs   High Respiratory Rate 40 br/min   Low Exhaled Vt  250 mL   Non-Surgical Airway Endo Tracheal Tube   Placement Date/Time: 07/25/20 5974   Placed By:  In place on arrival to facility  Inserted by: [facility]  Airway Device: Endo Tracheal Tube  Size: 7  Placement Verified By[de-[email or web address]] Chest X-ray   Secured at 23 cm   Measured From Lips   Secured Location Left   Secured By Commercial tube mariano   Site Condition Dry   Cuff Pressure 30 cm H2O

## 2020-07-31 NOTE — PROCEDURES
Electroencephalography (EEG) [address] [facility]        Patient:  Mane Cameron Standard Procedure Date: 7/31/2020   YOB: 1950 Referring Practitioner: Jaden Oakley MD   MRN: [id number] Interpreting Physician: Dario Solis DO         HISTORY:    This is a 79 y.o. old female presenting for evaluation of unresponsiveness after cardiac arrest.      REPORT:    With the patient Intubated, off sedation, and unresponsive the background shows no clear dominant occipital rhythm. There is severe attenuation of all cerebral electrographic activity. At 2 µV sensitivity there is low amplitude, irregular, polymorphic delta activity recorded diffusely at 1 to 2 Hz. No clear sleep pattern was seen. Photic stimulation were performed as an activating maneuver during the recording; no abnormalities were elicited by this maneuver. Hyperventilation was not performed. No abnormalities were seen in the EKG monitoring channel. IMPRESSION:    Abnormal EEG due to severe attenuation and diffuse slowing of all cerebral electrographic activity. At 2 µV sensitivity 1-2 Hz delta activity was appreciated. No focal slowing or epileptiform discharges were seen. This is a non-specific finding and can be seen in variety of severe encephalopathies. Clinical correlation advised.       Electronically signed by: Dario Solis DO 7/31/2020 5:40 PM

## 2020-07-31 NOTE — CONSULTS
Vannesa Mar MD.  Section of General Neurology - Adult  Consult Note        Reason for Consult:    Requesting Physician:  Particia Aase, [address] [place], [address] [facility]  Thank you for your kind referral.    CHIEF COMPLAINT:  S/p cardia arrest with obtundation         HISTORY OF PRESENT ILLNESS:              The patient is a 79 y.o. female with a history of cardiac arrest with obtundation. female  who presents with shortness of breath. History was obtained from the chart. Patient is intubated and sedated. EMS was called to patient's residence due to shortness of breath. On arrival patient was using 1 of her breathing treatments. Upon transferring her to the cot she was arrested. Initial rhythm was asystole. She had CPR for less than 5 minutes and was given 1 round of epinephrine subsequently had return of spontaneous circulation. Patient was intubated by ED physician and Bibi jackson. Upon intubation she was noted to have copious amount of frothy sputum. CT brain was neg. pt has slight twitching of the right eyelid. pt was given keppra. Past Medical History:        Diagnosis Date    Anxiety     Arthritis     bilats hands, right shoulder    Atrial fibrillation [facility])     Cardioversion @ [facility] - no trouble with it ever since. Sees Dr. Sebastian Half Atrial fibrillation with RVR [facility]) 2013    Atrial fibrillation with RVR ([facility] [address] 11/26/2012    Atrial fibrillation with RVR ([facility] [address] 3/1/2013    Cancer (HCC)     skin (right upper chest & face)    Cervicalgia     Constipation     COPD (chronic obstructive pulmonary disease) ([facility])     Depression     Diabetes mellitus (HCC)     IDDM    Diabetic neuropathy ([facility])     Disc herniation     lower back    Fatigue     Fibromyalgia     H/O cardiovascular stress test 05/08 EF 70%,07/09 EF 66%,02/10, 03/10 EF 63% 04/10    04/26/10 if LAD and circ are compared, the LAD in its mid segment has about 60% stenosis around a small diag.  circumflex vessel is a dominant vessel and is without any significant disease,  rca is nondominant with about 50% stenosis , will continue medical management for now.  H/O complete electrocardiogram 02/10    02/03/10 on chart    H/O echocardiogram 05/08,EF 55%, 02/10    02/25/10 EF>55% left ventricular systolic function is normal,mild mitral regurg. there is no PE    Headache(784.0)     Not migraines, usually from a high blood sugar.  Hepatitis B     History of cardiac cath 06/08    06/17/10 heart cath, patient has single vessel cad with RCA which is nondominant, being 50% stenosed, rest of vessels are without any significant disease, medical therapy and risk factor modification will be continued    History of chest x-ray 06/08    06/15/08 chest xray, nonacute chest with borderline cardiomegaly    Holter monitor, abnormal 10/28/13    48hr-Predominantly SR w/only SVT ectopy in single beat form noted.  Hx of cardiovascular stress test 4/1/2013    EF70%, normal    Hx of echocardiogram 4/1/2013    EF55%,mild tricuspid regurg    Hyperlipidemia     Hypertension     Insulin pump in place     Liver cirrhosis (HCC)     Dr Park Leary SADI on CPAP     In the process of getting a c-pap. Does not currently have one.     Peripheral vascular disease (HCC)     PONV (postoperative nausea and vomiting) 1960    with shoulder surgery    Renal disease     Tachycardia     Wears dentures     upper plate     Past Surgical History:        Procedure Laterality Date    BACK SURGERY  1998    herniated disc - no hardware    CATARACT REMOVAL WITH IMPLANT Bilateral 2000s    CHOLECYSTECTOMY  [id number]    laproscopic with liver biopsy    DIAGNOSTIC CARDIAC CATH LAB PROCEDURE  2000s    no stents (mimimal blockage)    HYSTERECTOMY  2000    total    LIVER BIOPSY  9/14/2015    SHOULDER SURGERY Right 1960    fracture surgery-screw at humeral head    TONSILLECTOMY  1966    TONSILLECTOMY AND ADENOIDECTOMY  1966     Current Medications:   Current Facility-Administered Medications: [START ON 8/1/2020] levothyroxine (SYNTHROID) tablet 50 mcg, 50 mcg, Oral, Daily PRN  piperacillin-tazobactam (ZOSYN) 3.375 g in dextrose 5 % 50 mL IVPB extended infusion (mini-bag), 3.375 g, Intravenous, Q8H  furosemide (LASIX) injection 40 mg, 40 mg, Intravenous, Daily  vancomycin (VANCOCIN) 1,500 mg in dextrose 5 % 500 mL IVPB, 1,500 mg, Intravenous, Once  levETIRAcetam (KEPPRA) 1,000 mg in sodium chloride 0.9 % 100 mL IVPB, 1,000 mg, Intravenous, Q12H  insulin glargine (LANTUS) injection vial 20 Units, 20 Units, Subcutaneous, Nightly  vancomycin (VANCOCIN) intermittent dosing (placeholder), , Other, RX Placeholder  miconazole (MICOTIN) 2 % powder, , Topical, BID  insulin lispro (HUMALOG) injection vial 0-12 Units, 0-12 Units, Subcutaneous, Q4H  enoxaparin (LOVENOX) injection 30 mg, 30 mg, Subcutaneous, BID  levothyroxine (SYNTHROID) injection 12.5 mcg, 12.5 mcg, Intravenous, Daily **AND** sodium chloride (PF) 0.9 % injection 5 mL, 5 mL, Intravenous, Daily  sodium chloride flush 0.9 % injection 10 mL, 10 mL, Intravenous, 2 times per day  sodium chloride flush 0.9 % injection 10 mL, 10 mL, Intravenous, PRN  acetaminophen (TYLENOL) tablet 650 mg, 650 mg, Oral, Q6H PRN **OR** acetaminophen (TYLENOL) suppository 650 mg, 650 mg, Rectal, Q6H PRN  polyethylene glycol (GLYCOLAX) packet 17 g, 17 g, Oral, Daily PRN  promethazine (PHENERGAN) tablet 12.5 mg, 12.5 mg, Oral, Q6H PRN **OR** ondansetron (ZOFRAN) injection 4 mg, 4 mg, Intravenous, Q6H PRN  atorvastatin (LIPITOR) tablet 20 mg, 20 mg, Oral, Daily  aspirin chewable tablet 81 mg, 81 mg, Per NG tube, Daily  lansoprazole suspension SUSP 30 mg, 30 mg, Per NG tube, QAM AC  glucose (GLUTOSE) 40 % oral gel 15 g, 15 g, Oral, PRN  dextrose 50 % IV solution, 12.5 g, Intravenous, PRN  glucagon (rDNA) injection 1 mg, 1 mg, Intramuscular, PRN  dextrose 5 % solution, 100 mL/hr, Intravenous, PRN  norepinephrine (LEVOPHED) 16 mg in dextrose 5% 250 nontender, no masses, no organomegaly, no peritoneal signs  Extremities:  No clubbing, cyanosis, or edema  Skin:  Warm and dry, no open lesions or rash  Breast: deferred  Rectal: deferred  Genitalia:  deferred    NEUROLOGICAL EXAM  ---------------------------------    Mental Status Exam:              Exam off sedation    Obtunded no response to verbal commands   Absent oculocephalics very slight sluggish corneals  Pupils 3 mm lynnette  Absent gag  Flaccid extremities  No response to babinski  Minimal withdrawal to painful stimulii  resp on the vent          CBC with Differential:    Lab Results   Component Value Date    WBC 10.2 07/31/2020    RBC 2.85 07/31/2020    HGB 7.4 07/31/2020    HCT 24.6 07/31/2020     07/31/2020    MCV 86.3 07/31/2020    MCH 26.0 07/31/2020    MCHC 30.1 07/31/2020    RDW 15.9 07/31/2020    SEGSPCT 75.5 07/31/2020    LYMPHOPCT 12.4 07/31/2020    MONOPCT 10.5 07/31/2020    EOSPCT 3 03/17/2016    BASOPCT 0.4 07/31/2020    MONOSABS 1.1 07/31/2020    LYMPHSABS 1.3 07/31/2020    EOSABS 0.1 07/31/2020    BASOSABS 0.0 07/31/2020    DIFFTYPE AUTOMATED DIFFERENTIAL 07/31/2020     CMP:    Lab Results   Component Value Date     07/31/2020    K 3.6 07/31/2020    CL 88 07/31/2020    CO2 31 07/31/2020    BUN 28 07/31/2020    CREATININE 1.3 07/31/2020    GFRAA 49 07/31/2020    AGRATIO 1.3 06/08/2016    LABGLOM 40 07/31/2020    GLUCOSE 179 07/31/2020    PROT 6.9 07/25/2020    PROT 6.7 03/02/2013    LABALBU 3.0 07/25/2020    LABALBU 74 09/11/2018    CALCIUM 8.0 07/31/2020    BILITOT 0.3 07/25/2020    ALKPHOS 125 07/25/2020    AST 23 07/25/2020    ALT 11 07/25/2020     BMP:    Lab Results   Component Value Date     07/31/2020    K 3.6 07/31/2020    CL 88 07/31/2020    CO2 31 07/31/2020    BUN 28 07/31/2020    LABALBU 3.0 07/25/2020    LABALBU 74 09/11/2018    CREATININE 1.3 07/31/2020    CALCIUM 8.0 07/31/2020    GFRAA 49 07/31/2020    LABGLOM 40 07/31/2020    GLUCOSE 179 07/31/2020     PT/INR: Lab Results   Component Value Date    PROTIME 12.8 07/31/2020    INR 1.06 07/31/2020     PTT:    Lab Results   Component Value Date    APTT 37.6 07/31/2020   [APTT  U/A:    Lab Results   Component Value Date    COLORU STRAW 07/26/2020    WBCUA 5 07/26/2020    RBCUA 3 07/26/2020    MUCUS RARE 07/26/2020    TRICHOMONAS NONE SEEN 07/26/2020    BACTERIA OCCASIONAL 07/26/2020    CLARITYU CLEAR 07/26/2020    SPECGRAV 1.009 07/26/2020    LEUKOCYTESUR TRACE 07/26/2020    UROBILINOGEN NORMAL 07/26/2020    BILIRUBINUR NEGATIVE 07/26/2020    BLOODU SMALL 07/26/2020     TSH:    Lab Results   Component Value Date    TSH 3.080 03/17/2016     VITAMIN B12: No components found for: B12  FOLATE:    Lab Results   Component Value Date    FOLATE 8.7 07/26/2020     RPR:  No results found for: RPR  DELMA:  No results found for: ANATITER, DEMLA  Urine Toxicology:  No components found for: IAMMENTA, IBARBIT, IBENZO, ICOCAINE, IMARTHC, IOPIATES, IPHENCYC     IMPRESSION:    Hypoxic encephalopathy    S/P cardiac arrest    Acute respiratory failure    seizures    Hypotension    Sepsis    Hx CHF    PLAN:    CT brain neg    EEG    Keppra    Continue supportive care    Discussed plan of care with pts nurse. Stefani Tadeo MD  BOARD CERTIFIED-NEUROLOGY.

## 2020-07-31 NOTE — PROGRESS NOTES
[address] [facility]  consulted by Dr. Evaristo Bello for monitoring and adjustment. Indication for treatment: Sepsis, possible pneumonia & post-cardiac arrest  Goal trough: 15 mcg/mL     Pertinent Laboratory Values:   Temp Readings from Last 3 Encounters:   07/31/20 96.8 °F (36 °C) (Rectal)   07/25/20 98.2 °F (36.8 °C) (Axillary)   06/26/19 98.2 °F (36.8 °C) (Oral)     Recent Labs     07/29/20  0514 07/30/20  0325 07/31/20  0620   WBC 12.3* 10.9* 10.2     Recent Labs     07/29/20  0514 07/30/20  0325 07/31/20  0620   BUN 31* 32* 28*   CREATININE 1.7* 1.7* 1.3*     Estimated Creatinine Clearance: 42 mL/min (A) (based on SCr of 1.3 mg/dL (H)).     Intake/Output Summary (Last 24 hours) at 7/31/2020 1014  Last data filed at 7/31/2020 1006  Gross per 24 hour   Intake 401.25 ml   Output 1315 ml   Net -913.75 ml       Pertinent Cultures:  Date                             Source                                     Results  7/26/20                        Blood                                       NGTD  7/26/20                        Covid-19                                  Negative  7/26/20                        Respiratory Panel                   Negative  7/30/20    MRSA nasal      Pending    Vancomycin level:   RANDOM:    Recent Labs     07/30/20  0325 07/31/20 0620   VANCORANDOM 21.0 15.6       Assessment:  · WBC and temperature: WBC improving; 24-hr Tmax 100.2F  · SCr, BUN, and urine output:   · SHER, Scr trend slightly improved  · UOP low  · Day(s) of therapy: #5  · Vancomycin level:   · 15.6, appropriate to re-dose (56h post-dose)    Plan:  · Continue intermittent dosing based on levels  · Based on level result, re-dose with vancomycin 1500 mg IVPB x 1 dose  · Repeat next random level in 48h  · Pharmacy will continue to monitor patient and adjust therapy as indicated    REPEAT VANCOMYCIN RANDOM SCHEDULED FOR 08/02/20 @ 0600    Thank you for the consult,  Yehuda Lanier [facility]

## 2020-07-31 NOTE — PROGRESS NOTES
EEG started at this time. Pt is rate for rate on ventilator and follows no commands. Pt had no gag when I suctioned her.

## 2020-07-31 NOTE — PLAN OF CARE
Problem: Falls - Risk of:  Goal: Will remain free from falls  Description: Will remain free from falls  7/30/2020 2221 by Tracy Marino RN  Outcome: Ongoing  7/30/2020 1329 by Vandana Richter RN  Outcome: Ongoing  Goal: Absence of physical injury  Description: Absence of physical injury  7/30/2020 2221 by Tracy Marino RN  Outcome: Ongoing  7/30/2020 1329 by Vandana Richter RN  Outcome: Ongoing     Problem: Skin Integrity:  Goal: Will show no infection signs and symptoms  Description: Will show no infection signs and symptoms  7/30/2020 2221 by Tracy Marino RN  Outcome: Ongoing  7/30/2020 1329 by Vandana Richter RN  Outcome: Ongoing  Goal: Absence of new skin breakdown  Description: Absence of new skin breakdown  7/30/2020 2221 by Tracy Marino RN  Outcome: Ongoing  7/30/2020 1329 by Vandana Richter RN  Outcome: Ongoing     Problem: Restraint Use - Nonviolent/Non-Self-Destructive Behavior:  Goal: Absence of restraint indications  Description: Absence of restraint indications  7/30/2020 2221 by Tracy Marino RN  Outcome: Ongoing  7/30/2020 1329 by Vandana Richter RN  Outcome: Ongoing  Goal: Absence of restraint-related injury  Description: Absence of restraint-related injury  7/30/2020 2221 by Tracy Marino RN  Outcome: Ongoing  7/30/2020 1329 by Vandana Richter RN  Outcome: Ongoing     Problem: Restraint Use - Nonviolent/Non-Self-Destructive Behavior:  Goal: Absence of restraint indications  Description: Absence of restraint indications  7/30/2020 2221 by Tracy Marino RN  Outcome: Ongoing  7/30/2020 1329 by Vandana Richter RN  Outcome: Ongoing  Goal: Absence of restraint-related injury  Description: Absence of restraint-related injury  7/30/2020 2221 by Tracy Marino RN  Outcome: Ongoing  7/30/2020 1329 by Vandana Richter RN  Outcome: Ongoing     Problem: Nutrition  Goal: Optimal nutrition therapy  7/30/2020 2221 by Tracy Marino RN  Outcome: Ongoing  7/30/2020 1329 by Shauna Alvarado RN  Outcome: Ongoing

## 2020-07-31 NOTE — CONSULTS
[address] [place] [address] Consultation Note    Date: 7/31/2020  Name: Paula Murphy  MRN: [id number]  YOB: 1950   Patient's PCP: Pura Hernandez PA-C  Referring Physician: Dr. Mari Lambert care admit date: 7/25/2020  Intubation/mechanical ventilation: 7/25/20    Informant: Chart reviewed, discussed with the patient's ICU nurses. There are no family here, and per nurses the sister is not amenable to hospice at this time. CC:  unresponsive    Sun'aq: This is a 79year old patient with Diastolic heart failure, severe aortic stenosis with valve area of 0.62 cm2, obstructive sleep apnea, Type 2 diabetes mellitus, anemia of chronic disease, coronary artery disease, paroxysmal atrial fibrillation not on anticoagulation secondary to GI bleed, who was admitted on 7/25/2020 in transfer from [facility] with shortness of breath. The patient had a cardiopulmonary arrest and EMS performed resuscitation, with intubation in the field, and reportedly less than 5 minutes of CPR before return of spontaneous circulation (ROSC). The patient was found to have decompensated heart failure with pulmonary edema, and was admitted at [facility]. She has been treated for possible sepsis. COVID-19 is negative x 2, and the patient was transferred to the main ICU. The patient is unresponsive, and on no sedation. She remains on mechanical ventilation and is on NorEpinephrine at 11 mcg/min. A hospice consult was placed by Dr. Akash Colon. The patient's nurse reports that the patient's sister is the decision maker, and does not want hospice involved at this point, as evidently another family member had been on mechanical ventilation and it took a while for them to improve.   I collaborated with the patient's nurses, and they are aware that the patient would need to be off mechanical ventilation and vasopressors with consent from the family prior to being able to echocardiogram 2013    EF55%,mild tricuspid regurg    Hyperlipidemia     Hypertension     Insulin pump in place     Liver cirrhosis (HCC)     Dr Azam Stewart SADI on CPAP     In the process of getting a c-pap. Does not currently have one.  Peripheral vascular disease ([address]     PONV (postoperative nausea and vomiting) 1960    with shoulder surgery    Renal disease     Tachycardia     Wears dentures     upper plate       Past Surgical History:   Procedure Laterality Date    BACK SURGERY      herniated disc - no hardware    CATARACT REMOVAL WITH IMPLANT Bilateral s    CHOLECYSTECTOMY  [id number]    laproscopic with liver biopsy    DIAGNOSTIC CARDIAC CATH LAB PROCEDURE      no stents (mimimal blockage)    HYSTERECTOMY  2000    total    LIVER BIOPSY  2015    SHOULDER SURGERY Right 1960    fracture surgery-screw at humeral head    TONSILLECTOMY  1966    TONSILLECTOMY AND ADENOIDECTOMY  1966       Social History     Socioeconomic History    Marital status:       Spouse name: Not on file    Number of children: Not on file    Years of education: Not on file    Highest education level: Not on file   Occupational History    Occupation:    Social Needs    Financial resource strain: Not on file    Food insecurity     Worry: Not on file     Inability: Not on file   Burt needs     Medical: Not on file     Non-medical: Not on file   Tobacco Use    Smoking status: Former Smoker     Packs/day: 1.00     Years: 21.00     Pack years: 21.00     Types: Cigarettes     Start date: 1970     Last attempt to quit: 1991     Years since quittin.6    Smokeless tobacco: Never Used    Tobacco comment: lives with smokers   Substance and Sexual Activity    Alcohol use: No     Alcohol/week: 0.0 standard drinks     Comment:           CAFFEINE: 4-6 diet sodas daily    Drug use: No    Sexual activity: Not on file   Lifestyle    Physical activity     Days per week: Not on file     Minutes per session: Not on file    Stress: Not on file   Relationships    Social connections     Talks on phone: Not on file     Gets together: Not on file     Attends Mandaeism service: Not on file     Active member of club or organization: Not on file     Attends meetings of clubs or organizations: Not on file     Relationship status: Not on file    Intimate partner violence     Fear of current or ex partner: Not on file     Emotionally abused: Not on file     Physically abused: Not on file     Forced sexual activity: Not on file   Other Topics Concern    Not on file   Social History Narrative    Not on file       Family History   Problem Relation Age of Onset    Arthritis Mother     Depression Mother     Emphysema Mother     Dementia Mother     Arthritis Father     Cancer Father         prostate    Vision Loss Father     Other Father         brain aneurysm    Arthritis Sister     Depression Sister     Anxiety Disorder Sister     Arthritis Brother     Cancer Brother         eye    Hearing Loss Brother     High Blood Pressure Brother        Allergies   Allergen Reactions    Latex Other (See Comments)     Blisters    Adhesive Tape      Paper tape ok to use    Codeine Nausea And Vomiting       Medications reviewed  Prior to Admission medications    Medication Sig Start Date End Date Taking? Authorizing Provider   MIDODRINE HCL PO Take by mouth 3 times daily    Historical Provider, MD   LORazepam (ATIVAN) 1 MG tablet Take 1 mg by mouth every 6 hours as needed for Anxiety.     Historical Provider, MD   pantoprazole sodium (PROTONIX) 40 MG PACK packet Take 40 mg by mouth every morning (before breakfast)    Historical Provider, MD   albuterol-ipratropium (COMBIVENT)  MCG/ACT inhaler Inhale 2 puffs into the lungs 4 times daily 6/19/19 6/18/20  Jonathan Berg MD   furosemide (LASIX) 40 MG tablet Take 1 tablet by mouth 2 times daily 6/19/19   Jonathan Berg MD potassium chloride (KLOR-CON M) 20 MEQ extended release tablet Take 1 tablet by mouth daily for 5 days 6/19/19 10/18/19  Екатерина Post MD   albuterol sulfate HFA (PROAIR HFA) 108 (90 Base) MCG/ACT inhaler Inhale 2 puffs into the lungs every 4 hours as needed for Wheezing 6/19/19   Екатерина Post MD   busPIRone (BUSPAR) 10 MG tablet Take 1.5 tablets by mouth 3 times daily 11/13/18   Isaura Kapadia MD   insulin glargine (LANTUS) 100 UNIT/ML injection vial Inject 60 Units into the skin nightly  Patient taking differently: Inject 80 Units into the skin nightly  11/13/18   Isaura Kapadia MD   docusate sodium (COLACE, DULCOLAX) 100 MG CAPS Take 100 mg by mouth daily 11/14/18   Isaura Kapadia MD   tolterodine (DETROL) 2 MG tablet Take 2 mg by mouth daily    Historical Provider, MD   nitroGLYCERIN (NITROSTAT) 0.4 MG SL tablet up to max of 3 total doses. If no relief after 1 dose, call 911. 11/5/17   Tello Franklin MD   simvastatin (ZOCOR) 20 MG tablet Take 1 tablet by mouth nightly  Patient taking differently: Take 40 mg by mouth nightly  11/5/17   Tello Franklin MD   levothyroxine (SYNTHROID) 25 MCG tablet Take 25 mcg by mouth Daily    Historical Provider, MD   venlafaxine (EFFEXOR XR) 150 MG extended release capsule Take one capsule daily at the same time as the 75 mg capsule 2/2/17   Renaldo H MIREYA Wolf   ONE TOUCH LANCETS MISC 1 each by Does not apply route daily 10/11/16   Renaldo H MIREYA Wolf   Insulin Lispro, Human, (HUMALOG SC) Inject into the skin Insulin pump    Historical Provider, MD   gabapentin (NEURONTIN) 800 MG tablet Take 1 tablet by mouth 3 times daily  Patient taking differently: Take 800 mg by mouth 4 times daily. . 10/4/16   Renaldo H MIREYA Wolf   glucose blood VI test strips (ASCENSIA AUTODISC VI;ONE TOUCH ULTRA TEST VI) strip 1 each by In Vitro route 4 times daily as needed As needed.     Historical Provider, MD       ROS: As noted in [address], all other systems are unobtainable due to the patient's clinical condition    Weight:    Wt Readings from Last 3 Encounters:   20 211 lb 10.3 oz (96 kg)   20 290 lb (131.5 kg)   19 248 lb 6.4 oz (112.7 kg)       Data reviewed 2020:  CTA chest 20:  1. No pulmonary embolism. 2. Findings of fluid overload with small to moderate pleural effusions, diffuse soft tissue edema, and upper abdominal ascites. 3. Consolidative opacities within the upper to mid lungs could represent pneumonia or atelectasis/scarring. 4. Cirrhosis. 5. Borderline cardiomegaly with severe atherosclerotic disease. Cardiac catheterization Dr. Neal Charles 19:   Procedure Summary   Access : Femoral   1. LAD mid segment has 30- 40 % stenosis, Diag has 99 % ostial   disease but it is small vessel   2. Mid Cric has mild 10 to 20 % stenosis   3. RCA is non dominant   4. DONNA is 1.3 cm2, mean Gradient is 20 mmHG, PEak Gradient is 22   mmHG   5. RV 50/18-23 mmHG   6. PcWp: is 24 mmHG   7. Pa 52/36 42 mmHG      Recommendations   Moderate Aortic stenosis   Mild Pulmonary Hypertension   Risk Modification   Heart failure management for Linette , Add Imdur    Transthoracic Echocardiogram 20:   Left ventricular systolic function is abnormal.   Ejection fraction is visually estimated at 30%. Grade III diastolic dysfunction. Severe aortic stenosis (DONNA: 0.62 cm sq, mean P mmHg). Moderate mitral valve stenosis (mean P mmHg). Mild tricuspid regurgitation is present. RVSP is 36 mmHg. No evidence of any pericardial effusion. Pleural effusion present bilaterally.     Pro-BNP 2020: 21,085  COVID-19: negative x 2  and   Lab Results   Component Value Date    ALKPHOS 125 2020    ALT 11 2020    AST 23 2020    PROT 6.9 2020    PROT 6.7 2013    BILITOT 0.3 2020    BILIDIR 0.2 2019    IBILI 0.1 2019    LABALBU 3.0 2020    LABALBU 74 2018     CBC:   Recent Labs     20  0514 07/30/20  0325 07/31/20  0620   WBC 12.3* 10.9* 10.2   HGB 8.7* 7.3* 7.4*   HCT 27.6* 24.4* 24.6*   MCV 84.7 85.6 86.3    233 275     BMP:   Recent Labs     07/29/20  0514 07/30/20  0325 07/31/20  0620   * 135 136   K 4.3 3.5 3.6   CL 86* 87* 88*   CO2 36* 35* 31   BUN 31* 32* 28*   CREATININE 1.7* 1.7* 1.3*   GLUCOSE 139* 153* 179*       Physical Exam:   BP (!) 120/43   Pulse 85   Temp 96.8 °F (36 °C) (Rectal)   Resp 12   Ht 5' (1.524 m)   Wt 211 lb 10.3 oz (96 kg)   SpO2 95%   BMI 41.33 kg/m²   General: obtunded, intubated, on NorEpinephrine but no sedation  Skin: sallow, tattoo right wrist  HEENT: orally intubated, nasogastric tube  Neck: right Internal jugular central venous catheter   Heart: distant tones, RRR, S1S2, II/VI KASIA at left sternal border  Lungs:  Coarse breath sounds bilaterally, diminished at the bases, with basilar rales, scattered rhonchi   Abdomen: obese, soft, bowel sounds quietly present, no apparent tenderness  : ham  Extremities:  No mottling   Neurologic: obtunded    Assessment/Plan:  1. Out of hospital cardiopulmonary arrest with resuscitation 7/25/20  2. Respiratory failure with hypoxia on mechanical ventilation, possible pneumonia, COVID-19: negative x 2  3. Combined systolic and diastolic heart failure with LVEF of 30% with pulmonary edema on admission  4. Encephalopathy, hypoxic, metabolic. Concerning regarding no need for sedation. Consider Palliative Medicine consultation. Hospice remains a possibility in the future if family accepting and off of aggressive care. 5. Other chronic illnesses as noted.        Patient Active Problem List   Diagnosis Code    HTN (hypertension) I10    COPD (chronic obstructive pulmonary disease) (HCC) J44.9    Anxiety and depression F41.9, F32.9    DM type 2, uncontrolled, with retinopathy ([facility] E11.319, E11.65    Hypokalemia E87.6    Hyperlipidemia E78.5    Precordial pain R07.2    Hyperglycemia R73.9    Axillary abscess

## 2020-07-31 NOTE — PROGRESS NOTES
Call initiated by: Nursing staff: Brandon Or  Call addressed around: 7/30/2020 11:55 PM      Reason for call: Reports of rapid eye twitching lasting 1-1/2 minutes. Orders placed:   Possible seizure. Loaded with Keppra. Neurology consult. CT head.         MARY Austin - CNP

## 2020-07-31 NOTE — PROGRESS NOTES
To CT scan by bed for stat head CT accompanied by this nurse, ICU charge nurse REYES Baird and RT Zayra for vent management.   Marianne Rudd RN

## 2020-07-31 NOTE — PROGRESS NOTES
cardiomegaly. Low lung volumes. Isabel Mena Chest Portable    Result Date: 7/25/2020  EXAMINATION: ONE XRAY VIEW OF THE CHEST 7/25/2020 3:37 pm COMPARISON: Chest 06/17/2019 HISTORY: ORDERING SYSTEM PROVIDED HISTORY: post arrest        1. Nonspecific pulmonary findings may be related to sequela from pulmonary edema, diffuse infection or ARDS. Pulmonary contusion or sequela during resuscitation (? aspiration) may contribute to these opacities as well. 2. Calcific atherosclerosis aorta. 3. Cardiomegaly. 4. Endotracheal tube tip measures about 2.7 cm superior to the elijah. Stefanie Heck Pulmonary W Contrast    Result Date: 7/25/2020  EXAMINATION: CTA OF THE CHEST 7/25/2020 5:51 pm     1. No pulmonary embolism. 2. Findings of fluid overload with small to moderate pleural effusions, diffuse soft tissue edema, and upper abdominal ascites. 3. Consolidative opacities within the upper to mid lungs could represent pneumonia or atelectasis/scarring. 4. Cirrhosis. 5. Borderline cardiomegaly with severe atherosclerotic disease. Xr Abdomen For Ng/og/ne Tube Placement    Result Date: 7/26/2020  EXAMINATION: ONE SUPINE XRAY VIEW(S) OF THE ABDOMEN 7/26/2020 8:18 am COMPARISON: None       NG tube tip in the gastric antrum with the proximal side-port in the gastric body.        Scheduled Medicines   Medications:    levothyroxine  50 mcg Oral Daily    insulin glargine  20 Units Subcutaneous Nightly    vancomycin (VANCOCIN) intermittent dosing (placeholder)   Other RX Placeholder    miconazole   Topical BID    insulin lispro  0-12 Units Subcutaneous Q4H    cefepime  2 g Intravenous Q12H    enoxaparin  30 mg Subcutaneous BID    levothyroxine  12.5 mcg Intravenous Daily    And    sodium chloride (PF)  5 mL Intravenous Daily    sodium chloride flush  10 mL Intravenous 2 times per day    gabapentin  800 mg Oral TID    atorvastatin  20 mg Oral Daily    aspirin  81 mg Per NG tube Daily    lansoprazole  30 mg Per NG tube QAM AC    chlorhexidine  15 mL Mouth/Throat BID      Infusions:    dextrose      norepinephrine 11 mcg/min (07/30/20 2980)         Objective:   Vitals: BP (!) 127/55   Pulse 82   Temp 97.9 °F (36.6 °C) (Rectal)   Resp 12   Ht 5' (1.524 m)   Wt 211 lb 10.3 oz (96 kg)   SpO2 97%   BMI 41.33 kg/m²   General appearance: Patient is sedated intubated on ventilator  Neck: no JVD or bruit  Thyroid : Normal lobes   Lungs: Has Vesicular Breath sounds intubated and on ventilator  Heart:  regular rate and rhythm  Abdomen: soft, non-tender; bowel sounds normal; no masses,  no organomegaly  Musculoskeletal: Normal  Extremities: extremities normal, , no edema  Neurologic: Patient is sedated intubated and on ventilator. Assessment:     Patient Active Problem List:     HTN (hypertension)     COPD (chronic obstructive pulmonary disease) ([facility]     Anxiety and depression     DM type 2, uncontrolled, with retinopathy ([facility]     Hypokalemia     Hyperlipidemia     Precordial pain     Hyperglycemia     Axillary abscess     Light headed     Orthostatic hypotension     Hypertriglyceridemia     CCC (chronic calculous cholecystitis)     Cirrhosis of liver without ascites (HCC)     Mild obstructive sleep apnea     Idiopathic progressive neuropathy     Congestive heart failure (CHF) (HCC)     Type 2 diabetes mellitus (HCC)     Hypothyroidism     Depression     Hypertension     CHF (congestive heart failure), NYHA class I, acute on chronic, diastolic (HCC)     ACS (acute coronary syndrome) (HCC)     Acute coronary syndrome (HCC)     Diastolic dysfunction with acute on chronic heart failure (HCC)     NSTEMI (non-ST elevated myocardial infarction) (HCC)     SADI (obstructive sleep apnea)     Morbid obesity (HCC)     VHD (valvular heart disease)     Excessive daytime sleepiness     Ex-smoker     Acute respiratory failure ([facility]      Plan:     1. Reviewed POC blood glucose . Labs and X ray results   2. Reviewed Current Medicines   3.  On Correction bolus Humalog/ Basal Lantus Insulin regime /   4. Monitor Blood glucose frequently   5. Modified  the dose of Insulin/ other medicines as needed  6. Started on tube feeding if able to control her blood pressure without pressor  7. Will follow     .      Jeferson Kruger MD

## 2020-08-01 NOTE — PROGRESS NOTES
paranasal sinuses appear to be clear. Opacification/partial opacification to a few left-sided mastoid air cells. Right-sided mastoid air cells appear clear. Bilateral middle ears appear clear. SOFT TISSUES/SKULL:  No acute abnormality of the visualized skull or soft tissues. No acute intracranial abnormality. Paranasal sinus disease and left mastoid effusions as above. Xr Chest Portable    Result Date: 8/1/2020  EXAMINATION: ONE XRAY VIEW OF THE CHEST 7/29/2020 4:55 am COMPARISON: Yesterday HISTORY: ORDERING SYSTEM PROVIDED HISTORY: Vent management TECHNOLOGIST PROVIDED HISTORY: Reason for exam:->Vent management Reason for Exam: vent Acuity: Acute Type of Exam: Subsequent/Follow-up FINDINGS: Endotracheal tube terminates 3 cm above the elijah. Right internal jugular catheter with tip at the cavoatrial junction. Enteric tube courses below the diaphragm. Findings of pulmonary edema with pulmonary vascular indistinctness. No pneumothorax. Small bilateral pleural effusions. Low lung volumes exaggerate the pulmonary vasculature. Scattered perihilar and basilar linear atelectasis. No pneumothorax. 1.  Satisfactory position of support devices. 2. Low lung volumes exaggerating the pulmonary finding since yesterday's exam.  Essentially stable pulmonary edema, small pleural effusions, and linear atelectasis. Xr Chest Portable    Result Date: 8/1/2020  EXAMINATION: ONE XRAY VIEW OF THE CHEST 8/1/2020 5:06 am COMPARISON: 07/31/2020 HISTORY: ORDERING SYSTEM PROVIDED HISTORY: Vent management TECHNOLOGIST PROVIDED HISTORY: Reason for exam:->Vent management Reason for Exam: Vent management Acuity: Acute Type of Exam: Ongoing FINDINGS: The right internal jugular catheter tip is in the superior vena cava. The endotracheal tube tip is 2 cm above the elijah. The enteric tube is below the diaphragm. There are moderate pleural effusions. Basilar lung opacities could represent atelectasis.      Worsening moderate a small to moderate right pleural effusion and stable small left pleural effusion. Findings may be exacerbated by patient positioning. Xr Chest Portable    Result Date: 7/28/2020  EXAMINATION: ONE XRAY VIEW OF THE CHEST 7/28/2020 5:00 am COMPARISON: 07/27/2020 HISTORY: ORDERING SYSTEM PROVIDED HISTORY: Vent management TECHNOLOGIST PROVIDED HISTORY: Reason for exam:->Vent management Reason for Exam: vent Acuity: Acute Type of Exam: Subsequent/Follow-up FINDINGS: Endotracheal tube tip is 3 cm above the elijah. Enteric tube goes below the level of the diaphragm and out of the field of view. Right IJ CVC tip overlies the superior cavoatrial junction. The heart is stable in size. No measurable pneumothorax. Airspace opacities in the mid and lower lungs along with bilateral pleural effusions have overall improved from the prior study. Overall improvement in the bilateral airspace opacities in the mid and lower lungs as well as the pleural effusions. Xr Chest Portable    Result Date: 7/27/2020  EXAMINATION: ONE XRAY VIEW OF THE CHEST 7/27/2020 5:29 am COMPARISON: Yesterday HISTORY: ORDERING SYSTEM PROVIDED HISTORY: Hypoxia and congestion TECHNOLOGIST PROVIDED HISTORY: Reason for exam:->Hypoxia and congestion Reason for Exam: Hypoxia and congestion Acuity: Acute Type of Exam: Ongoing FINDINGS: Endotracheal tube terminates 1.5 cm from the elijah. Enteric tube courses below the diaphragm. Right internal jugular catheter with tip extending to the cavoatrial junction. Multiple monitor wires overlie the patient. No pneumothorax. Small-moderate stable bilateral pleural effusions and bibasilar atelectasis. Pulmonary vascular indistinctness and perihilar opacities. Stable mild cardiomegaly. Satisfactory position of support devices. Stable lung aeration with findings favoring pulmonary edema. Associated small-moderate pleural effusions and bibasilar atelectasis.      Xr Chest Portable    Result Date: 7/26/2020  EXAMINATION: ONE XRAY VIEW OF THE CHEST 7/26/2020 4:49 am COMPARISON: July 25, 2020 HISTORY: ORDERING SYSTEM PROVIDED HISTORY: Vent management TECHNOLOGIST PROVIDED HISTORY: Reason for exam:->Vent management Reason for Exam: Vent management Acuity: Acute Type of Exam: Subsequent/Follow-up FINDINGS: The ETT is 2.4 cm above the elijah. The feeding tube is inserted with its tip below the diaphragm and beyond the field of view. The cardiomediastinal silhouette is stable. There are low lung volumes. There are patchy airspace opacities bilaterally, may be related to pulmonary edema versus pneumonia. There is mild to moderate right pleural effusion. There is no pneumothorax. There is no acute osseous abnormality. Patchy airspace opacities bilaterally, may be related to pulmonary edema versus pneumonia, likely improved in the left hemithorax. Mild to moderate right pleural effusion. Stable cardiomegaly. Low lung volumes. Xr Chest Portable    Result Date: 7/25/2020  EXAMINATION: ONE XRAY VIEW OF THE CHEST 7/25/2020 4:14 pm COMPARISON: July 25, 2020 HISTORY: ORDERING SYSTEM PROVIDED HISTORY: CVC placement TECHNOLOGIST PROVIDED HISTORY: Reason for exam:->CVC placement Reason for Exam: post central line right side Acuity: Acute Type of Exam: Initial Additional signs and symptoms: post arrest Line placement FINDINGS: ETT tip is approximately 3 cm above the elijah. Right IJ catheter tip overlies the lower SVC. Cardiac silhouette is mildly enlarged but stable. Diffuse hazy opacity overlying the right lung has not appreciably changed. There is no pneumothorax. Persistent small to moderate right pleural effusion. 1. Interval placement of a right IJ catheter with catheter tip at the level of the lower SVC. No pneumothorax. 2. Persistent hazy opacity over the right hemithorax with small to moderate bilateral pleural effusions.      Xr Chest Portable    Result Date: 7/25/2020  EXAMINATION: ONE XRAY VIEW OF THE CHEST 7/25/2020 3:37 pm COMPARISON: Chest 06/17/2019 HISTORY: ORDERING SYSTEM PROVIDED HISTORY: post arrest Acuity: Acute Type of Exam: Initial Additional signs and symptoms: tube placement FINDINGS: The cardiac silhouette is enlarged. Calcifications involving the aorta reflect atherosclerosis. The mediastinal and hilar silhouettes appear unremarkable. Near complete opacification right hemithorax in keeping with atelectasis/infiltrate and pleural effusion. Dense consolidation lower left chest obscuring the left hemidiaphragm reflecting consolidation and pleural effusion. Vascular engorgement and cephalization is demonstrated with bilateral peribronchial cuffing and perivascular haziness. No pneumothorax is seen. No acute osseous abnormality is identified. Endotracheal tube tip measures about 2.7 cm superior to the elijah. 1. Nonspecific pulmonary findings may be related to sequela from pulmonary edema, diffuse infection or ARDS. Pulmonary contusion or sequela during resuscitation (? aspiration) may contribute to these opacities as well. 2. Calcific atherosclerosis aorta. 3. Cardiomegaly. 4. Endotracheal tube tip measures about 2.7 cm superior to the elijah. Xr Chest 1 View    Result Date: 7/25/2020  EXAMINATION: ONE XRAY VIEW OF THE CHEST 7/25/2020 11:01 pm COMPARISON: Chest radiograph 07/25/2020 at 4:15 p.m. HISTORY: ORDERING SYSTEM PROVIDED HISTORY: et tube placement TECHNOLOGIST PROVIDED HISTORY: Reason for exam:->et tube placement FINDINGS: The cardiac silhouette is enlarged. Calcifications involving the aorta reflect atherosclerosis. The mediastinal and hilar silhouettes appear unremarkable. Dense consolidation bilateral lower lungs with bilateral pleural effusion, unchanged from chest radiograph performed earlier today. Vascular engorgement and cephalization is demonstrated with bilateral peribronchial cuffing and perivascular haziness. No pneumothorax is seen.  No acute osseous abnormality is identified. Endotracheal tube tip projects over the trachea, tip at the level of the aortic knob, 2.2 cm superior to the elijah. Right IJ CVC tip overlies the mid superior vena cava level. NG tube extends below the left hemidiaphragm, out of the field of view. 1. Unchanged findings most typical of congestive heart failure; pneumonia is a consideration in areas of consolidation with pleural effusion. 2. Calcific atherosclerosis aorta. 3. Cardiomegaly. 4.  Life support appliances appear appropriately positioned. Cta Pulmonary W Contrast    Result Date: 7/25/2020  EXAMINATION: CTA OF THE CHEST 7/25/2020 5:51 pm TECHNIQUE: CTA of the chest was performed after the administration of 100 mL Isovue 370 intravenous contrast.  Multiplanar reformatted images are provided for review. MIP images are provided for review. Dose modulation, iterative reconstruction, and/or weight based adjustment of the mA/kV was utilized to reduce the radiation dose to as low as reasonably achievable. COMPARISON: 08/09/2011 HISTORY: Acute shortness of breath, respiratory arrest. FINDINGS: Image quality degraded by motion artifact and body habitus. Pulmonary Arteries: Pulmonary arteries are adequately opacified for evaluation. No intraluminal filling defect to suggest pulmonary embolism. Main pulmonary artery is normal in caliber. Mediastinum: Endotracheal tube tip is at the level of the aortic arch. Right IJ catheter tip is not visualized within dense intravenous contrast. Borderline cardiomegaly. Extensive atherosclerotic disease including the coronary arteries. No pericardial effusion. Thoracic aorta is normal caliber. No lymphadenopathy. Thyroid and esophagus are unremarkable. Lungs/pleura: Small to moderate pleural effusions with adjacent compressive atelectasis. Additional linear and consolidative opacities in the upper to mid lungs. Upper Abdomen: Cirrhosis with upper abdominal ascites. Cholecystectomy.  Enteric tube tip is within the distal stomach. Soft Tissues/Bones: Diffuse soft tissue edema. Osteopenia with scattered degenerative changes throughout the visualized spine. Shoulder osteoarthrosis. 1. No pulmonary embolism. 2. Findings of fluid overload with small to moderate pleural effusions, diffuse soft tissue edema, and upper abdominal ascites. 3. Consolidative opacities within the upper to mid lungs could represent pneumonia or atelectasis/scarring. 4. Cirrhosis. 5. Borderline cardiomegaly with severe atherosclerotic disease. Xr Abdomen For Ng/og/ne Tube Placement    Result Date: 7/26/2020  EXAMINATION: ONE SUPINE XRAY VIEW(S) OF THE ABDOMEN 7/26/2020 8:18 am COMPARISON: None HISTORY: ORDERING SYSTEM PROVIDED HISTORY: Confirmation of course of NG/OG/NE tube and location of tip of tube TECHNOLOGIST PROVIDED HISTORY: Reason for exam:->Confirmation of course of NG/OG/NE tube and location of tip of tube Portable? ->Yes Reason for Exam: ng location Acuity: Acute Type of Exam: Subsequent/Follow-up FINDINGS: Enteric tube is in place tip in the gastric antrum. The proximal side-port is in the gastric body. No bowel obstruction is suggested. NG tube tip in the gastric antrum with the proximal side-port in the gastric body.        LAB RESULTS  --------------------    Recent Results (from the past 24 hour(s))   POCT Glucose    Collection Time: 07/31/20  5:19 PM   Result Value Ref Range    POC Glucose 214 (H) 70 - 99 MG/DL   POCT Glucose    Collection Time: 07/31/20  8:53 PM   Result Value Ref Range    POC Glucose 216 (H) 70 - 99 MG/DL   POCT Glucose    Collection Time: 08/01/20 12:27 AM   Result Value Ref Range    POC Glucose 187 (H) 70 - 99 MG/DL   Basic Metabolic Panel    Collection Time: 08/01/20  5:40 AM   Result Value Ref Range    Sodium 137 135 - 145 MMOL/L    Potassium 2.9 (LL) 3.5 - 5.1 MMOL/L    Chloride 90 (L) 99 - 110 mMol/L    CO2 36 (H) 21 - 32 MMOL/L    Anion Gap 11 4 - 16    BUN 23 6 - 23 MG/DL    CREATININE 1.3 (H) 0.6 - 1.1 MG/DL    Glucose 151 (H) 70 - 99 MG/DL    Calcium 8.0 (L) 8.3 - 10.6 MG/DL    GFR Non-African American 40 (L) >60 mL/min/1.73m2    GFR  49 (L) >60 mL/min/1.73m2   CBC auto differential    Collection Time: 08/01/20  5:40 AM   Result Value Ref Range    WBC 12.2 (H) 4.0 - 10.5 K/CU MM    RBC 2.94 (L) 4.2 - 5.4 M/CU MM    Hemoglobin 7.5 (L) 12.5 - 16.0 GM/DL    Hematocrit 24.8 (L) 37 - 47 %    MCV 84.4 78 - 100 FL    MCH 25.5 (L) 27 - 31 PG    MCHC 30.2 (L) 32.0 - 36.0 %    RDW 16.0 (H) 11.7 - 14.9 %    Platelets 290 538 - 359 K/CU MM    MPV 9.9 7.5 - 11.1 FL    Differential Type AUTOMATED DIFFERENTIAL     Segs Relative 72.1 (H) 36 - 66 %    Lymphocytes % 14.8 (L) 24 - 44 %    Monocytes % 8.7 (H) 0 - 4 %    Eosinophils % 2.8 0 - 3 %    Basophils % 0.6 0 - 1 %    Segs Absolute 8.8 K/CU MM    Lymphocytes Absolute 1.8 K/CU MM    Monocytes Absolute 1.1 K/CU MM    Eosinophils Absolute 0.3 K/CU MM    Basophils Absolute 0.1 K/CU MM    Nucleated RBC % 0.0 %    Total Nucleated RBC 0.0 K/CU MM    Total Immature Neutrophil 0.12 K/CU MM    Immature Neutrophil % 1.0 (H) 0 - 0.43 %   CK    Collection Time: 08/01/20  5:40 AM   Result Value Ref Range    Total  (H) 26 - 140 IU/L   Ionized Calcium    Collection Time: 08/01/20  5:40 AM   Result Value Ref Range    Ionized Ca 1.00 (L) 1.12 - 1.32 mMOL/L    Calcium, Ion 4.00 (L) 4.48 - 5.28 MG/DL   Magnesium    Collection Time: 08/01/20  5:40 AM   Result Value Ref Range    Magnesium 2.0 1.8 - 2.4 mg/dl   Phosphorus    Collection Time: 08/01/20  5:40 AM   Result Value Ref Range    Phosphorus 2.0 (L) 2.5 - 4.9 MG/DL   Protime-INR    Collection Time: 08/01/20  5:40 AM   Result Value Ref Range    Protime 13.5 11.7 - 14.5 SECONDS    INR 1.11 INDEX   APTT    Collection Time: 08/01/20  5:40 AM   Result Value Ref Range    aPTT 38.2 (H) 25.1 - 37.1 SECONDS   Fibrinogen    Collection Time: 08/01/20  5:40 AM   Result Value Ref Range    Fibrinogen 467 (H) 196.9 - 442.1 MG/DL   D-Dimer, Quantitative    Collection Time: 08/01/20  5:40 AM   Result Value Ref Range    D-Dimer, Quant 759 (H) <230 NG/mL(DDU)   Prealbumin    Collection Time: 08/01/20  5:40 AM   Result Value Ref Range    Prealbumin 7 (L) 20 - 40 mg/dL   POCT Glucose    Collection Time: 08/01/20  5:43 AM   Result Value Ref Range    POC Glucose 347 (H) 70 - 99 MG/DL   POCT Glucose    Collection Time: 08/01/20  9:50 AM   Result Value Ref Range    POC Glucose 98 70 - 99 MG/DL   POCT Glucose    Collection Time: 08/01/20 12:30 PM   Result Value Ref Range    POC Glucose 122 (H) 70 - 99 MG/DL   Ammonia    Collection Time: 08/01/20 12:35 PM   Result Value Ref Range    Ammonia 27 11 - 51 UMOL/L   Basic metabolic panel    Collection Time: 08/01/20  1:50 PM   Result Value Ref Range    Sodium 135 135 - 145 MMOL/L    Potassium 3.6 3.5 - 5.1 MMOL/L    Chloride 90 (L) 99 - 110 mMol/L    CO2 34 (H) 21 - 32 MMOL/L    Anion Gap 11 4 - 16    BUN 22 6 - 23 MG/DL    CREATININE 1.3 (H) 0.6 - 1.1 MG/DL    Glucose 128 (H) 70 - 99 MG/DL    Calcium 8.2 (L) 8.3 - 10.6 MG/DL    GFR Non-African American 40 (L) >60 mL/min/1.73m2    GFR  49 (L) >60 mL/min/1.73m2   Magnesium    Collection Time: 08/01/20  1:50 PM   Result Value Ref Range    Magnesium 2.0 1.8 - 2.4 mg/dl   Blood gas, arterial    Collection Time: 08/01/20  3:00 PM   Result Value Ref Range    pH, Bld 7.56 (H) 7.34 - 7.45    pCO2, Arterial 44.0 32 - 45 MMHG    pO2, Arterial 74 (L) 75 - 100 MMHG    Base Exc, Mixed 15.3 (H) 0 - 2.3    HCO3, Arterial 39.4 (H) 18 - 23 MMOL/L    CO2 Content 40.8 (H) 19 - 24 MMOL/L    O2 Sat 96.0 96 - 97 %    Carbon Monoxide, Blood 2.6 0 - 5 %    Methemoglobin, Arterial 0.4 <1.5 %    Comment AC 12 450 30% +5          Medical problems    Patient Active Problem List:     HTN (hypertension)     COPD (chronic obstructive pulmonary disease) (HCC)     Anxiety and depression     DM type 2, uncontrolled, with retinopathy (HCC)     Hypokalemia Hyperlipidemia     Precordial pain     Hyperglycemia     Axillary abscess     Light headed     Orthostatic hypotension     Hypertriglyceridemia     CCC (chronic calculous cholecystitis)     Cirrhosis of liver without ascites (HCC)     Mild obstructive sleep apnea     Idiopathic progressive neuropathy     Congestive heart failure (CHF) (HCC)     Type 2 diabetes mellitus (HCC)     Hypothyroidism     Depression     Hypertension     CHF (congestive heart failure), NYHA class I, acute on chronic, diastolic (HCC)     ACS (acute coronary syndrome) (HCC)     Acute coronary syndrome (HCC)     Diastolic dysfunction with acute on chronic heart failure (HCC)     NSTEMI (non-ST elevated myocardial infarction) ([facility])     SADI (obstructive sleep apnea)     Morbid obesity (HCC)     VHD (valvular heart disease)     Excessive daytime sleepiness     Ex-smoker     Acute respiratory failure (HCC)      ASSESSMENT:  ---------------------    Hypoxic encephalopathy     S/P cardiac arrest     Acute respiratory failure     seizures     Hypotension     Sepsis     Hx CHF     PLAN:     CT brain neg     EEG severe slowing     Keppra     Continue supportive care     Discussed plan of care with pts nurse.         Electronically signed by Jeb Ibarra MD on 8/1/2020 at 4:48 PM

## 2020-08-01 NOTE — PROGRESS NOTES
[id number] [facility]  consulted by Dr. Vandana Templeton for monitoring and adjustment. Indication for treatment: Sepsis, possible pneumonia & post-cardiac arrest  Goal trough: 15 mcg/mL     Pertinent Laboratory Values:   Temp Readings from Last 3 Encounters:   08/01/20 99.5 °F (37.5 °C) (Rectal)   07/25/20 98.2 °F (36.8 °C) (Axillary)   06/26/19 98.2 °F (36.8 °C) (Oral)     Recent Labs     07/30/20  0325 07/31/20  0620 08/01/20  0540   WBC 10.9* 10.2 12.2*     Recent Labs     07/31/20  0620 08/01/20  0540 08/01/20  1350   BUN 28* 23 22   CREATININE 1.3* 1.3* 1.3*     Estimated Creatinine Clearance: 41 mL/min (A) (based on SCr of 1.3 mg/dL (H)). Intake/Output Summary (Last 24 hours) at 8/1/2020 1629  Last data filed at 8/1/2020 0902  Gross per 24 hour   Intake 339.09 ml   Output 960 ml   Net -620.91 ml       Pertinent Cultures:  Date                             Source                                     Results  7/26/20                        Blood                                       NGTD  7/26/20                        Covid-19                                  Negative  7/26/20                        Respiratory Panel                   Negative  7/30/20    MRSA nasal      Positive    Vancomycin level:   RANDOM:    Recent Labs     07/30/20 0325 07/31/20  0620   VANCORANDOM 21.0 15.6       Assessment:  · WBC and temperature: WBC back up slightly to 12.2; Pt continues with a slight fever of 99.5 (Tmax 100. 2F)  · SCr, BUN, and urine output:   · SHER, Scr remains steady @1.3  · UOP improved  · Nasal MRSA screen positive  · Day(s) of therapy: #6  · Vancomycin level:   · 7/31 - 15.6, appropriate to re-dose (56h post-dose)    Plan:  · Continue intermittent dosing based on levels  · Based on level result, vancomycin 1500 mg IVPB x 1 dose yesterday  · Repeat next random level in 48h  · Pharmacy will continue to monitor patient and adjust therapy as indicated    REPEAT VANCOMYCIN RANDOM SCHEDULED FOR

## 2020-08-01 NOTE — CONSULTS
Darrell Mora MD.  Section of General Neurology - Adult  Consult Note        Reason for Consult:    Requesting Physician:  Rudolph Arriaga, DO  [address] [place], [address] [facility]  Thank you for your kind referral.        Pt s nurse states hshe is not responding to questions    Pt not follwing commands    Sluggish oculocehalics corneals    Pupils 3 mm lynnette    resp on the vent             Past Medical History:        Diagnosis Date    Anxiety     Arthritis     bilats hands, right shoulder    Atrial fibrillation [facility])     Cardioversion @ [facility] - no trouble with it ever since. Sees Dr. Shawna Dickens Atrial fibrillation with RVR [facility]) 2013    Atrial fibrillation with RVR ([facility] [address] 11/26/2012    Atrial fibrillation with RVR ([facility] 3/1/2013    Cancer (HCC)     skin (right upper chest & face)    Cervicalgia     Constipation     [facility]     Depression     Diabetes mellitus (HCC)     IDDM    Diabetic neuropathy ([facility])     Disc herniation     lower back    Fatigue     Fibromyalgia     H/O cardiovascular stress test 05/08 EF 70%,07/09 EF 66%,02/10, 03/10 EF 63% 04/10    04/26/10 if LAD and circ are compared, the LAD in its mid segment has about 60% stenosis around a small diag. circumflex vessel is a dominant vessel and is without any significant disease,  rca is nondominant with about 50% stenosis , will continue medical management for now.  H/O complete electrocardiogram 02/10    02/03/10 on chart    H/O echocardiogram 05/08,EF 55%, 02/10    02/25/10 EF>55% left ventricular systolic function is normal,mild mitral regurg. there is no PE    Headache(784.0)     Not migraines, usually from a high blood sugar.     Hepatitis B     History of cardiac cath 06/08    06/17/10 heart cath, patient has single vessel cad with RCA which is nondominant, being 50% stenosed, rest of vessels are without any significant disease, medical therapy and risk factor modification will be injection vial 20 Units, 20 Units, Subcutaneous, Nightly  vancomycin (VANCOCIN) intermittent dosing (placeholder), , Other, RX Placeholder  miconazole (MICOTIN) 2 % powder, , Topical, BID  insulin lispro (HUMALOG) injection vial 0-12 Units, 0-12 Units, Subcutaneous, Q4H  enoxaparin (LOVENOX) injection 30 mg, 30 mg, Subcutaneous, BID  sodium chloride flush 0.9 % injection 10 mL, 10 mL, Intravenous, 2 times per day  sodium chloride flush 0.9 % injection 10 mL, 10 mL, Intravenous, PRN  acetaminophen (TYLENOL) tablet 650 mg, 650 mg, Oral, Q6H PRN **OR** acetaminophen (TYLENOL) suppository 650 mg, 650 mg, Rectal, Q6H PRN  polyethylene glycol (GLYCOLAX) packet 17 g, 17 g, Oral, Daily PRN  promethazine (PHENERGAN) tablet 12.5 mg, 12.5 mg, Oral, Q6H PRN **OR** ondansetron (ZOFRAN) injection 4 mg, 4 mg, Intravenous, Q6H PRN  atorvastatin (LIPITOR) tablet 20 mg, 20 mg, Oral, Daily  aspirin chewable tablet 81 mg, 81 mg, Per NG tube, Daily  lansoprazole suspension SUSP 30 mg, 30 mg, Per NG tube, QAM AC  glucose (GLUTOSE) 40 % oral gel 15 g, 15 g, Oral, PRN  dextrose 50 % IV solution, 12.5 g, Intravenous, PRN  glucagon (rDNA) injection 1 mg, 1 mg, Intramuscular, PRN  dextrose 5 % solution, 100 mL/hr, Intravenous, PRN  norepinephrine (LEVOPHED) 16 mg in dextrose 5% 250 mL infusion, 2 mcg/min, Intravenous, Continuous  chlorhexidine (PERIDEX) 0.12 % solution 15 mL, 15 mL, Mouth/Throat, BID  albuterol sulfate  (90 Base) MCG/ACT inhaler 4 puff, 4 puff, Inhalation, Q4H PRN  Allergies:  Latex; Adhesive tape; and Codeine    Social History:  TOBACCO:   reports that she quit smoking about 29 years ago. Her smoking use included cigarettes. She started smoking about 50 years ago. She has a 21.00 pack-year smoking history. She has never used smokeless tobacco.  ETOH:   reports no history of alcohol use. DRUGS:   reports no history of drug use.   Family History:       Problem Relation Age of Onset    Arthritis Mother     Depression Mother     Emphysema Mother     Dementia Mother     Arthritis Father     Cancer Father         prostate    Vision Loss Father     Other Father         brain aneurysm    Arthritis Sister     Depression Sister     Anxiety Disorder Sister     Arthritis Brother     Cancer Brother         eye    Hearing Loss Brother     High Blood Pressure Brother        REVIEW OF SYSTEMS:  CONSTITUTIONAL:  negative  HEENT:  negative  RESPIRATORY:  negative  CARDIOVASCULAR:  negative  GASTROINTESTINAL:  negative  GENITOURINARY:  negative  MUSCULOSKELETAL:  negative  BEHAVIOR/PSYCH:  Negative    ROS unavailable    Family hx neg    PHYSICAL EXAM  ------------------------  Vitals:  BP (!) 114/56   Pulse 84   Temp 99.5 °F (37.5 °C) (Rectal)   Resp 12   Ht 5' (1.524 m)   Wt 207 lb 14.3 oz (94.3 kg)   SpO2 98%   BMI 40.60 kg/m²      General:  obtunded  Well developed, well nourished, well groomed. No apparent distress. HEENT:  Normocephalic, atraumatic. Pupils equal, round, reactive to light. No scleral icterus. No conjunctival injection. Normal lips, teeth, and gums. No nasal discharge. Neck:  Supple  Heart:  RRR, no murmurs, gallops, rubs  Lungs:  CTA bilaterally, bilat symmetrical expansion, no wheeze, rales, or rhonchi  Abdomen:   Bowel sounds present, soft, nontender, no masses, no organomegaly, no peritoneal signs  Extremities:  No clubbing, cyanosis, or edema  Skin:  Warm and dry, no open lesions or rash  Breast: deferred  Rectal: deferred  Genitalia:  deferred    NEUROLOGICAL EXAM  ---------------------------------    Mental Status Exam:              Exam off sedation    Obtunded no response to verbal commands   Absent oculocephalics very slight sluggish corneals  Pupils 3 mm lynnette  Absent gag  Flaccid extremities  No response to babinski  Minimal withdrawal to painful stimulii  resp on the vent          CBC with Differential:    Lab Results   Component Value Date    WBC 12.2 08/01/2020    RBC 2.94 08/01/2020 HGB 7.5 08/01/2020    HCT 24.8 08/01/2020     08/01/2020    MCV 84.4 08/01/2020    MCH 25.5 08/01/2020    MCHC 30.2 08/01/2020    RDW 16.0 08/01/2020    SEGSPCT 72.1 08/01/2020    LYMPHOPCT 14.8 08/01/2020    MONOPCT 8.7 08/01/2020    EOSPCT 3 03/17/2016    BASOPCT 0.6 08/01/2020    MONOSABS 1.1 08/01/2020    LYMPHSABS 1.8 08/01/2020    EOSABS 0.3 08/01/2020    BASOSABS 0.1 08/01/2020    DIFFTYPE AUTOMATED DIFFERENTIAL 08/01/2020     CMP:    Lab Results   Component Value Date     08/01/2020    K 3.6 08/01/2020    CL 90 08/01/2020    CO2 34 08/01/2020    BUN 22 08/01/2020    CREATININE 1.3 08/01/2020    GFRAA 49 08/01/2020    AGRATIO 1.3 06/08/2016    LABGLOM 40 08/01/2020    GLUCOSE 128 08/01/2020    PROT 6.9 07/25/2020    PROT 6.7 03/02/2013    LABALBU 3.0 07/25/2020    LABALBU 74 09/11/2018    CALCIUM 8.2 08/01/2020    BILITOT 0.3 07/25/2020    ALKPHOS 125 07/25/2020    AST 23 07/25/2020    ALT 11 07/25/2020     BMP:    Lab Results   Component Value Date     08/01/2020    K 3.6 08/01/2020    CL 90 08/01/2020    CO2 34 08/01/2020    BUN 22 08/01/2020    LABALBU 3.0 07/25/2020    LABALBU 74 09/11/2018    CREATININE 1.3 08/01/2020    CALCIUM 8.2 08/01/2020    GFRAA 49 08/01/2020    LABGLOM 40 08/01/2020    GLUCOSE 128 08/01/2020     PT/INR:    Lab Results   Component Value Date    PROTIME 13.5 08/01/2020    INR 1.11 08/01/2020     PTT:    Lab Results   Component Value Date    APTT 38.2 08/01/2020   [APTT  U/A:    Lab Results   Component Value Date    COLORU STRAW 07/26/2020    WBCUA 5 07/26/2020    RBCUA 3 07/26/2020    MUCUS RARE 07/26/2020    TRICHOMONAS NONE SEEN 07/26/2020    BACTERIA OCCASIONAL 07/26/2020    CLARITYU CLEAR 07/26/2020    SPECGRAV 1.009 07/26/2020    LEUKOCYTESUR TRACE 07/26/2020    UROBILINOGEN NORMAL 07/26/2020    BILIRUBINUR NEGATIVE 07/26/2020    BLOODU SMALL 07/26/2020     TSH:    Lab Results   Component Value Date    TSH 3.080 03/17/2016     VITAMIN B12: No components found for: B12  FOLATE:    Lab Results   Component Value Date    FOLATE 8.7 07/26/2020     RPR:  No results found for: RPR  DELMA:  No results found for: ANATITER, DELMA  Urine Toxicology:  No components found for: IAMMENTA, IBARBIT, IBENZO, ICOCAINE, IMARTHC, IOPIATES, IPHENCYC     IMPRESSION:    Hypoxic encephalopathy    S/P cardiac arrest    Acute respiratory failure    seizures    Hypotension    Sepsis    Hx CHF    PLAN:    CT brain neg    EEG severe slowing    Keppra    Continue supportive care    Discussed plan of care with pts nurse. Mallory Wong MD  BOARD CERTIFIED-NEUROLOGY.

## 2020-08-01 NOTE — PROGRESS NOTES
10 mL Intravenous PRN Jaci Vyas MD        acetaminophen (TYLENOL) tablet 650 mg  650 mg Oral Q6H PRN Jaci Vyas MD   650 mg at 07/30/20 1648    Or    acetaminophen (TYLENOL) suppository 650 mg  650 mg Rectal Q6H PRN Jaci Vyas MD   650 mg at 07/27/20 2111    polyethylene glycol (GLYCOLAX) packet 17 g  17 g Oral Daily PRN Jaci Vyas MD        promethazine (PHENERGAN) tablet 12.5 mg  12.5 mg Oral Q6H PRN Jaci Vyas MD        Or    ondansetron (ZOFRAN) injection 4 mg  4 mg Intravenous Q6H PRN Jaci Vyas MD        atorvastatin (LIPITOR) tablet 20 mg  20 mg Oral Daily Jaci Vyas MD   20 mg at 08/01/20 0858    aspirin chewable tablet 81 mg  81 mg Per NG tube Daily Jaci Vyas MD   81 mg at 08/01/20 0858    lansoprazole suspension SUSP 30 mg  30 mg Per NG tube QAM AC Jaci Vyas MD   30 mg at 08/01/20 0951    glucose (GLUTOSE) 40 % oral gel 15 g  15 g Oral PRN Jaci Vyas MD        dextrose 50 % IV solution  12.5 g Intravenous PRN Jaci Vyas MD        glucagon (rDNA) injection 1 mg  1 mg Intramuscular PRN Jaci Vyas MD        dextrose 5 % solution  100 mL/hr Intravenous PRN Jaci Vyas MD        norepinephrine (LEVOPHED) 16 mg in dextrose 5% 250 mL infusion  2 mcg/min Intravenous Continuous Cyndy Finn PA-C 6.6 mL/hr at 08/01/20 1054 7 mcg/min at 08/01/20 1054    chlorhexidine (PERIDEX) 0.12 % solution 15 mL  15 mL Mouth/Throat BID Ingrid Bustamante MD   15 mL at 08/01/20 0858    albuterol sulfate  (90 Base) MCG/ACT inhaler 4 puff  4 puff Inhalation Q4H PRN Ingrid Bustamante MD   4 puff at 07/30/20 1556     Allergies   Allergen Reactions    Latex Other (See Comments)     Blisters    Adhesive Tape      Paper tape ok to use    Codeine Nausea And Vomiting     Active Problems:    Acute respiratory failure ([address]  Resolved Problems:    * No resolved hospital problems.  *    Blood pressure (!) 126/53, pulse 75, temperature 99.3 °F (37.4 °C),

## 2020-08-01 NOTE — PROGRESS NOTES
Patient Active Problem List    Diagnosis Date Noted    Acute respiratory failure ([facility] 07/25/2020    Excessive daytime sleepiness 11/04/2019    Ex-smoker 11/04/2019    VHD (valvular heart disease)     SADI (obstructive sleep apnea)     Morbid obesity ([facility]     NSTEMI (non-ST elevated myocardial infarction) ([facility]     ACS (acute coronary syndrome) ([facility] 11/08/2018    Acute coronary syndrome ([facility] 60/71/5668    Diastolic dysfunction with acute on chronic heart failure (HCC) 11/08/2018    Congestive heart failure (CHF) ([facility] 11/07/2018    Type 2 diabetes mellitus ([facility] 11/07/2018    Hypothyroidism 11/07/2018    Depression 11/07/2018    Hypertension 11/07/2018    CHF (congestive heart failure), NYHA class I, acute on chronic, diastolic (HCC) 05/91/7390    Idiopathic progressive neuropathy 04/18/2016     Overview Note:     Patient has chronic peripheral neuropathy to bilateral feet. She is currently on gabapentin 800 mg tid with some benefit. She reports decreased sensation to her feet with intermittent pain.  Mild obstructive sleep apnea 10/14/2015    CCC (chronic calculous cholecystitis) 09/14/2015    Cirrhosis of liver without ascites ([facility] 09/14/2015    Light headed 04/23/2014     Overview Note:     From dehydration from hyperglycemia - patient dry on presentation. replace inactive diagnosis      Orthostatic hypotension 04/23/2014    Hypertriglyceridemia 04/23/2014    Precordial pain 04/22/2014    Hyperglycemia 04/22/2014     Overview Note:     Admission blood glucose of 120; was >500 prior to presentation to ER, first accucheck in ER was 341.  Axillary abscess 04/22/2014     Overview Note:     Left      Hyperlipidemia     DM type 2, uncontrolled, with retinopathy ([facility] 03/01/2013     Overview Note:     Patient's last A1c was 12.1, and she has been referred in the last month to endocrinology.  She has an appointment this coming month with a specialist.      Hypokalemia 03/01/2013    HTN (hypertension) 11/26/2012    COPD (chronic obstructive pulmonary disease) ([facility] [address] 11/26/2012    Anxiety and depression 11/26/2012     Overview Note:     Patient is currently on venlafaxine 150 mg daily for depression and anxiety. She feels like it is not as affective as she would like currently. She does not feel suicidal and is sleeping ok but has decreased motivation and less happy days. Patient is on the vent and sedated. She is responding to the painful stimuli    Hypokalemia  Hypophosphatemia  Bilateral pleural effusions  VDRF  Anemia  Leukocytosis - improved  Moderate AS  Morbid obesity  SADI  Smoker  Pulmonary edema  Out of Hospital Cardiac arrest  ? Aspiration pneumonia  Systolic CHF with EF of 63%  Grade III Diastolic dysfunction     1. Lasix drip 5 mg /hr  2. I/O Chart  3. CXR in am  4. Decrease TV to 450 ml and check ABG in 1 hour  5. Keep sats > 92%  6. Check prealbumin  7. Tube feeds  8. PT/OT  9. SAT and SBT trial in am  10. Levophed wean off for a MAP > 65 mmhg  No follow-ups on file.     Electronically signed by Sandra Artis MD on 8/1/2020 at 11:57 AM

## 2020-08-01 NOTE — PROGRESS NOTES
HISTORY: ORDERING SYSTEM PROVIDED HISTORY: Vent management TECHNOLOGIST PROVIDED HISTORY: Reason for exam:->Vent management Reason for Exam: vent Acuity: Acute Type of Exam: Subsequent/Follow-up FINDINGS: The endotracheal tube, nasogastric tube and right IJ catheter remain. The heart size is upper normal.  A pneumothorax is not identified. Perihilar atelectasis has improved. A right-sided pleural effusion is noted, possibly decreased in size. Central vascular congestion has improved. No change in life support. Improved perihilar atelectasis. Central vascular congestion has improved. Right-sided pleural effusion is either decreased in size or redistributed. Xr Chest Portable    Result Date: 7/30/2020  EXAMINATION: ONE XRAY VIEW OF THE CHEST 7/30/2020 4:01 am COMPARISON: Chest radiograph performed July 29, 2020. HISTORY: ORDERING SYSTEM PROVIDED HISTORY: Vent management TECHNOLOGIST PROVIDED HISTORY: Reason for exam:->Vent management Reason for Exam: Vent management Acuity: Unknown Type of Exam: Subsequent/Follow-up Additional signs and symptoms: Vent management Relevant Medical/Surgical History: Vent management FINDINGS: Endotracheal tube tip terminates approximately 2.3 cm from the elijah. Right IJ CVC tip is in stable positioning. Gastric tube descends below the level of the diaphragm. There are increasing bilateral central airspace opacities, predominantly on the right. There is prominence of the pulmonary vasculature bilaterally. Interval increase in size of a small to moderate right pleural effusion. Grossly stable small left pleural effusion. Stable cardiac silhouette. 1. Increasing bilateral central airspace opacities compatible with pulmonary edema with prominence of the pulmonary vasculature. 2. Interval increase in size of a small to moderate right pleural effusion and stable small left pleural effusion. Findings may be exacerbated by patient positioning.      Xr Chest Portable    Result Date: 7/28/2020  EXAMINATION: ONE XRAY VIEW OF THE CHEST 7/28/2020 5:00 am COMPARISON: 07/27/2020 HISTORY: ORDERING SYSTEM PROVIDED HISTORY: Vent management TECHNOLOGIST PROVIDED HISTORY: Reason for exam:->Vent management Reason for Exam: vent Acuity: Acute Type of Exam: Subsequent/Follow-up FINDINGS: Endotracheal tube tip is 3 cm above the elijah. Enteric tube goes below the level of the diaphragm and out of the field of view. Right IJ CVC tip overlies the superior cavoatrial junction. The heart is stable in size. No measurable pneumothorax. Airspace opacities in the mid and lower lungs along with bilateral pleural effusions have overall improved from the prior study. Overall improvement in the bilateral airspace opacities in the mid and lower lungs as well as the pleural effusions. Xr Chest Portable    Result Date: 7/27/2020  EXAMINATION: ONE XRAY VIEW OF THE CHEST 7/27/2020 5:29 am COMPARISON: Yesterday HISTORY: ORDERING SYSTEM PROVIDED HISTORY: Hypoxia and congestion TECHNOLOGIST PROVIDED HISTORY: Reason for exam:->Hypoxia and congestion Reason for Exam: Hypoxia and congestion Acuity: Acute Type of Exam: Ongoing FINDINGS: Endotracheal tube terminates 1.5 cm from the elijah. Enteric tube courses below the diaphragm. Right internal jugular catheter with tip extending to the cavoatrial junction. Multiple monitor wires overlie the patient. No pneumothorax. Small-moderate stable bilateral pleural effusions and bibasilar atelectasis. Pulmonary vascular indistinctness and perihilar opacities. Stable mild cardiomegaly. Satisfactory position of support devices. Stable lung aeration with findings favoring pulmonary edema. Associated small-moderate pleural effusions and bibasilar atelectasis.      Xr Chest Portable    Result Date: 7/26/2020  EXAMINATION: ONE XRAY VIEW OF THE CHEST 7/26/2020 4:49 am COMPARISON: July 25, 2020 HISTORY: ORDERING SYSTEM PROVIDED HISTORY: Vent management TECHNOLOGIST PROVIDED HISTORY: Reason for exam:->Vent management Reason for Exam: Vent management Acuity: Acute Type of Exam: Subsequent/Follow-up FINDINGS: The ETT is 2.4 cm above the elijah. The feeding tube is inserted with its tip below the diaphragm and beyond the field of view. The cardiomediastinal silhouette is stable. There are low lung volumes. There are patchy airspace opacities bilaterally, may be related to pulmonary edema versus pneumonia. There is mild to moderate right pleural effusion. There is no pneumothorax. There is no acute osseous abnormality. Patchy airspace opacities bilaterally, may be related to pulmonary edema versus pneumonia, likely improved in the left hemithorax. Mild to moderate right pleural effusion. Stable cardiomegaly. Low lung volumes. Xr Chest Portable    Result Date: 7/25/2020  EXAMINATION: ONE XRAY VIEW OF THE CHEST 7/25/2020 4:14 pm COMPARISON: July 25, 2020 HISTORY: ORDERING SYSTEM PROVIDED HISTORY: CVC placement TECHNOLOGIST PROVIDED HISTORY: Reason for exam:->CVC placement Reason for Exam: post central line right side Acuity: Acute Type of Exam: Initial Additional signs and symptoms: post arrest Line placement FINDINGS: ETT tip is approximately 3 cm above the elijah. Right IJ catheter tip overlies the lower SVC. Cardiac silhouette is mildly enlarged but stable. Diffuse hazy opacity overlying the right lung has not appreciably changed. There is no pneumothorax. Persistent small to moderate right pleural effusion. 1. Interval placement of a right IJ catheter with catheter tip at the level of the lower SVC. No pneumothorax. 2. Persistent hazy opacity over the right hemithorax with small to moderate bilateral pleural effusions.      Xr Chest Portable    Result Date: 7/25/2020  EXAMINATION: ONE XRAY VIEW OF THE CHEST 7/25/2020 3:37 pm COMPARISON: Chest 06/17/2019 HISTORY: ORDERING SYSTEM PROVIDED HISTORY: post arrest Acuity: Acute Type of Exam: Initial Additional signs and symptoms: tube placement FINDINGS: The cardiac silhouette is enlarged. Calcifications involving the aorta reflect atherosclerosis. The mediastinal and hilar silhouettes appear unremarkable. Near complete opacification right hemithorax in keeping with atelectasis/infiltrate and pleural effusion. Dense consolidation lower left chest obscuring the left hemidiaphragm reflecting consolidation and pleural effusion. Vascular engorgement and cephalization is demonstrated with bilateral peribronchial cuffing and perivascular haziness. No pneumothorax is seen. No acute osseous abnormality is identified. Endotracheal tube tip measures about 2.7 cm superior to the elijah. 1. Nonspecific pulmonary findings may be related to sequela from pulmonary edema, diffuse infection or ARDS. Pulmonary contusion or sequela during resuscitation (? aspiration) may contribute to these opacities as well. 2. Calcific atherosclerosis aorta. 3. Cardiomegaly. 4. Endotracheal tube tip measures about 2.7 cm superior to the elijah. Xr Chest 1 View    Result Date: 7/25/2020  EXAMINATION: ONE XRAY VIEW OF THE CHEST 7/25/2020 11:01 pm COMPARISON: Chest radiograph 07/25/2020 at 4:15 p.m. HISTORY: ORDERING SYSTEM PROVIDED HISTORY: et tube placement TECHNOLOGIST PROVIDED HISTORY: Reason for exam:->et tube placement FINDINGS: The cardiac silhouette is enlarged. Calcifications involving the aorta reflect atherosclerosis. The mediastinal and hilar silhouettes appear unremarkable. Dense consolidation bilateral lower lungs with bilateral pleural effusion, unchanged from chest radiograph performed earlier today. Vascular engorgement and cephalization is demonstrated with bilateral peribronchial cuffing and perivascular haziness. No pneumothorax is seen. No acute osseous abnormality is identified. Endotracheal tube tip projects over the trachea, tip at the level of the aortic knob, 2.2 cm superior to the elijah.   Right IJ CVC tip visualized spine. Shoulder osteoarthrosis. 1. No pulmonary embolism. 2. Findings of fluid overload with small to moderate pleural effusions, diffuse soft tissue edema, and upper abdominal ascites. 3. Consolidative opacities within the upper to mid lungs could represent pneumonia or atelectasis/scarring. 4. Cirrhosis. 5. Borderline cardiomegaly with severe atherosclerotic disease. Xr Abdomen For Ng/og/ne Tube Placement    Result Date: 7/26/2020  EXAMINATION: ONE SUPINE XRAY VIEW(S) OF THE ABDOMEN 7/26/2020 8:18 am COMPARISON: None HISTORY: ORDERING SYSTEM PROVIDED HISTORY: Confirmation of course of NG/OG/NE tube and location of tip of tube TECHNOLOGIST PROVIDED HISTORY: Reason for exam:->Confirmation of course of NG/OG/NE tube and location of tip of tube Portable? ->Yes Reason for Exam: ng location Acuity: Acute Type of Exam: Subsequent/Follow-up FINDINGS: Enteric tube is in place tip in the gastric antrum. The proximal side-port is in the gastric body. No bowel obstruction is suggested. NG tube tip in the gastric antrum with the proximal side-port in the gastric body.        LAB RESULTS  --------------------    Recent Results (from the past 24 hour(s))   POCT Glucose    Collection Time: 07/31/20  5:19 PM   Result Value Ref Range    POC Glucose 214 (H) 70 - 99 MG/DL   POCT Glucose    Collection Time: 07/31/20  8:53 PM   Result Value Ref Range    POC Glucose 216 (H) 70 - 99 MG/DL   POCT Glucose    Collection Time: 08/01/20 12:27 AM   Result Value Ref Range    POC Glucose 187 (H) 70 - 99 MG/DL   Basic Metabolic Panel    Collection Time: 08/01/20  5:40 AM   Result Value Ref Range    Sodium 137 135 - 145 MMOL/L    Potassium 2.9 (LL) 3.5 - 5.1 MMOL/L    Chloride 90 (L) 99 - 110 mMol/L    CO2 36 (H) 21 - 32 MMOL/L    Anion Gap 11 4 - 16    BUN 23 6 - 23 MG/DL    CREATININE 1.3 (H) 0.6 - 1.1 MG/DL    Glucose 151 (H) 70 - 99 MG/DL    Calcium 8.0 (L) 8.3 - 10.6 MG/DL    GFR Non- 40 (L) >60 Hypertriglyceridemia     CCC (chronic calculous cholecystitis)     Cirrhosis of liver without ascites (HCC)     Mild obstructive sleep apnea     Idiopathic progressive neuropathy     Congestive heart failure (CHF) (HCC)     Type 2 diabetes mellitus (HCC)     Hypothyroidism     Depression     Hypertension     CHF (congestive heart failure), NYHA class I, acute on chronic, diastolic (HCC)     ACS (acute coronary syndrome) (HCC)     Acute coronary syndrome (HCC)     Diastolic dysfunction with acute on chronic heart failure (HCC)     NSTEMI (non-ST elevated myocardial infarction) (HCC)     SADI (obstructive sleep apnea)     Morbid obesity (HCC)     VHD (valvular heart disease)     Excessive daytime sleepiness     Ex-smoker     Acute respiratory failure (HCC)      ASSESSMENT:  ---------------------    Hypoxic encephalopathy     S/P cardiac arrest     Acute respiratory failure     seizures     Hypotension     Sepsis     Hx CHF     PLAN:     CT brain neg     EEG severe slowing     Keppra     Continue supportive care     Discussed plan of care with pts nurse.           Electronically signed by Roxann Sanders MD on 8/1/2020 at 5:17 PM

## 2020-08-01 NOTE — PLAN OF CARE
Problem: Falls - Risk of:  Goal: Will remain free from falls  Description: Will remain free from falls  7/31/2020 2208 by Colby Jones RN  Outcome: Ongoing  7/31/2020 1043 by Nikita Aburto RN  Outcome: Ongoing  Goal: Absence of physical injury  Description: Absence of physical injury  7/31/2020 2208 by Colby Jones RN  Outcome: Ongoing  7/31/2020 1043 by Nikita Aburto RN  Outcome: Ongoing     Problem: Skin Integrity:  Goal: Will show no infection signs and symptoms  Description: Will show no infection signs and symptoms  7/31/2020 2208 by Colby Jones RN  Outcome: Ongoing  7/31/2020 1043 by Nikita Aburto RN  Outcome: Ongoing  Goal: Absence of new skin breakdown  Description: Absence of new skin breakdown  7/31/2020 2208 by Colby Jones RN  Outcome: Ongoing  7/31/2020 1043 by Nikita Aburto RN  Outcome: Ongoing     Problem: Nutrition  Goal: Optimal nutrition therapy  7/31/2020 2208 by Colby Jones RN  Outcome: Ongoing  7/31/2020 1043 by Nikita Aburto RN  Outcome: Ongoing

## 2020-08-02 NOTE — PROGRESS NOTES
08/02/20 0442   Vent Information   Vent Type 980   Vent Mode AC/VC   Vt Ordered 450 mL   Rate Set 12 bmp   Peak Flow 35 L/min   Pressure Support 0 cmH20   FiO2  30 %   SpO2 100 %   SpO2/FiO2 ratio 333.33   Sensitivity 3   PEEP/CPAP 5   I Time/ I Time % 0 s   Humidification Source HME   Nitric Oxide/Epoprostenol In Use? No   Vent Patient Data   High Peep/I Pressure 0   Peak Inspiratory Pressure 22 cmH2O   Mean Airway Pressure 10 cmH20   Rate Measured 13 br/min   Vt Exhaled 463 mL   Minute Volume 5.92 Liters   I:E Ratio 1:1.50   Plateau Pressure 20 [id number]   Static Compliance 31 mL/cmH2O   Total PEEP 6 cmH20   Auto PEEP 1.2 cmH20   Spontaneous Breathing Trial (SBT) RT Doc   Pulse 86   Alarm Settings   High Pressure Alarm 40 cmH2O   Delay Alarm 20 sec(s)   Low Minute Volume Alarm 4 L/min   Apnea (secs) 20 secs   High Respiratory Rate 40 br/min   Low Exhaled Vt  250 mL   Patient Observation   Observations Pt resting in bed   Non-Surgical Airway Endo Tracheal Tube   Placement Date/Time: 07/25/20 1516   Placed By:  In place on arrival to facility  Inserted by: [facility]  Airway Device: Endo Tracheal Tube  Size: 7  Placement Verified By[de-identified] Chest X-ray   Secured at 23 cm   Measured From Lips   Secured Location Right   Secured By Commercial tube mariano   Site Condition Dry

## 2020-08-02 NOTE — PROGRESS NOTES
[id number]  consulted by Dr. Opal Alvarez for monitoring and adjustment. Indication for treatment: Sepsis, possible pneumonia & post-cardiac arrest  Goal trough: 15 mcg/mL     Pertinent Laboratory Values:   Temp Readings from Last 3 Encounters:   08/02/20 99.9 °F (37.7 °C) (Rectal)   07/25/20 98.2 °F (36.8 °C) (Axillary)   06/26/19 98.2 °F (36.8 °C) (Oral)     Recent Labs     07/31/20  0620 08/01/20  0540 08/02/20  0530   WBC 10.2 12.2* 9.2     Recent Labs     08/01/20  0540 08/01/20  1350 08/02/20  0530   BUN 23 22 23   CREATININE 1.3* 1.3* 1.3*     Estimated Creatinine Clearance: 41 mL/min (A) (based on SCr of 1.3 mg/dL (H)). Intake/Output Summary (Last 24 hours) at 8/2/2020 1044  Last data filed at 8/2/2020 7755  Gross per 24 hour   Intake 1308 ml   Output 3980 ml   Net -2672 ml       Pertinent Cultures:  Date                             Source                                     Results  7/26/20                        Blood                                       NGTD  7/26/20                        Covid-19                                  Negative  7/26/20                        Respiratory Panel                   Negative  7/30/20    MRSA nasal      Positive    Vancomycin level:   RANDOM:    Recent Labs     07/31/20  0620 08/02/20  0530   VANCORANDOM 15.6 15.1       Assessment:  · WBC and temperature: WBC back down to normal today; Pt continues with a slight fever of 99.9 (Tmax 100. 2F)  · SCr, BUN, and urine output:   · SHER, Scr remains steady @1.3  · UOP now very good  · Nasal MRSA screen positive, vancomycin to continue  · Day(s) of therapy: #7  · Vancomycin level:   · 7/31 - 15.6, appropriate to re-dose (56h post-dose)  · 8/2 - 15.1 (42 hour level, ok to re-dose)    Plan:  · Level result therapeutic @15.1, output improving  · Continue intermittent dosing based on levels  · Give vancomycin 1,500mg ivpb x1 dose today  · Repeat next random level in 48h  · Pharmacy will continue to monitor patient and adjust therapy as indicated    REPEAT VANCOMYCIN RANDOM SCHEDULED FOR 8/4 @ 0600    Thank you for the consult,  Sandra Delgado.  Gennaro Ybarra

## 2020-08-02 NOTE — PROGRESS NOTES
Progress Note( Dr. Layne Workman)  8/2/2020  Subjective:   Admit Date: 7/25/2020  PCP: Mark Mobley PA-C    Admitted For : Cardiac arrest but very soon was resuscitated intubated and placed on the ventilator    Consulted For: Better control of blood glucose    Interval History: No major changes in her mental or respiratory status    Patient is still sedated intubated and on ventilator  Patient is COVID negative so she was transferred to regular ICU  on  7/30 /2020  Patient was started on tube feeding but on hold as patient blood pressure, and on vasopressors      Intake/Output Summary (Last 24 hours) at 8/2/2020 1112  Last data filed at 8/2/2020 9098  Gross per 24 hour   Intake 1308 ml   Output 3980 ml   Net -2672 ml       DATA    CBC:   Recent Labs     07/31/20  0620 08/01/20  0540 08/02/20  0530   WBC 10.2 12.2* 9.2   HGB 7.4* 7.5* 6.7*    299 223    CMP:  Recent Labs     08/01/20  0540 08/01/20  1350 08/02/20  0530    135 136   K 2.9* 3.6 3.4*   CL 90* 90* 90*   CO2 36* 34* 37*   BUN 23 22 23   CREATININE 1.3* 1.3* 1.3*   CALCIUM 8.0* 8.2* 7.9*     Lipids:   Lab Results   Component Value Date    CHOL 122 06/20/2019    CHOL 205 03/17/2016    HDL 51 06/20/2019    TRIG 150 06/20/2019     Glucose:  Recent Labs     08/02/20  0108 08/02/20  0532 08/02/20  0940   POCGLU 206* 272* 291*     [id number]:  Lab Results   Component Value Date    LABA1C 12.0 07/26/2020     High Sensitivity TSH:   Lab Results   Component Value Date    TSHHS 4.730 06/20/2019     Free T3:   Lab Results   Component Value Date    FT3 2.9 03/17/2016     Free T4:  Lab Results   Component Value Date    T4FREE 1.04 06/18/2019       Xr Chest Portable    Result Date: 7/26/2020  EXAMINATION: ONE XRAY VIEW OF THE CHEST 7/26/2020 4:49 am COMPARISON: July 25, 2020 HISTORY: ORDERING SYSTEM PROVIDED HISTORY: Vent management T    Patchy airspace opacities bilaterally, may be related to pulmonary edema versus pneumonia, likely improved in the left hemithorax. Mild to moderate right pleural effusion. Stable cardiomegaly. Low lung volumes. [place] Chest Portable    Result Date: 7/25/2020  EXAMINATION: ONE XRAY VIEW OF THE CHEST 7/25/2020 3:37 pm COMPARISON: Chest 06/17/2019 HISTORY: ORDERING SYSTEM PROVIDED HISTORY: post arrest        1. Nonspecific pulmonary findings may be related to sequela from pulmonary edema, diffuse infection or ARDS. Pulmonary contusion or sequela during resuscitation (? aspiration) may contribute to these opacities as well. 2. Calcific atherosclerosis aorta. 3. Cardiomegaly. 4. Endotracheal tube tip measures about 2.7 cm superior to the elijah. Lanell Gitelman Pulmonary W Contrast    Result Date: 7/25/2020  EXAMINATION: CTA OF THE CHEST 7/25/2020 5:51 pm     1. No pulmonary embolism. 2. Findings of fluid overload with small to moderate pleural effusions, diffuse soft tissue edema, and upper abdominal ascites. 3. Consolidative opacities within the upper to mid lungs could represent pneumonia or atelectasis/scarring. 4. Cirrhosis. 5. Borderline cardiomegaly with severe atherosclerotic disease. Xr Abdomen For Ng/og/ne Tube Placement    Result Date: 7/26/2020  EXAMINATION: ONE SUPINE XRAY VIEW(S) OF THE ABDOMEN 7/26/2020 8:18 am COMPARISON: None       NG tube tip in the gastric antrum with the proximal side-port in the gastric body.        Scheduled Medicines   Medications:    calcium gluconate IVPB  2 g Intravenous Once    enoxaparin  40 mg Subcutaneous Daily    vancomycin  1,500 mg Intravenous Once    insulin glargine  30 Units Subcutaneous Nightly    insulin regular  0-12 Units Subcutaneous Q6H    insulin regular  10 Units Subcutaneous 3 times per day    levothyroxine  50 mcg Oral Daily    piperacillin-tazobactam  3.375 g Intravenous Q8H    levetiracetam  1,000 mg Intravenous Q12H    vancomycin (VANCOCIN) intermittent dosing (placeholder)   Other RX Placeholder    miconazole   Topical BID    sodium chloride flush 10 mL Intravenous 2 times per day    atorvastatin  20 mg Oral Daily    aspirin  81 mg Per NG tube Daily    lansoprazole  30 mg Per NG tube QAM AC    chlorhexidine  15 mL Mouth/Throat BID      Infusions:    furosemide (LASIX) 1mg/ml infusion 5 mg/hr (08/02/20 0617)    dextrose      norepinephrine 6 mcg/min (08/02/20 7042)         Objective:   Vitals: BP (!) 119/54   Pulse 88   Temp 99.9 °F (37.7 °C) (Rectal)   Resp 15   Ht 5' (1.524 m)   Wt 207 lb 3.7 oz (94 kg)   SpO2 99%   BMI 40.47 kg/m²   General appearance: Patient is sedated intubated on ventilator  Neck: no JVD or bruit  Thyroid : Normal lobes   Lungs: Has Vesicular Breath sounds intubated and on ventilator  Heart:  regular rate and rhythm  Abdomen: soft, non-tender; bowel sounds normal; no masses,  no organomegaly  Musculoskeletal: Normal  Extremities: extremities normal, , no edema  Neurologic: Patient is sedated intubated and on ventilator.     Assessment:     Patient Active Problem List:     HTN (hypertension)     COPD (chronic obstructive pulmonary disease) ([address]     Anxiety and depression     DM type 2, uncontrolled, with retinopathy ([address]     Hypokalemia     Hyperlipidemia     Precordial pain     Hyperglycemia     Axillary abscess     Light headed     Orthostatic hypotension     Hypertriglyceridemia     CCC (chronic calculous cholecystitis)     Cirrhosis of liver without ascites (HCC)     Mild obstructive sleep apnea     Idiopathic progressive neuropathy     Congestive heart failure (CHF) ([facility])     Type 2 diabetes mellitus (HCC)     Hypothyroidism     Depression     Hypertension     CHF (congestive heart failure), NYHA class I, acute on chronic, diastolic (HCC)     ACS (acute coronary syndrome) (HCC)     Acute coronary syndrome (HCC)     Diastolic dysfunction with acute on chronic heart failure (HCC)     NSTEMI (non-ST elevated myocardial infarction) (HCC)     SADI (obstructive sleep apnea)     Morbid obesity (HCC)     VHD (valvular heart disease)     Excessive daytime sleepiness     Ex-smoker     Acute respiratory failure ([facility] [address]      Plan:     1. Reviewed POC blood glucose . Labs and X ray results   2. Reviewed Current Medicines   3. On Correction bolus Humalog/ Basal Lantus Insulin regime /   4. Monitor Blood glucose frequently   5. Modified  the dose of Insulin/ other medicines as needed  6. Started on tube feeding if able to control her blood pressure without pressor  7. Will follow     .      Brandi Ojeda MD

## 2020-08-02 NOTE — PROGRESS NOTES
NEUROLOGY NOTE  DR. Mallory Wong MD.  -------------------------------------------------  Subjective:    Obtunded no response to verbal commands    No seizure activity noted    Objective:    BP (!) 119/54   Pulse 91   Temp 99.9 °F (37.7 °C) (Rectal)   Resp 14   Ht 5' (1.524 m)   Wt 207 lb 3.7 oz (94 kg)   SpO2 98%   BMI 40.47 kg/m²   HEENT nl      Neuro exam    Obtunded no response to verbal commands,  Pupils 3 mm lynnette  Sluggish gag reflex  Minimal withdrawal to painful stimulii  Equivocal toes  resp on the vent      RADIOLOGY  -----------------    Ct Head Wo Contrast    Result Date: 7/31/2020  EXAMINATION: CT OF THE HEAD WITHOUT CONTRAST  7/31/2020 1:18 am TECHNIQUE: CT of the head was performed without the administration of intravenous contrast. Dose modulation, iterative reconstruction, and/or weight based adjustment of the mA/kV was utilized to reduce the radiation dose to as low as reasonably achievable. COMPARISON: None HISTORY: ORDERING SYSTEM PROVIDED HISTORY: Twitching of eyes/suspected seizure/rule out acute intracranial process TECHNOLOGIST PROVIDED HISTORY: Reason for exam:->Twitching of eyes/suspected seizure/rule out acute intracranial process Has a \"code stroke\" or \"stroke alert\" been called? ->No Reason for Exam: rule out acute process FINDINGS: BRAIN/VENTRICLES: There is no acute intracranial hemorrhage, mass effect or midline shift. No abnormal extra-axial fluid collection. The gray-white differentiation is maintained without evidence of an acute infarct. There is no evidence of hydrocephalus. ORBITS: The visualized portion of the orbits demonstrate no acute abnormality. SINUSES: Mild mucoperiosteal thickening to frontal ethmoidal recess of left frontal sinus. Small air-fluid level in left sphenoid sinus. Other paranasal sinuses appear to be clear. Opacification/partial opacification to a few left-sided mastoid air cells. Right-sided mastoid air cells appear clear.   Bilateral middle ears appear clear. SOFT TISSUES/SKULL:  No acute abnormality of the visualized skull or soft tissues. No acute intracranial abnormality. Paranasal sinus disease and left mastoid effusions as above. Xr Chest Portable    Result Date: 8/1/2020  EXAMINATION: ONE XRAY VIEW OF THE CHEST 7/29/2020 4:55 am COMPARISON: Yesterday HISTORY: ORDERING SYSTEM PROVIDED HISTORY: Vent management TECHNOLOGIST PROVIDED HISTORY: Reason for exam:->Vent management Reason for Exam: vent Acuity: Acute Type of Exam: Subsequent/Follow-up FINDINGS: Endotracheal tube terminates 3 cm above the elijah. Right internal jugular catheter with tip at the cavoatrial junction. Enteric tube courses below the diaphragm. Findings of pulmonary edema with pulmonary vascular indistinctness. No pneumothorax. Small bilateral pleural effusions. Low lung volumes exaggerate the pulmonary vasculature. Scattered perihilar and basilar linear atelectasis. No pneumothorax. 1.  Satisfactory position of support devices. 2. Low lung volumes exaggerating the pulmonary finding since yesterday's exam.  Essentially stable pulmonary edema, small pleural effusions, and linear atelectasis. Xr Chest Portable    Result Date: 8/1/2020  EXAMINATION: ONE XRAY VIEW OF THE CHEST 8/1/2020 5:06 am COMPARISON: 07/31/2020 HISTORY: ORDERING SYSTEM PROVIDED HISTORY: Vent management TECHNOLOGIST PROVIDED HISTORY: Reason for exam:->Vent management Reason for Exam: Vent management Acuity: Acute Type of Exam: Ongoing FINDINGS: The right internal jugular catheter tip is in the superior vena cava. The endotracheal tube tip is 2 cm above the elijah. The enteric tube is below the diaphragm. There are moderate pleural effusions. Basilar lung opacities could represent atelectasis. Worsening moderate pleural effusions and atelectasis.      Xr Chest Portable    Result Date: 7/31/2020  EXAMINATION: ONE XRAY VIEW OF THE CHEST 7/31/2020 5:41 am COMPARISON: 07/30/2020 HISTORY: ORDERING SYSTEM PROVIDED HISTORY: Vent management TECHNOLOGIST PROVIDED HISTORY: Reason for exam:->Vent management Reason for Exam: vent Acuity: Acute Type of Exam: Subsequent/Follow-up FINDINGS: The endotracheal tube, nasogastric tube and right IJ catheter remain. The heart size is upper normal.  A pneumothorax is not identified. Perihilar atelectasis has improved. A right-sided pleural effusion is noted, possibly decreased in size. Central vascular congestion has improved. No change in life support. Improved perihilar atelectasis. Central vascular congestion has improved. Right-sided pleural effusion is either decreased in size or redistributed. Xr Chest Portable    Result Date: 7/30/2020  EXAMINATION: ONE XRAY VIEW OF THE CHEST 7/30/2020 4:01 am COMPARISON: Chest radiograph performed July 29, 2020. HISTORY: ORDERING SYSTEM PROVIDED HISTORY: Vent management TECHNOLOGIST PROVIDED HISTORY: Reason for exam:->Vent management Reason for Exam: Vent management Acuity: Unknown Type of Exam: Subsequent/Follow-up Additional signs and symptoms: Vent management Relevant Medical/Surgical History: Vent management FINDINGS: Endotracheal tube tip terminates approximately 2.3 cm from the elijah. Right IJ CVC tip is in stable positioning. Gastric tube descends below the level of the diaphragm. There are increasing bilateral central airspace opacities, predominantly on the right. There is prominence of the pulmonary vasculature bilaterally. Interval increase in size of a small to moderate right pleural effusion. Grossly stable small left pleural effusion. Stable cardiac silhouette. 1. Increasing bilateral central airspace opacities compatible with pulmonary edema with prominence of the pulmonary vasculature. 2. Interval increase in size of a small to moderate right pleural effusion and stable small left pleural effusion. Findings may be exacerbated by patient positioning.      Xr Chest Portable    Result Date: 7/28/2020  EXAMINATION: ONE XRAY VIEW OF THE CHEST 7/28/2020 5:00 am COMPARISON: 07/27/2020 HISTORY: ORDERING SYSTEM PROVIDED HISTORY: Vent management TECHNOLOGIST PROVIDED HISTORY: Reason for exam:->Vent management Reason for Exam: vent Acuity: Acute Type of Exam: Subsequent/Follow-up FINDINGS: Endotracheal tube tip is 3 cm above the elijah. Enteric tube goes below the level of the diaphragm and out of the field of view. Right IJ CVC tip overlies the superior cavoatrial junction. The heart is stable in size. No measurable pneumothorax. Airspace opacities in the mid and lower lungs along with bilateral pleural effusions have overall improved from the prior study. Overall improvement in the bilateral airspace opacities in the mid and lower lungs as well as the pleural effusions. Xr Chest Portable    Result Date: 7/27/2020  EXAMINATION: ONE XRAY VIEW OF THE CHEST 7/27/2020 5:29 am COMPARISON: Yesterday HISTORY: ORDERING SYSTEM PROVIDED HISTORY: Hypoxia and congestion TECHNOLOGIST PROVIDED HISTORY: Reason for exam:->Hypoxia and congestion Reason for Exam: Hypoxia and congestion Acuity: Acute Type of Exam: Ongoing FINDINGS: Endotracheal tube terminates 1.5 cm from the elijah. Enteric tube courses below the diaphragm. Right internal jugular catheter with tip extending to the cavoatrial junction. Multiple monitor wires overlie the patient. No pneumothorax. Small-moderate stable bilateral pleural effusions and bibasilar atelectasis. Pulmonary vascular indistinctness and perihilar opacities. Stable mild cardiomegaly. Satisfactory position of support devices. Stable lung aeration with findings favoring pulmonary edema. Associated small-moderate pleural effusions and bibasilar atelectasis.      Xr Chest Portable    Result Date: 7/26/2020  EXAMINATION: ONE XRAY VIEW OF THE CHEST 7/26/2020 4:49 am COMPARISON: July 25, 2020 HISTORY: ORDERING SYSTEM PROVIDED HISTORY: Vent management overlies the mid superior vena cava level. NG tube extends below the left hemidiaphragm, out of the field of view. 1. Unchanged findings most typical of congestive heart failure; pneumonia is a consideration in areas of consolidation with pleural effusion. 2. Calcific atherosclerosis aorta. 3. Cardiomegaly. 4.  Life support appliances appear appropriately positioned. Cta Pulmonary W Contrast    Result Date: 7/25/2020  EXAMINATION: CTA OF THE CHEST 7/25/2020 5:51 pm TECHNIQUE: CTA of the chest was performed after the administration of 100 mL Isovue 370 intravenous contrast.  Multiplanar reformatted images are provided for review. MIP images are provided for review. Dose modulation, iterative reconstruction, and/or weight based adjustment of the mA/kV was utilized to reduce the radiation dose to as low as reasonably achievable. COMPARISON: 08/09/2011 HISTORY: Acute shortness of breath, respiratory arrest. FINDINGS: Image quality degraded by motion artifact and body habitus. Pulmonary Arteries: Pulmonary arteries are adequately opacified for evaluation. No intraluminal filling defect to suggest pulmonary embolism. Main pulmonary artery is normal in caliber. Mediastinum: Endotracheal tube tip is at the level of the aortic arch. Right IJ catheter tip is not visualized within dense intravenous contrast. Borderline cardiomegaly. Extensive atherosclerotic disease including the coronary arteries. No pericardial effusion. Thoracic aorta is normal caliber. No lymphadenopathy. Thyroid and esophagus are unremarkable. Lungs/pleura: Small to moderate pleural effusions with adjacent compressive atelectasis. Additional linear and consolidative opacities in the upper to mid lungs. Upper Abdomen: Cirrhosis with upper abdominal ascites. Cholecystectomy. Enteric tube tip is within the distal stomach. Soft Tissues/Bones: Diffuse soft tissue edema.   Osteopenia with scattered degenerative changes throughout the visualized spine. Shoulder osteoarthrosis. 1. No pulmonary embolism. 2. Findings of fluid overload with small to moderate pleural effusions, diffuse soft tissue edema, and upper abdominal ascites. 3. Consolidative opacities within the upper to mid lungs could represent pneumonia or atelectasis/scarring. 4. Cirrhosis. 5. Borderline cardiomegaly with severe atherosclerotic disease. Xr Abdomen For Ng/og/ne Tube Placement    Result Date: 7/26/2020  EXAMINATION: ONE SUPINE XRAY VIEW(S) OF THE ABDOMEN 7/26/2020 8:18 am COMPARISON: None HISTORY: ORDERING SYSTEM PROVIDED HISTORY: Confirmation of course of NG/OG/NE tube and location of tip of tube TECHNOLOGIST PROVIDED HISTORY: Reason for exam:->Confirmation of course of NG/OG/NE tube and location of tip of tube Portable? ->Yes Reason for Exam: ng location Acuity: Acute Type of Exam: Subsequent/Follow-up FINDINGS: Enteric tube is in place tip in the gastric antrum. The proximal side-port is in the gastric body. No bowel obstruction is suggested. NG tube tip in the gastric antrum with the proximal side-port in the gastric body.        LAB RESULTS  --------------------    Recent Results (from the past 24 hour(s))   POCT Glucose    Collection Time: 08/01/20  8:45 PM   Result Value Ref Range    POC Glucose 180 (H) 70 - 99 MG/DL   POCT Glucose    Collection Time: 08/02/20  1:08 AM   Result Value Ref Range    POC Glucose 206 (H) 70 - 99 MG/DL   Basic Metabolic Panel    Collection Time: 08/02/20  5:30 AM   Result Value Ref Range    Sodium 136 135 - 145 MMOL/L    Potassium 3.4 (L) 3.5 - 5.1 MMOL/L    Chloride 90 (L) 99 - 110 mMol/L    CO2 37 (H) 21 - 32 MMOL/L    Anion Gap 9 4 - 16    BUN 23 6 - 23 MG/DL    CREATININE 1.3 (H) 0.6 - 1.1 MG/DL    Glucose 267 (H) 70 - 99 MG/DL    Calcium 7.9 (L) 8.3 - 10.6 MG/DL    GFR Non-African American 40 (L) >60 mL/min/1.73m2    GFR  49 (L) >60 mL/min/1.73m2   CBC auto differential    Collection Time: 08/02/20  5:30 AM Result Value Ref Range    WBC 9.2 4.0 - 10.5 K/CU MM    RBC 2.62 (L) 4.2 - 5.4 M/CU MM    Hemoglobin 6.7 (LL) 12.5 - 16.0 GM/DL    Hematocrit 23.0 (L) 37 - 47 %    MCV 87.8 78 - 100 FL    MCH 25.6 (L) 27 - 31 PG    MCHC 29.1 (L) 32.0 - 36.0 %    RDW 16.3 (H) 11.7 - 14.9 %    Platelets 556 199 - 691 K/CU MM    MPV 9.9 7.5 - 11.1 FL    Differential Type AUTOMATED DIFFERENTIAL     Segs Relative 74.3 (H) 36 - 66 %    Lymphocytes % 14.5 (L) 24 - 44 %    Monocytes % 8.0 (H) 0 - 4 %    Eosinophils % 2.1 0 - 3 %    Basophils % 0.3 0 - 1 %    Segs Absolute 6.8 K/CU MM    Lymphocytes Absolute 1.3 K/CU MM    Monocytes Absolute 0.7 K/CU MM    Eosinophils Absolute 0.2 K/CU MM    Basophils Absolute 0.0 K/CU MM    Nucleated RBC % 0.0 %    Total Nucleated RBC 0.0 K/CU MM    Total Immature Neutrophil 0.07 K/CU MM    Immature Neutrophil % 0.8 (H) 0 - 0.43 %   Ionized Calcium    Collection Time: 08/02/20  5:30 AM   Result Value Ref Range    Ionized Ca 0.97 (L) 1.12 - 1.32 mMOL/L    Calcium, Ion 3.88 (L) 4.48 - 5.28 MG/DL   Magnesium    Collection Time: 08/02/20  5:30 AM   Result Value Ref Range    Magnesium 1.7 (L) 1.8 - 2.4 mg/dl   Phosphorus    Collection Time: 08/02/20  5:30 AM   Result Value Ref Range    Phosphorus 1.9 (L) 2.5 - 4.9 MG/DL   Vancomycin, random    Collection Time: 08/02/20  5:30 AM   Result Value Ref Range    Vancomycin Rm 15.1 UG/ML    DOSE AMOUNT DOSE AMT.  GIVEN - `     DOSE TIME DOSE TIME GIVEN - `    POCT Glucose    Collection Time: 08/02/20  5:32 AM   Result Value Ref Range    POC Glucose 272 (H) 70 - 99 MG/DL   Blood gases    Collection Time: 08/02/20  6:00 AM   Result Value Ref Range    pH, Bld 7.54 (H) 7.34 - 7.45    pCO2, Arterial 47.0 (H) 32 - 45 MMHG    pO2, Arterial 84 75 - 100 MMHG    Base Exc, Mixed 15.5 (H) 0 - 2.3    HCO3, Arterial 40.2 (H) 18 - 23 MMOL/L    CO2 Content 41.6 (H) 19 - 24 MMOL/L    O2 Sat 95.5 (L) 96 - 97 %    Carbon Monoxide, Blood 2.6 0 - 5 %    Methemoglobin, Arterial 0.9 <1.5 % Comment AC/VC12 450 30% 5    TYPE AND SCREEN    Collection Time: 08/02/20  7:05 AM   Result Value Ref Range    ABO/Rh O NEGATIVE     Antibody Screen NEGATIVE     Unit Number [id number]     Component LEUKO-POOR RED CELLS     Unit Divison 00     Status ISSUED     Transfusion Status OK TO TRANSFUSE     Crossmatch Result COMPATIBLE    POCT Glucose    Collection Time: 08/02/20  9:40 AM   Result Value Ref Range    POC Glucose 291 (H) 70 - 99 MG/DL   POCT Glucose    Collection Time: 08/02/20  1:05 PM   Result Value Ref Range    POC Glucose 334 (H) 70 - 99 MG/DL   Potassium    Collection Time: 08/02/20  4:52 PM   Result Value Ref Range    Potassium 3.3 (L) 3.5 - 5.1 MMOL/L   Hemoglobin and hematocrit, blood    Collection Time: 08/02/20  4:52 PM   Result Value Ref Range    Hemoglobin 7.8 (L) 12.5 - 16.0 GM/DL    Hematocrit 24.8 (L) 37 - 47 %   POCT Glucose    Collection Time: 08/02/20  6:17 PM   Result Value Ref Range    POC Glucose 357 (H) 70 - 99 MG/DL         Medical problems    Patient Active Problem List:     HTN (hypertension)     COPD (chronic obstructive pulmonary disease) ([facility])     Anxiety and depression     DM type 2, uncontrolled, with retinopathy ([facility])     Hypokalemia     Hyperlipidemia     Precordial pain     Hyperglycemia     Axillary abscess     Light headed     Orthostatic hypotension     Hypertriglyceridemia     CCC (chronic calculous cholecystitis)     Cirrhosis of liver without ascites ([facility])     Mild obstructive sleep apnea     Idiopathic progressive neuropathy     Congestive heart failure (CHF) ([facility])     Type 2 diabetes mellitus (HCC)     Hypothyroidism     Depression     Hypertension     CHF (congestive heart failure), NYHA class I, acute on chronic, diastolic (HCC)     ACS (acute coronary syndrome) ([facility])     Acute coronary syndrome (HCC)     Diastolic dysfunction with acute on chronic heart failure ([facility])     NSTEMI (non-ST elevated myocardial infarction) ([facility])     SADI (obstructive sleep apnea) Morbid obesity (HCC)     VHD (valvular heart disease)     Excessive daytime sleepiness     Ex-smoker     Acute respiratory failure (HCC)      ASSESSMENT:  ---------------------    Hypoxic encephalopathy     S/P cardiac arrest     Acute respiratory failure     seizures     Hypotension     Sepsis     Hx CHF     PLAN:     CT brain neg     EEG severe slowing     Keppra     Continue supportive care     Pt is not any better today.           Electronically signed by Joe Marr MD on 8/2/2020 at 6:43 PM

## 2020-08-02 NOTE — PROGRESS NOTES
08/02/20 0442   Vent Information   Vent Type 980   Vent Mode AC/VC   Vt Ordered 450 mL   Rate Set 12 bmp   Peak Flow 35 L/min   Pressure Support 0 cmH20   FiO2  30 %   SpO2 100 %   SpO2/FiO2 ratio 333.33   Sensitivity 3   PEEP/CPAP 5   I Time/ I Time % 0 s   Humidification Source HME   Nitric Oxide/Epoprostenol In Use? No   Vent Patient Data   High Peep/I Pressure 0   Peak Inspiratory Pressure 22 cmH2O   Mean Airway Pressure 10 cmH20   Rate Measured 13 br/min   Vt Exhaled 463 mL   Minute Volume 5.92 Liters   I:E Ratio 1:1.50   Plateau Pressure 20 [id number]   Static Compliance 31 mL/cmH2O   Total PEEP 6 cmH20   Auto PEEP 1.2 cmH20   Cough/Sputum   Sputum How Obtained Suctioned;Oral;Endotracheal   $Obtained Sample $Induced Sputum   Cough Productive;Moist   Frequency Occasional   Sputum Amount Small   Sputum Color Creamy; Tan   Tenacity Thick   Spontaneous Breathing Trial (SBT) RT Doc   Pulse 86   Alarm Settings   High Pressure Alarm 40 cmH2O   Delay Alarm 20 sec(s)   Low Minute Volume Alarm 4 L/min   Apnea (secs) 20 secs   High Respiratory Rate 40 br/min   Low Exhaled Vt  250 mL   Patient Observation   Observations Pt resting in bed   Non-Surgical Airway Endo Tracheal Tube   Placement Date/Time: 07/25/20 1516   Placed By:  In place on arrival to facility  Inserted by: Bibi jackson [facility]  Airway Device: Endo Tracheal Tube  Size: 7  Placement Verified By[de-identified] Chest X-ray   Secured at 23 cm   Measured From Lips   Secured Location Right   Secured By Commercial tube mariano   Site Condition Dry

## 2020-08-02 NOTE — PROGRESS NOTES
Cory Murphy is a 79 y.o. female patient intubated off sedation    Current Facility-Administered Medications   Medication Dose Route Frequency Provider Last Rate Last Dose    calcium gluconate 2 g in dextrose 5 % 100 mL IVPB  2 g Intravenous Once Dora Tovar MD        enoxaparin (LOVENOX) injection 40 mg  40 mg Subcutaneous Daily Dora Tovar MD   40 mg at 08/02/20 1013    vancomycin (VANCOCIN) 1,500 mg in dextrose 5 % 500 mL IVPB  1,500 mg Intravenous Once Reilly Ang MD        sodium phosphate 30.09 mmol in dextrose 5 % 500 mL IVPB  0.32 mmol/kg Intravenous PRN Dora Tovar MD        Or    sodium phosphate 15.03 mmol in dextrose 5 % 250 mL IVPB  0.16 mmol/kg Intravenous PRN Dora Tovar MD        potassium chloride 20 mEq/50 mL IVPB (Central Line)  20 mEq Intravenous PRN Dora Tovar MD 50 mL/hr at 08/02/20 1014 20 mEq at 08/02/20 1014    magnesium sulfate 1 g in dextrose 5% 100 mL IVPB  1 g Intravenous PRN Dora Tovar MD   Stopped at 08/01/20 1016    furosemide (LASIX) 100 mg in dextrose 5 % 100 mL infusion  5 mg/hr Intravenous Continuous Dorinda Morgan MD 5 mL/hr at 08/02/20 0617 5 mg/hr at 08/02/20 0617    levothyroxine (SYNTHROID) tablet 50 mcg  50 mcg Oral Daily Vikas Cardozo MD   50 mcg at 08/02/20 0538    piperacillin-tazobactam (ZOSYN) 3.375 g in dextrose 5 % 50 mL IVPB extended infusion (mini-bag)  3.375 g Intravenous Q8H Dora Tovar MD 12.5 mL/hr at 08/02/20 1012 3.375 g at 08/02/20 1012    levETIRAcetam (KEPPRA) 1,000 mg in sodium chloride 0.9 % 100 mL IVPB  1,000 mg Intravenous Q12H Essence Barry MD   Stopped at 08/02/20 0120    insulin glargine (LANTUS) injection vial 20 Units  20 Units Subcutaneous Nightly Vikas Cardozo MD   20 Units at 08/01/20 2058    vancomycin (VANCOCIN) intermittent dosing (placeholder)   Other RX Placeholder Malaika [place], DO        miconazole (MICOTIN) 2 % powder   Topical BID Cyndy Finn PA-C        insulin lispro (HUMALOG) injection vial 0-12 Units  0-12 Units Subcutaneous Q4H Ileana Osorio MD   6 Units at 08/02/20 1010    sodium chloride flush 0.9 % injection 10 mL  10 mL Intravenous 2 times per day Dain Tompkins MD   10 mL at 08/02/20 1014    sodium chloride flush 0.9 % injection 10 mL  10 mL Intravenous PRN Dain Tompkins MD        acetaminophen (TYLENOL) tablet 650 mg  650 mg Oral Q6H PRN Dain Tompkins MD   650 mg at 07/30/20 1648    Or    acetaminophen (TYLENOL) suppository 650 mg  650 mg Rectal Q6H PRN Dain Tompkins MD   650 mg at 07/27/20 2111    polyethylene glycol (GLYCOLAX) packet 17 g  17 g Oral Daily PRN Dain Tompkins MD        promethazine (PHENERGAN) tablet 12.5 mg  12.5 mg Oral Q6H PRN Dain Tompkins MD        Or    ondansetron (ZOFRAN) injection 4 mg  4 mg Intravenous Q6H PRN Dain Tompkins MD        atorvastatin (LIPITOR) tablet 20 mg  20 mg Oral Daily Dain Tompkins MD   20 mg at 08/02/20 1012    aspirin chewable tablet 81 mg  81 mg Per NG tube Daily Dain Tompkins MD   81 mg at 08/02/20 1012    lansoprazole suspension SUSP 30 mg  30 mg Per NG tube QAM AC Dain Tompkins MD   30 mg at 08/02/20 0538    glucose (GLUTOSE) 40 % oral gel 15 g  15 g Oral PRN Dain Tompkins MD        dextrose 50 % IV solution  12.5 g Intravenous PRN Dain Tompkins MD        glucagon (rDNA) injection 1 mg  1 mg Intramuscular PRN Dain Tompkins MD        dextrose 5 % solution  100 mL/hr Intravenous PRN Dain Tompkins MD        norepinephrine (LEVOPHED) 16 mg in dextrose 5% 250 mL infusion  2 mcg/min Intravenous Continuous Cyndy Finn PA-C 5.6 mL/hr at 08/02/20 0621 6 mcg/min at 08/02/20 0621    chlorhexidine (PERIDEX) 0.12 % solution 15 mL  15 mL Mouth/Throat BID Clearance MD Velasquez   15 mL at 08/02/20 1012    albuterol sulfate  (90 Base) MCG/ACT inhaler 4 puff  4 puff Inhalation Q4H PRN Clearance MD Velasquez   4 puff at 07/30/20 1556     Allergies   Allergen Reactions    Latex Other (See Comments)     Blisters    Adhesive Tape      Paper tape ok to use    Codeine Nausea And Vomiting     Active Problems:    Acute respiratory failure (HCC)  Resolved Problems:    * No resolved hospital problems. *    Blood pressure (!) 119/54, pulse 88, temperature 99.9 °F (37.7 °C), temperature source Rectal, resp. rate 15, height 5' (1.524 m), weight 207 lb 3.7 oz (94 kg), SpO2 99 %, not currently breastfeeding. Subjective:  Symptoms:  Stable. Diet:  NPO. Objective:  General Appearance:  Comfortable. Vital signs: (most recent): Blood pressure (!) 119/54, pulse 88, temperature 99.9 °F (37.7 °C), temperature source Rectal, resp. rate 15, height 5' (1.524 m), weight 207 lb 3.7 oz (94 kg), SpO2 99 %, not currently breastfeeding. Vital signs are normal.  (Pressor). HEENT: Normal HEENT exam.    Lungs: There are decreased breath sounds. Heart: Normal rate. Positive for murmur. Abdomen: Abdomen is soft. Extremities: Decreased range of motion. Neurological: (Sedated). Pupils:  Pupils are equal.   Skin:  Warm. Assessment & Plan  Septic shock with acute resp failure susptect gram neg pna  -CT chest opacities and fluid overload, No PE  -covid neg  -vanc and zosyn  -pressor  -bld cx neg, resp PCR neg,  strep and legionella neg  Acute on chronic CHF with AS and cardiac arrest  -EF 30% and grade 3  -IV lasix  SHER 2/2 ATN wit CKD 3  -resolving  DM  -SSI  PAF  -no AC due to hx fo Gi bleed  Severe PCM  Cardiac cirhosis  -suspect cardiac related  -LFT stable  -ammonia wnl  Hypokalemia, hypomag and hypophos  -replacement protocol as on lasix  AOCD  -monitor and transfuse < 7  Twitching/possible seizure  -CT head, keppra IV  -EEG abnormal but no epileptiform waves  -stopped cefepime and neurontin  DVT prophylaxis  -lovenox      Transfuse PRBC, correct electrolyte and diurese.  Will need to discuss with daughter again as still obtunded and off sedation  Vita Busch MD  8/2/2020

## 2020-08-02 NOTE — PROGRESS NOTES
RRTSuctioned ot for small bloody secretions from the ETT. And RRT suctioned pt's mouth for scant white cloudy secretions. Pt tolerated well.

## 2020-08-02 NOTE — PROGRESS NOTES
Pulmonary and Critical Care  Progress Note      VITALS:  BP (!) 119/54   Pulse 91   Temp 99.9 °F (37.7 °C) (Rectal)   Resp 14   Ht 5' (1.524 m)   Wt 207 lb 3.7 oz (94 kg)   SpO2 98%   BMI 40.47 kg/m²     Subjective:   CHIEF COMPLAINT :SOB     HPI:                The patient is a 79 y.o. female with significant past medical history ofCHF, DM, HTN, hypothyroidism,15 pk yr smoker quit 20 years ago, Morbid obesity, SADI  presents with complaints of SOB getting worse. EMS was called and had a witness out of hospital Cardiac arrest.She was Intubated in [facility]. She had CPR with ROSC in 5 minutes. She was found to have  Suspected pneumonia. She is being treated with ABX. She had worsening LE edema. She had a EF of 55% and moderate Aortic stenosis. She had Pulmonary edema. She is on Lasix drip.she has good urine output. She is sedated now. Her PBNPT is 21,085 and troponin mildly elevated. Objective:   PHYSICAL EXAM:    LUNGS:Decreased air entry bilateral bases  Abd-soft, BS+,NT  Ext - no pedal edema  CVS-s1s2, no murmurs      DATA:    CBC:  Recent Labs     07/31/20  0620 08/01/20  0540 08/02/20  0530   WBC 10.2 12.2* 9.2   RBC 2.85* 2.94* 2.62*   HGB 7.4* 7.5* 6.7*   HCT 24.6* 24.8* 23.0*    299 223   MCV 86.3 84.4 87.8   MCH 26.0* 25.5* 25.6*   MCHC 30.1* 30.2* 29.1*   RDW 15.9* 16.0* 16.3*   SEGSPCT 75.5* 72.1* 74.3*      BMP:  Recent Labs     08/01/20  0540 08/01/20  1350 08/02/20  0530    135 136   K 2.9* 3.6 3.4*   CL 90* 90* 90*   CO2 36* 34* 37*   BUN 23 22 23   CREATININE 1.3* 1.3* 1.3*   CALCIUM 8.0* 8.2* 7.9*   GLUCOSE 151* 128* 267*      ABG:  Recent Labs     07/31/20  0600 08/01/20  1500 08/02/20  0600   PH 7.64*  7.58* 7.56* 7.54*   PO2ART 69*  87 74* 84   ORF6GIB 37.0  39.0 44.0 47.0*   O2SAT 94.9*  95.4* 96.0 95.5*     BNP  Lab Results   Component Value Date    BNP 9.6 06/08/2016      D-Dimer:  Lab Results   Component Value Date    DDIMER 759 (H) 08/01/2020      Radiology:   1.  No significant change in small to moderate bilateral pleural effusions    with underlying bibasilar passive atelectasis.  Superimposed pneumonia and    aspiration are not excluded. 2. Unchanged central predominant airspace opacities and diffuse interstitial    opacities suggestive of edema potentially due to congestive heart failure    given mild cardiomegaly.  Pneumonia could appear similar. 1.       Assessment/Plan     Patient Active Problem List    Diagnosis Date Noted    Acute respiratory failure ([facility] 07/25/2020    Excessive daytime sleepiness 11/04/2019    Ex-smoker 11/04/2019    VHD (valvular heart disease)     SADI (obstructive sleep apnea)     Morbid obesity ([address]     NSTEMI (non-ST elevated myocardial infarction) ([address]     ACS (acute coronary syndrome) ([address] 11/08/2018    Acute coronary syndrome ([address] 14/52/4114    Diastolic dysfunction with acute on chronic heart failure ([facility] 11/08/2018    Congestive heart failure (CHF) ([facility] 11/07/2018    Type 2 diabetes mellitus ([facility] 11/07/2018    Hypothyroidism 11/07/2018    Depression 11/07/2018    Hypertension 11/07/2018    CHF (congestive heart failure), NYHA class I, acute on chronic, diastolic (HCC) 45/27/3800    Idiopathic progressive neuropathy 04/18/2016     Overview Note:     Patient has chronic peripheral neuropathy to bilateral feet. She is currently on gabapentin 800 mg tid with some benefit. She reports decreased sensation to her feet with intermittent pain.  Mild obstructive sleep apnea 10/14/2015    CCC (chronic calculous cholecystitis) 09/14/2015    Cirrhosis of liver without ascites ([facility] 09/14/2015    Light headed 04/23/2014     Overview Note:     From dehydration from hyperglycemia - patient dry on presentation.   replace inactive diagnosis      Orthostatic hypotension 04/23/2014    Hypertriglyceridemia 04/23/2014    Precordial pain 04/22/2014    Hyperglycemia 04/22/2014     Overview Note:     Admission blood glucose of 120; was >500 prior to presentation to ER, first accucheck in ER was 341.  Axillary abscess 04/22/2014     Overview Note:     Left      Hyperlipidemia     DM type 2, uncontrolled, with retinopathy ([facility] 03/01/2013     Overview Note:     Patient's last A1c was 12.1, and she has been referred in the last month to endocrinology. She has an appointment this coming month with a specialist.      Hypokalemia 03/01/2013    HTN (hypertension) 11/26/2012    COPD (chronic obstructive pulmonary disease) ([facility] [facility] 11/26/2012    Anxiety and depression 11/26/2012     Overview Note:     Patient is currently on venlafaxine 150 mg daily for depression and anxiety. She feels like it is not as affective as she would like currently. She does not feel suicidal and is sleeping ok but has decreased motivation and less happy days. Patient is on then vent and sedated. She is getting 1 unit of PRBC    Hypokalemia  Hypomagensemia  Hypophosphatemia  Anemia  Acute on chronic Hypoxic hypercapneic resp failure  Chronic Metabolic alkalosis  Bilateral pleural effusions  VDRF  Leukocytosis - improved  Moderate AS  Morbid obesity  SADI  Smoker  Pulmonary edema  Out of Hospital Cardiac arrest  ? Aspiration pneumonia  Systolic CHF with EF of 21%  Grade III Diastolic dysfunction  Increased PH sec to the worsening metabolic alkalosis sec to Lasix         1. PRBC  2. F/u H&H  3. Kphos  4. Magnesium sulfate  5. IV L:asix drip  6. I/O chart  7. Severe Malnutrition  8. Tube feeds once cleared by GI  9. Albumin 25%  10. Diamox  11. CBC, BMP, Mg, Phos in am  12. SAT and SBT trial in am  13. C/w present management  14. CXR in am                                                                                                Discussed with the nursing and more than 30 minutes of CC time spent on the patient  No follow-ups on file.     Electronically signed by Lashonda Middleton MD on 8/2/2020 at 11:46 AM

## 2020-08-03 NOTE — PROGRESS NOTES
[address] [facility]  consulted by Dr. Deepika Thorpe for monitoring and adjustment. Indication for treatment: Sepsis, possible pneumonia & post-cardiac arrest  Goal trough: 15 mcg/mL     Pertinent Laboratory Values:   Temp Readings from Last 3 Encounters:   08/03/20 99.7 °F (37.6 °C) (Rectal)   07/25/20 98.2 °F (36.8 °C) (Axillary)   06/26/19 98.2 °F (36.8 °C) (Oral)     Recent Labs     08/01/20  0540 08/02/20  0530 08/03/20  0530   WBC 12.2* 9.2 11.3*     Recent Labs     08/01/20  1350 08/02/20  0530 08/03/20  0530   BUN 22 23 24*   CREATININE 1.3* 1.3* 1.3*     Estimated Creatinine Clearance: 41 mL/min (A) (based on SCr of 1.3 mg/dL (H)). Intake/Output Summary (Last 24 hours) at 8/3/2020 1117  Last data filed at 8/3/2020 0900  Gross per 24 hour   Intake 4725.73 ml   Output 3650 ml   Net 1075.73 ml       Pertinent Cultures:  Date                             Source                                     Results  7/26/20                        Blood                                       NGTD  7/26/20                        Covid-19                                  Negative  7/26/20                        Respiratory Panel                   Negative  7/30/20    MRSA nasal      Positive    Vancomycin level:   RANDOM:    Recent Labs     08/02/20  0530   VANCORANDOM 15.1       Assessment:  · WBC and temperature: slight leukocytosis @ 11.3;  Tmax = 100.4F.  · SCr, BUN, and urine output:   · SHER, Scr remains steady @1.3  · UOP improved  · Nasal MRSA screen positive  · Day(s) of therapy: #8  · Vancomycin level:   · 7/31 - 15.6, appropriate to re-dose (56h post-dose)  · 8/2 - 15.1 (42 hour level, ok to re-dose)    Plan:  · Continue intermittent dosing based on levels given SHER  · Last dose of vancomycin 1500 mg x1 given yesterday  · No vancomycin is due today  · Repeat random level ordered for tomorrow AM  · Pharmacy will continue to monitor patient and adjust therapy as indicated    REPEAT VANCOMYCIN RANDOM SCHEDULED FOR 8/4 @ 0600    Thank you for the consult,  Darryn Baker, [facility]

## 2020-08-03 NOTE — PROGRESS NOTES
Pulmonary and Critical Care  Progress Note      VITALS:  BP (!) 116/47   Pulse 79   Temp 99.7 °F (37.6 °C) (Rectal)   Resp 19   Ht 5' (1.524 m)   Wt 208 lb 1.8 oz (94.4 kg)   SpO2 99%   BMI 40.64 kg/m²     Subjective:   CHIEF COMPLAINT :SOB     HPI:                The patient is a 79 y.o. female with significant past medical history of CHF, DM, HTN, hypothyroidism,15 pk yr smoker quit 20 years ago, Morbid obesity, SADI  presents with complaints of SOB getting worse. EMS was called and had a witness out of hospital Cardiac arrest.She was Intubated in [place]. She had CPR with ROSC in 5 minutes. She was found to have  Suspected pneumonia. She is being treated with ABX. She had worsening LE edema. She had a EF of 55% and moderate Aortic stenosis. She had Pulmonary edema. She is on Lasix drip.she has good urine output. She is sedated now. Her PBNPT is 21,085 and troponin mildly elevated.       Objective:   PHYSICAL EXAM:    LUNGS:Bilateral basal crackles  Abd-soft, BS+,NT  Ext - no pedal edema  CVS-s1s2,no murmurs      DATA:    CBC:  Recent Labs     08/01/20  0540 08/02/20  0530 08/02/20 1652 08/03/20  0530   WBC 12.2* 9.2  --  11.3*   RBC 2.94* 2.62*  --  2.87*   HGB 7.5* 6.7* 7.8* 7.8*   HCT 24.8* 23.0* 24.8* 25.1*    223  --  255   MCV 84.4 87.8  --  87.5   MCH 25.5* 25.6*  --  27.2   MCHC 30.2* 29.1*  --  31.1*   RDW 16.0* 16.3*  --  16.2*   SEGSPCT 72.1* 74.3*  --  73.5*      BMP:  Recent Labs     08/01/20  1350 08/02/20  0530 08/02/20  1652 08/02/20 2230 08/03/20  0530    136  --   --  134*   K 3.6 3.4* 3.3* 3.6 3.4*   CL 90* 90*  --   --  87*   CO2 34* 37*  --   --  34*   BUN 22 23  --   --  24*   CREATININE 1.3* 1.3*  --   --  1.3*   CALCIUM 8.2* 7.9*  --   --  8.4   GLUCOSE 128* 267*  --   --  358*      ABG:  Recent Labs     08/01/20  1500 08/02/20  0600 08/03/20  0600   PH 7.56* 7.54* 7.47*   PO2ART 74* 84 91   OJU5VCI 44.0 47.0* 51.0*   O2SAT 96.0 95.5* 94.4*     BNP  Lab Results   Component

## 2020-08-03 NOTE — PROGRESS NOTES
Comprehensive Nutrition Assessment    Type and Reason for Visit:  Reassess    Nutrition Recommendations/Plan:   D/c EN, will closely monitor Magnesium, Phosphorus, Potassium levels and restart EN once lytes are wnl    Nutrition Assessment:  pt remains on vent, concern for refeeding syndrome, sent message via [employer] to attending physician regarding low electrolyte levels, +levophed    Malnutrition Assessment:  Malnutrition Status: At risk for malnutrition (Comment)    Context:  Acute Illness     Findings of the 6 clinical characteristics of malnutrition:    Estimated Daily Nutrient Needs:  Energy (kcal): 1322(14 kcal/kg ABW hypocaloric feeds); Weight Used for Energy Requirements:  Current     Protein (g):  91(2.0 g/kg IBW);    Weight Used for Protein Requirements:  Ideal          Nutrition Related Findings:   Labs: K+ 3.4, Phos 1.8, Mag 1.6, Glucose 337      Wounds:  Stage II, Pressure Ulcer       Current Nutrition Therapies:    No diet orders on file    Anthropometric Measures:  · Height: 5' (152.4 cm)  · Current Body Weight: 208 lb 1.8 oz (94.4 kg)   · Admission Body Weight: 257 lb (116.6 kg)    · Usual Body Weight: 232 lb (105.2 kg)     · Ideal Body Weight: 100 lbs;  ·  % Ideal Body Weight 208.1 %   · BMI: 40.6  · Adjusted Body Weight:  ; No Adjustment   · BMI Categories: Obese Class 3 (BMI 40.0 or greater)       Nutrition Diagnosis:   · Inadequate oral intake related to impaired respiratory funtion as evidenced by NPO or clear liquid status due to medical condition, intubation    Nutrition Interventions:   Food and/or Nutrient Delivery:  Discontinue Tube Feeding  Nutrition Education/Counseling:  No recommendation at this time   Coordination of Nutrition Care:  Continued Inpatient Monitoring    Goals:  pt will be able to restart EN within the next 24 hr       Nutrition Monitoring and Evaluation:      Food/Nutrient Intake Outcomes:  Enteral Nutrition Intake/Tolerance  Physical Signs/Symptoms Outcomes: Biochemical Data, GI Status, Hemodynamic Status, Weight     Discharge Planning:     Too soon to determine     Electronically signed by Nicole Benz, MS, RD, LD on 8/3/20 at 1:58 PM EDT    Contact: [phone]

## 2020-08-03 NOTE — PROGRESS NOTES
Palliative Care RN note: [facility] aware of the consult and are following from [place]. The patient's sister Cesilia Appiah spoke with Dr. Saw Tapia and did not want contact with hospice today. Jennifer Choi is aware.

## 2020-08-03 NOTE — PLAN OF CARE
Problem: Falls - Risk of:  Goal: Will remain free from falls  Description: Will remain free from falls  8/3/2020 1129 by Marjean Schwab, RN  Outcome: Ongoing  8/3/2020 0421 by Tristen Brown RN  Outcome: Ongoing  Goal: Absence of physical injury  Description: Absence of physical injury  8/3/2020 1129 by Marjean Schwab, RN  Outcome: Ongoing  8/3/2020 0421 by Tristen Brown RN  Outcome: Ongoing     Problem: Skin Integrity:  Goal: Will show no infection signs and symptoms  Description: Will show no infection signs and symptoms  8/3/2020 1129 by Marjean Schwab, RN  Outcome: Ongoing  8/3/2020 0421 by Tristen Brown RN  Outcome: Ongoing  Goal: Absence of new skin breakdown  Description: Absence of new skin breakdown  8/3/2020 1129 by Marjean Schwab, RN  Outcome: Ongoing  8/3/2020 0421 by Tristen Brown RN  Outcome: Ongoing     Problem: Restraint Use - Nonviolent/Non-Self-Destructive Behavior:  Goal: Absence of restraint indications  Description: Absence of restraint indications  8/3/2020 1129 by Marjean Schwab, RN  Outcome: Ongoing  8/3/2020 0421 by Tristen Brown RN  Outcome: Ongoing  Goal: Absence of restraint-related injury  Description: Absence of restraint-related injury  8/3/2020 1129 by Marjean Schwab, RN  Outcome: Ongoing  8/3/2020 0421 by Tristen Brown RN  Outcome: Ongoing     Problem: Nutrition  Goal: Optimal nutrition therapy  8/3/2020 1129 by Marjean Schwab, RN  Outcome: Ongoing  8/3/2020 0421 by Tristen Brown RN  Outcome: Ongoing

## 2020-08-03 NOTE — CONSULTS
Palliative Medicine Consultation    Reason for Consult:      _X____ Advance Care Planning  _X____Transition of Care Planning  _X____ Psychosocial Support  _____ Symptom Management:     Recommendations:  See impression for details    1. Continue with the excellent care of this patient status post cardiac arrest.  2.  Change code status to [facility]  3. Discussed potential compassionate extubation if patient is unable to be weaned from the vent. Requesting Physician:  Dr. Iwona Malin:  Cardiac arrest    History Obtained From:  family member - sister, Siva Chew, electronic medical record    HISTORY OF PRESENT ILLNESS:    79year old female who presented to the ED via EMS following a cardiac arrest.  EMS had been called to the patient's home for shortness of breath. While the patient was giving herself a breathing treatment she went into cardiac arest.  She was given a round of Epinephrine and CPR with return of spontaneous circulation. The patient was intubated in the ED for airway protection. The patient has a history of coronary artery disease wtih more than 80% obstruction of one of her diagonal branches however it was a small vessel no intervention was done, moderate aortic stenosis, diabetes mellitus type 2, and chronic diastolic CHF. The patient has been off sedation for at least 5 days now and is still not responsive. Her EEG showed significant slowing and her head CT was normal.  Dr. Steph Luevano had ordered another head CT. Palliative Care was asked to see the patient for goals of care and family support. Past Medical History:        Diagnosis Date    Anxiety     Arthritis     bilats hands, right shoulder    Atrial fibrillation [facility])     Cardioversion @ [facility] - no trouble with it ever since.  Sees Dr. Vish Gaspar Atrial fibrillation with RVR [facility]) 2013    Atrial fibrillation with RVR ([facility] 11/26/2012    Atrial fibrillation with RVR ([facility] 3/1/2013    Cancer ([facility]     skin (right Date    BACK SURGERY  1998    herniated disc - no hardware    CATARACT REMOVAL WITH IMPLANT Bilateral 2000s    CHOLECYSTECTOMY  [id number]    laproscopic with liver biopsy    DIAGNOSTIC CARDIAC CATH LAB PROCEDURE  2000s    no stents (mimimal blockage)    HYSTERECTOMY  2000    total    LIVER BIOPSY  9/14/2015    SHOULDER SURGERY Right 1960    fracture surgery-screw at humeral head    TONSILLECTOMY  1966    TONSILLECTOMY AND ADENOIDECTOMY  1966       Current Medications:    Current Facility-Administered Medications: calcium gluconate 2 g in dextrose 5 % 100 mL IVPB, 2 g, Intravenous, Once  insulin glargine (LANTUS) injection vial 40 Units, 40 Units, Subcutaneous, Nightly  insulin regular (HUMULIN R;NOVOLIN R) injection 20 Units, 20 Units, Subcutaneous, 3 times per day  enoxaparin (LOVENOX) injection 40 mg, 40 mg, Subcutaneous, Daily  sodium phosphate 15.03 mmol in dextrose 5 % 250 mL IVPB, 0.16 mmol/kg, Intravenous, PRN **OR** sodium phosphate 30.09 mmol in dextrose 5 % 500 mL IVPB, 0.32 mmol/kg, Intravenous, PRN  albumin human 25 % IV solution 25 g, 25 g, Intravenous, Q8H  insulin regular (HUMULIN R;NOVOLIN R) injection 0-12 Units, 0-12 Units, Subcutaneous, Q6H  potassium chloride 20 mEq/50 mL IVPB (Central Line), 20 mEq, Intravenous, PRN  magnesium sulfate 1 g in dextrose 5% 100 mL IVPB, 1 g, Intravenous, PRN  furosemide (LASIX) 100 mg in dextrose 5 % 100 mL infusion, 5 mg/hr, Intravenous, Continuous  levothyroxine (SYNTHROID) tablet 50 mcg, 50 mcg, Oral, Daily  piperacillin-tazobactam (ZOSYN) 3.375 g in dextrose 5 % 50 mL IVPB extended infusion (mini-bag), 3.375 g, Intravenous, Q8H  levETIRAcetam (KEPPRA) 1,000 mg in sodium chloride 0.9 % 100 mL IVPB, 1,000 mg, Intravenous, Q12H  vancomycin (VANCOCIN) intermittent dosing (placeholder), , Other, RX Placeholder  miconazole (MICOTIN) 2 % powder, , Topical, BID  sodium chloride flush 0.9 % injection 10 mL, 10 mL, Intravenous, 2 times per day  sodium chloride flush 0.9 % injection 10 mL, 10 mL, Intravenous, PRN  acetaminophen (TYLENOL) tablet 650 mg, 650 mg, Oral, Q6H PRN **OR** acetaminophen (TYLENOL) suppository 650 mg, 650 mg, Rectal, Q6H PRN  polyethylene glycol (GLYCOLAX) packet 17 g, 17 g, Oral, Daily PRN  promethazine (PHENERGAN) tablet 12.5 mg, 12.5 mg, Oral, Q6H PRN **OR** ondansetron (ZOFRAN) injection 4 mg, 4 mg, Intravenous, Q6H PRN  atorvastatin (LIPITOR) tablet 20 mg, 20 mg, Oral, Daily  aspirin chewable tablet 81 mg, 81 mg, Per NG tube, Daily  lansoprazole suspension SUSP 30 mg, 30 mg, Per NG tube, QAM AC  glucose (GLUTOSE) 40 % oral gel 15 g, 15 g, Oral, PRN  dextrose 50 % IV solution, 12.5 g, Intravenous, PRN  glucagon (rDNA) injection 1 mg, 1 mg, Intramuscular, PRN  dextrose 5 % solution, 100 mL/hr, Intravenous, PRN  norepinephrine (LEVOPHED) 16 mg in dextrose 5% 250 mL infusion, 2 mcg/min, Intravenous, Continuous  chlorhexidine (PERIDEX) 0.12 % solution 15 mL, 15 mL, Mouth/Throat, BID  albuterol sulfate  (90 Base) MCG/ACT inhaler 4 puff, 4 puff, Inhalation, Q4H PRN    Allergies:  Latex; Adhesive tape; and Codeine    Social History:    Lives with her niece. .   No children    Family History:       Problem Relation Age of Onset    Arthritis Mother     Depression Mother     Emphysema Mother     Dementia Mother     Arthritis Father     Cancer Father         prostate    Vision Loss Father     Other Father         brain aneurysm    Arthritis Sister     Depression Sister     Anxiety Disorder Sister     Arthritis Brother     Cancer Brother         eye    Hearing Loss Brother     High Blood Pressure Brother        REVIEW OF SYSTEMS:    Review of systems not obtained due to patient factors - intubation    Vitals:    Vitals:    08/03/20 1111   BP:    Pulse: 79   Resp: 19   Temp:    SpO2: 99%       PHYSICAL EXAM:    GENERAL: intubated and obtunded  HEENT: No cervical adenopathy, MM moist, PERRL  COR: RRR  LUNGS: diminished breath sounds bilaterally  ABD: soft, ND/NT, +BS  SKIN: scattered eccymosis  PSYCH: obtunded  NEURO: CN II-XII grossly intact    DATA:    CBC:   Lab Results   Component Value Date    WBC 11.3 2020    RBC 2.87 2020    HGB 7.8 2020    HCT 25.1 2020    MCV 87.5 2020    MCH 27.2 2020    MCHC 31.1 2020    RDW 16.2 2020     2020    MPV 10.4 2020     CMP:    Lab Results   Component Value Date     2020    K 3.4 2020    CL 87 2020    CO2 34 2020    BUN 24 2020    CREATININE 1.3 2020    GFRAA 49 2020    AGRATIO 1.3 2016    LABGLOM 40 2020    GLUCOSE 358 2020    PROT 6.9 2020    PROT 6.7 2013    LABALBU 3.0 2020    LABALBU 74 2018    CALCIUM 8.4 2020    BILITOT 0.3 2020    ALKPHOS 125 2020    AST 23 2020    ALT 11 2020     Albumin:    Lab Results   Component Value Date    LABALBU 3.0 2020    LABALBU 74 2018     ECHO: 2020  Summary   Left ventricular systolic function is abnormal.   Ejection fraction is visually estimated at 30%. Grade III diastolic dysfunction. Severe aortic stenosis (DONNA: 0.62 cm sq, mean P mmHg). Moderate mitral valve stenosis (mean P mmHg). Mild tricuspid regurgitation is present. RVSP is 36 mmHg. No evidence of any pericardial effusion. Pleural effusion present bilaterally. CT Head: 2020  No acute intracranial abnormality.         Paranasal sinus disease and left mastoid effusions as above. IMPRESSION:    79year old female with cardiac arrest and now obtunded for Palliative Care encounter. I spoke with the patient's sister, Keisha Matute, who is her decision maker as the patient is  and had no children. Keisha Matute states that she believes the patient has a living will and DNR but she is a hoarder and they can not find the paperwork.   I asked Keisha Matute if she thought Neftaly Mathias would want to have CPR or defibrillation with an acute event and she did not. Cesilia Appiah would like to change the code status to [facility]. We did discuss the patient's neurologic status and that this may continue for some time, if not forever. I brought up compassionate extubation as Cesilia Appiah stated that her sister would not want a PEG or tracheostomy. Cesilia Appiah states that the family wants to wait a full 14 days before making a decision regarding compassionate extubation. We will continue to follow for goals of care and family support.         Electronically signed by Rosalia Spring MD on 8/3/2020 at 11:59 AM

## 2020-08-03 NOTE — PROGRESS NOTES
08/03/20 0502   Vent Information   Vent Type 980   Vent Mode AC/VC   Vt Ordered 450 mL   Rate Set 12 bmp   Peak Flow 35 L/min   Pressure Support 0 cmH20   FiO2  30 %   SpO2 97 %   SpO2/FiO2 ratio 323.33   Sensitivity 3   PEEP/CPAP 5   I Time/ I Time % 0 s   Humidification Source HME   Nitric Oxide/Epoprostenol In Use? No   Vent Patient Data   High Peep/I Pressure 0   Peak Inspiratory Pressure 40 cmH2O   Mean Airway Pressure 16 cmH20   Rate Measured 19 br/min   Vt Exhaled 268 mL   Minute Volume 7.67 Liters   I:E Ratio 1.00:1   Plateau Pressure 29 [id number]   Static Compliance 14 mL/cmH2O   Spontaneous Breathing Trial (SBT) RT Doc   Pulse 86   Alarm Settings   High Pressure Alarm 40 cmH2O   Delay Alarm 20 sec(s)   Low Minute Volume Alarm 4 L/min   Apnea (secs) 20 secs   High Respiratory Rate 40 br/min   Low Exhaled Vt  250 mL   Patient Observation   Observations RRT changed to the center   Non-Surgical Airway Endo Tracheal Tube   Placement Date/Time: 07/25/20 1516   Placed By:  In place on arrival to facility  Inserted by: [facility]  Airway Device: Endo Tracheal Tube  Size: 7  Placement Verified By[de-identified] Chest X-ray   Secured at 24 cm   Measured From [facility] By [employer] tube mariano   Site Condition Dry

## 2020-08-03 NOTE — PROGRESS NOTES
Josie Murphy is a 79 y.o. female patient intubated off sedation    Current Facility-Administered Medications   Medication Dose Route Frequency Provider Last Rate Last Dose    calcium gluconate 2 g in dextrose 5 % 100 mL IVPB  2 g Intravenous Once Benny Pak MD        enoxaparin (LOVENOX) injection 40 mg  40 mg Subcutaneous Daily Benny Pak MD   40 mg at 08/02/20 1013    sodium phosphate 30.09 mmol in dextrose 5 % 500 mL IVPB  0.32 mmol/kg Intravenous PRN Benny Pak MD        Or    sodium phosphate 15.03 mmol in dextrose 5 % 250 mL IVPB  0.16 mmol/kg Intravenous PRN Benny Pak MD   Stopped at 08/02/20 1735    insulin glargine (LANTUS) injection vial 30 Units  30 Units Subcutaneous Nightly Devora Luna MD   30 Units at 08/02/20 2046    insulin regular (HUMULIN R;NOVOLIN R) injection 10 Units  10 Units Subcutaneous 3 times per day eDvora Luna MD   10 Units at 08/03/20 0537    albumin human 25 % IV solution 25 g  25 g Intravenous Hebert Fleischer, MD   Stopped at 08/03/20 0524    insulin regular (HUMULIN R;NOVOLIN R) injection 0-12 Units  0-12 Units Subcutaneous Q6H Devora Luna MD   10 Units at 08/03/20 0536    potassium chloride 20 mEq/50 mL IVPB (Central Line)  20 mEq Intravenous PRLISA Pak MD 50 mL/hr at 08/03/20 0647 20 mEq at 08/03/20 0647    magnesium sulfate 1 g in dextrose 5% 100 mL IVPB  1 g Intravenous PRN Benny Pak  mL/hr at 08/03/20 0647 1 g at 08/03/20 0647    furosemide (LASIX) 100 mg in dextrose 5 % 100 mL infusion  5 mg/hr Intravenous Continuous Marques Sawant MD 5 mL/hr at 08/03/20 0414 5 mg/hr at 08/03/20 0414    levothyroxine (SYNTHROID) tablet 50 mcg  50 mcg Oral Daily Devora Luna MD   50 mcg at 08/03/20 0527    piperacillin-tazobactam (ZOSYN) 3.375 g in dextrose 5 % 50 mL IVPB extended infusion (mini-bag)  3.375 g Intravenous Jocelynn Murray MD   Stopped at 08/03/20 1944    levETIRAcetam (KEPPRA) 1,000 mg in sodium chloride  (90 Base) MCG/ACT inhaler 4 puff  4 puff Inhalation Q4H PRN Mariano Cook MD   4 puff at 07/30/20 7535     Allergies   Allergen Reactions    Latex Other (See Comments)     Blisters    Adhesive Tape      Paper tape ok to use    Codeine Nausea And Vomiting     Active Problems:    Acute respiratory failure (HCC)  Resolved Problems:    * No resolved hospital problems. *    Blood pressure (!) 119/59, pulse 83, temperature 100 °F (37.8 °C), temperature source Rectal, resp. rate 19, height 5' (1.524 m), weight 208 lb 1.8 oz (94.4 kg), SpO2 99 %, not currently breastfeeding. Subjective:  Symptoms:  Stable. Diet:  NPO. Objective:  General Appearance:  Comfortable. Vital signs: (most recent): Blood pressure (!) 119/59, pulse 83, temperature 100 °F (37.8 °C), temperature source Rectal, resp. rate 19, height 5' (1.524 m), weight 208 lb 1.8 oz (94.4 kg), SpO2 99 %, not currently breastfeeding. Vital signs are normal.  (Pressor). HEENT: Normal HEENT exam.    Lungs: There are decreased breath sounds. Heart: Normal rate. Positive for murmur. Abdomen: Abdomen is soft. Extremities: Decreased range of motion. Neurological: (Sedated). Pupils:  Pupils are equal. (Minimally reactive). Skin:  Warm.       Assessment & Plan  Septic shock with acute resp failure susptect gram neg pna  -CT chest opacities and fluid overload, No PE  -covid neg  -vanc and zosyn  -pressor  -bld cx neg, resp PCR neg,  strep and legionella neg  Acute on chronic CHF with AS and cardiac arrest  -EF 30% and grade 3  -IV lasix drip  SHER 2/2 ATN wit CKD 3  -resolving  DM  -SSI  PAF  -no AC due to hx fo Gi bleed  Severe PCM  Cardiac cirhosis  -suspect cardiac related  -LFT stable  -ammonia wnl  Hypokalemia, hypomag and hypophos  -replacement protocol as on lasix  AOCD  -monitor and transfuse < 7 and received 1PRBC yesterday  Twitching/possible seizure  -CT head, keppra IV  -EEG abnormal but no epileptiform waves  -stopped cefepime and neurontin as this can cause neurotoxicity  DVT prophylaxis  -lovenox      Mental status has not improved. Will repeat CT head.  Palliative care updated   Yamilet Bone MD  8/3/2020

## 2020-08-04 NOTE — PROGRESS NOTES
indicated    REPEAT VANCOMYCIN RANDOM SCHEDULED FOR 8/6 @ 0600    Thank you for the consult,  Carol Mitchell [facility]

## 2020-08-04 NOTE — PROGRESS NOTES
Progress Note( Dr. Henri Siemens)  8/3/2020  Subjective:   Admit Date: 7/25/2020  PCP: Aleksey Nash PA-C    Admitted For : Cardiac arrest but very soon was resuscitated intubated and placed on the ventilator    Consulted For: Better control of blood glucose    Interval History: No major changes in her mental or respiratory status    Patient is still sedated intubated and on ventilator  Patient is COVID negative so she was transferred to regular ICU  on  7/30 /2020  Patient was started on tube feeding and tolerating it well    Intake/Output Summary (Last 24 hours) at 8/3/2020 2156  Last data filed at 8/3/2020 2128  Gross per 24 hour   Intake 2926.3 ml   Output 3000 ml   Net -73.7 ml       DATA    CBC:   Recent Labs     08/01/20  0540 08/02/20  0530 08/02/20  1652 08/03/20  0530   WBC 12.2* 9.2  --  11.3*   HGB 7.5* 6.7* 7.8* 7.8*    223  --  255    CMP:  Recent Labs     08/01/20  1350 08/02/20  0530 08/02/20  1652 08/02/20  2230 08/03/20  0530    136  --   --  134*   K 3.6 3.4* 3.3* 3.6 3.4*   CL 90* 90*  --   --  87*   CO2 34* 37*  --   --  34*   BUN 22 23  --   --  24*   CREATININE 1.3* 1.3*  --   --  1.3*   CALCIUM 8.2* 7.9*  --   --  8.4     Lipids:   Lab Results   Component Value Date    CHOL 122 06/20/2019    CHOL 205 03/17/2016    HDL 51 06/20/2019    TRIG 150 06/20/2019     Glucose:  Recent Labs     08/03/20  1153 08/03/20  1649 08/03/20 2117   POCGLU 337* 302* 154*     [id number]:  Lab Results   Component Value Date    LABA1C 12.0 07/26/2020     High Sensitivity TSH:   Lab Results   Component Value Date    TSHHS 4.730 06/20/2019     Free T3:   Lab Results   Component Value Date    FT3 2.9 03/17/2016     Free T4:  Lab Results   Component Value Date    T4FREE 1.04 06/18/2019       Xr Chest Portable    Result Date: 7/26/2020  EXAMINATION: ONE XRAY VIEW OF THE CHEST 7/26/2020 4:49 am COMPARISON: July 25, 2020 HISTORY: ORDERING SYSTEM PROVIDED HISTORY: Vent management T    Patchy airspace opacities Topical BID    sodium chloride flush  10 mL Intravenous 2 times per day    atorvastatin  20 mg Oral Daily    aspirin  81 mg Per NG tube Daily    lansoprazole  30 mg Per NG tube QAM AC    chlorhexidine  15 mL Mouth/Throat BID      Infusions:    furosemide (LASIX) 1mg/ml infusion 5 mg/hr (08/03/20 5095)    dextrose      norepinephrine 8 mcg/min (08/03/20 8252)         Objective:   Vitals: BP (!) 119/50   Pulse 80   Temp 99.3 °F (37.4 °C) (Rectal)   Resp 15   Ht 5' (1.524 m)   Wt 208 lb 1.8 oz (94.4 kg)   SpO2 98%   BMI 40.64 kg/m²   General appearance: Patient is sedated intubated on ventilator  Neck: no JVD or bruit  Thyroid : Normal lobes   Lungs: Has Vesicular Breath sounds intubated and on ventilator  Heart:  regular rate and rhythm  Abdomen: soft, non-tender; bowel sounds normal; no masses,  no organomegaly  Musculoskeletal: Normal  Extremities: extremities normal, , no edema  Neurologic: Patient is sedated intubated and on ventilator.     Assessment:     Patient Active Problem List:     HTN (hypertension)     COPD (chronic obstructive pulmonary disease) ([facility] [address]     Anxiety and depression     DM type 2, uncontrolled, with retinopathy ([facility] [address]     Hypokalemia     Hyperlipidemia     Precordial pain     Hyperglycemia     Axillary abscess     Light headed     Orthostatic hypotension     Hypertriglyceridemia     CCC (chronic calculous cholecystitis)     Cirrhosis of liver without ascites (HCC)     Mild obstructive sleep apnea     Idiopathic progressive neuropathy     Congestive heart failure (CHF) (HCC)     Type 2 diabetes mellitus (HCC)     Hypothyroidism     Depression     Hypertension     CHF (congestive heart failure), NYHA class I, acute on chronic, diastolic (HCC)     ACS (acute coronary syndrome) (HCC)     Acute coronary syndrome (HCC)     Diastolic dysfunction with acute on chronic heart failure (HCC)     NSTEMI (non-ST elevated myocardial infarction) (HCC)     SADI (obstructive sleep apnea) Morbid obesity (HCC)     VHD (valvular heart disease)     Excessive daytime sleepiness     Ex-smoker     Acute respiratory failure ([facility] [address]      Plan:     1. Reviewed POC blood glucose . Labs and X ray results   2. Reviewed Current Medicines   3. Started on schedule and correction bolus regular insulin and basal Lantus Insulin regime /   4. Monitor Blood glucose frequently   5. Modified  the dose of Insulin/ other medicines as needed  6. Started on tube feeding if able to control her blood pressure without pressor  7. Will follow     .      Clarita Aggarwal MD

## 2020-08-04 NOTE — PROGRESS NOTES
[facility] updated to plan of care, including CT, MRI, EEG and NM blood flow study. Will only follow if blood flow study indicates brain death. Will contact tomorrow with results of NM study.

## 2020-08-04 NOTE — PLAN OF CARE
Shauna Asif RN  Outcome: Ongoing  8/3/2020 1129 by Shauna Asif RN  Outcome: Ongoing     Problem: Nutrition  Goal: Optimal nutrition therapy  8/3/2020 2211 by Kaushal Fraga RN  Outcome: Ongoing  8/3/2020 1129 by Shauna Asif RN  Outcome: Ongoing  8/3/2020 1129 by Shauna Asif RN  Outcome: Ongoing

## 2020-08-04 NOTE — PROGRESS NOTES
NEUROLOGY NOTE  DR. Jeb Ibarra MD.  -------------------------------------------------  Subjective:    Pt has not improved over the last week    Obtunded no response to verbal commands    No seizure activity noted    Objective:    BP (!) 112/45   Pulse 81   Temp 99.3 °F (37.4 °C) (Rectal)   Resp 15   Ht 5' (1.524 m)   Wt 208 lb 1.8 oz (94.4 kg)   SpO2 100%   BMI 40.64 kg/m²   HEENT nl      Neuro exam    Obtunded no response to verbal commands,  Pupils 3 mm lynnette  Sluggish gag reflex  Minimal withdrawal to painful stimulii  Equivocal toes  resp on the vent      RADIOLOGY  -----------------    Ct Head Wo Contrast    Result Date: 7/31/2020  EXAMINATION: CT OF THE HEAD WITHOUT CONTRAST  7/31/2020 1:18 am TECHNIQUE: CT of the head was performed without the administration of intravenous contrast. Dose modulation, iterative reconstruction, and/or weight based adjustment of the mA/kV was utilized to reduce the radiation dose to as low as reasonably achievable. COMPARISON: None HISTORY: ORDERING SYSTEM PROVIDED HISTORY: Twitching of eyes/suspected seizure/rule out acute intracranial process TECHNOLOGIST PROVIDED HISTORY: Reason for exam:->Twitching of eyes/suspected seizure/rule out acute intracranial process Has a \"code stroke\" or \"stroke alert\" been called? ->No Reason for Exam: rule out acute process FINDINGS: BRAIN/VENTRICLES: There is no acute intracranial hemorrhage, mass effect or midline shift. No abnormal extra-axial fluid collection. The gray-white differentiation is maintained without evidence of an acute infarct. There is no evidence of hydrocephalus. ORBITS: The visualized portion of the orbits demonstrate no acute abnormality. SINUSES: Mild mucoperiosteal thickening to frontal ethmoidal recess of left frontal sinus. Small air-fluid level in left sphenoid sinus. Other paranasal sinuses appear to be clear. Opacification/partial opacification to a few left-sided mastoid air cells.   Right-sided mastoid air cells appear clear. Bilateral middle ears appear clear. SOFT TISSUES/SKULL:  No acute abnormality of the visualized skull or soft tissues. No acute intracranial abnormality. Paranasal sinus disease and left mastoid effusions as above. Xr Chest Portable    Result Date: 8/1/2020  EXAMINATION: ONE XRAY VIEW OF THE CHEST 7/29/2020 4:55 am COMPARISON: Yesterday HISTORY: ORDERING SYSTEM PROVIDED HISTORY: Vent management TECHNOLOGIST PROVIDED HISTORY: Reason for exam:->Vent management Reason for Exam: vent Acuity: Acute Type of Exam: Subsequent/Follow-up FINDINGS: Endotracheal tube terminates 3 cm above the elijah. Right internal jugular catheter with tip at the cavoatrial junction. Enteric tube courses below the diaphragm. Findings of pulmonary edema with pulmonary vascular indistinctness. No pneumothorax. Small bilateral pleural effusions. Low lung volumes exaggerate the pulmonary vasculature. Scattered perihilar and basilar linear atelectasis. No pneumothorax. 1.  Satisfactory position of support devices. 2. Low lung volumes exaggerating the pulmonary finding since yesterday's exam.  Essentially stable pulmonary edema, small pleural effusions, and linear atelectasis. Xr Chest Portable    Result Date: 8/1/2020  EXAMINATION: ONE XRAY VIEW OF THE CHEST 8/1/2020 5:06 am COMPARISON: 07/31/2020 HISTORY: ORDERING SYSTEM PROVIDED HISTORY: Vent management TECHNOLOGIST PROVIDED HISTORY: Reason for exam:->Vent management Reason for Exam: Vent management Acuity: Acute Type of Exam: Ongoing FINDINGS: The right internal jugular catheter tip is in the superior vena cava. The endotracheal tube tip is 2 cm above the elijah. The enteric tube is below the diaphragm. There are moderate pleural effusions. Basilar lung opacities could represent atelectasis. Worsening moderate pleural effusions and atelectasis.      Xr Chest Portable    Result Date: 7/31/2020  EXAMINATION: ONE XRAY VIEW OF THE CHEST 7/31/2020 5:41 am COMPARISON: 07/30/2020 HISTORY: ORDERING SYSTEM PROVIDED HISTORY: Vent management TECHNOLOGIST PROVIDED HISTORY: Reason for exam:->Vent management Reason for Exam: vent Acuity: Acute Type of Exam: Subsequent/Follow-up FINDINGS: The endotracheal tube, nasogastric tube and right IJ catheter remain. The heart size is upper normal.  A pneumothorax is not identified. Perihilar atelectasis has improved. A right-sided pleural effusion is noted, possibly decreased in size. Central vascular congestion has improved. No change in life support. Improved perihilar atelectasis. Central vascular congestion has improved. Right-sided pleural effusion is either decreased in size or redistributed. Xr Chest Portable    Result Date: 7/30/2020  EXAMINATION: ONE XRAY VIEW OF THE CHEST 7/30/2020 4:01 am COMPARISON: Chest radiograph performed July 29, 2020. HISTORY: ORDERING SYSTEM PROVIDED HISTORY: Vent management TECHNOLOGIST PROVIDED HISTORY: Reason for exam:->Vent management Reason for Exam: Vent management Acuity: Unknown Type of Exam: Subsequent/Follow-up Additional signs and symptoms: Vent management Relevant Medical/Surgical History: Vent management FINDINGS: Endotracheal tube tip terminates approximately 2.3 cm from the elijah. Right IJ CVC tip is in stable positioning. Gastric tube descends below the level of the diaphragm. There are increasing bilateral central airspace opacities, predominantly on the right. There is prominence of the pulmonary vasculature bilaterally. Interval increase in size of a small to moderate right pleural effusion. Grossly stable small left pleural effusion. Stable cardiac silhouette. 1. Increasing bilateral central airspace opacities compatible with pulmonary edema with prominence of the pulmonary vasculature. 2. Interval increase in size of a small to moderate right pleural effusion and stable small left pleural effusion.   Findings may be exacerbated by patient positioning. Xr Chest Portable    Result Date: 7/28/2020  EXAMINATION: ONE XRAY VIEW OF THE CHEST 7/28/2020 5:00 am COMPARISON: 07/27/2020 HISTORY: ORDERING SYSTEM PROVIDED HISTORY: Vent management TECHNOLOGIST PROVIDED HISTORY: Reason for exam:->Vent management Reason for Exam: vent Acuity: Acute Type of Exam: Subsequent/Follow-up FINDINGS: Endotracheal tube tip is 3 cm above the elijah. Enteric tube goes below the level of the diaphragm and out of the field of view. Right IJ CVC tip overlies the superior cavoatrial junction. The heart is stable in size. No measurable pneumothorax. Airspace opacities in the mid and lower lungs along with bilateral pleural effusions have overall improved from the prior study. Overall improvement in the bilateral airspace opacities in the mid and lower lungs as well as the pleural effusions. Xr Chest Portable    Result Date: 7/27/2020  EXAMINATION: ONE XRAY VIEW OF THE CHEST 7/27/2020 5:29 am COMPARISON: Yesterday HISTORY: ORDERING SYSTEM PROVIDED HISTORY: Hypoxia and congestion TECHNOLOGIST PROVIDED HISTORY: Reason for exam:->Hypoxia and congestion Reason for Exam: Hypoxia and congestion Acuity: Acute Type of Exam: Ongoing FINDINGS: Endotracheal tube terminates 1.5 cm from the elijah. Enteric tube courses below the diaphragm. Right internal jugular catheter with tip extending to the cavoatrial junction. Multiple monitor wires overlie the patient. No pneumothorax. Small-moderate stable bilateral pleural effusions and bibasilar atelectasis. Pulmonary vascular indistinctness and perihilar opacities. Stable mild cardiomegaly. Satisfactory position of support devices. Stable lung aeration with findings favoring pulmonary edema. Associated small-moderate pleural effusions and bibasilar atelectasis.      Xr Chest Portable    Result Date: 7/26/2020  EXAMINATION: ONE XRAY VIEW OF THE CHEST 7/26/2020 4:49 am COMPARISON: July 25, 2020 HISTORY: [id number] Type of Exam: Initial Additional signs and symptoms: tube placement FINDINGS: The cardiac silhouette is enlarged. Calcifications involving the aorta reflect atherosclerosis. The mediastinal and hilar silhouettes appear unremarkable. Near complete opacification right hemithorax in keeping with atelectasis/infiltrate and pleural effusion. Dense consolidation lower left chest obscuring the left hemidiaphragm reflecting consolidation and pleural effusion. Vascular engorgement and cephalization is demonstrated with bilateral peribronchial cuffing and perivascular haziness. No pneumothorax is seen. No acute osseous abnormality is identified. Endotracheal tube tip measures about 2.7 cm superior to the elijah. 1. Nonspecific pulmonary findings may be related to sequela from pulmonary edema, diffuse infection or ARDS. Pulmonary contusion or sequela during resuscitation (? aspiration) may contribute to these opacities as well. 2. Calcific atherosclerosis aorta. 3. Cardiomegaly. 4. Endotracheal tube tip measures about 2.7 cm superior to the elijah. Xr Chest 1 View    Result Date: 7/25/2020  EXAMINATION: ONE XRAY VIEW OF THE CHEST 7/25/2020 11:01 pm COMPARISON: Chest radiograph 07/25/2020 at 4:15 p.m. HISTORY: ORDERING SYSTEM PROVIDED HISTORY: et tube placement TECHNOLOGIST PROVIDED HISTORY: Reason for exam:->et tube placement FINDINGS: The cardiac silhouette is enlarged. Calcifications involving the aorta reflect atherosclerosis. The mediastinal and hilar silhouettes appear unremarkable. Dense consolidation bilateral lower lungs with bilateral pleural effusion, unchanged from chest radiograph performed earlier today. Vascular engorgement and cephalization is demonstrated with bilateral peribronchial cuffing and perivascular haziness. No pneumothorax is seen. No acute osseous abnormality is identified.  Endotracheal tube tip projects over the trachea, tip at the level of the aortic knob, 2.2 cm superior to the changes throughout the visualized spine. Shoulder osteoarthrosis. 1. No pulmonary embolism. 2. Findings of fluid overload with small to moderate pleural effusions, diffuse soft tissue edema, and upper abdominal ascites. 3. Consolidative opacities within the upper to mid lungs could represent pneumonia or atelectasis/scarring. 4. Cirrhosis. 5. Borderline cardiomegaly with severe atherosclerotic disease. Xr Abdomen For Ng/og/ne Tube Placement    Result Date: 7/26/2020  EXAMINATION: ONE SUPINE XRAY VIEW(S) OF THE ABDOMEN 7/26/2020 8:18 am COMPARISON: None HISTORY: ORDERING SYSTEM PROVIDED HISTORY: Confirmation of course of NG/OG/NE tube and location of tip of tube TECHNOLOGIST PROVIDED HISTORY: Reason for exam:->Confirmation of course of NG/OG/NE tube and location of tip of tube Portable? ->Yes Reason for Exam: ng location Acuity: Acute Type of Exam: Subsequent/Follow-up FINDINGS: Enteric tube is in place tip in the gastric antrum. The proximal side-port is in the gastric body. No bowel obstruction is suggested. NG tube tip in the gastric antrum with the proximal side-port in the gastric body.        LAB RESULTS  --------------------    Recent Results (from the past 24 hour(s))   POCT Glucose    Collection Time: 08/03/20 12:53 AM   Result Value Ref Range    POC Glucose 340 (H) 70 - 99 MG/DL   Basic Metabolic Panel    Collection Time: 08/03/20  5:30 AM   Result Value Ref Range    Sodium 134 (L) 135 - 145 MMOL/L    Potassium 3.4 (L) 3.5 - 5.1 MMOL/L    Chloride 87 (L) 99 - 110 mMol/L    CO2 34 (H) 21 - 32 MMOL/L    Anion Gap 13 4 - 16    BUN 24 (H) 6 - 23 MG/DL    CREATININE 1.3 (H) 0.6 - 1.1 MG/DL    Glucose 358 (H) 70 - 99 MG/DL    Calcium 8.4 8.3 - 10.6 MG/DL    GFR Non-African American 40 (L) >60 mL/min/1.73m2    GFR  49 (L) >60 mL/min/1.73m2   CBC auto differential    Collection Time: 08/03/20  5:30 AM   Result Value Ref Range    WBC 11.3 (H) 4.0 - 10.5 K/CU MM    RBC 2.87 (L) 4.2 - 5.4 POCT Glucose    Collection Time: 08/03/20  4:49 PM   Result Value Ref Range    POC Glucose 302 (H) 70 - 99 MG/DL   POCT Glucose    Collection Time: 08/03/20  9:17 PM   Result Value Ref Range    POC Glucose 154 (H) 70 - 99 MG/DL         Medical problems    Patient Active Problem List:     HTN (hypertension)     COPD (chronic obstructive pulmonary disease) ([facility])     Anxiety and depression     DM type 2, uncontrolled, with retinopathy ([address]     Hypokalemia     Hyperlipidemia     Precordial pain     Hyperglycemia     Axillary abscess     Light headed     Orthostatic hypotension     Hypertriglyceridemia     CCC (chronic calculous cholecystitis)     Cirrhosis of liver without ascites ([facility])     Mild obstructive sleep apnea     Idiopathic progressive neuropathy     Congestive heart failure (CHF) ([facility])     Type 2 diabetes mellitus ([facility])     Hypothyroidism     Depression     Hypertension     CHF (congestive heart failure), NYHA class I, acute on chronic, diastolic ([facility])     ACS (acute coronary syndrome) ([facility])     Acute coronary syndrome ([facility])     Diastolic dysfunction with acute on chronic heart failure ([facility])     NSTEMI (non-ST elevated myocardial infarction) ([facility])     SADI (obstructive sleep apnea)     Morbid obesity ([facility])     VHD (valvular heart disease)     Excessive daytime sleepiness     Ex-smoker     Acute respiratory failure ([facility])      ASSESSMENT:  ---------------------    Hypoxic encephalopathy     S/P cardiac arrest     Acute respiratory failure     seizures     Hypotension     Sepsis     Hx CHF     PLAN:     CT brain neg     EEG severe slowing     Keppra     Continue supportive care     Pt is not any better today.     Discussed plan of care with pts nurse.           Electronically signed by Allen Rosa MD on 8/3/2020 at 10:57 PM

## 2020-08-04 NOTE — PROGRESS NOTES
[address] [place] [address]  HOSPITALIST PROGRESS NOTE                       Name:  Efrain Ulrich Standard /Age/Sex: 1950  (79 y.o. female)   MRN & CSN:  [id number] & [id number] Admission Date/Time: 2020  8:40 PM   Location:  - Attending:  Kana Bianchi MD                                                  AMELIA Harmon is a 79 y.o. female who was admitted on -with out of hospital cardiac arrest    SUBJECTIVE  -lying in bed, on the vent, unresponsive with no sedation for the last 7 days per nurse. Grimaces to painful stimuli but otherwise no repsonses. ROS- unable to obtain as patient unresponsive        ALLERGIES:   Allergies   Allergen Reactions    Latex Other (See Comments)     Blisters    Adhesive Tape      Paper tape ok to use    Codeine Nausea And Vomiting       PCP: Young Mendez PA-C    PAST MEDICAL HISTORY, SURGICAL HISTORY, SOCIAL HISTORY and  HOME MEDICATIONS all reviewed. OBJECTIVE  Vitals:    20 0802 20 0902 20 1002 20 1113   BP: (!) 125/57 (!) 121/50 (!) 137/50    Pulse: 96 95 91 91   Resp: 14      Temp: 99.7 °F (37.6 °C)      TempSrc: Rectal      SpO2: 98% 97% 98% 98%   Weight:       Height:           PHYSICAL EXAM   GEN Unresponsive  EYES  sluggish pupillary responses  HENT Dry mucosa membrane  NECK Supple, no apparent thyromegaly or masses. RESP  unlabored respiration, decreased breath sounds  CARDIO/VASC S1/S2 auscultated. Regular rate without appreciable murmurs, rubs, or gallops. No JVD or carotid bruits  GI Abdomen is soft -exam limited by sedation  MSK -no spontaneous extremity movement. SKIN Normal coloration, warm, dry.   NEURO unresponsive    INTAKE: In: 780.4 [I.V.:350.4; NG/GT:90]  Out: 3300   OUTPUT: In: 780.4   Out: 3300 [Urine:3300]    LABS  Recent Labs     20  0530 20  1652 20  0530 20  0420   WBC 9.2  --  11.3* 14.5*   HGB 6.7* 7.8* 7.8* 8.2*   HCT 23.0* 24.8* 25.1* 26.6*     --  255 333 Recent Labs     08/02/20  0530  08/02/20  1850 08/02/20  2230 08/03/20  0530 08/04/20  0420     --   --   --  134* 136   K 3.4*   < >  --  3.6 3.4* 3.4*   CL 90*  --   --   --  87* 88*   CO2 37*  --   --   --  34* 33*   PHOS 1.9*  --  2.3*  --  1.8* 3.0   BUN 23  --   --   --  24* 25*   CREATININE 1.3*  --   --   --  1.3* 1.3*    < > = values in this interval not displayed. No results for input(s): AST, ALT, ALB, BILIDIR, BILITOT, ALKPHOS in the last 72 hours. No results for input(s): INR in the last 72 hours. No results for input(s): CKTOTAL, CKMB, CKMBINDEX, TROPONINT in the last 72 hours.        Abnormal labs for today noted      Imaging:     ECHO:    Microbiology:  Blood culture:    Urine culture:    Sputum culture:    Procedures done this admission:    MEDS  Scheduled Meds:   vancomycin  1,500 mg Intravenous Once    insulin glargine  40 Units Subcutaneous Nightly    insulin regular  20 Units Subcutaneous 3 times per day    enoxaparin  40 mg Subcutaneous Daily    insulin regular  0-12 Units Subcutaneous Q6H    levothyroxine  50 mcg Oral Daily    piperacillin-tazobactam  3.375 g Intravenous Q8H    levetiracetam  1,000 mg Intravenous Q12H    vancomycin (VANCOCIN) intermittent dosing (placeholder)   Other RX Placeholder    miconazole   Topical BID    sodium chloride flush  10 mL Intravenous 2 times per day    atorvastatin  20 mg Oral Daily    aspirin  81 mg Per NG tube Daily    lansoprazole  30 mg Per NG tube QAM AC    chlorhexidine  15 mL Mouth/Throat BID     Continuous Infusions:   furosemide (LASIX) 1mg/ml infusion 5 mg/hr (08/03/20 0414)    dextrose      norepinephrine 12 mcg/min (08/04/20 0609)     PRN Meds:sodium phosphate IVPB **OR** sodium phosphate IVPB, potassium chloride, magnesium sulfate, sodium chloride flush, acetaminophen **OR** acetaminophen, polyethylene glycol, promethazine **OR** ondansetron, glucose, dextrose, glucagon (rDNA), dextrose, albuterol sulfate HFA        ASSESSMENT and PLAN  Hospital Day: 11   1-Probable septic shock- on levophed- to be weaned as able. Likely due to suspected gram negative. On IV abx. Blood culture so far negative. 2-Acute hypoxic respiratory failure due to suspected gram negative pneumonia and acute systolic HF with features of volume overload- on lasix drip per pulm- no PE on CTA- improving CXR findings. 3- with acute resp failure susptect gram neg pna  4-Reported out of hospital cardiac arrest- of unclear etiology at this time  5-Probable anoxic encephalopathy from cardiac arrest- no acute abn on CT head x2, will get MRI, EEG reported with no epileptiform activity, getting repeat one today. Has been off sedation for last 7 days with no responsiveness  -reported seizure activity one time for which on keppra.   6-SHER on CKD stage 2-3 on lasix drip due to concerns of volume overload  7-Electrolyte abnormalities-replace prn  8-PAF- not on AC due to hx of GIB  9-Cirrhosis- ?cardiac related  10-DM- on ss  11-Severe PCM- TF briefly held yesterday- to be restarted per dietitian  12-Anemia of chronic disease- monitor and transfuse if less than 7      Needs ongoing goals of care discussion- noted palliative care on consult       Disp:     Diet No diet orders on file   DVT Prophylaxis [] Lovenox, []  Heparin, [] SCDs, [] Ambulation   GI Prophylaxis [] PPI,  [] H2 Blocker,  [] Carafate,  [] Diet/Tube Feeds   Code Status DNR-CCA   Disposition Patient requires continued admission due to acute respiratory failure   CMS Level of Risk [] Low, [x] Moderate,[]  High  Patient's risk as above due to acute respiratory failure     HARINDER ROBERTSON MD 8/4/2020 11:32 AM

## 2020-08-04 NOTE — PROGRESS NOTES
Dr. Lex Gandhi notified of evening blood sugar of 154. Informed that tube feeding was off x 24 hours  Instructed this nurse to hold evening dose of lantus, okay to give sliding scale insulin. This nurse will continue to monitor.   Kerry Ramey RN

## 2020-08-04 NOTE — PROGRESS NOTES
Brother, Beatrice Cohen called this nurse, inquiring if a second EEG had been done. Informed that no second EEG had been performed, but a second CT of the head was performed on 8/3/20. Gladis Trevino stated \"that him and his sister, Cliff Garber, would like to have a second EEG done to see if anything was there since she has been off the sedation. \"  This nurse will notify physician of family's wishes. This nurse will continue to monitor.   Marianne Rudd RN

## 2020-08-04 NOTE — PROGRESS NOTES
Comprehensive Nutrition Assessment    Type and Reason for Visit:  Reassess    Nutrition Recommendations/Plan:   Closely monitor Phosphorus, Magnesium, Potassium labs and replace PRN  Start tropic EN   Will closely monitor GI tolerance    Nutrition Assessment:  +vent, off sedation, +Lasix, +Levophed    Malnutrition Assessment:  Malnutrition Status: At risk for malnutrition (Comment)    Context:  Acute Illness       Estimated Daily Nutrient Needs:  Energy (kcal):  1322(14 kcal/kg ABW hypocaloric feeds); Weight Used for Energy Requirements:  Current     Protein (g):  91(2.0 g/kg IBW); Weight Used for Protein Requirements:  Ideal          Nutrition Related Findings:    Labs; K+ 3.4, Phos 3.0, Mag 1.9, Glucose 260      Wounds:  Stage II, Pressure Ulcer       Current Nutrition Therapies:    No diet orders on file    Anthropometric Measures:  · Height: 5' (152.4 cm)  · Current Body Weight: 198 lb 3.1 oz (89.9 kg)   · Admission Body Weight: 257 lb (116.6 kg)    · Usual Body Weight: 232 lb (105.2 kg)     · Ideal Body Weight: 100 lbs;   · % Ideal Body Weight 208.1 %   · BMI: 38.7  · Adjusted Body Weight:  ·  No Adjustment    · BMI Categories: Obese Class 2 (BMI 35.0 -39.9)       Nutrition Diagnosis:   · Inadequate oral intake related to impaired respiratory funtion as evidenced by NPO or clear liquid status due to medical condition, intubation    Nutrition Interventions:   Food and/or Nutrient Delivery:  Start Tube Feeding     Nutrition Education/Counseling:  No recommendation at this time     Coordination of Nutrition Care:  Continued Inpatient Monitoring    Goals:  pt will be able initiate EN within the next 24 hr       Nutrition Monitoring and Evaluation:   Food/Nutrient Intake Outcomes:  Enteral Nutrition Intake/Tolerance  Physical Signs/Symptoms Outcomes:  Biochemical Data, Weight, GI Status, Hemodynamic Status     Discharge Planning:     Too soon to determine     Electronically signed by Tyrone Hernandes, MS, RD, LD

## 2020-08-04 NOTE — PROGRESS NOTES
EEG completed. Caren Crowe notified that EEG was ordered by Dr. Chad Sandy and [facility] will read.  Thanks

## 2020-08-04 NOTE — PROGRESS NOTES
Pulmonary and Critical Care  Progress Note      VITALS:  BP (!) 137/50   Pulse 91   Temp 99.7 °F (37.6 °C) (Rectal)   Resp 14   Ht 5' (1.524 m)   Wt 198 lb 3.1 oz (89.9 kg)   SpO2 98%   BMI 38.71 kg/m²     Subjective:   CHIEF COMPLAINT :SOB     HPI:                The patient is a 79 y.o. female with significant past medical history of CHF, DM, HTN, hypothyroidism,15 pk yr smoker quit 20 years ago, Morbid obesity, SADI  presents with complaints of SOB getting worse. EMS was called and had a witness out of hospital Cardiac arrest.She was Intubated in [facility]. She had CPR with ROSC in 5 minutes. She was found to have  Suspected pneumonia. She is being treated with ABX. She had worsening LE edema. She had a EF of 55% and moderate Aortic stenosis. She had Pulmonary edema. She is on Lasix drip.she has good urine output. She is sedated now. Her PBNPT is 21,085 and troponin mildly elevated.      Objective:   PHYSICAL EXAM:    LUNGS:Bilateral basal crackles  Abd-soft, BS+,NT  Ext - no pedal edema  CVS-s1s2,no murmurs      DATA:    CBC:  Recent Labs     08/02/20  0530 08/02/20  1652 08/03/20 0530 08/04/20  0420   WBC 9.2  --  11.3* 14.5*   RBC 2.62*  --  2.87* 3.00*   HGB 6.7* 7.8* 7.8* 8.2*   HCT 23.0* 24.8* 25.1* 26.6*     --  255 333   MCV 87.8  --  87.5 88.7   MCH 25.6*  --  27.2 27.3   MCHC 29.1*  --  31.1* 30.8*   RDW 16.3*  --  16.2* 16.8*   SEGSPCT 74.3*  --  73.5*  --       BMP:  Recent Labs     08/02/20 0530  08/02/20 2230 08/03/20 0530 08/04/20  0420     --   --  134* 136   K 3.4*   < > 3.6 3.4* 3.4*   CL 90*  --   --  87* 88*   CO2 37*  --   --  34* 33*   BUN 23  --   --  24* 25*   CREATININE 1.3*  --   --  1.3* 1.3*   CALCIUM 7.9*  --   --  8.4 8.9   GLUCOSE 267*  --   --  358* 178*    < > = values in this interval not displayed.       ABG:  Recent Labs     08/02/20  0600 08/03/20  0600 08/04/20  0600   PH 7.54* 7.47* 7.48*   PO2ART 84 91 79   KTN1HBI 47.0* 51.0* 47.0*   O2SAT 95.5* 94.4* Hypertriglyceridemia 04/23/2014    Precordial pain 04/22/2014    Hyperglycemia 04/22/2014     Overview Note:     Admission blood glucose of 120; was >500 prior to presentation to ER, first accucheck in ER was 341.  Axillary abscess 04/22/2014     Overview Note:     Left      Hyperlipidemia     DM type 2, uncontrolled, with retinopathy ([facility] [address] 03/01/2013     Overview Note:     Patient's last A1c was 12.1, and she has been referred in the last month to endocrinology. She has an appointment this coming month with a specialist.      Hypokalemia 03/01/2013    HTN (hypertension) 11/26/2012    COPD (chronic obstructive pulmonary disease) ([facility] [address] 11/26/2012    Anxiety and depression 11/26/2012     Overview Note:     Patient is currently on venlafaxine 150 mg daily for depression and anxiety. She feels like it is not as affective as she would like currently. She does not feel suicidal and is sleeping ok but has decreased motivation and less happy days. Patient is on the vent and off sedation. She is just grimacing to painful stimuli.     Hypokalemia  Hypomagnesemia  Anemia - stable  Anemia  Acute on chronic Hypoxic hypercapneic resp failure  Chronic Metabolic alkalosis  Bilateral pleural effusions  VDRF  Leukocytosis - improved  Moderate AS  Morbid obesity  SADI  Smoker  Pulmonary edema  Out of Hospital Cardiac arrest  ? Aspiration pneumonia  Systolic CHF with EF of 91%  Grade III Diastolic dysfunction  Increased PH sec to the worsening metabolic alkalosis sec to Lasix  Moderate malnutriti       1. Tube feeds  2. Lasix  3. EEG  4. Kcl  5. Magnesium sulfate  6. I/O Chart  7. Prognosis guarded  8. SAT and SBT trial in am  9. Trach, PEG and LTAC eval  10. C/w present management  No follow-ups on file.     Electronically signed by Jayleen Leon MD on 8/4/2020 at 12:04 PM

## 2020-08-05 NOTE — PROGRESS NOTES
[address] [place] [address]  HOSPITALIST PROGRESS NOTE                       Name:  Sandra Day Standard /Age/Sex: 1950  (79 y.o. female)   MRN & CSN:  [id number] & [id number] Admission Date/Time: 2020  8:40 PM   Location:  - Attending:  Fadumo Connell MD                                                  AMELIA Dykes is a 79 y.o. female who was admitted on -with out of hospital cardiac arrest    SUBJECTIVE  Continues to be unresponsive- grimaces with pain, has gag reflex, back on levophed. On the vent as well. ROS- unable to obtain as patient unresponsive        ALLERGIES:   Allergies   Allergen Reactions    Latex Other (See Comments)     Blisters    Adhesive Tape      Paper tape ok to use    Codeine Nausea And Vomiting       PCP: Sanam Fuentes PA-C    PAST MEDICAL HISTORY, SURGICAL HISTORY, SOCIAL HISTORY and  HOME MEDICATIONS all reviewed. OBJECTIVE  Vitals:    20 0732 20 0800 20 0830 20 0919   BP: (!) 142/58  (!) 117/54    Pulse: 82 83 82    Resp:    15   Temp:    99.1 °F (37.3 °C)   TempSrc:    Rectal   SpO2: 99%  99%    Weight:       Height:           PHYSICAL EXAM   GEN Unresponsive  EYES  sluggish pupillary responses  HENT Dry mucosa membrane  NECK Supple, no apparent thyromegaly or masses. RESP  unlabored respiration, decreased breath sounds  CARDIO/VASC S1/S2 auscultated. Regular rate without appreciable murmurs, rubs, or gallops. No JVD or carotid bruits  GI Abdomen is soft -exam limited by sedation  MSK -no spontaneous extremity movement. SKIN Normal coloration, warm, dry.   NEURO unresponsive    INTAKE: In: 902.8 [I.V.:472.8; NG/GT:126]  Out: 2300   OUTPUT: In: 902.8   Out: 2300 [Urine:2300]    LABS  Recent Labs     20  0530 20  0420 20  0515   WBC 11.3* 14.5* 14.7*   HGB 7.8* 8.2* 8.5*   HCT 25.1* 26.6* 27.2*    333 411      Recent Labs     20  0530 20  0420  20  1945 20  0005 12   1-Probable septic shock- on levophed- to be weaned as able. Likely due to suspected gram negative. On IV abx. Blood culture so far negative. 2-Acute hypoxic respiratory failure due to suspected gram negative pneumonia and acute systolic HF with features of volume overload- on lasix drip per pulm- no PE on CTA- improving CXR findings. 3- with acute resp failure susptect gram neg pna- on iv abx  4-Reported out of hospital cardiac arrest- of unclear etiology at this time  5-Probable anoxic encephalopathy from cardiac arrest- no acute abn on CT head x2, will get MRI, EEG reported with no epileptiform activity, getting repeat one today. Has been off sedation for last 7 days with no responsiveness  -reported seizure activity one time for which on keppra. 6-SHER on CKD stage 2-3 on lasix drip due to concerns of volume overload  7-Electrolyte abnormalities-replace prn  8-PAF- not on AC due to hx of GIB  9-Cirrhosis- ?cardiac related  10-DM- on ss  11-Severe PCM- TF briefly held yesterday- to be restarted per dietitian  12-Anemia of chronic disease- monitor and transfuse if less than 7      -needs ongoing goals of care discussion. Tried to call sister today- apparently was in surgery- will try in am       Disp:     Diet DIET TUBE FEED CONTINUOUS/CYCLIC NPO; Semi-elemental (Vital AF);  Nasogastric; Continuous; 10; 20; 24   DVT Prophylaxis [] Lovenox, []  Heparin, [] SCDs, [] Ambulation   GI Prophylaxis [] PPI,  [] H2 Blocker,  [] Carafate,  [] Diet/Tube Feeds   Code Status DNR-CCA   Disposition Patient requires continued admission due to acute respiratory failure   CMS Level of Risk [] Low, [x] Moderate,[]  High  Patient's risk as above due to acute respiratory failure     HARINDER ROBERTSON MD 8/5/2020 10:49 AM

## 2020-08-05 NOTE — PROGRESS NOTES
[address] [facility]  consulted by Dr. Deepika Thorpe for monitoring and adjustment. Indication for treatment: Sepsis, s/p cardiac arrest  Goal trough: 15 mcg/mL     Pertinent Laboratory Values:   Temp Readings from Last 3 Encounters:   08/05/20 99.1 °F (37.3 °C) (Rectal)   07/25/20 98.2 °F (36.8 °C) (Axillary)   06/26/19 98.2 °F (36.8 °C) (Oral)     Recent Labs     08/03/20  0530 08/04/20  0420 08/05/20  0515   WBC 11.3* 14.5* 14.7*     Recent Labs     08/03/20  0530 08/04/20  0420 08/05/20  0515   BUN 24* 25* 28*   CREATININE 1.3* 1.3* 1.3*     Estimated Creatinine Clearance: 40 mL/min (A) (based on SCr of 1.3 mg/dL (H)).     Intake/Output Summary (Last 24 hours) at 8/5/2020 1327  Last data filed at 8/5/2020 1200  Gross per 24 hour   Intake 902.81 ml   Output 2675 ml   Net -1772.19 ml       Pertinent Cultures:  Date                             Source                                     Results  7/26/20                        Blood                                       NGTD  7/26/20                        Covid-19                                  Negative  7/26/20                        Respiratory Panel                   Negative  7/30/20    MRSA nasal      Positive    Vancomycin level:   RANDOM:    Recent Labs     08/04/20  0420   VANCORANDOM 16.0       Assessment:  · WBC and temperature: persistent leukocytosis; afebrile  · SCr, BUN, and urine output: SHER, scr stable    · Day(s) of therapy: #10  · Vancomycin levels: repeat to be collected    Plan:  · Continue intermittent dosing based on levels given SHER and slow vancomycin clearance  · Received vancomycin 1500 mg x 1 yesterday (has been maintained fairly well on a q48h interval)   · Pharmacy will continue to monitor patient and adjust therapy as indicated    REPEAT VANCOMYCIN RANDOM SCHEDULED FOR 8/6 @ 0600    Thank you for the consult,  Prateek Acosta RPh

## 2020-08-05 NOTE — PROGRESS NOTES
During assessment today; pt opening eyes spontaneously, grimacing and moving tongue during mouth care; withdrawing from pain in all extremities; unable to follow commands at this time however. Dr. Raoul Simpson at bedside and updated. Will continue to monitor closely.

## 2020-08-05 NOTE — PROGRESS NOTES
Pulmonary and Critical Care  Progress Note      VITALS:  BP (!) 130/51   Pulse 82   Temp 99.1 °F (37.3 °C) (Rectal)   Resp 13   Ht 5' (1.524 m)   Wt 197 lb 1.5 oz (89.4 kg)   SpO2 100%   BMI 38.49 kg/m²     Subjective:   CHIEF COMPLAINT :SOB     HPI:                The patient is a 79 y.o. female with significant past medical history of CHF, DM, HTN, hypothyroidism,15 pk yr smoker quit 20 years ago, Morbid obesity, SADI  presents with complaints of SOB getting worse. EMS was called and had a witness out of hospital Cardiac arrest.She was Intubated in [facility] [place]. She had CPR with ROSC in 5 minutes. She was found to have  Suspected pneumonia. She is being treated with ABX. She had worsening LE edema. She had a EF of 55% and moderate Aortic stenosis. She had Pulmonary edema. She is on Lasix drip.she has good urine output. She is sedated now. Her PBNPT is 21,085 and troponin mildly elevated.      Objective:   PHYSICAL EXAM:    LUNGS:Bilateral basal crackles  Abd-soft, BS+,NT  Ext - no pedal edema  CVS-s1s2,no murmurs      DATA:    CBC:  Recent Labs     08/03/20  0530 08/04/20  0420 08/05/20  0515   WBC 11.3* 14.5* 14.7*   RBC 2.87* 3.00* 3.12*   HGB 7.8* 8.2* 8.5*   HCT 25.1* 26.6* 27.2*    333 411   MCV 87.5 88.7 87.2   MCH 27.2 27.3 27.2   MCHC 31.1* 30.8* 31.3*   RDW 16.2* 16.8* 17.2*   SEGSPCT 73.5* 70.9* 73.6*      BMP:  Recent Labs     08/03/20  0530 08/04/20  0420  08/04/20  1945 08/05/20  0005 08/05/20  0515   * 136   < > 135 133* 135   K 3.4* 3.4*   < > 3.9 3.6 3.9   CL 87* 88*   < > 90* 87* 90*   CO2 34* 33*   < > 30 33* 32   BUN 24* 25*  --   --   --  28*   CREATININE 1.3* 1.3*  --   --   --  1.3*   CALCIUM 8.4 8.9  --   --   --  8.8   GLUCOSE 358* 178*  --   --   --  294*    < > = values in this interval not displayed.       ABG:  Recent Labs     08/03/20  0600 08/04/20  0600 08/05/20  0600   PH 7.47* 7.48* 7.48*   PO2ART 91 79 96   LER4XDD 51.0* 47.0* 48.0*   O2SAT 94.4* 94.5* 96.4     BNP  Lab Results   Component Value Date    BNP 9.6 06/08/2016      D-Dimer:  Lab Results   Component Value Date    DDIMER 759 (H) 08/01/2020      1. Radiology:None       Assessment/Plan     Patient Active Problem List    Diagnosis Date Noted    Acute respiratory failure ([facility] [address] 07/25/2020    Excessive daytime sleepiness 11/04/2019    Ex-smoker 11/04/2019    VHD (valvular heart disease)     SADI (obstructive sleep apnea)     Morbid obesity ([facility] [address]     NSTEMI (non-ST elevated myocardial infarction) ([facility] [address]     ACS (acute coronary syndrome) ([facility] [address] 11/08/2018    Acute coronary syndrome ([facility] [address] 12/81/0781    Diastolic dysfunction with acute on chronic heart failure (HCC) 11/08/2018    Congestive heart failure (CHF) ([facility] [address] 11/07/2018    Type 2 diabetes mellitus ([facility] [address] 11/07/2018    Hypothyroidism 11/07/2018    Depression 11/07/2018    Hypertension 11/07/2018    CHF (congestive heart failure), NYHA class I, acute on chronic, diastolic ([facility]) 84/38/8388    Idiopathic progressive neuropathy 04/18/2016     Overview Note:     Patient has chronic peripheral neuropathy to bilateral feet. She is currently on gabapentin 800 mg tid with some benefit. She reports decreased sensation to her feet with intermittent pain.  Mild obstructive sleep apnea 10/14/2015    CCC (chronic calculous cholecystitis) 09/14/2015    Cirrhosis of liver without ascites ([facility] [address] 09/14/2015    Light headed 04/23/2014     Overview Note:     From dehydration from hyperglycemia - patient dry on presentation. replace inactive diagnosis      Orthostatic hypotension 04/23/2014    Hypertriglyceridemia 04/23/2014    Precordial pain 04/22/2014    Hyperglycemia 04/22/2014     Overview Note:     Admission blood glucose of 120; was >500 prior to presentation to ER, first accucheck in ER was 341.       Axillary abscess 04/22/2014     Overview Note:     Left      Hyperlipidemia     DM type 2, uncontrolled, with retinopathy ([facility] [address] 03/01/2013     Overview Note: Patient's last A1c was 12.1, and she has been referred in the last month to endocrinology. She has an appointment this coming month with a specialist.      Hypokalemia 03/01/2013    HTN (hypertension) 11/26/2012    COPD (chronic obstructive pulmonary disease) ([facility] [address] 11/26/2012    Anxiety and depression 11/26/2012     Overview Note:     Patient is currently on venlafaxine 150 mg daily for depression and anxiety. She feels like it is not as affective as she would like currently. She does not feel suicidal and is sleeping ok but has decreased motivation and less happy days. Patient is on the vent and off sedation. No response to painful stimuli. EEG No seizures     Anoxic encephalopathy  Hypokalemia  Anemia - stable  Acute on chronic Hypoxic hypercapneic resp failure  Chronic Metabolic alkalosis  Bilateral pleural effusions  VDRF  Leukocytosis - improved  Moderate AS  Morbid obesity  SADI  Smoker  Pulmonary edema  Out of Hospital Cardiac arrest  ? Aspiration pneumonia  Systolic CHF with EF of 44%  Grade III Diastolic dysfunction  Increased PH sec to the worsening metabolic alkalosis sec to Lasix  Moderate malnutriti       1. Await repeat EEG  2. MRI  3. Tube feeds  4. Electrolyte replacement  5. Prognosis guarded  6. Family meeting  7. Palliative care eval  8. C/w present management  9. SAT and SBT when stable  No follow-ups on file.     Electronically signed by Maxwell Balderas MD on 8/5/2020 at 1:48 PM

## 2020-08-05 NOTE — PROGRESS NOTES
NEUROLOGY NOTE  DR. Leeanne Peraza MD.  -------------------------------------------------  Subjective:    Pt continues to be obtudned    At times pt yawns involuntarily    Does not follow any commands    Pt has not improved over the last week    Obtunded no response to verbal commands    No seizure activity noted    Objective:    BP (!) 131/56   Pulse 78   Temp 99.1 °F (37.3 °C) (Rectal)   Resp 14   Ht 5' (1.524 m)   Wt 198 lb 3.1 oz (89.9 kg)   SpO2 99%   BMI 38.71 kg/m²   HEENT nl      Neuro exam    Obtunded no response to verbal commands,  Pupils 3 mm lynnette  Sluggish gag reflex  Minimal withdrawal to painful stimulii  Equivocal toes  resp on the vent      RADIOLOGY  -----------------    Ct Head Wo Contrast    Result Date: 7/31/2020  EXAMINATION: CT OF THE HEAD WITHOUT CONTRAST  7/31/2020 1:18 am TECHNIQUE: CT of the head was performed without the administration of intravenous contrast. Dose modulation, iterative reconstruction, and/or weight based adjustment of the mA/kV was utilized to reduce the radiation dose to as low as reasonably achievable. COMPARISON: None HISTORY: ORDERING SYSTEM PROVIDED HISTORY: Twitching of eyes/suspected seizure/rule out acute intracranial process TECHNOLOGIST PROVIDED HISTORY: Reason for exam:->Twitching of eyes/suspected seizure/rule out acute intracranial process Has a \"code stroke\" or \"stroke alert\" been called? ->No Reason for Exam: rule out acute process FINDINGS: BRAIN/VENTRICLES: There is no acute intracranial hemorrhage, mass effect or midline shift. No abnormal extra-axial fluid collection. The gray-white differentiation is maintained without evidence of an acute infarct. There is no evidence of hydrocephalus. ORBITS: The visualized portion of the orbits demonstrate no acute abnormality. SINUSES: Mild mucoperiosteal thickening to frontal ethmoidal recess of left frontal sinus. Small air-fluid level in left sphenoid sinus.   Other paranasal sinuses appear to be clear.  Opacification/partial opacification to a few left-sided mastoid air cells. Right-sided mastoid air cells appear clear. Bilateral middle ears appear clear. SOFT TISSUES/SKULL:  No acute abnormality of the visualized skull or soft tissues. No acute intracranial abnormality. Paranasal sinus disease and left mastoid effusions as above. Xr Chest Portable    Result Date: 8/1/2020  EXAMINATION: ONE XRAY VIEW OF THE CHEST 7/29/2020 4:55 am COMPARISON: Yesterday HISTORY: ORDERING SYSTEM PROVIDED HISTORY: Vent management TECHNOLOGIST PROVIDED HISTORY: Reason for exam:->Vent management Reason for Exam: vent Acuity: Acute Type of Exam: Subsequent/Follow-up FINDINGS: Endotracheal tube terminates 3 cm above the elijah. Right internal jugular catheter with tip at the cavoatrial junction. Enteric tube courses below the diaphragm. Findings of pulmonary edema with pulmonary vascular indistinctness. No pneumothorax. Small bilateral pleural effusions. Low lung volumes exaggerate the pulmonary vasculature. Scattered perihilar and basilar linear atelectasis. No pneumothorax. 1.  Satisfactory position of support devices. 2. Low lung volumes exaggerating the pulmonary finding since yesterday's exam.  Essentially stable pulmonary edema, small pleural effusions, and linear atelectasis. Xr Chest Portable    Result Date: 8/1/2020  EXAMINATION: ONE XRAY VIEW OF THE CHEST 8/1/2020 5:06 am COMPARISON: 07/31/2020 HISTORY: ORDERING SYSTEM PROVIDED HISTORY: Vent management TECHNOLOGIST PROVIDED HISTORY: Reason for exam:->Vent management Reason for Exam: Vent management Acuity: Acute Type of Exam: Ongoing FINDINGS: The right internal jugular catheter tip is in the superior vena cava. The endotracheal tube tip is 2 cm above the elijah. The enteric tube is below the diaphragm. There are moderate pleural effusions. Basilar lung opacities could represent atelectasis.      Worsening moderate pleural effusions and atelectasis. Xr Chest Portable    Result Date: 7/31/2020  EXAMINATION: ONE XRAY VIEW OF THE CHEST 7/31/2020 5:41 am COMPARISON: 07/30/2020 HISTORY: ORDERING SYSTEM PROVIDED HISTORY: Vent management TECHNOLOGIST PROVIDED HISTORY: Reason for exam:->Vent management Reason for Exam: vent Acuity: Acute Type of Exam: Subsequent/Follow-up FINDINGS: The endotracheal tube, nasogastric tube and right IJ catheter remain. The heart size is upper normal.  A pneumothorax is not identified. Perihilar atelectasis has improved. A right-sided pleural effusion is noted, possibly decreased in size. Central vascular congestion has improved. No change in life support. Improved perihilar atelectasis. Central vascular congestion has improved. Right-sided pleural effusion is either decreased in size or redistributed. Xr Chest Portable    Result Date: 7/30/2020  EXAMINATION: ONE XRAY VIEW OF THE CHEST 7/30/2020 4:01 am COMPARISON: Chest radiograph performed July 29, 2020. HISTORY: ORDERING SYSTEM PROVIDED HISTORY: Vent management TECHNOLOGIST PROVIDED HISTORY: Reason for exam:->Vent management Reason for Exam: Vent management Acuity: Unknown Type of Exam: Subsequent/Follow-up Additional signs and symptoms: Vent management Relevant Medical/Surgical History: Vent management FINDINGS: Endotracheal tube tip terminates approximately 2.3 cm from the elijah. Right IJ CVC tip is in stable positioning. Gastric tube descends below the level of the diaphragm. There are increasing bilateral central airspace opacities, predominantly on the right. There is prominence of the pulmonary vasculature bilaterally. Interval increase in size of a small to moderate right pleural effusion. Grossly stable small left pleural effusion. Stable cardiac silhouette. 1. Increasing bilateral central airspace opacities compatible with pulmonary edema with prominence of the pulmonary vasculature.  2. Interval increase in size of a small to moderate right pleural effusion and stable small left pleural effusion. Findings may be exacerbated by patient positioning. Xr Chest Portable    Result Date: 7/28/2020  EXAMINATION: ONE XRAY VIEW OF THE CHEST 7/28/2020 5:00 am COMPARISON: 07/27/2020 HISTORY: ORDERING SYSTEM PROVIDED HISTORY: Vent management TECHNOLOGIST PROVIDED HISTORY: Reason for exam:->Vent management Reason for Exam: vent Acuity: Acute Type of Exam: Subsequent/Follow-up FINDINGS: Endotracheal tube tip is 3 cm above the elijah. Enteric tube goes below the level of the diaphragm and out of the field of view. Right IJ CVC tip overlies the superior cavoatrial junction. The heart is stable in size. No measurable pneumothorax. Airspace opacities in the mid and lower lungs along with bilateral pleural effusions have overall improved from the prior study. Overall improvement in the bilateral airspace opacities in the mid and lower lungs as well as the pleural effusions. Xr Chest Portable    Result Date: 7/27/2020  EXAMINATION: ONE XRAY VIEW OF THE CHEST 7/27/2020 5:29 am COMPARISON: Yesterday HISTORY: ORDERING SYSTEM PROVIDED HISTORY: Hypoxia and congestion TECHNOLOGIST PROVIDED HISTORY: Reason for exam:->Hypoxia and congestion Reason for Exam: Hypoxia and congestion Acuity: Acute Type of Exam: Ongoing FINDINGS: Endotracheal tube terminates 1.5 cm from the elijah. Enteric tube courses below the diaphragm. Right internal jugular catheter with tip extending to the cavoatrial junction. Multiple monitor wires overlie the patient. No pneumothorax. Small-moderate stable bilateral pleural effusions and bibasilar atelectasis. Pulmonary vascular indistinctness and perihilar opacities. Stable mild cardiomegaly. Satisfactory position of support devices. Stable lung aeration with findings favoring pulmonary edema. Associated small-moderate pleural effusions and bibasilar atelectasis.      Xr Chest Portable    Result Date: 7/26/2020  EXAMINATION: ONE XRAY VIEW OF THE CHEST 7/26/2020 4:49 am COMPARISON: July 25, 2020 HISTORY: ORDERING SYSTEM PROVIDED HISTORY: Vent management TECHNOLOGIST PROVIDED HISTORY: Reason for exam:->Vent management Reason for Exam: Vent management Acuity: Acute Type of Exam: Subsequent/Follow-up FINDINGS: The ETT is 2.4 cm above the elijah. The feeding tube is inserted with its tip below the diaphragm and beyond the field of view. The cardiomediastinal silhouette is stable. There are low lung volumes. There are patchy airspace opacities bilaterally, may be related to pulmonary edema versus pneumonia. There is mild to moderate right pleural effusion. There is no pneumothorax. There is no acute osseous abnormality. Patchy airspace opacities bilaterally, may be related to pulmonary edema versus pneumonia, likely improved in the left hemithorax. Mild to moderate right pleural effusion. Stable cardiomegaly. Low lung volumes. Xr Chest Portable    Result Date: 7/25/2020  EXAMINATION: ONE XRAY VIEW OF THE CHEST 7/25/2020 4:14 pm COMPARISON: July 25, 2020 HISTORY: ORDERING SYSTEM PROVIDED HISTORY: CVC placement TECHNOLOGIST PROVIDED HISTORY: Reason for exam:->CVC placement Reason for Exam: post central line right side Acuity: Acute Type of Exam: Initial Additional signs and symptoms: post arrest Line placement FINDINGS: ETT tip is approximately 3 cm above the elijah. Right IJ catheter tip overlies the lower SVC. Cardiac silhouette is mildly enlarged but stable. Diffuse hazy opacity overlying the right lung has not appreciably changed. There is no pneumothorax. Persistent small to moderate right pleural effusion. 1. Interval placement of a right IJ catheter with catheter tip at the level of the lower SVC. No pneumothorax. 2. Persistent hazy opacity over the right hemithorax with small to moderate bilateral pleural effusions.      Xr Chest Portable    Result Date: 7/25/2020  EXAMINATION: ONE XRAY VIEW OF THE CHEST 7/25/2020 3:37 pm COMPARISON: Chest 06/17/2019 HISTORY: ORDERING SYSTEM PROVIDED HISTORY: post arrest Acuity: Acute Type of Exam: Initial Additional signs and symptoms: tube placement FINDINGS: The cardiac silhouette is enlarged. Calcifications involving the aorta reflect atherosclerosis. The mediastinal and hilar silhouettes appear unremarkable. Near complete opacification right hemithorax in keeping with atelectasis/infiltrate and pleural effusion. Dense consolidation lower left chest obscuring the left hemidiaphragm reflecting consolidation and pleural effusion. Vascular engorgement and cephalization is demonstrated with bilateral peribronchial cuffing and perivascular haziness. No pneumothorax is seen. No acute osseous abnormality is identified. Endotracheal tube tip measures about 2.7 cm superior to the elijah. 1. Nonspecific pulmonary findings may be related to sequela from pulmonary edema, diffuse infection or ARDS. Pulmonary contusion or sequela during resuscitation (? aspiration) may contribute to these opacities as well. 2. Calcific atherosclerosis aorta. 3. Cardiomegaly. 4. Endotracheal tube tip measures about 2.7 cm superior to the elijah. Xr Chest 1 View    Result Date: 7/25/2020  EXAMINATION: ONE XRAY VIEW OF THE CHEST 7/25/2020 11:01 pm COMPARISON: Chest radiograph 07/25/2020 at 4:15 p.m. HISTORY: ORDERING SYSTEM PROVIDED HISTORY: et tube placement TECHNOLOGIST PROVIDED HISTORY: Reason for exam:->et tube placement FINDINGS: The cardiac silhouette is enlarged. Calcifications involving the aorta reflect atherosclerosis. The mediastinal and hilar silhouettes appear unremarkable. Dense consolidation bilateral lower lungs with bilateral pleural effusion, unchanged from chest radiograph performed earlier today. Vascular engorgement and cephalization is demonstrated with bilateral peribronchial cuffing and perivascular haziness. No pneumothorax is seen. No acute osseous abnormality is identified. Endotracheal tube tip projects over the trachea, tip at the level of the aortic knob, 2.2 cm superior to the elijah. Right IJ CVC tip overlies the mid superior vena cava level. NG tube extends below the left hemidiaphragm, out of the field of view. 1. Unchanged findings most typical of congestive heart failure; pneumonia is a consideration in areas of consolidation with pleural effusion. 2. Calcific atherosclerosis aorta. 3. Cardiomegaly. 4.  Life support appliances appear appropriately positioned. Cta Pulmonary W Contrast    Result Date: 7/25/2020  EXAMINATION: CTA OF THE CHEST 7/25/2020 5:51 pm TECHNIQUE: CTA of the chest was performed after the administration of 100 mL Isovue 370 intravenous contrast.  Multiplanar reformatted images are provided for review. MIP images are provided for review. Dose modulation, iterative reconstruction, and/or weight based adjustment of the mA/kV was utilized to reduce the radiation dose to as low as reasonably achievable. COMPARISON: 08/09/2011 HISTORY: Acute shortness of breath, respiratory arrest. FINDINGS: Image quality degraded by motion artifact and body habitus. Pulmonary Arteries: Pulmonary arteries are adequately opacified for evaluation. No intraluminal filling defect to suggest pulmonary embolism. Main pulmonary artery is normal in caliber. Mediastinum: Endotracheal tube tip is at the level of the aortic arch. Right IJ catheter tip is not visualized within dense intravenous contrast. Borderline cardiomegaly. Extensive atherosclerotic disease including the coronary arteries. No pericardial effusion. Thoracic aorta is normal caliber. No lymphadenopathy. Thyroid and esophagus are unremarkable. Lungs/pleura: Small to moderate pleural effusions with adjacent compressive atelectasis. Additional linear and consolidative opacities in the upper to mid lungs. Upper Abdomen: Cirrhosis with upper abdominal ascites. Cholecystectomy.  Enteric tube tip is within the distal stomach. Soft Tissues/Bones: Diffuse soft tissue edema. Osteopenia with scattered degenerative changes throughout the visualized spine. Shoulder osteoarthrosis. 1. No pulmonary embolism. 2. Findings of fluid overload with small to moderate pleural effusions, diffuse soft tissue edema, and upper abdominal ascites. 3. Consolidative opacities within the upper to mid lungs could represent pneumonia or atelectasis/scarring. 4. Cirrhosis. 5. Borderline cardiomegaly with severe atherosclerotic disease. Xr Abdomen For Ng/og/ne Tube Placement    Result Date: 7/26/2020  EXAMINATION: ONE SUPINE XRAY VIEW(S) OF THE ABDOMEN 7/26/2020 8:18 am COMPARISON: None HISTORY: ORDERING SYSTEM PROVIDED HISTORY: Confirmation of course of NG/OG/NE tube and location of tip of tube TECHNOLOGIST PROVIDED HISTORY: Reason for exam:->Confirmation of course of NG/OG/NE tube and location of tip of tube Portable? ->Yes Reason for Exam: ng location Acuity: Acute Type of Exam: Subsequent/Follow-up FINDINGS: Enteric tube is in place tip in the gastric antrum. The proximal side-port is in the gastric body. No bowel obstruction is suggested. NG tube tip in the gastric antrum with the proximal side-port in the gastric body.        LAB RESULTS  --------------------    Recent Results (from the past 24 hour(s))   POCT Glucose    Collection Time: 08/04/20 12:42 AM   Result Value Ref Range    POC Glucose 155 (H) 70 - 99 MG/DL   Basic Metabolic Panel    Collection Time: 08/04/20  4:20 AM   Result Value Ref Range    Sodium 136 135 - 145 MMOL/L    Potassium 3.4 (L) 3.5 - 5.1 MMOL/L    Chloride 88 (L) 99 - 110 mMol/L    CO2 33 (H) 21 - 32 MMOL/L    Anion Gap 15 4 - 16    BUN 25 (H) 6 - 23 MG/DL    CREATININE 1.3 (H) 0.6 - 1.1 MG/DL    Glucose 178 (H) 70 - 99 MG/DL    Calcium 8.9 8.3 - 10.6 MG/DL    GFR Non-African American 40 (L) >60 mL/min/1.73m2    GFR  49 (L) >60 mL/min/1.73m2   CBC auto differential    Collection Time: 08/04/20  4:20 AM   Result Value Ref Range    WBC 14.5 (H) 4.0 - 10.5 K/CU MM    RBC 3.00 (L) 4.2 - 5.4 M/CU MM    Hemoglobin 8.2 (L) 12.5 - 16.0 GM/DL    Hematocrit 26.6 (L) 37 - 47 %    MCV 88.7 78 - 100 FL    MCH 27.3 27 - 31 PG    MCHC 30.8 (L) 32.0 - 36.0 %    RDW 16.8 (H) 11.7 - 14.9 %    Platelets 688 157 - 273 K/CU MM    MPV 10.2 7.5 - 11.1 FL    Immature Neutrophil % 1.0 (H) 0 - 0.43 %    Segs Relative 70.9 (H) 36 - 66 %    Eosinophils % 4.5 (H) 0 - 3 %    Basophils % 0.4 0 - 1 %    Lymphocytes % 16.6 (L) 24 - 44 %    Monocytes % 6.6 (H) 0 - 4 %    Total Immature Neutrophil 0.15 K/CU MM    Segs Absolute 10.2 K/CU MM    Eosinophils Absolute 0.7 K/CU MM    Basophils Absolute 0.1 K/CU MM    Lymphocytes Absolute 2.4 K/CU MM    Monocytes Absolute 1.0 K/CU MM    Differential Type AUTOMATED DIFFERENTIAL    Ionized Calcium    Collection Time: 08/04/20  4:20 AM   Result Value Ref Range    Ionized Ca 1.04 (L) 1.12 - 1.32 mMOL/L    Calcium, Ion 4.16 (L) 4.48 - 5.28 MG/DL   Magnesium    Collection Time: 08/04/20  4:20 AM   Result Value Ref Range    Magnesium 1.9 1.8 - 2.4 mg/dl   Phosphorus    Collection Time: 08/04/20  4:20 AM   Result Value Ref Range    Phosphorus 3.0 2.5 - 4.9 MG/DL   Vancomycin, random    Collection Time: 08/04/20  4:20 AM   Result Value Ref Range    Vancomycin Rm 16.0 UG/ML    DOSE AMOUNT DOSE AMT.  GIVEN - 1500mg     DOSE TIME DOSE TIME GIVEN - 8/2 @12:00    Blood gas, arterial    Collection Time: 08/04/20  6:00 AM   Result Value Ref Range    pH, Bld 7.48 (H) 7.34 - 7.45    pCO2, Arterial 47.0 (H) 32 - 45 MMHG    pO2, Arterial 79 75 - 100 MMHG    Base Exc, Mixed 10 (H) 0 - 2.3    HCO3, Arterial 35.0 (H) 18 - 23 MMOL/L    CO2 Content 36.4 (H) 19 - 24 MMOL/L    O2 Sat 94.5 (L) 96 - 97 %    Carbon Monoxide, Blood 2.5 0 - 5 %    Methemoglobin, Arterial 1.5 (H) <1.5 %    Comment AC12 450 .30 P5    POCT Glucose    Collection Time: 08/04/20  6:01 AM   Result Value Ref Range    POC Glucose 218 (H) 70 - 99 MG/DL   POCT Glucose    Collection Time: 08/04/20  8:33 AM   Result Value Ref Range    POC Glucose 222 (H) 70 - 99 MG/DL   POCT Glucose    Collection Time: 08/04/20 12:17 PM   Result Value Ref Range    POC Glucose 260 (H) 70 - 99 MG/DL   Electrolyte Panel w/ Reflex to MG    Collection Time: 08/04/20  3:35 PM   Result Value Ref Range    Sodium 133 (L) 135 - 145 MMOL/L    Potassium 4.2 3.5 - 5.1 MMOL/L    Chloride 88 (L) 99 - 110 mMol/L    CO2 30 21 - 32 MMOL/L    Anion Gap 15 4 - 16   Electrolyte Panel w/ Reflex to MG    Collection Time: 08/04/20  7:45 PM   Result Value Ref Range    Sodium 135 135 - 145 MMOL/L    Potassium 3.9 3.5 - 5.1 MMOL/L    Chloride 90 (L) 99 - 110 mMol/L    CO2 30 21 - 32 MMOL/L    Anion Gap 15 4 - 16   POCT Glucose    Collection Time: 08/04/20  7:48 PM   Result Value Ref Range    POC Glucose 306 (H) 70 - 99 MG/DL         Medical problems    Patient Active Problem List:     HTN (hypertension)     COPD (chronic obstructive pulmonary disease) ([facility])     Anxiety and depression     DM type 2, uncontrolled, with retinopathy ([facility])     Hypokalemia     Hyperlipidemia     Precordial pain     Hyperglycemia     Axillary abscess     Light headed     Orthostatic hypotension     Hypertriglyceridemia     CCC (chronic calculous cholecystitis)     Cirrhosis of liver without ascites ([facility])     Mild obstructive sleep apnea     Idiopathic progressive neuropathy     Congestive heart failure (CHF) ([facility])     Type 2 diabetes mellitus ([facility])     Hypothyroidism     Depression     Hypertension     CHF (congestive heart failure), NYHA class I, acute on chronic, diastolic ([facility])     ACS (acute coronary syndrome) ([facility])     Acute coronary syndrome ([facility])     Diastolic dysfunction with acute on chronic heart failure ([facility])     NSTEMI (non-ST elevated myocardial infarction) ([facility])     SADI (obstructive sleep apnea)     Morbid obesity ([facility])     VHD (valvular heart disease)     Excessive daytime sleepiness     Ex-smoker     Acute respiratory failure ([facility])      ASSESSMENT:  ---------------------    Hypoxic encephalopathy     S/P cardiac arrest     Acute respiratory failure     seizures     Hypotension     Sepsis     Hx CHF     PLAN:     CT brain neg     EEG pend     Keppra     Continue supportive care     Pt is not any better today after 10 days of not on sedation and hospital stay.     Discussed plan of care with pts nurse.           Electronically signed by Ck Gustafson MD on 8/4/2020 at 10:49 PM

## 2020-08-05 NOTE — CONSULTS
Nephrology Service Consultation        [address], [address] [address]  Phone: [phone]  Office Hours: 8:30AM - 4:30PM  Monday - Friday           Patient:  Usman Murphy  MRN: [id number]  Consulting physician:  Eber Cevallos MD  Reason for Consult: elevated creatinine      PCP: Carissa Landeros PA-C    HISTORY OF PRESENT ILLNESS:   The patient is a 79 y.o. female with DM2, aortic stenosis presented with an out of hospital cardiac arrest.  She is currently intubated  She is on lasix gtt for chf and fluid overload  Renal consult for cr 1.3 and fluid status management  She is net negative 23L so far, since admission   She is on levophed    REVIEW OF SYSTEMS:  Can not obtain as she is intubated     Past Medical History:        Diagnosis Date    Anxiety     Arthritis     bilats hands, right shoulder    Atrial fibrillation [facility])     Cardioversion @ [facility] - no trouble with it ever since. Sees Dr. Umesh Arshad Atrial fibrillation with RVR [facility]) 2013    Atrial fibrillation with RVR ([facility] 11/26/2012    Atrial fibrillation with RVR ([facility] 3/1/2013    Cancer (HCC)     skin (right upper chest & face)    Cervicalgia     Constipation     COPD (chronic obstructive pulmonary disease) (HCC)     Depression     Diabetes mellitus (HCC)     IDDM    Diabetic neuropathy (HCC)     Disc herniation     lower back    Fatigue     Fibromyalgia     H/O cardiovascular stress test 05/08 EF 70%,07/09 EF 66%,02/10, 03/10 EF 63% 04/10    04/26/10 if LAD and circ are compared, the LAD in its mid segment has about 60% stenosis around a small diag. circumflex vessel is a dominant vessel and is without any significant disease,  rca is nondominant with about 50% stenosis , will continue medical management for now.  H/O complete electrocardiogram 02/10    02/03/10 on chart    H/O echocardiogram 05/08,EF 55%, 02/10    02/25/10 EF>55% left ventricular systolic function is normal,mild mitral regurg. Take 40 mg by mouth every morning (before breakfast)    Historical Provider, MD   albuterol-ipratropium (COMBIVENT)  MCG/ACT inhaler Inhale 2 puffs into the lungs 4 times daily 6/19/19 6/18/20  Lucho Bravo MD   furosemide (LASIX) 40 MG tablet Take 1 tablet by mouth 2 times daily 6/19/19   Lucho Bravo MD   potassium chloride (KLOR-CON M) 20 MEQ extended release tablet Take 1 tablet by mouth daily for 5 days 6/19/19 10/18/19  Lucho Bravo MD   albuterol sulfate HFA (PROAIR HFA) 108 (90 Base) MCG/ACT inhaler Inhale 2 puffs into the lungs every 4 hours as needed for Wheezing 6/19/19   Lucho Bravo MD   busPIRone (BUSPAR) 10 MG tablet Take 1.5 tablets by mouth 3 times daily 11/13/18   Swati Cain MD   insulin glargine (LANTUS) 100 UNIT/ML injection vial Inject 60 Units into the skin nightly  Patient taking differently: Inject 80 Units into the skin nightly  11/13/18   Swati Cain MD   docusate sodium (COLACE, DULCOLAX) 100 MG CAPS Take 100 mg by mouth daily 11/14/18   Swati Cain MD   tolterodine (DETROL) 2 MG tablet Take 2 mg by mouth daily    Historical Provider, MD   nitroGLYCERIN (NITROSTAT) 0.4 MG SL tablet up to max of 3 total doses.  If no relief after 1 dose, call 911. 11/5/17   Fareed Andersen MD   simvastatin (ZOCOR) 20 MG tablet Take 1 tablet by mouth nightly  Patient taking differently: Take 40 mg by mouth nightly  11/5/17   Fareed Andersen MD   levothyroxine (SYNTHROID) 25 MCG tablet Take 25 mcg by mouth Daily    Historical Provider, MD   venlafaxine (EFFEXOR XR) 150 MG extended release capsule Take one capsule daily at the same time as the 75 mg capsule 2/2/17   Renaldokatherine Wolf PA-C   ONE TOUCH LANCETS MISC 1 each by Does not apply route daily 10/11/16   Renaldo Wolf PA-C   Insulin Lispro, Human, (HUMALOG SC) Inject into the skin Insulin pump    Historical Provider, MD   gabapentin (NEURONTIN) 800 MG tablet Take 1 tablet by mouth 3 times daily  Patient taking differently: Take 800 mg by mouth 4 times daily. . 10/4/16   Renaldo Wolf PA-C   glucose blood VI test strips (ASCENSIA AUTODISC VI;ONE TOUCH ULTRA TEST VI) strip 1 each by In Vitro route 4 times daily as needed As needed. Historical Provider, MD        Allergies:  Latex; Adhesive tape; and Codeine    Social History:   TOBACCO:   reports that she quit smoking about 29 years ago. Her smoking use included cigarettes. She started smoking about 50 years ago. She has a 21.00 pack-year smoking history. She has never used smokeless tobacco.  ETOH:   reports no history of alcohol use.   OCCUPATION:      Family History:       Problem Relation Age of Onset    Arthritis Mother     Depression Mother     Emphysema Mother     Dementia Mother     Arthritis Father     Cancer Father         prostate    Vision Loss Father     Other Father         brain aneurysm    Arthritis Sister     Depression Sister     Anxiety Disorder Sister     Arthritis Brother     Cancer Brother         eye    Hearing Loss Brother     High Blood Pressure Brother            Physical Exam:    Vitals: BP (!) 117/54   Pulse 82   Temp 99.1 °F (37.3 °C) (Rectal)   Resp 15   Ht 5' (1.524 m)   Wt 197 lb 1.5 oz (89.4 kg)   SpO2 99%   BMI 38.49 kg/m²   General appearance: in no acute distress, appears stated age  Skin: Skin color, texture, turgor normal. No rashes or lesions  HEENT: normocephalic, atraumatic, ET TUBe in place  Neck: supple, trachea midline  Lungs: clear to auscultation bilaterally, breathing comfortably on mv  Heart[de-identified] regular rate and rhythm, S1, S2 normal,  Abdomen: soft, non-tender; bowel sounds normal; non distended  Extremities: extremities normal, atraumatic, no cyanosis or edema  Neurologic: Mental status: opening her eyes, not following commands yet  Psychiatric: mood and affect can not be assessed    CBC:   Recent Labs     08/03/20  0530 08/04/20  0420 08/05/20  0515   WBC 11.3* 14.5* 14.7*   HGB 7. 8* 8.2* 8.5*    333 411     BMP:    Recent Labs     08/03/20  0530 08/04/20  0420  08/04/20  1945 08/05/20  0005 08/05/20  0515   * 136   < > 135 133* 135   K 3.4* 3.4*   < > 3.9 3.6 3.9   CL 87* 88*   < > 90* 87* 90*   CO2 34* 33*   < > 30 33* 32   BUN 24* 25*  --   --   --  28*   CREATININE 1.3* 1.3*  --   --   --  1.3*   GLUCOSE 358* 178*  --   --   --  294*    < > = values in this interval not displayed. Hepatic: No results for input(s): AST, ALT, ALB, BILITOT, ALKPHOS in the last 72 hours. Troponin: No results for input(s): TROPONINI in the last 72 hours. BNP: No results for input(s): BNP in the last 72 hours. Lipids: No results for input(s): CHOL, HDL in the last 72 hours. Invalid input(s): LDLCALCU  ABGs:   Lab Results   Component Value Date    PO2ART 96 08/05/2020    SLB0KVT 48.0 08/05/2020     INR: No results for input(s): INR in the last 72 hours. I/O last 3 completed shifts: In: 992.8 [I.V.:472.8; NG/GT:216; IV FMNCORPNI:542]  Out: 6769 [Urine:2875]      I/O this shift:  In: -   Out: 250 [Urine:250]     -----------------------------------------------------------------      Assessment and Recommendations   .   Patient Active Problem List   Diagnosis    HTN (hypertension)    COPD [facility]chronic obstructive [facility]) ([facility]    Anxiety and depression    DM type 2, uncontrolled, with retinopathy ([facility]    Hypokalemia    Hyperlipidemia    Precordial pain    Hyperglycemia    Axillary abscess    Light headed    Orthostatic hypotension    Hypertriglyceridemia    CCC (chronic calculous cholecystitis)    Cirrhosis of liver without ascites (HCC)    Mild obstructive sleep apnea    Idiopathic progressive neuropathy    Congestive heart failure (CHF) (HCC)    Type 2 diabetes mellitus [facility]    Hypothyroidism    Depression    Hypertension    CHF (congestive heart failure), NYHA class I, acute on chronic, diastolic (HCC)    ACS (acute coronary syndrome) ([facility]    Acute coronary syndrome ([facility]    Diastolic dysfunction with acute on chronic heart failure (HCC)    NSTEMI (non-ST elevated myocardial infarction) ([facility]    SADI (obstructive sleep apnea)    Morbid obesity (HCC)    VHD (valvular heart disease)    Excessive daytime sleepiness    Ex-smoker    Acute respiratory failure (HCC)   SHER  S/p cardiac arrest  Hypokalemia  Hypomagnesemia  encephalopathy s/p cardiac arrest  Acute hypoxic resp failure  Acute on chronic CHF  DM2    Suggest;  Monitor electrolyte panel every 6hrs and replete as needed  Continue lasix gtt for now  Avoid nephrotoxins  Monitor UOP  critically ill      Electronically signed by Walter Saenz DO on 8/5/2020 at 10:06 AM    MD Gordo Wilson DO [address]  [place], [place] [address]  PHONE: [phone]  FAX: [phone]

## 2020-08-05 NOTE — PLAN OF CARE
Problem: Falls - Risk of:  Goal: Will remain free from falls  Description: Will remain free from falls  Outcome: Ongoing  Goal: Absence of physical injury  Description: Absence of physical injury  Outcome: Ongoing     Problem: Skin Integrity:  Goal: Will show no infection signs and symptoms  Description: Will show no infection signs and symptoms  Outcome: Ongoing  Goal: Absence of new skin breakdown  Description: Absence of new skin breakdown  Outcome: Ongoing     Problem: Nutrition  Goal: Optimal nutrition therapy  Outcome: Ongoing

## 2020-08-05 NOTE — PROCEDURES
Patient:  Toñito Larson Standard Procedure Date: 7/31/2020   YOB: 1950 Referring Practitioner: Matt Cohen MD   MRN: [id number] Interpreting Physician: Esvin Mauricio DO            HISTORY:     This is a 79 y.o. old female presenting for evaluation of unresponsiveness after cardiac arrest.        REPORT:     With the patient Intubated, off sedation, and unresponsive the background shows no clear dominant occipital rhythm. There is severe attenuation of all cerebral electrographic activity. At 2 µV sensitivity there is low amplitude, irregular, polymorphic delta activity recorded diffusely at 1 to 2 Hz.     No clear sleep pattern was seen.     Photic stimulation were performed as an activating maneuver during the recording; no abnormalities were elicited by this maneuver. Hyperventilation was not performed. Sternal rub and protopathic stimulus did not elicit EEG changes.      Frequent PVCs noted.      IMPRESSION:     Abnormal EEG due to severe attenuation and diffuse slowing of all cerebral electrographic activity. At 2 µV sensitivity 1-2 Hz delta activity was appreciated. No focal slowing or epileptiform discharges were seen. This is a non-specific finding and can be seen in variety of severe encephalopathies. Clinical correlation advised. EEG remains unchanged from prior.      Nahun James DO

## 2020-08-06 NOTE — PROGRESS NOTES
frontal sinus. Small air-fluid level in left sphenoid sinus. Other paranasal sinuses appear to be clear. Opacification/partial opacification to a few left-sided mastoid air cells. Right-sided mastoid air cells appear clear. Bilateral middle ears appear clear. SOFT TISSUES/SKULL:  No acute abnormality of the visualized skull or soft tissues. No acute intracranial abnormality. Paranasal sinus disease and left mastoid effusions as above. Xr Chest Portable    Result Date: 8/1/2020  EXAMINATION: ONE XRAY VIEW OF THE CHEST 7/29/2020 4:55 am COMPARISON: Yesterday HISTORY: ORDERING SYSTEM PROVIDED HISTORY: Vent management TECHNOLOGIST PROVIDED HISTORY: Reason for exam:->Vent management Reason for Exam: vent Acuity: Acute Type of Exam: Subsequent/Follow-up FINDINGS: Endotracheal tube terminates 3 cm above the elijah. Right internal jugular catheter with tip at the cavoatrial junction. Enteric tube courses below the diaphragm. Findings of pulmonary edema with pulmonary vascular indistinctness. No pneumothorax. Small bilateral pleural effusions. Low lung volumes exaggerate the pulmonary vasculature. Scattered perihilar and basilar linear atelectasis. No pneumothorax. 1.  Satisfactory position of support devices. 2. Low lung volumes exaggerating the pulmonary finding since yesterday's exam.  Essentially stable pulmonary edema, small pleural effusions, and linear atelectasis. Xr Chest Portable    Result Date: 8/1/2020  EXAMINATION: ONE XRAY VIEW OF THE CHEST 8/1/2020 5:06 am COMPARISON: 07/31/2020 HISTORY: ORDERING SYSTEM PROVIDED HISTORY: Vent management TECHNOLOGIST PROVIDED HISTORY: Reason for exam:->Vent management Reason for Exam: Vent management Acuity: Acute Type of Exam: Ongoing FINDINGS: The right internal jugular catheter tip is in the superior vena cava. The endotracheal tube tip is 2 cm above the elijah. The enteric tube is below the diaphragm. There are moderate pleural effusions. Basilar lung opacities could represent atelectasis. Worsening moderate pleural effusions and atelectasis. Xr Chest Portable    Result Date: 7/31/2020  EXAMINATION: ONE XRAY VIEW OF THE CHEST 7/31/2020 5:41 am COMPARISON: 07/30/2020 HISTORY: ORDERING SYSTEM PROVIDED HISTORY: Vent management TECHNOLOGIST PROVIDED HISTORY: Reason for exam:->Vent management Reason for Exam: vent Acuity: Acute Type of Exam: Subsequent/Follow-up FINDINGS: The endotracheal tube, nasogastric tube and right IJ catheter remain. The heart size is upper normal.  A pneumothorax is not identified. Perihilar atelectasis has improved. A right-sided pleural effusion is noted, possibly decreased in size. Central vascular congestion has improved. No change in life support. Improved perihilar atelectasis. Central vascular congestion has improved. Right-sided pleural effusion is either decreased in size or redistributed. Xr Chest Portable    Result Date: 7/30/2020  EXAMINATION: ONE XRAY VIEW OF THE CHEST 7/30/2020 4:01 am COMPARISON: Chest radiograph performed July 29, 2020. HISTORY: ORDERING SYSTEM PROVIDED HISTORY: Vent management TECHNOLOGIST PROVIDED HISTORY: Reason for exam:->Vent management Reason for Exam: Vent management Acuity: Unknown Type of Exam: Subsequent/Follow-up Additional signs and symptoms: Vent management Relevant Medical/Surgical History: Vent management FINDINGS: Endotracheal tube tip terminates approximately 2.3 cm from the elijah. Right IJ CVC tip is in stable positioning. Gastric tube descends below the level of the diaphragm. There are increasing bilateral central airspace opacities, predominantly on the right. There is prominence of the pulmonary vasculature bilaterally. Interval increase in size of a small to moderate right pleural effusion. Grossly stable small left pleural effusion. Stable cardiac silhouette.      1. Increasing bilateral central airspace opacities compatible with pulmonary edema with prominence of the pulmonary vasculature. 2. Interval increase in size of a small to moderate right pleural effusion and stable small left pleural effusion. Findings may be exacerbated by patient positioning. Xr Chest Portable    Result Date: 7/28/2020  EXAMINATION: ONE XRAY VIEW OF THE CHEST 7/28/2020 5:00 am COMPARISON: 07/27/2020 HISTORY: ORDERING SYSTEM PROVIDED HISTORY: Vent management TECHNOLOGIST PROVIDED HISTORY: Reason for exam:->Vent management Reason for Exam: vent Acuity: Acute Type of Exam: Subsequent/Follow-up FINDINGS: Endotracheal tube tip is 3 cm above the elijah. Enteric tube goes below the level of the diaphragm and out of the field of view. Right IJ CVC tip overlies the superior cavoatrial junction. The heart is stable in size. No measurable pneumothorax. Airspace opacities in the mid and lower lungs along with bilateral pleural effusions have overall improved from the prior study. Overall improvement in the bilateral airspace opacities in the mid and lower lungs as well as the pleural effusions. Xr Chest Portable    Result Date: 7/27/2020  EXAMINATION: ONE XRAY VIEW OF THE CHEST 7/27/2020 5:29 am COMPARISON: Yesterday HISTORY: ORDERING SYSTEM PROVIDED HISTORY: Hypoxia and congestion TECHNOLOGIST PROVIDED HISTORY: Reason for exam:->Hypoxia and congestion Reason for Exam: Hypoxia and congestion Acuity: Acute Type of Exam: Ongoing FINDINGS: Endotracheal tube terminates 1.5 cm from the elijah. Enteric tube courses below the diaphragm. Right internal jugular catheter with tip extending to the cavoatrial junction. Multiple monitor wires overlie the patient. No pneumothorax. Small-moderate stable bilateral pleural effusions and bibasilar atelectasis. Pulmonary vascular indistinctness and perihilar opacities. Stable mild cardiomegaly. Satisfactory position of support devices. Stable lung aeration with findings favoring pulmonary edema.   Associated small-moderate pleural effusions and bibasilar atelectasis. Xr Chest Portable    Result Date: 7/26/2020  EXAMINATION: ONE XRAY VIEW OF THE CHEST 7/26/2020 4:49 am COMPARISON: July 25, 2020 HISTORY: ORDERING SYSTEM PROVIDED HISTORY: Vent management TECHNOLOGIST PROVIDED HISTORY: Reason for exam:->Vent management Reason for Exam: Vent management Acuity: Acute Type of Exam: Subsequent/Follow-up FINDINGS: The ETT is 2.4 cm above the elijah. The feeding tube is inserted with its tip below the diaphragm and beyond the field of view. The cardiomediastinal silhouette is stable. There are low lung volumes. There are patchy airspace opacities bilaterally, may be related to pulmonary edema versus pneumonia. There is mild to moderate right pleural effusion. There is no pneumothorax. There is no acute osseous abnormality. Patchy airspace opacities bilaterally, may be related to pulmonary edema versus pneumonia, likely improved in the left hemithorax. Mild to moderate right pleural effusion. Stable cardiomegaly. Low lung volumes. Xr Chest Portable    Result Date: 7/25/2020  EXAMINATION: ONE XRAY VIEW OF THE CHEST 7/25/2020 4:14 pm COMPARISON: July 25, 2020 HISTORY: ORDERING SYSTEM PROVIDED HISTORY: CVC placement TECHNOLOGIST PROVIDED HISTORY: Reason for exam:->CVC placement Reason for Exam: post central line right side Acuity: Acute Type of Exam: Initial Additional signs and symptoms: post arrest Line placement FINDINGS: ETT tip is approximately 3 cm above the elijah. Right IJ catheter tip overlies the lower SVC. Cardiac silhouette is mildly enlarged but stable. Diffuse hazy opacity overlying the right lung has not appreciably changed. There is no pneumothorax. Persistent small to moderate right pleural effusion. 1. Interval placement of a right IJ catheter with catheter tip at the level of the lower SVC. No pneumothorax. 2. Persistent hazy opacity over the right hemithorax with small to moderate bilateral pleural effusions. Xr Chest Portable    Result Date: 7/25/2020  EXAMINATION: ONE XRAY VIEW OF THE CHEST 7/25/2020 3:37 pm COMPARISON: Chest 06/17/2019 HISTORY: ORDERING SYSTEM PROVIDED HISTORY: post arrest Acuity: Acute Type of Exam: Initial Additional signs and symptoms: tube placement FINDINGS: The cardiac silhouette is enlarged. Calcifications involving the aorta reflect atherosclerosis. The mediastinal and hilar silhouettes appear unremarkable. Near complete opacification right hemithorax in keeping with atelectasis/infiltrate and pleural effusion. Dense consolidation lower left chest obscuring the left hemidiaphragm reflecting consolidation and pleural effusion. Vascular engorgement and cephalization is demonstrated with bilateral peribronchial cuffing and perivascular haziness. No pneumothorax is seen. No acute osseous abnormality is identified. Endotracheal tube tip measures about 2.7 cm superior to the elijah. 1. Nonspecific pulmonary findings may be related to sequela from pulmonary edema, diffuse infection or ARDS. Pulmonary contusion or sequela during resuscitation (? aspiration) may contribute to these opacities as well. 2. Calcific atherosclerosis aorta. 3. Cardiomegaly. 4. Endotracheal tube tip measures about 2.7 cm superior to the elijah. Xr Chest 1 View    Result Date: 7/25/2020  EXAMINATION: ONE XRAY VIEW OF THE CHEST 7/25/2020 11:01 pm COMPARISON: Chest radiograph 07/25/2020 at 4:15 p.m. HISTORY: ORDERING SYSTEM PROVIDED HISTORY: et tube placement TECHNOLOGIST PROVIDED HISTORY: Reason for exam:->et tube placement FINDINGS: The cardiac silhouette is enlarged. Calcifications involving the aorta reflect atherosclerosis. The mediastinal and hilar silhouettes appear unremarkable. Dense consolidation bilateral lower lungs with bilateral pleural effusion, unchanged from chest radiograph performed earlier today.   Vascular engorgement and cephalization is demonstrated with bilateral peribronchial cuffing and Abdomen: Cirrhosis with upper abdominal ascites. Cholecystectomy. Enteric tube tip is within the distal stomach. Soft Tissues/Bones: Diffuse soft tissue edema. Osteopenia with scattered degenerative changes throughout the visualized spine. Shoulder osteoarthrosis. 1. No pulmonary embolism. 2. Findings of fluid overload with small to moderate pleural effusions, diffuse soft tissue edema, and upper abdominal ascites. 3. Consolidative opacities within the upper to mid lungs could represent pneumonia or atelectasis/scarring. 4. Cirrhosis. 5. Borderline cardiomegaly with severe atherosclerotic disease. Xr Abdomen For Ng/og/ne Tube Placement    Result Date: 7/26/2020  EXAMINATION: ONE SUPINE XRAY VIEW(S) OF THE ABDOMEN 7/26/2020 8:18 am COMPARISON: None HISTORY: ORDERING SYSTEM PROVIDED HISTORY: Confirmation of course of NG/OG/NE tube and location of tip of tube TECHNOLOGIST PROVIDED HISTORY: Reason for exam:->Confirmation of course of NG/OG/NE tube and location of tip of tube Portable? ->Yes Reason for Exam: ng location Acuity: Acute Type of Exam: Subsequent/Follow-up FINDINGS: Enteric tube is in place tip in the gastric antrum. The proximal side-port is in the gastric body. No bowel obstruction is suggested. NG tube tip in the gastric antrum with the proximal side-port in the gastric body.        LAB RESULTS  --------------------    Recent Results (from the past 24 hour(s))   POCT Glucose    Collection Time: 08/05/20 12:03 AM   Result Value Ref Range    POC Glucose 288 (H) 70 - 99 MG/DL   Electrolyte Panel w/ Reflex to MG    Collection Time: 08/05/20 12:05 AM   Result Value Ref Range    Sodium 133 (L) 135 - 145 MMOL/L    Potassium 3.6 3.5 - 5.1 MMOL/L    Chloride 87 (L) 99 - 110 mMol/L    CO2 33 (H) 21 - 32 MMOL/L    Anion Gap 13 4 - 16   POCT Glucose    Collection Time: 08/05/20  5:07 AM   Result Value Ref Range    POC Glucose 301 (H) 70 - 99 MG/DL   Basic Metabolic Panel    Collection Time: 08/05/20 5:15 AM   Result Value Ref Range    Sodium 135 135 - 145 MMOL/L    Potassium 3.9 3.5 - 5.1 MMOL/L    Chloride 90 (L) 99 - 110 mMol/L    CO2 32 21 - 32 MMOL/L    Anion Gap 13 4 - 16    BUN 28 (H) 6 - 23 MG/DL    CREATININE 1.3 (H) 0.6 - 1.1 MG/DL    Glucose 294 (H) 70 - 99 MG/DL    Calcium 8.8 8.3 - 10.6 MG/DL    GFR Non-African American 40 (L) >60 mL/min/1.73m2    GFR  49 (L) >60 mL/min/1.73m2   CBC auto differential    Collection Time: 08/05/20  5:15 AM   Result Value Ref Range    WBC 14.7 (H) 4.0 - 10.5 K/CU MM    RBC 3.12 (L) 4.2 - 5.4 M/CU MM    Hemoglobin 8.5 (L) 12.5 - 16.0 GM/DL    Hematocrit 27.2 (L) 37 - 47 %    MCV 87.2 78 - 100 FL    MCH 27.2 27 - 31 PG    MCHC 31.3 (L) 32.0 - 36.0 %    RDW 17.2 (H) 11.7 - 14.9 %    Platelets 993 169 - 296 K/CU MM    MPV 9.8 7.5 - 11.1 FL    Differential Type AUTOMATED DIFFERENTIAL     Segs Relative 73.6 (H) 36 - 66 %    Lymphocytes % 14.1 (L) 24 - 44 %    Monocytes % 7.5 (H) 0 - 4 %    Eosinophils % 3.6 (H) 0 - 3 %    Basophils % 0.5 0 - 1 %    Segs Absolute 10.8 K/CU MM    Lymphocytes Absolute 2.1 K/CU MM    Monocytes Absolute 1.1 K/CU MM    Eosinophils Absolute 0.5 K/CU MM    Basophils Absolute 0.1 K/CU MM    Nucleated RBC % 0.0 %    Total Nucleated RBC 0.0 K/CU MM    Total Immature Neutrophil 0.10 K/CU MM    Immature Neutrophil % 0.7 (H) 0 - 0.43 %   Ionized Calcium    Collection Time: 08/05/20  5:15 AM   Result Value Ref Range    Ionized Ca 1.08 (L) 1.12 - 1.32 mMOL/L    Calcium, Ion 4.32 (L) 4.48 - 5.28 MG/DL   Magnesium    Collection Time: 08/05/20  5:15 AM   Result Value Ref Range    Magnesium 1.9 1.8 - 2.4 mg/dl   Phosphorus    Collection Time: 08/05/20  5:15 AM   Result Value Ref Range    Phosphorus 2.8 2.5 - 4.9 MG/DL   Blood gas, arterial    Collection Time: 08/05/20  6:00 AM   Result Value Ref Range    pH, Bld 7.48 (H) 7.34 - 7.45    pCO2, Arterial 48.0 (H) 32 - 45 MMHG    pO2, Arterial 96 75 - 100 MMHG    Base Exc, Mixed 10.6 (H) 0 - 2.3 HCO3, Arterial 35.7 (H) 18 - 23 MMOL/L    CO2 Content 37.2 (H) 19 - 24 MMOL/L    O2 Sat 96.4 96 - 97 %    Carbon Monoxide, Blood 4.0 0 - 5 %    Methemoglobin, Arterial 0.4 <1.5 %    Comment 12 450 .30 P5    Ammonia    Collection Time: 08/05/20 11:10 AM   Result Value Ref Range    Ammonia 59 (H) 11 - 51 UMOL/L   POCT Glucose    Collection Time: 08/05/20 11:48 AM   Result Value Ref Range    POC Glucose 207 (H) 70 - 99 MG/DL   Electrolyte Panel w/ Reflex to MG    Collection Time: 08/05/20  3:00 PM   Result Value Ref Range    Sodium 136 135 - 145 MMOL/L    Potassium 3.4 (L) 3.5 - 5.1 MMOL/L    Chloride 92 (L) 99 - 110 mMol/L    CO2 32 21 - 32 MMOL/L    Anion Gap 12 4 - 16   Magnesium    Collection Time: 08/05/20  3:00 PM   Result Value Ref Range    Magnesium 2.0 1.8 - 2.4 mg/dl   POCT Glucose    Collection Time: 08/05/20  5:57 PM   Result Value Ref Range    POC Glucose 156 (H) 70 - 99 MG/DL   Electrolyte Panel w/ Reflex to MG    Collection Time: 08/05/20  8:45 PM   Result Value Ref Range    Sodium 136 135 - 145 MMOL/L    Potassium 3.9 3.5 - 5.1 MMOL/L    Chloride 91 (L) 99 - 110 mMol/L    CO2 33 (H) 21 - 32 MMOL/L    Anion Gap 12 4 - 16         Medical problems    Patient Active Problem List:     HTN (hypertension)     COPD (chronic obstructive pulmonary disease) ([facility])     Anxiety and depression     DM type 2, uncontrolled, with retinopathy ([facility])     Hypokalemia     Hyperlipidemia     Precordial pain     Hyperglycemia     Axillary abscess     Light headed     Orthostatic hypotension     Hypertriglyceridemia     CCC (chronic calculous cholecystitis)     Cirrhosis of liver without ascites ([facility])     Mild obstructive sleep apnea     Idiopathic progressive neuropathy     Congestive heart failure (CHF) ([facility])     Type 2 diabetes mellitus ([facility])     Hypothyroidism     Depression     Hypertension     CHF (congestive heart failure), NYHA class I, acute on chronic, diastolic ([facility])     ACS (acute coronary syndrome) ([facility])     Acute coronary syndrome ([facility])     Diastolic dysfunction with acute on chronic heart failure (HCC)     NSTEMI (non-ST elevated myocardial infarction) ([facility])     SADI (obstructive sleep apnea)     Morbid obesity ([facility])     VHD (valvular heart disease)     Excessive daytime sleepiness     Ex-smoker     Acute respiratory failure ([facility])      ASSESSMENT:  ---------------------    Hypoxic encephalopathy     S/P cardiac arrest     Acute respiratory failure     seizures     Hypotension     Sepsis     Hx CHF     PLAN:     CT brain neg     EEG severe slowing     Keppra     Continue supportive care     Pt is not any better today after 10 days of not on sedation and hospital stay. Pt at times remains with eyes open and yawning involunatarily. As much as prognosis is gaurded pt is not brain dead and would be difficult to say that pt is not going to recover nor can be said if pt is going to,although the possibility of persistant vegetative state need to be considered and a high probability. Family to decide further course of management.     Discussed plan of care with pts nurse.           Electronically signed by Shereen Hebert MD on 8/5/2020 at 10:47 PM

## 2020-08-06 NOTE — PROGRESS NOTES
[id number] [facility]  consulted by Dr. Evaristo Bello for monitoring and adjustment. Indication for treatment: Sepsis, s/p cardiac arrest  Goal trough: 15 mcg/mL     Pertinent Laboratory Values:   Temp Readings from Last 3 Encounters:   08/06/20 99.3 °F (37.4 °C) (Oral)   07/25/20 98.2 °F (36.8 °C) (Axillary)   06/26/19 98.2 °F (36.8 °C) (Oral)     Recent Labs     08/04/20  0420 08/05/20  0515 08/06/20  0525   WBC 14.5* 14.7* 12.5*     Recent Labs     08/04/20  0420 08/05/20  0515 08/06/20  0525   BUN 25* 28* 31*   CREATININE 1.3* 1.3* 1.3*     Estimated Creatinine Clearance: 40 mL/min (A) (based on SCr of 1.3 mg/dL (H)). Intake/Output Summary (Last 24 hours) at 8/6/2020 0941  Last data filed at 8/6/2020 0645  Gross per 24 hour   Intake 1368 ml   Output 2275 ml   Net -907 ml       Pertinent Cultures:  Date                             Source                                     Results  7/26/20                        Blood                                       NGTD  7/26/20                        Covid-19                                  Negative  7/26/20                        Respiratory Panel                   Negative  7/30/20    MRSA nasal      Positive    Vancomycin level:   RANDOM:    Recent Labs     08/04/20  0420 08/06/20  0525   VANCORANDOM 16.0 18.5       Assessment:  · WBC and temperature: persistent leukocytosis; Tmax = 100F. · SCr, BUN, and urine output: SHER, scr stable    · Day(s) of therapy: #11  · Vancomycin levels: 18.5, ok to re-dose    Plan:  · Patient previously ordered on intermittent dosing. Patient has been well maintained on 1500 mg q48h dosing interval, so will plan to re-dose with 1500 mg today and then schedule dosing every 48h. · Repeat a true trough level prior to next dose.   · Pharmacy will continue to monitor patient and adjust therapy as indicated    VANCOMYCIN TROUGH SCHEDULED FOR 8/6 @ 1030    Thank you for the consult,  Yehuda Lanier, PharmD, [facility]

## 2020-08-06 NOTE — PROGRESS NOTES
08/06/20 0411   Vent Information   Vent Type 980   Vent Mode AC/VC   Vt Ordered 450 mL   Rate Set 12 bmp   Peak Flow 50 L/min   Pressure Support 0 cmH20   FiO2  30 %   SpO2 97 %   SpO2/FiO2 ratio 323.33   Sensitivity 3   PEEP/CPAP 5   I Time/ I Time % 0 s   Humidification Source HME   Vent Patient Data   High Peep/I Pressure 0   Peak Inspiratory Pressure 27 cmH2O   Mean Airway Pressure 9.6 cmH20   Rate Measured 13 br/min   Vt Exhaled 482 mL   Minute Volume 6.18 Liters   I:E Ratio 1:4.10   Plateau Pressure 20 YPL38   Static Compliance 31 mL/cmH2O   Total PEEP 6.1 cmH20   Auto PEEP 0.8 cmH20   Cough/Sputum   Sputum How Obtained Endotracheal;Suctioned   $Obtained Sample $Nasotracheal Suction   Cough Productive   Sputum Amount Small   Sputum Color Creamy; Yellow   Tenacity Thick   Spontaneous Breathing Trial (SBT) RT Doc   Pulse 82   Breath Sounds   Right Upper Lobe Clear   Right Middle Lobe Clear   Right Lower Lobe Clear   Left Upper Lobe Clear   Left Lower Lobe Clear   Additional Respiratory  Assessments   Resp 22   Alarm Settings   High Pressure Alarm 40 cmH2O   Delay Alarm 20 sec(s)   Low Minute Volume Alarm 2.5 L/min   Apnea (secs) 20 secs   High Respiratory Rate 40 br/min   Low Exhaled Vt  250 mL   Non-Surgical Airway Endo Tracheal Tube   Placement Date/Time: 07/25/20 1516   Placed By:  In place on arrival to facility  Inserted by: Lito [facility]  Airway Device: Endo Tracheal Tube  Size: 7  Placement Verified By[de-identified] Chest X-ray   Secured at 23 cm   Measured From Lips   Secured Location Left   Secured By Commercial tube mariano   Site Condition Dry   Cuff Pressure   (minimal leak)

## 2020-08-06 NOTE — PROGRESS NOTES
Comprehensive Nutrition Assessment    Type and Reason for Visit:  Reassess    Nutrition Recommendations/Plan:   · Pt needs EN started as she has been in the hospital for 11 days now  · EN Order: semi-elemental at 60 mL/hr to provide the pt with 1728 kcal and 108 g of protein per day    Nutrition Assessment:  Pt remains vented with no goals of care established. Pt is now on los day 11. Please start the EN today as the pt will need nutrition    Malnutrition Assessment:  Malnutrition Status: At risk for malnutrition (Comment)    Context:  Acute Illness     Findings of the 6 clinical characteristics of malnutrition:  Energy Intake:  No significant decrease in energy intake  Weight Loss:  No significant weight loss     Body Fat Loss:  Unable to assess     Muscle Mass Loss:  Unable to assess    Fluid Accumulation:  7 - Moderate to Severe Generalized   Strength:  Not Performed    Estimated Daily Nutrient Needs:  Energy (kcal):  1763; Weight Used for Energy Requirements:  Current     Protein (g):  91(2.0 g/kg IBW); Weight Used for Protein Requirements:  Ideal        Fluid (ml/day):  1763; Weight Used for Fluid Requirements:  Current      Wounds:  Stage II, Pressure Ulcer       Current Nutrition Therapies:    DIET CLEAR LIQUID;     Anthropometric Measures:  · Height: 5' (152.4 cm)  · Current Body Weight: 198 lb 3.1 oz (89.9 kg)   · Admission Body Weight: 257 lb (116.6 kg)    · Usual Body Weight: 232 lb (105.2 kg)     · Ideal Body Weight: 100 lbs; % Ideal Body Weight 208.1 %   · BMI: 38.7  · BMI Categories: Obese Class 2 (BMI 35.0 -39.9)       Nutrition Diagnosis:   · Inadequate oral intake related to impaired respiratory funtion as evidenced by NPO or clear liquid status due to medical condition, intubation      Nutrition Interventions:   Food and/or Nutrient Delivery:  Start Tube Feeding  Nutrition Education/Counseling:  No recommendation at this time   Coordination of Nutrition Care:  Continued Inpatient Monitoring    Goals:  pt will be able initiate EN within the next 24 hr       Nutrition Monitoring and Evaluation:   Food/Nutrient Intake Outcomes:  Enteral Nutrition Intake/Tolerance  Physical Signs/Symptoms Outcomes:  Biochemical Data, Weight, GI Status, Hemodynamic Status     Discharge Planning:     Too soon to determine     Electronically signed by Josefa Flores RD, JENNIFER on 8/3/86 at 10:39 AM EDT    Contact: [phone]

## 2020-08-06 NOTE — PROGRESS NOTES
Pulmonary and Critical Care  Progress Note      VITALS:  BP (!) 95/42   Pulse 80   Temp 99.3 °F (37.4 °C) (Oral)   Resp 14   Ht 5' (1.524 m)   Wt 196 lb 3.4 oz (89 kg)   SpO2 98%   BMI 38.32 kg/m²     Subjective:   CHIEF COMPLAINT :SOB     HPI:                The patient is a 79 y.o. female with significant past medical history of CHF, DM, HTN, hypothyroidism,15 pk yr smoker quit 20 years ago, Morbid obesity, SADI  presents with complaints of SOB getting worse. EMS was called and had a witness out of hospital Cardiac arrest.She was Intubated in [place]. She had CPR with ROSC in 5 minutes. She was found to have  Suspected pneumonia. She is being treated with ABX. She had worsening LE edema. She had a EF of 55% and moderate Aortic stenosis. She had Pulmonary edema. She is on Lasix drip.she has good urine output. She is sedated now. Her PBNPT is 21,085 and troponin mildly elevated.      Objective:   PHYSICAL EXAM:    LUNGS:Bilateral basal crackles  Abd-soft, BS+,NT  Ext - no pedal edema  CVS-s1s2,no murmurs      DATA:    CBC:  Recent Labs     08/04/20  0420 08/05/20  0515 08/06/20  0525   WBC 14.5* 14.7* 12.5*   RBC 3.00* 3.12* 3.14*   HGB 8.2* 8.5* 8.6*   HCT 26.6* 27.2* 27.6*    411 434   MCV 88.7 87.2 87.9   MCH 27.3 27.2 27.4   MCHC 30.8* 31.3* 31.2*   RDW 16.8* 17.2* 17.3*   SEGSPCT 70.9* 73.6* 72.2*      BMP:  Recent Labs     08/04/20  0420  08/05/20  0515  08/05/20 2045 08/06/20  0113 08/06/20  0525      < > 135   < > 136 138 136   K 3.4*   < > 3.9   < > 3.9 3.7 3.8   CL 88*   < > 90*   < > 91* 93* 92*   CO2 33*   < > 32   < > 33* 32 32   BUN 25*  --  28*  --   --   --  31*   CREATININE 1.3*  --  1.3*  --   --   --  1.3*   CALCIUM 8.9  --  8.8  --   --   --  9.2   GLUCOSE 178*  --  294*  --   --   --  183*    < > = values in this interval not displayed.       ABG:  Recent Labs     08/04/20  0600 08/05/20  0600 08/06/20  0600   PH 7.48* 7.48* 7.45   PO2ART 79 96 67*   WES2CJR 47.0* 48.0* 50.0* O2SAT 94.5* 96.4 93.1*     BNP  Lab Results   Component Value Date    BNP 9.6 06/08/2016      D-Dimer:  Lab Results   Component Value Date    DDIMER 759 (H) 08/01/2020      1. Radiology: None      Assessment/Plan     Patient Active Problem List    Diagnosis Date Noted    Acute respiratory failure ([facility] 07/25/2020    Excessive daytime sleepiness 11/04/2019    Ex-smoker 11/04/2019    VHD (valvular heart disease)     SADI (obstructive sleep apnea)     Morbid obesity ([facility]     NSTEMI (non-ST elevated myocardial infarction) ([facility]     ACS (acute coronary syndrome) ([facility] 11/08/2018    Acute coronary syndrome ([facility] 64/42/2378    Diastolic dysfunction with acute on chronic heart failure ([facility] 11/08/2018    Congestive heart failure (CHF) ([facility] 11/07/2018    Type 2 diabetes mellitus ([facility] 11/07/2018    Hypothyroidism 11/07/2018    Depression 11/07/2018    Hypertension 11/07/2018    CHF (congestive heart failure), NYHA class I, acute on chronic, diastolic ([facility] 45/52/1385    Idiopathic progressive neuropathy 04/18/2016     Overview Note:     Patient has chronic peripheral neuropathy to bilateral feet. She is currently on gabapentin 800 mg tid with some benefit. She reports decreased sensation to her feet with intermittent pain.  Mild obstructive sleep apnea 10/14/2015    CCC (chronic calculous cholecystitis) 09/14/2015    Cirrhosis of liver without ascites ([facility] 09/14/2015    Light headed 04/23/2014     Overview Note:     From dehydration from hyperglycemia - patient dry on presentation. replace inactive diagnosis      Orthostatic hypotension 04/23/2014    Hypertriglyceridemia 04/23/2014    Precordial pain 04/22/2014    Hyperglycemia 04/22/2014     Overview Note:     Admission blood glucose of 120; was >500 prior to presentation to ER, first accucheck in ER was 341.       Axillary abscess 04/22/2014     Overview Note:     Left      Hyperlipidemia     DM type 2, uncontrolled, with retinopathy ([facility] 03/01/2013     Overview Note:     Patient's last A1c was 12.1, and she has been referred in the last month to endocrinology. She has an appointment this coming month with a specialist.      Hypokalemia 03/01/2013    HTN (hypertension) 11/26/2012    COPD (chronic obstructive pulmonary disease) ([facility] 11/26/2012    Anxiety and depression 11/26/2012     Overview Note:     Patient is currently on venlafaxine 150 mg daily for depression and anxiety. She feels like it is not as affective as she would like currently. She does not feel suicidal and is sleeping ok but has decreased motivation and less happy days. Patient is on the vent and off sedation. No response to painful stimuli. EEG No seizures     Anoxic encephalopathy  Hypokalemia  Anemia - stable  Acute on chronic Hypoxic hypercapneic resp failure  Chronic Metabolic alkalosis  Bilateral pleural effusions  VDRF  Leukocytosis - improved  Moderate AS  Morbid obesity  SADI  Smoker  Pulmonary edema  Out of Hospital Cardiac arrest  ? Aspiration pneumonia  Systolic CHF with EF of 94%  Grade III Diastolic dysfunction  Increased PH sec to the worsening metabolic alkalosis sec to Lasix  Moderate malnutrition       1. Await Trach and PEG as per the family request  2. Tube feeds  3. Await LT[facility] eval  4. Prognosis guarded  5. C.w present management  No follow-ups on file.     Electronically signed by Laurie Valles MD on 8/6/2020 at 11:50 AM

## 2020-08-06 NOTE — PROGRESS NOTES
Nephrology Progress Note        [address], [address] [facility], [place] [address]  Phone: [phone]  Office Hours: 8:30AM - 4:30PM  Monday - Friday       [facility]  [facility], [address],  [place] [address] [facility], [place] [address]  PHONE: [phone]  FAX: [phone]    8/6/2020 7:02 AM  Subjective:   Admit Date: 7/25/2020  PCP: Sarahi Wolf PA-C  Interval History:   NONOLIGURIC  INtubated  No gag reflex      Diet: DIET CLEAR LIQUID;      Data:   Scheduled Meds:   insulin NPH  15 Units Subcutaneous QAM    insulin regular  25 Units Subcutaneous 3 times per day    insulin glargine  50 Units Subcutaneous Nightly    potassium chloride  20 mEq Intravenous Once    enoxaparin  40 mg Subcutaneous Daily    insulin regular  0-12 Units Subcutaneous Q6H    levothyroxine  50 mcg Oral Daily    piperacillin-tazobactam  3.375 g Intravenous Q8H    levetiracetam  1,000 mg Intravenous Q12H    vancomycin (VANCOCIN) intermittent dosing (placeholder)   Other RX Placeholder    miconazole   Topical BID    sodium chloride flush  10 mL Intravenous 2 times per day    atorvastatin  20 mg Oral Daily    aspirin  81 mg Per NG tube Daily    lansoprazole  30 mg Per NG tube QAM AC    chlorhexidine  15 mL Mouth/Throat BID     Continuous Infusions:   furosemide (LASIX) 1mg/ml infusion 5 mg/hr (08/05/20 2047)    dextrose      norepinephrine 13 mcg/min (08/05/20 1320)     PRN Meds:sodium phosphate IVPB **OR** sodium phosphate IVPB, potassium chloride, magnesium sulfate, sodium chloride flush, acetaminophen **OR** acetaminophen, polyethylene glycol, promethazine **OR** ondansetron, glucose, dextrose, glucagon (rDNA), dextrose, albuterol sulfate HFA  I/O last 3 completed shifts: In: 6019 [I.V.:592; NG/GT:537; IV Piggyback:239]  Out: 2525 [Urine:2525]  No intake/output data recorded.     Intake/Output Summary (Last 24 hours) at 8/6/2020 0702  Last data filed cholecystitis)    Cirrhosis of liver without ascites (HCC)    Mild obstructive sleep apnea    Idiopathic progressive neuropathy    Congestive heart failure (CHF) (HCC)    Type 2 diabetes mellitus ([facility]    Hypothyroidism    Depression    Hypertension    CHF (congestive heart failure), NYHA class I, acute on chronic, diastolic (HCC)    ACS (acute coronary syndrome) ([facility]    Acute coronary syndrome (HCC)    Diastolic dysfunction with acute on chronic heart failure (HCC)    NSTEMI (non-ST elevated myocardial infarction) ([facility]    SADI (obstructive sleep apnea)    Morbid obesity (HCC)    VHD (valvular heart disease)    Excessive daytime sleepiness    Ex-smoker    Acute respiratory failure (HCC)   SHER  S/p cardiac arrest  Hypokalemia  Hypomagnesemia  encephalopathy s/p cardiac arrest  Acute hypoxic resp failure  Acute on chronic CHF  DM2     Suggest;  Stop lasix gtt once current bag is empthy  Avoid nephrotoxins  Monitor UOP  critically ill                    Electronically signed by Walter Saenz DO on 8/6/2020 at 7:02 AM    MD Gordo Carvalho DO [address],  [place]  [place] [address]  PHONE: [phone]  FAX: [phone]

## 2020-08-06 NOTE — PLAN OF CARE
Problem: Falls - Risk of:  Goal: Will remain free from falls  Description: Will remain free from falls  8/6/2020 0150 by Argelia Phillips RN  Outcome: Ongoing  8/5/2020 2149 by Argelia Phillips RN  Outcome: Ongoing  Goal: Absence of physical injury  Description: Absence of physical injury  8/6/2020 0150 by Argelia Phillips RN  Outcome: Ongoing  8/5/2020 2149 by Argelia Phillips RN  Outcome: Ongoing     Problem: Skin Integrity:  Goal: Will show no infection signs and symptoms  Description: Will show no infection signs and symptoms  8/6/2020 0150 by Argelia Phillips RN  Outcome: Ongoing  8/5/2020 2149 by Argelia Phillips RN  Outcome: Ongoing  Goal: Absence of new skin breakdown  Description: Absence of new skin breakdown  8/6/2020 0150 by Argelia Phillips RN  Outcome: Ongoing  8/5/2020 2149 by Argelia Phillips RN  Outcome: Ongoing

## 2020-08-06 NOTE — PROGRESS NOTES
[address] [place] [address]  HOSPITALIST PROGRESS NOTE                       Name:  Veena Gutierrez Standard /Age/Sex: 1950  (79 y.o. female)   MRN & CSN:  [id number] & [id number] Admission Date/Time: 2020  8:40 PM   Location:  -A Attending:  Mariza Batista MD                                                  AMELIA Conte is a 79 y.o. female who was admitted on -with out of hospital cardiac arrest    SUBJECTIVE  Continues to be unresponsive, no significant from change- on lasix drip and levophed    ROS- unable to obtain as patient unresponsive        ALLERGIES:   Allergies   Allergen Reactions    Latex Other (See Comments)     Blisters    Adhesive Tape      Paper tape ok to use    Codeine Nausea And Vomiting       PCP: Tunde Hi PA-C    PAST MEDICAL HISTORY, SURGICAL HISTORY, SOCIAL HISTORY and  HOME MEDICATIONS all reviewed. OBJECTIVE  Vitals:    20 1020 20 1030 20 1100 20 1120   BP: (!) 142/54 (!) 109/46 (!) 95/42    Pulse: 84 84 93 80   Resp: 17 13 19 14   Temp:       TempSrc:       SpO2:    98%   Weight:       Height:           PHYSICAL EXAM   GEN Unresponsive  EYES  sluggish pupillary responses  HENT Dry mucosa membrane  NECK Supple, no apparent thyromegaly or masses. RESP  unlabored respiration, decreased breath sounds  CARDIO/VASC S1/S2 auscultated. Regular rate without appreciable murmurs, rubs, or gallops. No JVD or carotid bruits  GI Abdomen is soft -exam limited by sedation  MSK -no spontaneous extremity movement. SKIN Normal coloration, warm, dry.   NEURO unresponsive    INTAKE: In: 1368 [I.V.:592; NG/GT:537]  Out: 1900   OUTPUT: In: 1368   Out: 1900 [Urine:1900]    LABS  Recent Labs     20  0420 20  0515 20  0525   WBC 14.5* 14.7* 12.5*   HGB 8.2* 8.5* 8.6*   HCT 26.6* 27.2* 27.6*    411 434      Recent Labs     20  0420  20  0515  20  2045 20  0113 20  0525      < > 135   < > 136 138 136   K 3.4*   < > 3.9   < > 3.9 3.7 3.8   CL 88*   < > 90*   < > 91* 93* 92*   CO2 33*   < > 32   < > 33* 32 32   PHOS 3.0  --  2.8  --   --   --  3.1   BUN 25*  --  28*  --   --   --  31*   CREATININE 1.3*  --  1.3*  --   --   --  1.3*    < > = values in this interval not displayed. No results for input(s): AST, ALT, ALB, BILIDIR, BILITOT, ALKPHOS in the last 72 hours. No results for input(s): INR in the last 72 hours. No results for input(s): CKTOTAL, CKMB, CKMBINDEX, TROPONINT in the last 72 hours.        Abnormal labs for today noted      Imaging:     ECHO:    Microbiology:  Blood culture:    Urine culture:    Sputum culture:    Procedures done this admission:    MEDS  Scheduled Meds:   vancomycin  1,500 mg Intravenous Once    [START ON 8/8/2020] vancomycin  1,500 mg Intravenous Q48H    insulin NPH  15 Units Subcutaneous QAM    insulin regular  25 Units Subcutaneous 3 times per day    insulin glargine  50 Units Subcutaneous Nightly    potassium chloride  20 mEq Intravenous Once    enoxaparin  40 mg Subcutaneous Daily    insulin regular  0-12 Units Subcutaneous Q6H    levothyroxine  50 mcg Oral Daily    piperacillin-tazobactam  3.375 g Intravenous Q8H    levetiracetam  1,000 mg Intravenous Q12H    miconazole   Topical BID    sodium chloride flush  10 mL Intravenous 2 times per day    atorvastatin  20 mg Oral Daily    aspirin  81 mg Per NG tube Daily    lansoprazole  30 mg Per NG tube QAM AC    chlorhexidine  15 mL Mouth/Throat BID     Continuous Infusions:   furosemide (LASIX) 1mg/ml infusion 5 mg/hr (08/05/20 2047)    dextrose      norepinephrine 12 mcg/min (08/06/20 1046)     PRN Meds:sodium phosphate IVPB **OR** sodium phosphate IVPB, potassium chloride, magnesium sulfate, sodium chloride flush, acetaminophen **OR** acetaminophen, polyethylene glycol, promethazine **OR** ondansetron, glucose, dextrose, glucagon (rDNA), dextrose, albuterol sulfate HFA        ASSESSMENT and

## 2020-08-07 NOTE — BRIEF OP NOTE
Brief Postoperative Note      Patient: Mike Murphy  YOB: 1950  MRN: [id number]    Date of Procedure: 8/7/2020    Pre-Op Diagnosis: respiratory failure    Post-Op Diagnosis: Same       Procedure(s):  TRACHEOTOMY  EGD PEG TUBE PLACEMENT    Surgeon(s):  MD Dorinda Carter MD    Assistant:  First Assistant: Desiree Bautista PA-C    Anesthesia: General    Estimated Blood Loss (mL): less than 50     Complications: Bleeding at trach site. Addressed prior to patient leaving OR    Specimens:   * No specimens in log *    Implants:  Implant Name Type Inv. Item Serial No.  Lot No. LRB No. Used Action   [employer] SAFETY PEG KIT     [id number] N/A 1 Implanted         Drains:   NG/OG/NJ/NE Tube Orogastric 18 fr Center mouth (Active)   Surrounding Skin Dry; Intact 08/07/20 0745   Securement device Yes 08/07/20 0745   Status Suction-low intermittent 08/07/20 0745   Placement Verified by X-Ray (repeat) 08/07/20 0400   NG/OG/NJ/NE External Measurement (cm) 55 cm 08/07/20 0400   Drainage Appearance Milky 08/07/20 0745   Tube Feeding Semi-elemental 08/06/20 1630   Tube Feeding Status Stopped 08/07/20 0745   Rate/Schedule 20 mL/hr 08/07/20 0400   Tube Feeding Intake (mL) 520 ml 08/07/20 0525   Free Water Flush (mL) 30 mL 08/06/20 0830   Free Water Rate 30 08/07/20 0500   Residual Volume (ml) 35 ml 08/07/20 0400   Output (mL) 0 ml 08/03/20 1558       Gastrostomy/Enterostomy/Jejunostomy PEG-Jejunostomy LLQ 1 20 fr (Active)   Dressing Status Clean;Dry; Intact 08/07/20 0952   Dressing Change Due 08/07/20 08/07/20 8440       Urethral Catheter Non-latex 16 fr (Active)   Catheter Indications Need for fluid management in critically ill patients in a critical care setting not able to be managed by other means such as BSC with hat, bedpan, urinal, condom catheter, or short term intermittent urethral catherization 08/07/20 0745   Site Assessment Edema;Pink; No urethral drainage 08/07/20 0745   Urine

## 2020-08-07 NOTE — PROGRESS NOTES
This note also relates to the following rows which could not be included:  SpO2 - Cannot attach notes to unvalidated device data  Pulse - Cannot attach notes to unvalidated device data  Resp - Cannot attach notes to unvalidated device data       08/07/20 0434   Vent Information   Vent Type 980   Vent Mode AC/VC   Vt Ordered 450 mL   Rate Set 12 bmp   Peak Flow 50 L/min   Pressure Support 0 cmH20   FiO2  30 %   Sensitivity 3   PEEP/CPAP 5   I Time/ I Time % 0 s   Humidification Source HME   Vent Patient Data   High Peep/I Pressure 0   Peak Inspiratory Pressure 20 cmH2O   Mean Airway Pressure 8.3 cmH20   Rate Measured 12 br/min   Vt Exhaled 465 mL   Minute Volume 5.68 Liters   I:E Ratio 1:4.10   Plateau Pressure 17 [id number]   Static Compliance 36 mL/cmH2O   Total PEEP 5.2 cmH20   Auto PEEP 0.1 cmH20   Cough/Sputum   Sputum How Obtained Endotracheal;Suctioned   $Obtained Sample $Nasotracheal Suction   Cough Productive   Sputum Amount Small   Sputum Color Creamy; Yellow   Tenacity Thick   Breath Sounds   Right Upper Lobe Clear   Right Middle Lobe Clear;Diminished   Right Lower Lobe Clear   Left Upper Lobe Clear;Diminished   Left Lower Lobe Clear;Diminished   Alarm Settings   High Pressure Alarm 40 cmH2O   Delay Alarm 20 sec(s)   Low Minute Volume Alarm 2.5 L/min   Apnea (secs) 20 secs   High Respiratory Rate 40 br/min   Low Exhaled Vt  250 mL   Non-Surgical Airway Endo Tracheal Tube   Placement Date/Time: 07/25/20 1516   Placed By:  In place on arrival to facility  Inserted by: [facility]  Airway Device: Endo Tracheal Tube  Size: 7  Placement Verified By[de-identified] Chest X-ray   Secured at 23 cm   Measured From Lips   Secured Location Right   Secured By Commercial tube mariano   Site Condition Dry   Cuff Pressure   (mlt)

## 2020-08-07 NOTE — PLAN OF CARE
baseline  Outcome: Ongoing     Problem: Nutrition Deficit:  Goal: Ability to achieve adequate nutritional intake will improve  Description: Ability to achieve adequate nutritional intake will improve  Outcome: Ongoing     Problem: Pain:  Goal: Pain level will decrease  Description: Pain level will decrease  Outcome: Ongoing  Goal: Recognizes and communicates pain  Description: Recognizes and communicates pain  Outcome: Ongoing  Goal: Control of acute pain  Description: Control of acute pain  Outcome: Ongoing  Goal: Control of chronic pain  Description: Control of chronic pain  Outcome: Ongoing     Problem: Serum Glucose Level - Abnormal:  Goal: Ability to maintain appropriate glucose levels will improve to within specified parameters  Description: Ability to maintain appropriate glucose levels will improve to within specified parameters  Outcome: Ongoing     Problem: Skin Integrity - Impaired:  Goal: Will show no infection signs and symptoms  Description: Will show no infection signs and symptoms  Outcome: Ongoing  Goal: Absence of new skin breakdown  Description: Absence of new skin breakdown  Outcome: Ongoing     Problem: Sleep Pattern Disturbance:  Goal: Appears well-rested  Description: Appears well-rested  Outcome: Ongoing     Problem: Tissue Perfusion, Altered:  Goal: Circulatory function within specified parameters  Description: Circulatory function within specified parameters  Outcome: Ongoing     Problem: Tissue Perfusion - Cardiopulmonary, Altered:  Goal: Absence of angina  Description: Absence of angina  Outcome: Ongoing  Goal: Hemodynamic stability will improve  Description: Hemodynamic stability will improve  Outcome: Ongoing

## 2020-08-07 NOTE — PROGRESS NOTES
Nephrology Progress Note        [address], [address] [address]  Phone: [phone]  Office Hours: 8:30AM - 4:30PM  Monday - Friday       [facility]  Fay Solo MD  [address],   [facility],  [place] [address]  PHONE: [phone]  FAX: [phone]    8/7/2020 6:30 AM  Subjective:   Admit Date: 7/25/2020  PCP: Jose Wolf PA-C  Interval History: remains intubated  On minimal vent settings    Diet: DIET TUBE FEED CONTINUOUS/CYCLIC NPO; Semi-elemental (Vital AF); Nasogastric; Continuous; 10; 60; 24      Data:   Scheduled Meds:   [START ON 8/8/2020] vancomycin  1,500 mg Intravenous Q48H    insulin NPH  15 Units Subcutaneous QAM    insulin regular  25 Units Subcutaneous 3 times per day    insulin glargine  50 Units Subcutaneous Nightly    potassium chloride  20 mEq Intravenous Once    enoxaparin  40 mg Subcutaneous Daily    insulin regular  0-12 Units Subcutaneous Q6H    levothyroxine  50 mcg Oral Daily    piperacillin-tazobactam  3.375 g Intravenous Q8H    levetiracetam  1,000 mg Intravenous Q12H    miconazole   Topical BID    sodium chloride flush  10 mL Intravenous 2 times per day    atorvastatin  20 mg Oral Daily    aspirin  81 mg Per NG tube Daily    lansoprazole  30 mg Per NG tube QAM AC    chlorhexidine  15 mL Mouth/Throat BID     Continuous Infusions:   dextrose      norepinephrine 11 mcg/min (08/07/20 0525)     PRN Meds:sodium phosphate IVPB **OR** sodium phosphate IVPB, potassium chloride, magnesium sulfate, sodium chloride flush, acetaminophen **OR** acetaminophen, polyethylene glycol, promethazine **OR** ondansetron, glucose, dextrose, glucagon (rDNA), dextrose, albuterol sulfate HFA  I/O last 3 completed shifts:   In: 1663.6 [I.V.:645.6; NG/GT:308; IV Piggyback:710]  Out: 3835 [Urine:2425]  I/O this shift:  In: 670 [I.V.:50; NG/GT:520; IV Piggyback:100]  Out: 1100 [Urine:1100]    Intake/Output Summary (Last 24 hours) at 8/7/2020 0630  Last data filed at 8/7/2020 0525  Gross per 24 hour   Intake 2233.63 ml   Output 2825 ml   Net -591.37 ml       CBC:   Recent Labs     08/05/20  0515 08/06/20  0525 08/07/20  0509   WBC 14.7* 12.5* 12.7*   HGB 8.5* 8.6* 7.6*    434 377       BMP:    Recent Labs     08/05/20  0515  08/06/20  0113 08/06/20  0525 08/06/20  1425      < > 138 136 134*   K 3.9   < > 3.7 3.8 3.3*   CL 90*   < > 93* 92* 90*   CO2 32   < > 32 32 32   BUN 28*  --   --  31*  --    CREATININE 1.3*  --   --  1.3*  --    GLUCOSE 294*  --   --  183*  --     < > = values in this interval not displayed. Hepatic: No results for input(s): AST, ALT, ALB, BILITOT, ALKPHOS in the last 72 hours. Troponin: No results for input(s): TROPONINI in the last 72 hours. BNP: No results for input(s): BNP in the last 72 hours. Lipids: No results for input(s): CHOL, HDL in the last 72 hours. Invalid input(s): LDLCALCU  ABGs:   Lab Results   Component Value Date    PO2ART 67 08/06/2020    EBW7JZQ 50.0 08/06/2020     INR: No results for input(s): INR in the last 72 hours.     Objective:   Vitals: /62   Pulse 80   Temp 99.5 °F (37.5 °C) (Rectal)   Resp 14   Ht 5' (1.524 m)   Wt 196 lb 10.4 oz (89.2 kg)   SpO2 99%   BMI 38.41 kg/m²   General appearance:  in no acute distress  HEENT: normocephalic, atraumatic,   Neck: supple, trachea midline  Lungs:  breathing comfortably on mv  Heart[de-identified] regular rate and rhythm,  Abdomen: soft, non-tender; non distended,   Extremities: extremities atraumatic, no cyanosis or edema      Assessment and Plan:     Patient Active Problem List   Diagnosis    HTN (hypertension)    COPD (chronic obstructive pulmonary disease) (HCC)    Anxiety and depression    DM type 2, uncontrolled, with retinopathy ([facility]    Hypokalemia    Hyperlipidemia    Precordial pain    Hyperglycemia    Axillary abscess    Light headed    Orthostatic hypotension    Hypertriglyceridemia  CCC (chronic calculous cholecystitis)    Cirrhosis of liver without ascites (HCC)    Mild obstructive sleep apnea    Idiopathic progressive neuropathy    Congestive heart failure (CHF) (HCC)    Type 2 diabetes mellitus ([facility]    Hypothyroidism    Depression    Hypertension    CHF (congestive heart failure), NYHA class I, acute on chronic, diastolic (HCC)    ACS (acute coronary syndrome) ([facility]    Acute coronary syndrome (HCC)    Diastolic dysfunction with acute on chronic heart failure (HCC)    NSTEM[facility] (non-ST elevated myocardial infarction) [facility]    SADI (obstructive sleep apnea)    Morbid obesity (HCC)    VHD (valvular heart disease)    Excessive daytime sleepiness    Ex-smoker    Acute respiratory failure (HCC)   SHER  S/p cardiac arrest  Hypokalemia  Hypomagnesemia  encephalopathy s/p cardiac arrest  Acute hypoxic resp failure  Acute on chronic CHF  DM2     Suggest;  Stop lasix gtt, allow fluid reequilibration  Avoid nephrotoxins  Monitor UOP  critically ill                             Electronically signed by Nancy Wray DO on 8/7/2020 at 6:30 AM    MD Jeferson Carvalho DO [address]  PHONE: [phone]  FAX: [phone]

## 2020-08-07 NOTE — PROGRESS NOTES
Report received from OR team, new trach is noted to be oozing and sutured in place, G tube also has small amounts of drainage and sutured in place. Tube is currently draining to gravity, small amounts of clear drainage with coffee ground sediment.

## 2020-08-07 NOTE — PROGRESS NOTES
[address] [place] [address]  HOSPITALIST PROGRESS NOTE                       Name:  Rosmery Candelaria Standard /Age/Sex: 1950  (79 y.o. female)   MRN & CSN:  [id number] & [id number] Admission Date/Time: 2020  8:40 PM   Location:  OR/NONE Attending:  Betsy Sterling MD                                                  HPI  Valarie Weeks is a 79 y.o. female who was admitted on -with out of hospital cardiac arrest    SUBJECTIVE  Continues to be unresponsive, on levophed when seen this morning      ROS- unable to obtain as patient unresponsive        ALLERGIES:   Allergies   Allergen Reactions    Latex Other (See Comments)     Blisters    Adhesive Tape      Paper tape ok to use    Codeine Nausea And Vomiting       PCP: Lupe Ibarra PA-C    PAST MEDICAL HISTORY, SURGICAL HISTORY, SOCIAL HISTORY and  HOME MEDICATIONS all reviewed. OBJECTIVE  Vitals:    20 0500 20 0600 20 0745 20 0801   BP: (!) 96/52 113/62 (!) 124/51 (!) 138/55   Pulse: 77 80 81 81   Resp: 13 14 16 18   Temp:   99.5 °F (37.5 °C)    TempSrc:   Rectal    SpO2: 99% 99% 98% 100%   Weight:       Height:           PHYSICAL EXAM   GEN Unresponsive  EYES  sluggish pupillary responses  HENT Dry mucosa membrane  NECK Supple, no apparent thyromegaly or masses. RESP  unlabored respiration, decreased breath sounds  CARDIO/VASC S1/S2 auscultated. Regular rate without appreciable murmurs, rubs, or gallops. No JVD or carotid bruits  GI Abdomen is soft -exam limited by sedation  MSK -no spontaneous extremity movement. SKIN Normal coloration, warm, dry.   NEURO unresponsive    INTAKE: In: 1711.6 [I.V.:481.6; NG/GT:520]  Out: 2450   OUTPUT: In: 1711.6   Out: 2450 [Urine:2450]    LABS  Recent Labs     20  0515 20  0525 20  0509   WBC 14.7* 12.5* 12.7*   HGB 8.5* 8.6* 7.6*   HCT 27.2* 27.6* 25.3*    434 377      Recent Labs     20  0515  20  0525 20  1425 20  0509      < > 136 IV abx  2-Acute hypoxic respiratory failure due to suspected gram negative pneumonia and acute systolic HF with features of volume overload- lasix prn- no PE on CTA- improving CXR findings. 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 6-SHER on CKD stage 2-3 - lasix drip due to concerns of volume overload  7-Electrolyte abnormalities-replace prn  8-PAF- not on AC due to hx of GIB  9-Cirrhosis- ?cardiac related  10-DM- on ss  11-Severe PCM- TF briefly held yesterday- to be restarted per dietitian  12-Anemia of chronic disease- monitor and transfuse if less than 7      Plan for trach/peg- then likely going to [address]:     Diet DIET TUBE FEED CONTINUOUS/CYCLIC NPO; Semi-elemental (Vital AF);  Nasogastric; Continuous; 10; 60; 24   DVT Prophylaxis [] Lovenox, []  Heparin, [] SCDs, [] Ambulation   GI Prophylaxis [] PPI,  [] H2 Blocker,  [] Carafate,  [] Diet/Tube Feeds   Code Status DNR-CCA   Disposition Patient requires continued admission due to acute respiratory failure   CMS Level of Risk [] Low, [x] Moderate,[]  High  Patient's risk as above due to acute respiratory failure     HARINDER ROBERTSON MD 8/7/2020 9:07 AM

## 2020-08-07 NOTE — PROGRESS NOTES
NEUROLOGY NOTE  DR. Emily Avila MD.  -------------------------------------------------  Subjective:    Pt is obtunded     Pt does not follow any commands    Pt continues to be obtudned    Does not follow any commands    Pt has not improved over the last week    Obtunded no response to verbal commands    No seizure activity noted    Objective:    BP (!) 131/56   Pulse 83   Temp 99.9 °F (37.7 °C) (Rectal)   Resp 15   Ht 5' (1.524 m)   Wt 196 lb 3.4 oz (89 kg)   SpO2 99%   BMI 38.32 kg/m²   HEENT nl      Neuro exam    Obtunded no response to verbal commands,  Pupils 3 mm lynnette  Sluggish gag reflex  Minimal withdrawal to painful stimulii  Equivocal toes  resp on the vent      RADIOLOGY  -----------------    Ct Head Wo Contrast    Result Date: 7/31/2020  EXAMINATION: CT OF THE HEAD WITHOUT CONTRAST  7/31/2020 1:18 am TECHNIQUE: CT of the head was performed without the administration of intravenous contrast. Dose modulation, iterative reconstruction, and/or weight based adjustment of the mA/kV was utilized to reduce the radiation dose to as low as reasonably achievable. COMPARISON: None HISTORY: ORDERING SYSTEM PROVIDED HISTORY: Twitching of eyes/suspected seizure/rule out acute intracranial process TECHNOLOGIST PROVIDED HISTORY: Reason for exam:->Twitching of eyes/suspected seizure/rule out acute intracranial process Has a \"code stroke\" or \"stroke alert\" been called? ->No Reason for Exam: rule out acute process FINDINGS: BRAIN/VENTRICLES: There is no acute intracranial hemorrhage, mass effect or midline shift. No abnormal extra-axial fluid collection. The gray-white differentiation is maintained without evidence of an acute infarct. There is no evidence of hydrocephalus. ORBITS: The visualized portion of the orbits demonstrate no acute abnormality. SINUSES: Mild mucoperiosteal thickening to frontal ethmoidal recess of left frontal sinus. Small air-fluid level in left sphenoid sinus.   Other paranasal sinuses appear to be clear. Opacification/partial opacification to a few left-sided mastoid air cells. Right-sided mastoid air cells appear clear. Bilateral middle ears appear clear. SOFT TISSUES/SKULL:  No acute abnormality of the visualized skull or soft tissues. No acute intracranial abnormality. Paranasal sinus disease and left mastoid effusions as above. Xr Chest Portable    Result Date: 8/1/2020  EXAMINATION: ONE XRAY VIEW OF THE CHEST 7/29/2020 4:55 am COMPARISON: Yesterday HISTORY: ORDERING SYSTEM PROVIDED HISTORY: Vent management TECHNOLOGIST PROVIDED HISTORY: Reason for exam:->Vent management Reason for Exam: vent Acuity: Acute Type of Exam: Subsequent/Follow-up FINDINGS: Endotracheal tube terminates 3 cm above the elijah. Right internal jugular catheter with tip at the cavoatrial junction. Enteric tube courses below the diaphragm. Findings of pulmonary edema with pulmonary vascular indistinctness. No pneumothorax. Small bilateral pleural effusions. Low lung volumes exaggerate the pulmonary vasculature. Scattered perihilar and basilar linear atelectasis. No pneumothorax. 1.  Satisfactory position of support devices. 2. Low lung volumes exaggerating the pulmonary finding since yesterday's exam.  Essentially stable pulmonary edema, small pleural effusions, and linear atelectasis. Xr Chest Portable    Result Date: 8/1/2020  EXAMINATION: ONE XRAY VIEW OF THE CHEST 8/1/2020 5:06 am COMPARISON: 07/31/2020 HISTORY: ORDERING SYSTEM PROVIDED HISTORY: Vent management TECHNOLOGIST PROVIDED HISTORY: Reason for exam:->Vent management Reason for Exam: Vent management Acuity: Acute Type of Exam: Ongoing FINDINGS: The right internal jugular catheter tip is in the superior vena cava. The endotracheal tube tip is 2 cm above the elijah. The enteric tube is below the diaphragm. There are moderate pleural effusions. Basilar lung opacities could represent atelectasis.      Worsening moderate pleural effusions and atelectasis. Xr Chest Portable    Result Date: 7/31/2020  EXAMINATION: ONE XRAY VIEW OF THE CHEST 7/31/2020 5:41 am COMPARISON: 07/30/2020 HISTORY: ORDERING SYSTEM PROVIDED HISTORY: Vent management TECHNOLOGIST PROVIDED HISTORY: Reason for exam:->Vent management Reason for Exam: vent Acuity: Acute Type of Exam: Subsequent/Follow-up FINDINGS: The endotracheal tube, nasogastric tube and right IJ catheter remain. The heart size is upper normal.  A pneumothorax is not identified. Perihilar atelectasis has improved. A right-sided pleural effusion is noted, possibly decreased in size. Central vascular congestion has improved. No change in life support. Improved perihilar atelectasis. Central vascular congestion has improved. Right-sided pleural effusion is either decreased in size or redistributed. Xr Chest Portable    Result Date: 7/30/2020  EXAMINATION: ONE XRAY VIEW OF THE CHEST 7/30/2020 4:01 am COMPARISON: Chest radiograph performed July 29, 2020. HISTORY: ORDERING SYSTEM PROVIDED HISTORY: Vent management TECHNOLOGIST PROVIDED HISTORY: Reason for exam:->Vent management Reason for Exam: Vent management Acuity: Unknown Type of Exam: Subsequent/Follow-up Additional signs and symptoms: Vent management Relevant Medical/Surgical History: Vent management FINDINGS: Endotracheal tube tip terminates approximately 2.3 cm from the elijah. Right IJ CVC tip is in stable positioning. Gastric tube descends below the level of the diaphragm. There are increasing bilateral central airspace opacities, predominantly on the right. There is prominence of the pulmonary vasculature bilaterally. Interval increase in size of a small to moderate right pleural effusion. Grossly stable small left pleural effusion. Stable cardiac silhouette. 1. Increasing bilateral central airspace opacities compatible with pulmonary edema with prominence of the pulmonary vasculature.  2. Interval increase in size of a small to moderate right pleural effusion and stable small left pleural effusion. Findings may be exacerbated by patient positioning. Xr Chest Portable    Result Date: 7/28/2020  EXAMINATION: ONE XRAY VIEW OF THE CHEST 7/28/2020 5:00 am COMPARISON: 07/27/2020 HISTORY: ORDERING SYSTEM PROVIDED HISTORY: Vent management TECHNOLOGIST PROVIDED HISTORY: Reason for exam:->Vent management Reason for Exam: vent Acuity: Acute Type of Exam: Subsequent/Follow-up FINDINGS: Endotracheal tube tip is 3 cm above the elijah. Enteric tube goes below the level of the diaphragm and out of the field of view. Right IJ CVC tip overlies the superior cavoatrial junction. The heart is stable in size. No measurable pneumothorax. Airspace opacities in the mid and lower lungs along with bilateral pleural effusions have overall improved from the prior study. Overall improvement in the bilateral airspace opacities in the mid and lower lungs as well as the pleural effusions. Xr Chest Portable    Result Date: 7/27/2020  EXAMINATION: ONE XRAY VIEW OF THE CHEST 7/27/2020 5:29 am COMPARISON: Yesterday HISTORY: ORDERING SYSTEM PROVIDED HISTORY: Hypoxia and congestion TECHNOLOGIST PROVIDED HISTORY: Reason for exam:->Hypoxia and congestion Reason for Exam: Hypoxia and congestion Acuity: Acute Type of Exam: Ongoing FINDINGS: Endotracheal tube terminates 1.5 cm from the elijah. Enteric tube courses below the diaphragm. Right internal jugular catheter with tip extending to the cavoatrial junction. Multiple monitor wires overlie the patient. No pneumothorax. Small-moderate stable bilateral pleural effusions and bibasilar atelectasis. Pulmonary vascular indistinctness and perihilar opacities. Stable mild cardiomegaly. Satisfactory position of support devices. Stable lung aeration with findings favoring pulmonary edema. Associated small-moderate pleural effusions and bibasilar atelectasis.      Xr Chest Portable    Result Date: 7/26/2020  EXAMINATION: ONE XRAY VIEW OF THE CHEST 7/26/2020 4:49 am COMPARISON: July 25, 2020 HISTORY: ORDERING SYSTEM PROVIDED HISTORY: Vent management TECHNOLOGIST PROVIDED HISTORY: Reason for exam:->Vent management Reason for Exam: Vent management Acuity: Acute Type of Exam: Subsequent/Follow-up FINDINGS: The ETT is 2.4 cm above the elijah. The feeding tube is inserted with its tip below the diaphragm and beyond the field of view. The cardiomediastinal silhouette is stable. There are low lung volumes. There are patchy airspace opacities bilaterally, may be related to pulmonary edema versus pneumonia. There is mild to moderate right pleural effusion. There is no pneumothorax. There is no acute osseous abnormality. Patchy airspace opacities bilaterally, may be related to pulmonary edema versus pneumonia, likely improved in the left hemithorax. Mild to moderate right pleural effusion. Stable cardiomegaly. Low lung volumes. Xr Chest Portable    Result Date: 7/25/2020  EXAMINATION: ONE XRAY VIEW OF THE CHEST 7/25/2020 4:14 pm COMPARISON: July 25, 2020 HISTORY: ORDERING SYSTEM PROVIDED HISTORY: CVC placement TECHNOLOGIST PROVIDED HISTORY: Reason for exam:->CVC placement Reason for Exam: post central line right side Acuity: Acute Type of Exam: Initial Additional signs and symptoms: post arrest Line placement FINDINGS: ETT tip is approximately 3 cm above the elijah. Right IJ catheter tip overlies the lower SVC. Cardiac silhouette is mildly enlarged but stable. Diffuse hazy opacity overlying the right lung has not appreciably changed. There is no pneumothorax. Persistent small to moderate right pleural effusion. 1. Interval placement of a right IJ catheter with catheter tip at the level of the lower SVC. No pneumothorax. 2. Persistent hazy opacity over the right hemithorax with small to moderate bilateral pleural effusions.      Xr Chest Portable    Result Date: 7/25/2020  EXAMINATION: ONE XRAY VIEW OF THE CHEST 7/25/2020 3:37 pm COMPARISON: Chest 06/17/2019 HISTORY: ORDERING SYSTEM PROVIDED HISTORY: post arrest Acuity: Acute Type of Exam: Initial Additional signs and symptoms: tube placement FINDINGS: The cardiac silhouette is enlarged. Calcifications involving the aorta reflect atherosclerosis. The mediastinal and hilar silhouettes appear unremarkable. Near complete opacification right hemithorax in keeping with atelectasis/infiltrate and pleural effusion. Dense consolidation lower left chest obscuring the left hemidiaphragm reflecting consolidation and pleural effusion. Vascular engorgement and cephalization is demonstrated with bilateral peribronchial cuffing and perivascular haziness. No pneumothorax is seen. No acute osseous abnormality is identified. Endotracheal tube tip measures about 2.7 cm superior to the elijah. 1. Nonspecific pulmonary findings may be related to sequela from pulmonary edema, diffuse infection or ARDS. Pulmonary contusion or sequela during resuscitation (? aspiration) may contribute to these opacities as well. 2. Calcific atherosclerosis aorta. 3. Cardiomegaly. 4. Endotracheal tube tip measures about 2.7 cm superior to the elijah. Xr Chest 1 View    Result Date: 7/25/2020  EXAMINATION: ONE XRAY VIEW OF THE CHEST 7/25/2020 11:01 pm COMPARISON: Chest radiograph 07/25/2020 at 4:15 p.m. HISTORY: ORDERING SYSTEM PROVIDED HISTORY: et tube placement TECHNOLOGIST PROVIDED HISTORY: Reason for exam:->et tube placement FINDINGS: The cardiac silhouette is enlarged. Calcifications involving the aorta reflect atherosclerosis. The mediastinal and hilar silhouettes appear unremarkable. Dense consolidation bilateral lower lungs with bilateral pleural effusion, unchanged from chest radiograph performed earlier today. Vascular engorgement and cephalization is demonstrated with bilateral peribronchial cuffing and perivascular haziness. No pneumothorax is seen.  No acute osseous abnormality is within the distal stomach. Soft Tissues/Bones: Diffuse soft tissue edema. Osteopenia with scattered degenerative changes throughout the visualized spine. Shoulder osteoarthrosis. 1. No pulmonary embolism. 2. Findings of fluid overload with small to moderate pleural effusions, diffuse soft tissue edema, and upper abdominal ascites. 3. Consolidative opacities within the upper to mid lungs could represent pneumonia or atelectasis/scarring. 4. Cirrhosis. 5. Borderline cardiomegaly with severe atherosclerotic disease. Xr Abdomen For Ng/og/ne Tube Placement    Result Date: 7/26/2020  EXAMINATION: ONE SUPINE XRAY VIEW(S) OF THE ABDOMEN 7/26/2020 8:18 am COMPARISON: None HISTORY: ORDERING SYSTEM PROVIDED HISTORY: Confirmation of course of NG/OG/NE tube and location of tip of tube TECHNOLOGIST PROVIDED HISTORY: Reason for exam:->Confirmation of course of NG/OG/NE tube and location of tip of tube Portable? ->Yes Reason for Exam: ng location Acuity: Acute Type of Exam: Subsequent/Follow-up FINDINGS: Enteric tube is in place tip in the gastric antrum. The proximal side-port is in the gastric body. No bowel obstruction is suggested. NG tube tip in the gastric antrum with the proximal side-port in the gastric body.        LAB RESULTS  --------------------    Recent Results (from the past 24 hour(s))   POCT Glucose    Collection Time: 08/05/20 11:49 PM   Result Value Ref Range    POC Glucose 144 (H) 70 - 99 MG/DL   Electrolyte Panel w/ Reflex to MG    Collection Time: 08/06/20  1:13 AM   Result Value Ref Range    Sodium 138 135 - 145 MMOL/L    Potassium 3.7 3.5 - 5.1 MMOL/L    Chloride 93 (L) 99 - 110 mMol/L    CO2 32 21 - 32 MMOL/L    Anion Gap 13 4 - 16   Basic Metabolic Panel    Collection Time: 08/06/20  5:25 AM   Result Value Ref Range    Sodium 136 135 - 145 MMOL/L    Potassium 3.8 3.5 - 5.1 MMOL/L    Chloride 92 (L) 99 - 110 mMol/L    CO2 32 21 - 32 MMOL/L    Anion Gap 12 4 - 16    BUN 31 (H) 6 - 23 MG/DL CREATININE 1.3 (H) 0.6 - 1.1 MG/DL    Glucose 183 (H) 70 - 99 MG/DL    Calcium 9.2 8.3 - 10.6 MG/DL    GFR Non-African American 40 (L) >60 mL/min/1.73m2    GFR  49 (L) >60 mL/min/1.73m2   CBC auto differential    Collection Time: 08/06/20  5:25 AM   Result Value Ref Range    WBC 12.5 (H) 4.0 - 10.5 K/CU MM    RBC 3.14 (L) 4.2 - 5.4 M/CU MM    Hemoglobin 8.6 (L) 12.5 - 16.0 GM/DL    Hematocrit 27.6 (L) 37 - 47 %    MCV 87.9 78 - 100 FL    MCH 27.4 27 - 31 PG    MCHC 31.2 (L) 32.0 - 36.0 %    RDW 17.3 (H) 11.7 - 14.9 %    Platelets 599 369 - 348 K/CU MM    MPV 9.8 7.5 - 11.1 FL    Differential Type AUTOMATED DIFFERENTIAL     Segs Relative 72.2 (H) 36 - 66 %    Lymphocytes % 16.1 (L) 24 - 44 %    Monocytes % 7.5 (H) 0 - 4 %    Eosinophils % 3.3 (H) 0 - 3 %    Basophils % 0.5 0 - 1 %    Segs Absolute 9.0 K/CU MM    Lymphocytes Absolute 2.0 K/CU MM    Monocytes Absolute 0.9 K/CU MM    Eosinophils Absolute 0.4 K/CU MM    Basophils Absolute 0.1 K/CU MM    Nucleated RBC % 0.0 %    Total Nucleated RBC 0.0 K/CU MM    Total Immature Neutrophil 0.05 K/CU MM    Immature Neutrophil % 0.4 0 - 0.43 %   Ionized Calcium    Collection Time: 08/06/20  5:25 AM   Result Value Ref Range    Ionized Ca 1.06 (L) 1.12 - 1.32 mMOL/L    Calcium, Ion 4.24 (L) 4.48 - 5.28 MG/DL   Magnesium    Collection Time: 08/06/20  5:25 AM   Result Value Ref Range    Magnesium 2.0 1.8 - 2.4 mg/dl   Phosphorus    Collection Time: 08/06/20  5:25 AM   Result Value Ref Range    Phosphorus 3.1 2.5 - 4.9 MG/DL   Vancomycin, random    Collection Time: 08/06/20  5:25 AM   Result Value Ref Range    Vancomycin Rm 18.5 UG/ML    DOSE AMOUNT DOSE AMT.  GIVEN - `     DOSE TIME DOSE TIME GIVEN - `    Brain Natriuretic Peptide    Collection Time: 08/06/20  5:25 AM   Result Value Ref Range    Pro-BNP 14,144 (H) <300 PG/ML   Blood gas, arterial    Collection Time: 08/06/20  6:00 AM   Result Value Ref Range    pH, Bld 7.45 7.34 - 7.45    pCO2, Arterial 50.0 class I, acute on chronic, diastolic (HCC)     ACS (acute coronary syndrome) (HCC)     Acute coronary syndrome ([facility])     Diastolic dysfunction with acute on chronic heart failure (HCC)     NSTEMI (non-ST elevated myocardial infarction) ([facility])     SADI (obstructive sleep apnea)     Morbid obesity (HCC)     VHD (valvular heart disease)     Excessive daytime sleepiness     Ex-smoker     Acute respiratory failure ([facility])      ASSESSMENT:  ---------------------    Hypoxic encephalopathy     S/P cardiac arrest     Acute respiratory failure     seizures     Hypotension     Sepsis     Hx CHF     PLAN:     CT brain neg    Mri brain neg for acute infarct.     EEG severe slowing     Keppra     Continue supportive care     Pt is not any better today after 10 days of not on sedation and hospital stay. Consult Palliative care. Prognosis gaurded. discussed dx prognosis options benefits and risks with pts sister who is going to talk to her brother. Pts sister stated pt due to her CHF 6-12 months prior to the hospitalization had poor quality of life. pts sister is going to discuss with her brother and make a decision if they want peg/trach vs termianal wean. Discussed plan of care with pts nurse.      will sign off.       Electronically signed by Darius Campbell MD on 8/6/2020 at 11:42 PM

## 2020-08-07 NOTE — ANESTHESIA PRE PROCEDURE
Department of Anesthesiology  Preprocedure Note       Name:  Joanne Couch Standard   Age:  79 y.o.  :  1950                                          MRN:  [id number]         Date:  2020      Surgeon: Anita Nunez):  Tiera Mota MD    Procedure: Procedure(s):  TRACHEOTOMY  EGD PEG TUBE PLACEMENT    Medications prior to admission:   Prior to Admission medications    Medication Sig Start Date End Date Taking? Authorizing Provider   MIDODRINE HCL PO Take by mouth 3 times daily    Historical Provider, MD   LORazepam (ATIVAN) 1 MG tablet Take 1 mg by mouth every 6 hours as needed for Anxiety. Historical Provider, MD   pantoprazole sodium (PROTONIX) 40 MG PACK packet Take 40 mg by mouth every morning (before breakfast)    Historical Provider, MD   albuterol-ipratropium (COMBIVENT)  MCG/ACT inhaler Inhale 2 puffs into the lungs 4 times daily 19  Gabrielle El MD   furosemide (LASIX) 40 MG tablet Take 1 tablet by mouth 2 times daily 19   Gabrielle El MD   potassium chloride (KLOR-CON M) 20 MEQ extended release tablet Take 1 tablet by mouth daily for 5 days 6/19/19 10/18/19  Gabrielle El MD   albuterol sulfate HFA (PROAIR HFA) 108 (90 Base) MCG/ACT inhaler Inhale 2 puffs into the lungs every 4 hours as needed for Wheezing 19   Gabrielle El MD   busPIRone (BUSPAR) 10 MG tablet Take 1.5 tablets by mouth 3 times daily 18   Jade Renee MD   insulin glargine (LANTUS) 100 UNIT/ML injection vial Inject 60 Units into the skin nightly  Patient taking differently: Inject 80 Units into the skin nightly  18   Jade Renee MD   docusate sodium (COLACE, DULCOLAX) 100 MG CAPS Take 100 mg by mouth daily 18   Jade Renee MD   tolterodine (DETROL) 2 MG tablet Take 2 mg by mouth daily    Historical Provider, MD   nitroGLYCERIN (NITROSTAT) 0.4 MG SL tablet up to max of 3 total doses.  If no relief after 1 dose, call 911. 17   [facility] Shania Pozo MD   simvastatin (ZOCOR) 20 MG tablet Take 1 tablet by mouth nightly  Patient taking differently: Take 40 mg by mouth nightly  11/5/17   Abbie Tejeda MD   levothyroxine (SYNTHROID) 25 MCG tablet Take 25 mcg by mouth Daily    Historical Provider, MD   venlafaxine (EFFEXOR XR) 150 MG extended release capsule Take one capsule daily at the same time as the 75 mg capsule 2/2/17 Trent H MIREYA Wolf   ONE TOUCH LANCETS MISC 1 each by Does not apply route daily 10/11/16   Renaldo H MIREYA Wolf   Insulin Lispro, Human, (HUMALOG SC) Inject into the skin Insulin pump    Historical Provider, MD   gabapentin (NEURONTIN) 800 MG tablet Take 1 tablet by mouth 3 times daily  Patient taking differently: Take 800 mg by mouth 4 times daily. . 10/4/16   Renaldo H MIREYA Wolf   glucose blood VI test strips (ASCENSIA AUTODISC VI;ONE TOUCH ULTRA TEST VI) strip 1 each by In Vitro route 4 times daily as needed As needed.     Historical Provider, MD       Current medications:    Current Facility-Administered Medications   Medication Dose Route Frequency Provider Last Rate Last Dose    insulin glargine (LANTUS) injection vial 40 Units  40 Units Subcutaneous Nightly M Lethea Nyhan, MD        insulin regular (HUMULIN R;NOVOLIN R) injection 20 Units  20 Units Subcutaneous 3 times per day MD David Ty [START ON 8/8/2020] vancomycin (VANCOCIN) 1,500 mg in dextrose 5 % 500 mL IVPB  1,500 mg Intravenous Q48H Gilda Barrios MD        insulin NPH (HUMULIN N;NOVOLIN N) injection vial 15 Units  15 Units Subcutaneous QAM David Rosales MD   15 Units at 08/06/20 0847    potassium chloride 10 mEq/100 mL IVPB (Peripheral Line)  20 mEq Intravenous Once Fadumo Connell MD   Stopped at 08/04/20 1210    enoxaparin (LOVENOX) injection 40 mg  40 mg Subcutaneous Daily Will Bamberger, MD   40 mg at 08/06/20 0197    sodium phosphate 30.09 mmol in dextrose 5 % 500 mL IVPB  0.32 mmol/kg Intravenous PRN Will Bamberger, MD        Or   David Davies sodium phosphate 15.03 mmol in dextrose 5 % 250 mL IVPB  0.16 mmol/kg Intravenous PRN Jeannette Hernandez MD   Stopped at 08/03/20 1446    insulin regular (HUMULIN R;NOVOLIN R) injection 0-12 Units  0-12 Units Subcutaneous Q6H David Lara MD   Stopped at 08/06/20 1810    potassium chloride 20 mEq/50 mL IVPB (Central Line)  20 mEq Intravenous PRN Jeannette Hernandez MD 50 mL/hr at 08/06/20 1634 20 mEq at 08/06/20 1634    magnesium sulfate 1 g in dextrose 5% 100 mL IVPB  1 g Intravenous PRN Jeannette Hernandez MD   Stopped at 08/03/20 0853    levothyroxine (SYNTHROID) tablet 50 mcg  50 mcg Oral Daily David Lara MD   50 mcg at 08/07/20 0510    piperacillin-tazobactam (ZOSYN) 3.375 g in dextrose 5 % 50 mL IVPB extended infusion (mini-bag)  3.375 g Intravenous Ashlie Beaver MD   Stopped at 08/07/20 0530    levETIRAcetam (KEPPRA) 1,000 mg in sodium chloride 0.9 % 100 mL IVPB  1,000 mg Intravenous Q12H Nerissa Weiss MD   Stopped at 08/07/20 0130    miconazole (MICOTIN) 2 % powder   Topical BID Cyndy Finn PA-C        sodium chloride flush 0.9 % injection 10 mL  10 mL Intravenous 2 times per day Paulo Rios MD   10 mL at 08/06/20 2111    sodium chloride flush 0.9 % injection 10 mL  10 mL Intravenous PRN Paulo Rios MD        acetaminophen (TYLENOL) tablet 650 mg  650 mg Oral Q6H PRN Paulo Rios MD   650 mg at 07/30/20 1648    Or    acetaminophen (TYLENOL) suppository 650 mg  650 mg Rectal Q6H PRN Paulo Rios MD   650 mg at 07/27/20 2111    polyethylene glycol (GLYCOLAX) packet 17 g  17 g Oral Daily PRN Paulo Rios MD        promethazine (PHENERGAN) tablet 12.5 mg  12.5 mg Oral Q6H PRN Paulo Rios MD        Or    ondansetron (ZOFRAN) injection 4 mg  4 mg Intravenous Q6H PRN Paulo Rios MD        atorvastatin (LIPITOR) tablet 20 mg  20 mg Oral Daily Paulo Rios MD   20 mg at 08/06/20 7636    aspirin chewable tablet 81 mg  81 mg Per NG tube Daily Paulo Rios MD   81 mg at 08/06/20 1844    lansoprazole suspension SUSP 30 mg  30 mg Per NG tube QAM AC Latrell Mckeon MD   30 mg at 08/06/20 0648    glucose (GLUTOSE) 40 % oral gel 15 g  15 g Oral PRN Latrell Mckeon MD        dextrose 50 % IV solution  12.5 g Intravenous PRN Latrell Mckeon MD        glucagon (rDNA) injection 1 mg  1 mg Intramuscular PRN Latrell Mckeon MD        dextrose 5 % solution  100 mL/hr Intravenous PRN Latrell Mckeon MD        norepinephrine (LEVOPHED) 16 mg in dextrose 5% 250 mL infusion  2 mcg/min Intravenous Continuous Cyndy Finn PA-C 10.3 mL/hr at 08/07/20 0525 11 mcg/min at 08/07/20 0525    chlorhexidine (PERIDEX) 0.12 % solution 15 mL  15 mL Mouth/Throat BID Thoams Ritchie MD   15 mL at 08/06/20 2110    albuterol sulfate  (90 Base) MCG/ACT inhaler 4 puff  4 puff Inhalation Q4H PRN Thomas Ritchie MD   4 puff at 07/30/20 1556       Allergies:     Allergies   Allergen Reactions    Latex Other (See Comments)     Blisters    Adhesive Tape      Paper tape ok to use    Codeine Nausea And Vomiting       Problem List:    Patient Active Problem List   Diagnosis Code    HTN (hypertension) I10    COPD (chronic obstructive pulmonary disease) ([facility]) J44.9    Anxiety and depression F41.9, F32.9    DM type 2, uncontrolled, with retinopathy ([facility] [address] E11.319, E11.65    Hypokalemia E87.6    Hyperlipidemia E78.5    Precordial pain R07.2    Hyperglycemia R73.9    Axillary abscess L02.419    Light headed R42    Orthostatic hypotension I95.1    Hypertriglyceridemia E78.1    CCC (chronic calculous cholecystitis) K80.10    Cirrhosis of liver without ascites ([facility]) K74.60    Mild obstructive sleep apnea G47.33    Idiopathic progressive neuropathy G60.3    Congestive heart failure (CHF) ([facility]) I50.9    Type 2 diabetes mellitus (HCC) E11.9    Hypothyroidism E03.9    Depression F32.9    Hypertension I10    CHF (congestive heart failure), NYHA class I, acute on chronic, diastolic ([facility]) E06.98 therapy and risk factor modification will be continued    History of chest x-ray 06/08    06/15/08 chest xray, nonacute chest with borderline cardiomegaly    Holter monitor, abnormal 10/28/13    48hr-Predominantly SR w/only SVT ectopy in single beat form noted.  Hx of cardiovascular stress test 2013    EF70%, normal    Hx of echocardiogram 2013    EF55%,mild tricuspid regurg    Hyperlipidemia     Hypertension     Insulin pump in place     Liver cirrhosis (HCC)     Dr Irina Arshad SADI on CPAP     In the process of getting a c-pap. Does not currently have one.     Peripheral vascular disease (HCC)     PONV (postoperative nausea and vomiting) 1960    with shoulder surgery    Renal disease     Tachycardia     Wears dentures     upper plate       Past Surgical History:        Procedure Laterality Date    BACK SURGERY      herniated disc - no hardware    CATARACT REMOVAL WITH IMPLANT Bilateral s    CHOLECYSTECTOMY  [id number]    laproscopic with liver biopsy    DIAGNOSTIC CARDIAC CATH LAB PROCEDURE      no stents (mimimal blockage)    HYSTERECTOMY  2000    total    LIVER BIOPSY  2015    SHOULDER SURGERY Right 1960    fracture surgery-screw at humeral head    TONSILLECTOMY  1966    TONSILLECTOMY AND ADENOIDECTOMY  1966       Social History:    Social History     Tobacco Use    Smoking status: Former Smoker     Packs/day: 1.00     Years: 21.00     Pack years: 21.00     Types: Cigarettes     Start date: 1970     Last attempt to quit: 1991     Years since quittin.6    Smokeless tobacco: Never Used    Tobacco comment: lives with smokers   Substance Use Topics    Alcohol use: No     Alcohol/week: 0.0 standard drinks     Comment:           CAFFEINE: 4-6 diet sodas daily                                Counseling given: Not Answered  Comment: lives with smokers      Vital Signs (Current):   Vitals:    20 0500 20 0600 20 0745 20 0801   BP: (!) 96/52 113/62 (!) 124/51 (!) 138/55   Pulse: 77 80 81 81   Resp: 13 14 16 18   Temp:   37.5 °C (99.5 °F)    TempSrc:   Rectal    SpO2: 99% 99% 98% 100%   Weight:       Height:                                                  BP Readings from Last 3 Encounters:   08/07/20 (!) 138/55   07/25/20 (!) 124/59   11/04/19 130/72       NPO Status:                                                                                 BMI:   Wt Readings from Last 3 Encounters:   08/07/20 196 lb 10.4 oz (89.2 kg)   07/25/20 290 lb (131.5 kg)   11/04/19 248 lb 6.4 oz (112.7 kg)     Body mass index is 38.41 kg/m².     CBC:   Lab Results   Component Value Date    WBC 12.7 08/07/2020    RBC 2.86 08/07/2020    HGB 7.6 08/07/2020    HCT 25.3 08/07/2020    MCV 88.5 08/07/2020    RDW 17.4 08/07/2020     08/07/2020       CMP:   Lab Results   Component Value Date     08/07/2020    K 3.9 08/07/2020    CL 94 08/07/2020    CO2 32 08/07/2020    BUN 33 08/07/2020    CREATININE 1.4 08/07/2020    GFRAA 45 08/07/2020    AGRATIO 1.3 06/08/2016    LABGLOM 37 08/07/2020    GLUCOSE 115 08/07/2020    PROT 6.9 07/25/2020    PROT 6.7 03/02/2013    CALCIUM 9.2 08/07/2020    BILITOT 0.3 07/25/2020    ALKPHOS 125 07/25/2020    AST 23 07/25/2020    ALT 11 07/25/2020       POC Tests:   Recent Labs     08/07/20  0506   POCGLU 100*       Coags:   Lab Results   Component Value Date    PROTIME 13.5 08/01/2020    INR 1.11 08/01/2020    APTT 38.2 08/01/2020       HCG (If Applicable): No results found for: PREGTESTUR, PREGSERUM, HCG, HCGQUANT     ABGs:   Lab Results   Component Value Date    PO2ART 78 08/07/2020    UQS4TVU 43.0 08/07/2020    FTB5PSJ 35.1 08/07/2020        Type & Screen (If Applicable):  No results found for: LABABO, LABRH    Drug/Infectious Status (If Applicable):  No results found for: HIV, HEPCAB    COVID-19 Screening (If Applicable):   Lab Results   Component Value Date    COVID19 NOT DETECTED 07/27/2020         Anesthesia Evaluation  Patient summary reviewed and Nursing notes reviewed   history of anesthetic complications: PONV. Airway: Mallampati: Unable to assess / NA       Comment: ETT insitu   Dental:          Pulmonary:   (+) COPD:  sleep apnea:                             Cardiovascular:    (+) hypertension:, past MI:, CHF:,       ECG reviewed      Echocardiogram reviewed               ROS comment:  Summary   Left ventricular systolic function is abnormal.   Ejection fraction is visually estimated at 30%. Grade III diastolic dysfunction. Severe aortic stenosis (DONNA: 0.62 cm sq, mean P mmHg). Moderate mitral valve stenosis (mean P mmHg). Mild tricuspid regurgitation is present. RVSP is 36 mmHg. No evidence of any pericardial effusion. Pleural effusion present bilaterally. Neuro/Psych:   (+) neuromuscular disease:, headaches:, psychiatric history:            GI/Hepatic/Renal:   (+) hepatitis: B, liver disease:,           Endo/Other:    (+) DiabetesType II DM, , hypothyroidism::., .                 Abdominal:           Vascular:                                        Anesthesia Plan      general     ASA 5       Induction: intravenous. MIPS: Postoperative ventilation.                       MARY Smith - CRNA   2020

## 2020-08-07 NOTE — PROGRESS NOTES
Pulmonary and Critical Care  Progress Note      VITALS:  BP (!) 138/55   Pulse 81   Temp 99.5 °F (37.5 °C) (Rectal)   Resp 18   Ht 5' (1.524 m)   Wt 196 lb 10.4 oz (89.2 kg)   SpO2 100%   BMI 38.41 kg/m²     Subjective:   CHIEF COMPLAINT :SOB     HPI:                The patient is a 79 y.o. female with significant past medical history of CHF, DM, HTN, hypothyroidism,15 pk yr smoker quit 20 years ago, Morbid obesity, SADI  presents with complaints of SOB getting worse. EMS was called and had a witness out of hospital Cardiac arrest.She was Intubated in [place]. She had CPR with ROSC in 5 minutes. She was found to have  Suspected pneumonia. She is being treated with ABX. She had worsening LE edema. She had a EF of 55% and moderate Aortic stenosis. She had Pulmonary edema. She is on Lasix drip.she has good urine output. She is sedated now. Her PBNPT is 21,085 and troponin mildly elevated.      Objective:   PHYSICAL EXAM:    LUNGS:Bilateral basal crackles  Abd-soft, BS+,NT  Ext - no pedal edema  CVS-s1s2,no murmurs      DATA:    CBC:  Recent Labs     08/05/20  0515 08/06/20  0525 08/07/20  0509   WBC 14.7* 12.5* 12.7*   RBC 3.12* 3.14* 2.86*   HGB 8.5* 8.6* 7.6*   HCT 27.2* 27.6* 25.3*    434 377   MCV 87.2 87.9 88.5   MCH 27.2 27.4 26.6*   MCHC 31.3* 31.2* 30.0*   RDW 17.2* 17.3* 17.4*   SEGSPCT 73.6* 72.2* 73.3*      BMP:  Recent Labs     08/05/20  0515  08/06/20  0525 08/06/20  1425 08/07/20  0509      < > 136 134* 138   K 3.9   < > 3.8 3.3* 3.9   CL 90*   < > 92* 90* 94*   CO2 32   < > 32 32 32   BUN 28*  --  31*  --  33*   CREATININE 1.3*  --  1.3*  --  1.4*   CALCIUM 8.8  --  9.2  --  9.2   GLUCOSE 294*  --  183*  --  115*    < > = values in this interval not displayed.       ABG:  Recent Labs     08/05/20  0600 08/06/20  0600 08/07/20  0600   PH 7.48* 7.45 7.52*   PO2ART 96 67* 78   GFS9OEA 48.0* 50.0* 43.0   O2SAT 96.4 93.1* 95.6*     BNP  Lab Results   Component Value Date    BNP 9.6 06/08/2016 D-Dimer:  Lab Results   Component Value Date    DDIMER 759 (H) 08/01/2020      1. Radiology: Pending      Assessment/Plan     Patient Active Problem List    Diagnosis Date Noted    Acute respiratory failure ([facility] [address] 07/25/2020    Excessive daytime sleepiness 11/04/2019    Ex-smoker 11/04/2019    VHD (valvular heart disease)     SADI (obstructive sleep apnea)     Morbid obesity ([address]     NSTEMI (non-ST elevated myocardial infarction) ([address]     ACS (acute coronary syndrome) ([address] 11/08/2018    Acute coronary syndrome ([address] 91/60/6941    Diastolic dysfunction with acute on chronic heart failure ([address] 11/08/2018    Congestive heart failure (CHF) ([address] 11/07/2018    Type 2 diabetes mellitus ([address] 11/07/2018    Hypothyroidism 11/07/2018    Depression 11/07/2018    Hypertension 11/07/2018    CHF (congestive heart failure), NYHA class I, acute on chronic, diastolic ([address] 30/19/8985    Idiopathic progressive neuropathy 04/18/2016     Overview Note:     Patient has chronic peripheral neuropathy to bilateral feet. She is currently on gabapentin 800 mg tid with some benefit. She reports decreased sensation to her feet with intermittent pain.  Mild obstructive sleep apnea 10/14/2015    CCC (chronic calculous cholecystitis) 09/14/2015    Cirrhosis of liver without ascites ([address] 09/14/2015    Light headed 04/23/2014     Overview Note:     From dehydration from hyperglycemia - patient dry on presentation. replace inactive diagnosis      Orthostatic hypotension 04/23/2014    Hypertriglyceridemia 04/23/2014    Precordial pain 04/22/2014    Hyperglycemia 04/22/2014     Overview Note:     Admission blood glucose of 120; was >500 prior to presentation to ER, first accucheck in ER was 341.       Axillary abscess 04/22/2014     Overview Note:     Left      Hyperlipidemia     DM type 2, uncontrolled, with retinopathy ([address] 03/01/2013     Overview Note:     Patient's last A1c was 12.1, and she has been referred in the last month to endocrinology. She has an appointment this coming month with a specialist.      Hypokalemia 03/01/2013    HTN (hypertension) 11/26/2012    [facility] 11/26/2012    Anxiety and depression 11/26/2012     Overview Note:     Patient is currently on venlafaxine 150 mg daily for depression and anxiety. She feels like it is not as affective as she would like currently. She does not feel suicidal and is sleeping ok but has decreased motivation and less happy days. Patient is on the vent and off sedation. No response to painful stimuli. EEG No seizures     Anoxic encephalopathy  Hypokalemia  Anemia - stable  Acute on chronic Hypoxic hypercapneic resp failure  Chronic Metabolic alkalosis  Bilateral pleural effusions  VDRF  Leukocytosis - improved  Moderate AS  Morbid obesity  SADI  Smoker  Pulmonary edema  Out of Hospital Cardiac arrest  ? Aspiration pneumonia  Systolic CHF with EF of 87%  Grade III Diastolic dysfunction  Metabolic alkalosis  Moderate malnutrition       1. Trach and a PEG  2. LTAC eval  3. Tube feeds start once cleared by surgery  4. Prognosis guarded  5. C/w present management  No follow-ups on file.     Electronically signed by Rita Vora MD on 8/7/2020 at 10:10 AM

## 2020-08-07 NOTE — ANESTHESIA POSTPROCEDURE EVALUATION
Department of Anesthesiology  Postprocedure Note    Patient: Joanne Murphy  MRN: [id number]  YOB: 1950  Date of evaluation: 8/7/2020  Time:  10:32 AM     Procedure Summary     Date:  08/07/20 Room / Location:  [place] [address] / [facility]    Anesthesia Start:  0814 Anesthesia Stop:  2959    Procedures:       TRACHEOTOMY (N/A )      EGD PEG TUBE PLACEMENT (N/A ) Diagnosis:  (respiratory failure)    Surgeon:  Tiera Mota MD Responsible Provider:  MARY Moctezuma CRNA    Anesthesia Type:  general ASA Status:  5          Anesthesia Type: general    Flor Phase I:      Flor Phase II:      Last vitals: Reviewed and per EMR flowsheets.        Anesthesia Post Evaluation    Patient location during evaluation: ICU  Patient participation: complete - patient cannot participate  Level of consciousness: sedated and ventilated  Airway patency: patent  Nausea & Vomiting: no nausea and no vomiting  Complications: no  Cardiovascular status: hemodynamically stable and vasoactive/inotropes  Respiratory status: ventilator  Hydration status: euvolemic

## 2020-08-07 NOTE — ANESTHESIA POSTPROCEDURE EVALUATION
Department of Anesthesiology  Postprocedure Note    Patient: Argentina Murphy  MRN: [id number]  YOB: 1950  Date of evaluation: 8/7/2020  Time:  10:32 AM     Procedure Summary     Date:  08/07/20 Room / Location:  [address]    Anesthesia Start:  5257 Anesthesia Stop:  6062    Procedures:       TRACHEOTOMY (N/A )      EGD PEG TUBE PLACEMENT (N/A ) Diagnosis:  (respiratory failure)    Surgeon:  Yesy Greer MD Responsible Provider:  MARY Garcia CRNA    Anesthesia Type:  general ASA Status:  5          Anesthesia Type: general    Flor Phase I:      Flor Phase II:      Last vitals: Reviewed and per EMR flowsheets.        Anesthesia Post Evaluation    Patient location during evaluation: ICU  Patient participation: complete - patient cannot participate  Level of consciousness: sedated and ventilated  Airway patency: patent  Nausea & Vomiting: no nausea and no vomiting  Complications: no  Cardiovascular status: blood pressure returned to baseline  Respiratory status: ventilator  Hydration status: euvolemic

## 2020-08-08 NOTE — PLAN OF CARE
Problem: Falls - Risk of:  Goal: Will remain free from falls  Description: Will remain free from falls  Outcome: Ongoing  Goal: Absence of physical injury  Description: Absence of physical injury  Outcome: Ongoing     Problem: Skin Integrity:  Goal: Will show no infection signs and symptoms  Description: Will show no infection signs and symptoms  Outcome: Ongoing  Goal: Absence of new skin breakdown  Description: Absence of new skin breakdown  Outcome: Ongoing     Problem: Discharge Planning:  Goal: Participates in care planning  Description: Participates in care planning  Outcome: Ongoing  Goal: Discharged to appropriate level of care  Description: Discharged to appropriate level of care  Outcome: Ongoing     Problem: Airway Clearance - Ineffective:  Goal: Ability to maintain a clear airway will improve  Description: Ability to maintain a clear airway will improve  Outcome: Ongoing     Problem: Anxiety/Stress:  Goal: Level of anxiety will decrease  Description: Level of anxiety will decrease  Outcome: Ongoing     Problem: Aspiration:  Goal: Absence of aspiration  Description: Absence of aspiration  Outcome: Ongoing     Problem:  Bowel Function - Altered:  Goal: Bowel elimination is within specified parameters  Description: Bowel elimination is within specified parameters  Outcome: Ongoing     Problem: Cardiac Output - Decreased:  Goal: Hemodynamic stability will improve  Description: Hemodynamic stability will improve  Outcome: Ongoing     Problem: Fluid Volume - Imbalance:  Goal: Absence of imbalanced fluid volume signs and symptoms  Description: Absence of imbalanced fluid volume signs and symptoms  Outcome: Ongoing     Problem: Gas Exchange - Impaired:  Goal: Levels of oxygenation will improve  Description: Levels of oxygenation will improve  Outcome: Ongoing     Problem: Mental Status - Impaired:  Goal: Mental status will be restored to baseline  Description: Mental status will be restored to

## 2020-08-08 NOTE — PLAN OF CARE
RN  Outcome: Ongoing  8/7/2020 1856 by Karen Riley RN  Outcome: Ongoing     Problem: Bowel Function - Altered:  Goal: Bowel elimination is within specified parameters  Description: Bowel elimination is within specified parameters  8/8/2020 0054 by Donn Rivas RN  Outcome: Ongoing  8/7/2020 1856 by Karen Riley RN  Outcome: Ongoing     Problem: Cardiac Output - Decreased:  Goal: Hemodynamic stability will improve  Description: Hemodynamic stability will improve  8/8/2020 0054 by Donn Rivas RN  Outcome: Ongoing  8/7/2020 1856 by Karen Riley RN  Outcome: Ongoing     Problem: Fluid Volume - Imbalance:  Goal: Absence of imbalanced fluid volume signs and symptoms  Description: Absence of imbalanced fluid volume signs and symptoms  8/8/2020 0054 by Donn Rivas RN  Outcome: Ongoing  8/7/2020 1856 by Karen Riley RN  Outcome: Ongoing     Problem: Gas Exchange - Impaired:  Goal: Levels of oxygenation will improve  Description: Levels of oxygenation will improve  8/8/2020 0054 by Donn Rivas RN  Outcome: Ongoing  8/7/2020 1856 by Karen Riley RN  Outcome: Ongoing     Problem: Mental Status - Impaired:  Goal: Mental status will be restored to baseline  Description: Mental status will be restored to baseline  8/8/2020 0054 by Donn Rivas RN  Outcome: Ongoing  8/7/2020 1856 by Karen Riley RN  Outcome: Ongoing     Problem: Nutrition Deficit:  Goal: Ability to achieve adequate nutritional intake will improve  Description: Ability to achieve adequate nutritional intake will improve  8/8/2020 0054 by Donn Rivas RN  Outcome: Ongoing  8/7/2020 1856 by Karen Riley RN  Outcome: Ongoing     Problem: Pain:  Description: Pain management should include both nonpharmacologic and pharmacologic interventions.   Goal: Pain level will decrease  Description: Pain level will decrease  8/8/2020 0054 by Donn Rivas RN  Outcome: Ongoing  8/7/2020 1856 by Karon Santos RN  Outcome: Ongoing  Goal: Recognizes and communicates pain  Description: Recognizes and communicates pain  8/8/2020 0054 by Ryann Jennings RN  Outcome: Ongoing  8/7/2020 1856 by Karon Santos RN  Outcome: Ongoing  Goal: Control of acute pain  Description: Control of acute pain  8/8/2020 0054 by Ryann Jennings RN  Outcome: Ongoing  8/7/2020 1856 by Karon Santos RN  Outcome: Ongoing  Goal: Control of chronic pain  Description: Control of chronic pain  8/8/2020 0054 by Ryann Jennings RN  Outcome: Ongoing  8/7/2020 1856 by Karon Santos RN  Outcome: Ongoing     Problem: Serum Glucose Level - Abnormal:  Goal: Ability to maintain appropriate glucose levels will improve to within specified parameters  Description: Ability to maintain appropriate glucose levels will improve to within specified parameters  8/8/2020 0054 by Ryann Jennings RN  Outcome: Ongoing  8/7/2020 1856 by Karon Santos RN  Outcome: Ongoing     Problem: Skin Integrity - Impaired:  Goal: Will show no infection signs and symptoms  Description: Will show no infection signs and symptoms  8/8/2020 0054 by Ryann Jennings RN  Outcome: Ongoing  8/7/2020 1856 by Karon Santos RN  Outcome: Ongoing  Goal: Absence of new skin breakdown  Description: Absence of new skin breakdown  8/8/2020 0054 by Ryann Jennings RN  Outcome: Ongoing  8/7/2020 1856 by Karon Santos RN  Outcome: Ongoing     Problem: Sleep Pattern Disturbance:  Goal: Appears well-rested  Description: Appears well-rested  8/8/2020 0054 by Ryann Jennings RN  Outcome: Ongoing  8/7/2020 1856 by Karon Santos RN  Outcome: Ongoing     Problem: Tissue Perfusion, Altered:  Goal: Circulatory function within specified parameters  Description: Circulatory function within specified parameters  8/8/2020 0054 by Ryann Jennings RN  Outcome: Ongoing  8/7/2020 1856 by Karon Santos RN  Outcome: Ongoing     Problem: Tissue Perfusion - Cardiopulmonary, Altered:  Goal: Absence of angina  Description: Absence of angina  8/8/2020 0054 by Mary Castillo RN  Outcome: Ongoing  8/7/2020 1856 by Natalia Negron RN  Outcome: Ongoing  Goal: Hemodynamic stability will improve  Description: Hemodynamic stability will improve  8/8/2020 0054 by Mary Castillo RN  Outcome: Ongoing  8/7/2020 1856 by Natalia Negron RN  Outcome: Ongoing

## 2020-08-08 NOTE — PROGRESS NOTES
to moderate right pleural effusion. Stable cardiomegaly. Low lung volumes. Raymondo Height Chest Portable    Result Date: 7/25/2020  EXAMINATION: ONE XRAY VIEW OF THE CHEST 7/25/2020 3:37 pm COMPARISON: Chest 06/17/2019 HISTORY: ORDERING SYSTEM PROVIDED HISTORY: post arrest        1. Nonspecific pulmonary findings may be related to sequela from pulmonary edema, diffuse infection or ARDS. Pulmonary contusion or sequela during resuscitation (? aspiration) may contribute to these opacities as well. 2. Calcific atherosclerosis aorta. 3. Cardiomegaly. 4. Endotracheal tube tip measures about 2.7 cm superior to the elijah. Yuni Duncan Pulmonary W Contrast    Result Date: 7/25/2020  EXAMINATION: CTA OF THE CHEST 7/25/2020 5:51 pm     1. No pulmonary embolism. 2. Findings of fluid overload with small to moderate pleural effusions, diffuse soft tissue edema, and upper abdominal ascites. 3. Consolidative opacities within the upper to mid lungs could represent pneumonia or atelectasis/scarring. 4. Cirrhosis. 5. Borderline cardiomegaly with severe atherosclerotic disease. Xr Abdomen For Ng/og/ne Tube Placement    Result Date: 7/26/2020  EXAMINATION: ONE SUPINE XRAY VIEW(S) OF THE ABDOMEN 7/26/2020 8:18 am COMPARISON: None       NG tube tip in the gastric antrum with the proximal side-port in the gastric body.        Scheduled Medicines   Medications:    piperacillin-tazobactam  3.375 g Intravenous Q8H    insulin glargine  40 Units Subcutaneous Nightly    insulin regular  20 Units Subcutaneous 3 times per day    insulin NPH  15 Units Subcutaneous QAM    potassium chloride  20 mEq Intravenous Once    enoxaparin  40 mg Subcutaneous Daily    insulin regular  0-12 Units Subcutaneous Q6H    levothyroxine  50 mcg Oral Daily    levetiracetam  1,000 mg Intravenous Q12H    miconazole   Topical BID    sodium chloride flush  10 mL Intravenous 2 times per day    atorvastatin  20 mg Oral Daily    aspirin  81 mg Per NG tube Daily    lansoprazole  30 mg Per NG tube QAM AC    chlorhexidine  15 mL Mouth/Throat BID      Infusions:    dextrose      norepinephrine 8 mcg/min (08/08/20 1445)         Objective:   Vitals: /89   Pulse 89   Temp 97.7 °F (36.5 °C) (Rectal)   Resp 13   Ht 5' (1.524 m)   Wt 196 lb 10.4 oz (89.2 kg)   SpO2 98%   BMI 38.41 kg/m²   General appearance: Patient is sedated intubated on ventilator  Neck: no JVD or bruit  Thyroid : Normal lobes   Lungs: Has Vesicular Breath sounds intubated and on ventilator  Heart:  regular rate and rhythm  Abdomen: soft, non-tender; bowel sounds normal; no masses,  no organomegaly  Musculoskeletal: Normal  Extremities: extremities normal, , no edema  Neurologic: Patient is sedated intubated and on ventilator. Assessment:     Patient Active Problem List:     HTN (hypertension)     COPD (chronic obstructive pulmonary disease) ([address]     Anxiety and depression     DM type 2, uncontrolled, with retinopathy ([address]     Hypokalemia     Hyperlipidemia     Precordial pain     Hyperglycemia     Axillary abscess     Light headed     Orthostatic hypotension     Hypertriglyceridemia     CCC (chronic calculous cholecystitis)     Cirrhosis of liver without ascites (HCC)     Mild obstructive sleep apnea     Idiopathic progressive neuropathy     Congestive heart failure (CHF) (HCC)     Type 2 diabetes mellitus (HCC)     Hypothyroidism     Depression     Hypertension     CHF (congestive heart failure), NYHA class I, acute on chronic, diastolic (HCC)     ACS (acute coronary syndrome) (HCC)     Acute coronary syndrome (HCC)     Diastolic dysfunction with acute on chronic heart failure (HCC)     NSTEMI (non-ST elevated myocardial infarction) ([facility])     SADI (obstructive sleep apnea)     Morbid obesity (HCC)     VHD (valvular heart disease)     Excessive daytime sleepiness     Ex-smoker     Acute respiratory failure ([address]      Plan:     1. Reviewed POC blood glucose .  Labs and X ray results 2. Reviewed Current Medicines   3. Started on schedule and correction bolus regular insulin and basal Lantus Insulin regime /   4. Monitor Blood glucose frequently   5. Modified  the dose of Insulin/ other medicines as needed  6. Started on tube feeding if able to control her blood pressure without pressor  7. Will follow     .      Nan Fong MD

## 2020-08-08 NOTE — PROGRESS NOTES
Pulmonary and Critical Care  Progress Note    Subjective: The patient is on vent. Shortness of breath none  Chest pain none  Addressing respiratory complaints Patient is negative for  hemoptysis and cyanosis  CONSTITUTIONAL:  negative for fevers and chills      Past Medical History:     has a past medical history of Anxiety, Arthritis, Atrial fibrillation ([address] Atrial fibrillation with RVR ([address] Atrial fibrillation with RVR ([address] Atrial fibrillation with RVR ([address] Cancer ([address] Cervicalgia, Constipation, COPD (chronic obstructive pulmonary disease) ([address] Depression, Diabetes mellitus ([address] Diabetic neuropathy ([address] Disc herniation, Fatigue, Fibromyalgia, H/O cardiovascular stress test, H/O complete electrocardiogram, H/O echocardiogram, Headache(784.0), Hepatitis B, History of cardiac cath, History of chest x-ray, Holter monitor, abnormal, Hx of cardiovascular stress test, Hx of echocardiogram, Hyperlipidemia, Hypertension, Insulin pump in place, Liver cirrhosis ([address] SADI on CPAP, Peripheral vascular disease ([address] PONV (postoperative nausea and vomiting), Renal disease, Tachycardia, and Wears dentures. has a past surgical history that includes Hysterectomy (2000); back surgery (1998); Tonsillectomy (1966); shoulder surgery (Right, 1960); Cataract removal with implant (Bilateral, 2000s); Diagnostic Cardiac Cath Lab Procedure (2000s); Tonsillectomy and adenoidectomy (1966); Cholecystectomy (88227315); and liver biopsy (9/14/2015). reports that she quit smoking about 29 years ago. Her smoking use included cigarettes. She started smoking about 50 years ago. She has a 21.00 pack-year smoking history. She has never used smokeless tobacco. She reports that she does not drink alcohol or use drugs. Family history:  family history includes Anxiety Disorder in her sister;  Arthritis in her brother, father, mother, and sister; Cancer in her brother and father; Dementia in her mother; Depression in her mother and sister; Emphysema in her mother; Hearing Loss in her brother; High Blood Pressure in her brother; Other in her father; Vision Loss in her father. Allergies   Allergen Reactions    Latex Other (See Comments)     Blisters    Adhesive Tape      Paper tape ok to use    Codeine Nausea And Vomiting     Social History:    Reviewed; no changes    Objective:   PHYSICAL EXAM:        VITALS:  BP (!) 106/53   Pulse 90   Temp 98.8 °F (37.1 °C) (Rectal)   Resp 12   Ht 5' (1.524 m)   Wt 196 lb 10.4 oz (89.2 kg)   SpO2 100%   BMI 38.41 kg/m²     24HR INTAKE/OUTPUT:      Intake/Output Summary (Last 24 hours) at 8/8/2020 1219  Last data filed at 8/8/2020 9811  Gross per 24 hour   Intake 614.65 ml   Output 820 ml   Net -205.35 ml       CONSTITUTIONAL:  Somnolent. LUNGS:  decreased breath sounds, occ basilar crackles. CARDIOVASCULAR:  normal S1 and S2 and negative JVD  ABD:Abdomen soft, non-tender. BS normal. No masses,  No organomegaly. DATA:    CBC:  Recent Labs     08/06/20  0525 08/07/20  0509 08/08/20  0535   WBC 12.5* 12.7* 9.3   RBC 3.14* 2.86* 2.46*   HGB 8.6* 7.6* 6.8*   HCT 27.6* 25.3* 21.7*    377 333   MCV 87.9 88.5 88.2   MCH 27.4 26.6* 27.6   MCHC 31.2* 30.0* 31.3*   RDW 17.3* 17.4* 18.0*   SEGSPCT 72.2* 73.3* 73.1*      BMP:  Recent Labs     08/07/20  1840 08/08/20  0030 08/08/20  0535    136 137   K 3.8 3.7 3.8   CL 93* 92* 92*   CO2 31 32 32   BUN 36* 38* 40*   CREATININE 1.3* 1.4* 1.4*   CALCIUM 8.9 8.9 9.2   GLUCOSE 141* 160* 173*      ABG:  Recent Labs     08/06/20  0600 08/07/20  0600 08/08/20  0600   PH 7.45 7.52* 7.52*   PO2ART 67* 78 90   UYQ8TWH 50.0* 43.0 42.0   O2SAT 93.1* 95.6* 95.9*     Lab Results   Component Value Date    PROBNP 14,144 (H) 08/06/2020    PROBNP 21,085 (H) 07/25/2020    PROBNP 315.8 (H) 06/24/2019     No results found for: CULTRESP    Radiology Review:  Pertinent images / reports were reviewed as a part of this visit.     Assessment:     Patient Active Problem List   Diagnosis    HTN (hypertension)    COPD (chronic obstructive pulmonary disease) (HCC)    Anxiety and depression    DM type 2, uncontrolled, with retinopathy ([address]    Hypokalemia    Hyperlipidemia    Precordial pain    Hyperglycemia    Axillary abscess    Light headed    Orthostatic hypotension    Hypertriglyceridemia    CCC (chronic calculous cholecystitis)    Cirrhosis of liver without ascites (HCC)    Mild obstructive sleep apnea    Idiopathic progressive neuropathy    Cong[facility]F) ([facility])    Type 2 diabetes mellitus ([address]    Hypothyroidism    Depression    Hypertension    CHF (congestive heart failure), NYHA class I, acute on chronic, diastolic ([facility])    ACS (acute coronary syndrome) ([facility])    Acute coronary syndrome (HCC)    Diastolic dysfunction with acute on chronic heart failure ([facility])    NSTEMI (non-ST elevated myocardial infarction) ([address]    SADI (obstructive sleep apnea)    Morbid obesity ([facility])    VHD (valvular heart disease)    Excessive daytime sleepiness    Ex-smoker    Acute respiratory failure ([facility]       Plan:   1. Overall the patient is unchanged. 2. Cont present vent settings.       Raven Harding MD  8/8/2020  12:19 PM

## 2020-08-08 NOTE — PROGRESS NOTES
Progress Note( Dr. Fatou Quick)    Subjective:   Admit Date: 7/25/2020  PCP: Carissa Landeros PA-C    The patient on 8/7/2020 but made the note later    Admitted For : Cardiac arrest but very soon was resuscitated intubated and placed on the ventilator    Consulted For: Better control of blood glucose    Interval History: No major changes in her mental or respiratory status    Patient is still sedated intubated and on ventilator  Patient is COVID negative so she was transferred to regular ICU  on  7/30 /2020  Patient had tracheotomy and PEG tube insertion on 8/7/2020 by Dr Namrata Bray     Intake/Output Summary (Last 24 hours) at 8/8/2020 1910  Last data filed at 8/8/2020 1819  Gross per 24 hour   Intake 1676.4 ml   Output 870 ml   Net 806.4 ml       DATA    CBC:   Recent Labs     08/06/20  0525 08/07/20  0509 08/08/20  0535 08/08/20  1745   WBC 12.5* 12.7* 9.3  --    HGB 8.6* 7.6* 6.8* 7.8*    377 333  --     CMP:  Recent Labs     08/08/20  0535 08/08/20  1324 08/08/20  1745    135 134*   K 3.8 3.6 3.3*   CL 92* 90* 90*   CO2 32 31 31   BUN 40* 41* 39*   CREATININE 1.4* 1.4* 1.3*   CALCIUM 9.2 9.0 9.0     Lipids:   Lab Results   Component Value Date    CHOL 122 06/20/2019    CHOL 205 03/17/2016    HDL 51 06/20/2019    TRIG 150 06/20/2019     Glucose:  Recent Labs     08/08/20  0932 08/08/20  1254 08/08/20  1749   POCGLU 188* 191* 195*     DowzvtveieQ6H:  Lab Results   Component Value Date    LABA1C 12.0 07/26/2020     High Sensitivity TSH:   Lab Results   Component Value Date    TSHHS 4.730 06/20/2019     Free T3:   Lab Results   Component Value Date    FT3 2.9 03/17/2016     Free T4:  Lab Results   Component Value Date    T4FREE 1.04 06/18/2019       Xr Chest Portable    Result Date: 7/26/2020  EXAMINATION: ONE XRAY VIEW OF THE CHEST 7/26/2020 4:49 am COMPARISON: July 25, 2020 HISTORY: ORDERING SYSTEM PROVIDED HISTORY: Vent management T    Patchy airspace opacities bilaterally, may be related to pulmonary edema versus pneumonia, likely improved in the left hemithorax. Mild to moderate right pleural effusion. Stable cardiomegaly. Low lung volumes. Lossie Ream Chest Portable    Result Date: 7/25/2020  EXAMINATION: ONE XRAY VIEW OF THE CHEST 7/25/2020 3:37 pm COMPARISON: Chest 06/17/2019 HISTORY: ORDERING SYSTEM PROVIDED HISTORY: post arrest        1. Nonspecific pulmonary findings may be related to sequela from pulmonary edema, diffuse infection or ARDS. Pulmonary contusion or sequela during resuscitation (? aspiration) may contribute to these opacities as well. 2. Calcific atherosclerosis aorta. 3. Cardiomegaly. 4. Endotracheal tube tip measures about 2.7 cm superior to the elijah. Kai Inch Pulmonary W Contrast    Result Date: 7/25/2020  EXAMINATION: CTA OF THE CHEST 7/25/2020 5:51 pm     1. No pulmonary embolism. 2. Findings of fluid overload with small to moderate pleural effusions, diffuse soft tissue edema, and upper abdominal ascites. 3. Consolidative opacities within the upper to mid lungs could represent pneumonia or atelectasis/scarring. 4. Cirrhosis. 5. Borderline cardiomegaly with severe atherosclerotic disease. Xr Abdomen For Ng/og/ne Tube Placement    Result Date: 7/26/2020  EXAMINATION: ONE SUPINE XRAY VIEW(S) OF THE ABDOMEN 7/26/2020 8:18 am COMPARISON: None       NG tube tip in the gastric antrum with the proximal side-port in the gastric body.        Scheduled Medicines   Medications:    piperacillin-tazobactam  3.375 g Intravenous Q8H    insulin glargine  40 Units Subcutaneous Nightly    insulin regular  20 Units Subcutaneous 3 times per day    insulin NPH  15 Units Subcutaneous QAM    potassium chloride  20 mEq Intravenous Once    enoxaparin  40 mg Subcutaneous Daily    insulin regular  0-12 Units Subcutaneous Q6H    levothyroxine  50 mcg Oral Daily    levetiracetam  1,000 mg Intravenous Q12H    miconazole   Topical BID    sodium chloride flush  10 mL Intravenous 2 times per day    atorvastatin  20 mg Oral Daily    aspirin  81 mg Per NG tube Daily    lansoprazole  30 mg Per NG tube QAM AC    chlorhexidine  15 mL Mouth/Throat BID      Infusions:    dextrose      norepinephrine 8 mcg/min (08/08/20 1445)         Objective:   Vitals: /89   Pulse 89   Temp 97.7 °F (36.5 °C) (Rectal)   Resp 13   Ht 5' (1.524 m)   Wt 196 lb 10.4 oz (89.2 kg)   SpO2 98%   BMI 38.41 kg/m²   General appearance: Patient is sedated intubated on ventilator  Neck: no JVD or bruit  Thyroid : Normal lobes   Lungs: Has Vesicular Breath sounds intubated and on ventilator  Heart:  regular rate and rhythm  Abdomen: soft, non-tender; bowel sounds normal; no masses,  no organomegaly  Musculoskeletal: Normal  Extremities: extremities normal, , no edema  Neurologic: Patient is sedated intubated and on ventilator.     Assessment:     Patient Active Problem List:     HTN (hypertension)     COPD (chronic obstructive pulmonary disease) ([facility]     Anxiety and depression     DM type 2, uncontrolled, with retinopathy ([facility]     Hypokalemia     Hyperlipidemia     Precordial pain     Hyperglycemia     Axillary abscess     Light headed     Orthostatic hypotension     Hypertriglyceridemia     CCC (chronic calculous cholecystitis)     Cirrhosis of liver without ascites (HCC)     Mild obstructive sleep apnea     Idiopathic progressive neuropathy     Congestive heart failure (CHF) (HCC)     Type 2 diabetes mellitus (HCC)     Hypothyroidism     Depression     Hypertension     CHF (congestive heart failure), NYHA class I, acute on chronic, diastolic (HCC)     ACS (acute coronary syndrome) (HCC)     Acute coronary syndrome (HCC)     Diastolic dysfunction with acute on chronic heart failure (HCC)     NSTEMI (non-ST elevated myocardial infarction) (HCC)     SADI (obstructive sleep apnea)     Morbid obesity (HCC)     VHD (valvular heart disease)     Excessive daytime sleepiness     Ex-smoker     Acute respiratory failure ([facility]      Plan: 1. Reviewed POC blood glucose . Labs and X ray results   2. Reviewed Current Medicines   3. Started on schedule and correction bolus regular insulin and basal Lantus Insulin regime /   4. Monitor Blood glucose frequently   5. Modified  the dose of Insulin/ other medicines as needed  6. Started on tube feeding if able to control her blood pressure without pressor  7. Will follow     .      Roberto Tinajero MD

## 2020-08-08 NOTE — PROGRESS NOTES
GENERAL SURGERY INPATIENT POST-OP NOTE  [facility]    PATIENT: Paula Murphy, 79 y.o., female, MRN: [id number]    Hospital Day:  LOS: 14 days , Post-Op Day: 1 Day Post-Op    Procedure(s):   20 (Dr. Brittni Arteaga) TRACHEOTOMY, EGD PEG TUBE PLACEMENT    Antoine Michael is a 79 y.o. female who presented with cardiac arrest.                 Subjective:  Chief Complaint: unresponsive  Pain: -/10  BM:    Diet: DIET TUBE FEED CONTINUOUS/CYCLIC NPO; Semi-elemental (Vital AF); Gastrostomy; Continuous; 10; 60; 24  Activity: bedrest    Stable since surgery yesterday. Mild clotted blood around the trach. Minimal PEG tube output to gravity bag.      Objective:    Vitals: BP (!) 106/53   Pulse 90   Temp 98.8 °F (37.1 °C) (Rectal)   Resp 12   Ht 5' (1.524 m)   Wt 196 lb 10.4 oz (89.2 kg)   SpO2 100%   BMI 38.41 kg/m²   Vital Signs (Last 24 Hours)  Temp  Av.2 °F (36.8 °C)  Min: 97.5 °F (36.4 °C)  Max: 98.8 °F (37.1 °C)  Pulse  Av.6  Min: 77  Max: 91  BP  Min: 74/48  Max: 134/73  Resp  Av.3  Min: 8  Max: 21  SpO2  Av.6 %  Min: 98 %  Max: 100 %  Wt Readings from Last 3 Encounters:   20 196 lb 10.4 oz (89.2 kg)   20 290 lb (131.5 kg)   19 248 lb 6.4 oz (112.7 kg)       I/O:  0701 -  0700  In: 1214.7 [I.V.:1014.7]  Out: 850 [Urine:670]    IV Fluids: dextrose    norepinephrine Last Rate: 6 mcg/min (20 1508)    Scheduled Meds:   sodium chloride, 20 mL, Intravenous, Once    piperacillin-tazobactam, 3.375 g, Intravenous, Q8H    vancomycin, 1,500 mg, Intravenous, Q48H    insulin glargine, 40 Units, Subcutaneous, Nightly    insulin regular, 20 Units, Subcutaneous, 3 times per day    insulin NPH, 15 Units, Subcutaneous, QAM    potassium chloride, 20 mEq, Intravenous, Once    enoxaparin, 40 mg, Subcutaneous, Daily    insulin regular, 0-12 Units, Subcutaneous, Q6H    levothyroxine, 50 mcg, Oral, Daily    levetiracetam, 1,000 mg, Intravenous, Q12H   Basic Metabolic Panel w/ Reflex to MG    Collection Time: 08/08/20 12:30 AM   Result Value Ref Range    Sodium 136 135 - 145 MMOL/L    Potassium 3.7 3.5 - 5.1 MMOL/L    Chloride 92 (L) 99 - 110 mMol/L    CO2 32 21 - 32 MMOL/L    Anion Gap 12 4 - 16    BUN 38 (H) 6 - 23 MG/DL    CREATININE 1.4 (H) 0.6 - 1.1 MG/DL    Glucose 160 (H) 70 - 99 MG/DL    Calcium 8.9 8.3 - 10.6 MG/DL    GFR Non- 37 (L) >60 mL/min/1.73m2    GFR  45 (L) >60 mL/min/1.73m2   POCT Glucose    Collection Time: 08/08/20 12:37 AM   Result Value Ref Range    POC Glucose 164 (H) 70 - 99 MG/DL   Basic Metabolic Panel    Collection Time: 08/08/20  5:35 AM   Result Value Ref Range    Sodium 137 135 - 145 MMOL/L    Potassium 3.8 3.5 - 5.1 MMOL/L    Chloride 92 (L) 99 - 110 mMol/L    CO2 32 21 - 32 MMOL/L    Anion Gap 13 4 - 16    BUN 40 (H) 6 - 23 MG/DL    CREATININE 1.4 (H) 0.6 - 1.1 MG/DL    Glucose 173 (H) 70 - 99 MG/DL    Calcium 9.2 8.3 - 10.6 MG/DL    GFR Non- 37 (L) >60 mL/min/1.73m2    GFR  45 (L) >60 mL/min/1.73m2   CBC auto differential    Collection Time: 08/08/20  5:35 AM   Result Value Ref Range    WBC 9.3 4.0 - 10.5 K/CU MM    RBC 2.46 (L) 4.2 - 5.4 M/CU MM    Hemoglobin 6.8 (LL) 12.5 - 16.0 GM/DL    Hematocrit 21.7 (L) 37 - 47 %    MCV 88.2 78 - 100 FL    MCH 27.6 27 - 31 PG    MCHC 31.3 (L) 32.0 - 36.0 %    RDW 18.0 (H) 11.7 - 14.9 %    Platelets 346 803 - 593 K/CU MM    MPV 9.9 7.5 - 11.1 FL    Differential Type AUTOMATED DIFFERENTIAL     Segs Relative 73.1 (H) 36 - 66 %    Lymphocytes % 16.4 (L) 24 - 44 %    Monocytes % 8.4 (H) 0 - 4 %    Eosinophils % 1.3 0 - 3 %    Basophils % 0.4 0 - 1 %    Segs Absolute 6.8 K/CU MM    Lymphocytes Absolute 1.5 K/CU MM    Monocytes Absolute 0.8 K/CU MM    Eosinophils Absolute 0.1 K/CU MM    Basophils Absolute 0.0 K/CU MM    Nucleated RBC % 0.0 %    Total Nucleated RBC 0.0 K/CU MM    Total Immature Neutrophil 0.04 K/CU MM    Immature Neutrophil % 0.4 0 - 0.43 %   Ionized Calcium    Collection Time: 08/08/20  5:35 AM   Result Value Ref Range    Ionized Ca 1.09 (L) 1.12 - 1.32 mMOL/L    Calcium, Ion 4.36 (L) 4.48 - 5.28 MG/DL   Magnesium    Collection Time: 08/08/20  5:35 AM   Result Value Ref Range    Magnesium 2.1 1.8 - 2.4 mg/dl   Phosphorus    Collection Time: 08/08/20  5:35 AM   Result Value Ref Range    Phosphorus 4.2 2.5 - 4.9 MG/DL   Procalcitonin    Collection Time: 08/08/20  5:35 AM   Result Value Ref Range    Procalcitonin 0.233    Blood gas, arterial    Collection Time: 08/08/20  6:00 AM   Result Value Ref Range    pH, Bld 7.52 (H) 7.34 - 7.45    pCO2, Arterial 42.0 32 - 45 MMHG    pO2, Arterial 90 75 - 100 MMHG    Base Exc, Mixed 10.3 (H) 0 - 2.3    HCO3, Arterial 34.3 (H) 18 - 23 MMOL/L    CO2 Content 35.6 (H) 19 - 24 MMOL/L    O2 Sat 95.9 (L) 96 - 97 %    Carbon Monoxide, Blood 3.0 0 - 5 %    Methemoglobin, Arterial 0.8 <1.5 %    Comment AC 12, , O2 .30, PEEP 5    POCT Glucose    Collection Time: 08/08/20  6:11 AM   Result Value Ref Range    POC Glucose 185 (H) 70 - 99 MG/DL   POCT Glucose    Collection Time: 08/08/20  9:32 AM   Result Value Ref Range    POC Glucose 188 (H) 70 - 99 MG/DL   TYPE AND SCREEN    Collection Time: 08/08/20  9:35 AM   Result Value Ref Range    ABO/Rh O NEGATIVE     Antibody Screen NEGATIVE     Unit Number [id number]     Component LEUKO-POOR RED CELLS     Unit Divison 00     Status ALLOCATED     Transfusion Status OK TO TRANSFUSE     Crossmatch Result COMPATIBLE          Assessment:  Rosmery Murphy is a 79 y.o. female who is post-op day #1 Day Post-Op (Dr. Reyna Malhotra) tracheostomy and PEG tube placement. Active Problems:    Acute respiratory failure (HCC)  Resolved Problems:    * No resolved hospital problems. *      Plan:  · Continue trach care  · Resume tube feeds, advance q4h to goal   · Appreciate hosptialist/medical management  · Will follow loosely, please call if needed. Electronically signed: Moisés Oliveira MD 8/8/2020 12:07 PM

## 2020-08-08 NOTE — PROGRESS NOTES
pulmonary findings may be related to sequela from pulmonary edema, diffuse infection or ARDS. Pulmonary contusion or sequela during resuscitation (? aspiration) may contribute to these opacities as well. 2. Calcific atherosclerosis aorta. 3. Cardiomegaly. 4. Endotracheal tube tip measures about 2.7 cm superior to the elijah. Vannesa Lozada Pulmonary W Contrast    Result Date: 7/25/2020  EXAMINATION: CTA OF THE CHEST 7/25/2020 5:51 pm     1. No pulmonary embolism. 2. Findings of fluid overload with small to moderate pleural effusions, diffuse soft tissue edema, and upper abdominal ascites. 3. Consolidative opacities within the upper to mid lungs could represent pneumonia or atelectasis/scarring. 4. Cirrhosis. 5. Borderline cardiomegaly with severe atherosclerotic disease. Xr Abdomen For Ng/og/ne Tube Placement    Result Date: 7/26/2020  EXAMINATION: ONE SUPINE XRAY VIEW(S) OF THE ABDOMEN 7/26/2020 8:18 am COMPARISON: None       NG tube tip in the gastric antrum with the proximal side-port in the gastric body.        Scheduled Medicines   Medications:    piperacillin-tazobactam  3.375 g Intravenous Q8H    insulin glargine  40 Units Subcutaneous Nightly    insulin regular  20 Units Subcutaneous 3 times per day    insulin NPH  15 Units Subcutaneous QAM    potassium chloride  20 mEq Intravenous Once    enoxaparin  40 mg Subcutaneous Daily    insulin regular  0-12 Units Subcutaneous Q6H    levothyroxine  50 mcg Oral Daily    levetiracetam  1,000 mg Intravenous Q12H    miconazole   Topical BID    sodium chloride flush  10 mL Intravenous 2 times per day    atorvastatin  20 mg Oral Daily    aspirin  81 mg Per NG tube Daily    lansoprazole  30 mg Per NG tube QAM AC    chlorhexidine  15 mL Mouth/Throat BID      Infusions:    dextrose      norepinephrine 8 mcg/min (08/08/20 1445)         Objective:   Vitals: /89   Pulse 89   Temp 97.7 °F (36.5 °C) (Rectal)   Resp 13   Ht 5' (1.524 m)   Wt 196 lb 10.4 oz (89.2 kg)   SpO2 98%   BMI 38.41 kg/m²   General appearance: Patient is sedated intubated on ventilator  Neck: no JVD or bruit  Thyroid : Normal lobes   Lungs: Has Vesicular Breath sounds intubated and on ventilator  Heart:  regular rate and rhythm  Abdomen: soft, non-tender; bowel sounds normal; no masses,  no organomegaly  Musculoskeletal: Normal  Extremities: extremities normal, , no edema  Neurologic: Patient is sedated intubated and on ventilator. Assessment:     Patient Active Problem List:     HTN (hypertension)     COPD (chronic obstructive pulmonary disease) ([address]     Anxiety and depression     DM type 2, uncontrolled, with retinopathy ([address]     Hypokalemia     Hyperlipidemia     Precordial pain     Hyperglycemia     Axillary abscess     Light headed     Orthostatic hypotension     Hypertriglyceridemia     CCC (chronic calculous cholecystitis)     Cirrhosis of liver without ascites (HCC)     Mild obstructive sleep apnea     Idiopathic progressive neuropathy     Congestive heart failure (CHF) (HCC)     Type 2 diabetes mellitus (HCC)     Hypothyroidism     Depression     Hypertension     CHF (congestive heart failure), NYHA class I, acute on chronic, diastolic (HCC)     ACS (acute coronary syndrome) (HCC)     Acute coronary syndrome (HCC)     Diastolic dysfunction with acute on chronic heart failure (HCC)     NSTEMI (non-ST elevated myocardial infarction) (HCC)     SADI (obstructive sleep apnea)     Morbid obesity (HCC)     VHD (valvular heart disease)     Excessive daytime sleepiness     Ex-smoker     Acute respiratory failure ([address]      Plan:     1. Reviewed POC blood glucose . Labs and X ray results   2. Reviewed Current Medicines   3. Started on schedule and correction bolus regular insulin and basal Lantus Insulin regime /   4. Monitor Blood glucose frequently   5. Modified  the dose of Insulin/ other medicines as needed  6.  Started on tube feeding if able to control her blood pressure without pressor  7. Will follow     .      Xander Simpson MD

## 2020-08-08 NOTE — PROGRESS NOTES
[address] [place] [address]  HOSPITALIST PROGRESS NOTE                       Name:  Matt Joshua Standard /Age/Sex: 1950  (79 y.o. female)   MRN & CSN:  [id number] & [id number] Admission Date/Time: 2020  8:40 PM   Location:  - Attending:  Afshin Ghotra MD                                                  HPI  Bhupinder Davidson is a 79 y.o. female who was admitted on -with out of hospital cardiac arrest    SUBJECTIVE  Continues to be unresponsive, still on levophed. Had trach/peg yesterday    ROS- unable to obtain as patient unresponsive        ALLERGIES:   Allergies   Allergen Reactions    Latex Other (See Comments)     Blisters    Adhesive Tape      Paper tape ok to use    Codeine Nausea And Vomiting       PCP: Freda Morgan PA-C    PAST MEDICAL HISTORY, SURGICAL HISTORY, SOCIAL HISTORY and  HOME MEDICATIONS all reviewed. OBJECTIVE  Vitals:    20 0745 20 0800 20 0815 20 0823   BP: (!) 106/55 (!) 109/54 (!) 112/54    Pulse: 86 88 87 88   Resp: 13 15 13 15   Temp:   98.8 °F (37.1 °C)    TempSrc:       SpO2: 99% 99% 99% 99%   Weight:       Height:           PHYSICAL EXAM   GEN Unresponsive  EYES  sluggish pupillary responses  HENT Dry mucosa membrane  NECK Supple, no apparent thyromegaly or masses. RESP  unlabored respiration, decreased breath sounds  CARDIO/VASC S1/S2 auscultated. Regular rate without appreciable murmurs, rubs, or gallops. No JVD or carotid bruits  GI Abdomen is soft -exam limited by sedation  MSK -no spontaneous extremity movement. SKIN Normal coloration, warm, dry.   NEURO unresponsive    INTAKE: In: 614.7 [I.V.:414.7]  Out: 820   OUTPUT: In: 614.7   Out: 820 [Urine:670]    LABS  Recent Labs     20  0525 20  0509 20  0535   WBC 12.5* 12.7* 9.3   HGB 8.6* 7.6* 6.8*   HCT 27.6* 25.3* 21.7*    377 333      Recent Labs     20  0525  20  0509  20  1840 20  0030 20  0535      < > 138   < > 137 136 137   K 3.8   < > 3.9   < > 3.8 3.7 3.8   CL 92*   < > 94*   < > 93* 92* 92*   CO2 32   < > 32   < > 31 32 32   PHOS 3.1  --  3.2  --   --   --  4.2   BUN 31*  --  33*   < > 36* 38* 40*   CREATININE 1.3*  --  1.4*   < > 1.3* 1.4* 1.4*    < > = values in this interval not displayed. No results for input(s): AST, ALT, ALB, BILIDIR, BILITOT, ALKPHOS in the last 72 hours. No results for input(s): INR in the last 72 hours. No results for input(s): CKTOTAL, CKMB, CKMBINDEX, TROPONINT in the last 72 hours. Abnormal labs for today noted      Imaging:     ECHO:    Microbiology:  Blood culture:    Urine culture:    Sputum culture:    Procedures done this admission:    MEDS  Scheduled Meds:   sodium chloride  20 mL Intravenous Once    insulin glargine  40 Units Subcutaneous Nightly    insulin regular  20 Units Subcutaneous 3 times per day    vancomycin  1,500 mg Intravenous Q48H    insulin NPH  15 Units Subcutaneous QAM    potassium chloride  20 mEq Intravenous Once    enoxaparin  40 mg Subcutaneous Daily    insulin regular  0-12 Units Subcutaneous Q6H    levothyroxine  50 mcg Oral Daily    piperacillin-tazobactam  3.375 g Intravenous Q8H    levetiracetam  1,000 mg Intravenous Q12H    miconazole   Topical BID    sodium chloride flush  10 mL Intravenous 2 times per day    atorvastatin  20 mg Oral Daily    aspirin  81 mg Per NG tube Daily    lansoprazole  30 mg Per NG tube QAM AC    chlorhexidine  15 mL Mouth/Throat BID     Continuous Infusions:   dextrose      norepinephrine 6 mcg/min (08/07/20 1508)     PRN Meds:sodium phosphate IVPB **OR** sodium phosphate IVPB, potassium chloride, magnesium sulfate, sodium chloride flush, acetaminophen **OR** acetaminophen, polyethylene glycol, promethazine **OR** ondansetron, glucose, dextrose, glucagon (rDNA), dextrose, albuterol sulfate HFA        ASSESSMENT and PLAN  Hospital Day: 15   1-Probable septic shock- on levophed- to be weaned as able. Likely due to suspected gram negative. Blood culture so far negative. Will stop abx today  2-Acute hypoxic respiratory failure due to suspected gram negative pneumonia and acute systolic HF with features of volume overload- lasix prn- no PE on CTA- improving CXR findings. 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   6-SHER on CKD stage 2-3 - lasix drip due to concerns of volume overload  7-Electrolyte abnormalities-replace prn  8-PAF- not on AC due to hx of GIB  9-Cirrhosis- ?cardiac related  10-DM- on ss  11-Severe PCM- TF briefly held yesterday- to be restarted per dietitian  12-Anemia of chronic disease- monitor and transfuse if less than 7      Plan for LTAC    Discussed with sister, will give one unit of PRBC       Disp:     Diet No diet orders on file   DVT Prophylaxis [] Lovenox, []  Heparin, [] SCDs, [] Ambulation   GI Prophylaxis [] PPI,  [] H2 Blocker,  [] Carafate,  [] Diet/Tube Feeds   Code Status DNR-CCA   Disposition Patient requires continued admission due to acute respiratory failure   CMS Level of Risk [] Low, [x] Moderate,[]  High  Patient's risk as above due to acute respiratory failure     HARINDER ROBERTSON MD 8/8/2020 9:28 AM

## 2020-08-08 NOTE — PROGRESS NOTES
Progress Note( Dr. Jonna Brooks)    Subjective:   Admit Date: 7/25/2020  PCP: Maggie Joyce PA-C    The patient on 8/7/2020 but made the note later    Admitted For : Cardiac arrest but very soon was resuscitated intubated and placed on the ventilator    Consulted For: Better control of blood glucose    Interval History: No major changes in her mental or respiratory status    Patient is still sedated intubated and on ventilator  Patient is COVID negative so she was transferred to regular ICU  on  7/30 /2020  Patient was started on tube feeding and tolerating it well      Intake/Output Summary (Last 24 hours) at 8/8/2020 1908  Last data filed at 8/8/2020 1819  Gross per 24 hour   Intake 1676.4 ml   Output 870 ml   Net 806.4 ml       DATA    CBC:    CMP:    Lipids:   Lab Results   Component Value Date    CHOL 122 06/20/2019    CHOL 205 03/17/2016    HDL 51 06/20/2019    TRIG 150 06/20/2019     Glucose:  Recent Labs     08/08/20  0932 08/08/20  1254 08/08/20  1749   POCGLU 188* 191* 195*     [id number]:  Lab Results   Component Value Date    LABA1C 12.0 07/26/2020     High Sensitivity TSH:   Lab Results   Component Value Date    TSHHS 4.730 06/20/2019     Free T3:   Lab Results   Component Value Date    FT3 2.9 03/17/2016     Free T4:  Lab Results   Component Value Date    T4FREE 1.04 06/18/2019       Xr Chest Portable    Result Date: 7/26/2020  EXAMINATION: ONE XRAY VIEW OF THE CHEST 7/26/2020 4:49 am COMPARISON: July 25, 2020 HISTORY: ORDERING SYSTEM PROVIDED HISTORY: Vent management T    Patchy airspace opacities bilaterally, may be related to pulmonary edema versus pneumonia, likely improved in the left hemithorax. Mild to moderate right pleural effusion. Stable cardiomegaly. Low lung volumes. Nelida Kaufman Chest Portable    Result Date: 7/25/2020  EXAMINATION: ONE XRAY VIEW OF THE CHEST 7/25/2020 3:37 pm COMPARISON: Chest 06/17/2019 HISTORY: ORDERING SYSTEM PROVIDED HISTORY: post arrest        1.  Nonspecific 10.4 oz (89.2 kg)   SpO2 98%   BMI 38.41 kg/m²   General appearance: Patient is sedated intubated on ventilator  Neck: no JVD or bruit  Thyroid : Normal lobes   Lungs: Has Vesicular Breath sounds intubated and on ventilator  Heart:  regular rate and rhythm  Abdomen: soft, non-tender; bowel sounds normal; no masses,  no organomegaly  Musculoskeletal: Normal  Extremities: extremities normal, , no edema  Neurologic: Patient is sedated intubated and on ventilator. Assessment:     Patient Active Problem List:     HTN (hypertension)     COPD (chronic obstructive pulmonary disease) ([address]     Anxiety and depression     DM type 2, uncontrolled, with retinopathy ([address]     Hypokalemia     Hyperlipidemia     Precordial pain     Hyperglycemia     Axillary abscess     Light headed     Orthostatic hypotension     Hypertriglyceridemia     CCC (chronic calculous cholecystitis)     Cirrhosis of liver without ascites (HCC)     Mild obstructive sleep apnea     Idiopathic progressive neuropathy     Congestive heart failure (CHF) (HCC)     Type 2 diabetes mellitus (HCC)     Hypothyroidism     Depression     Hypertension     CHF (congestive heart failure), NYHA class I, acute on chronic, diastolic (HCC)     ACS (acute coronary syndrome) (HCC)     Acute coronary syndrome (HCC)     Diastolic dysfunction with acute on chronic heart failure (HCC)     NSTEMI (non-ST elevated myocardial infarction) (HCC)     SADI (obstructive sleep apnea)     Morbid obesity (HCC)     VHD (valvular heart disease)     Excessive daytime sleepiness     Ex-smoker     Acute respiratory failure ([address]      Plan:     1. Reviewed POC blood glucose . Labs and X ray results   2. Reviewed Current Medicines   3. Started on schedule and correction bolus regular insulin and basal Lantus Insulin regime /   4. Monitor Blood glucose frequently   5. Modified  the dose of Insulin/ other medicines as needed  6.  Started on tube feeding if able to control her blood pressure

## 2020-08-08 NOTE — PROGRESS NOTES
Nephrology Progress Note        [address], [address] [address]  Phone: [phone]  Office Hours: 8:30AM - 4:30PM  Monday - Friday       [facility]  Muriel Guerin MD  [address]  [facility],  [address]  [place], [place] [address]  PHONE: [phone]  FAX: [phone]    8/8/2020 10:02 AM  Subjective:   Admit Date: 7/25/2020  PCP: Wyatt Wolf PA-C  Interval History: low bp remains on levophed  Trach and peg'ed now  Eyes opon but does not seem purposeful    Diet: No diet orders on file      Data:   Scheduled Meds:   sodium chloride  20 mL Intravenous Once    piperacillin-tazobactam  3.375 g Intravenous Q8H    vancomycin  1,500 mg Intravenous Q48H    insulin glargine  40 Units Subcutaneous Nightly    insulin regular  20 Units Subcutaneous 3 times per day    insulin NPH  15 Units Subcutaneous QAM    potassium chloride  20 mEq Intravenous Once    enoxaparin  40 mg Subcutaneous Daily    insulin regular  0-12 Units Subcutaneous Q6H    levothyroxine  50 mcg Oral Daily    levetiracetam  1,000 mg Intravenous Q12H    miconazole   Topical BID    sodium chloride flush  10 mL Intravenous 2 times per day    atorvastatin  20 mg Oral Daily    aspirin  81 mg Per NG tube Daily    lansoprazole  30 mg Per NG tube QAM AC    chlorhexidine  15 mL Mouth/Throat BID     Continuous Infusions:   dextrose      norepinephrine 6 mcg/min (08/07/20 1508)     PRN Meds:sodium phosphate IVPB **OR** sodium phosphate IVPB, potassium chloride, magnesium sulfate, sodium chloride flush, acetaminophen **OR** acetaminophen, polyethylene glycol, promethazine **OR** ondansetron, glucose, dextrose, glucagon (rDNA), dextrose, albuterol sulfate HFA  I/O last 3 completed shifts: In: 1214.7 [I.V.:1014.7; IV Piggyback:200]  Out: 850 [Urine:670; Emesis/NG output:150; Blood:30]  No intake/output data recorded.     Intake/Output Summary (Last 24 hours) at 8/8/2020 1002  Last data filed at 8/8/2020 1610  Gross per 24 hour   Intake 714.65 ml   Output 840 ml   Net -125.35 ml       CBC:   Recent Labs     08/06/20  0525 08/07/20  0509 08/08/20  0535   WBC 12.5* 12.7* 9.3   HGB 8.6* 7.6* 6.8*    377 333       BMP:    Recent Labs     08/07/20  1840 08/08/20  0030 08/08/20  0535    136 137   K 3.8 3.7 3.8   CL 93* 92* 92*   CO2 31 32 32   BUN 36* 38* 40*   CREATININE 1.3* 1.4* 1.4*   GLUCOSE 141* 160* 173*     Hepatic: No results for input(s): AST, ALT, ALB, BILITOT, ALKPHOS in the last 72 hours. Troponin: No results for input(s): TROPONINI in the last 72 hours. BNP: No results for input(s): BNP in the last 72 hours. Lipids: No results for input(s): CHOL, HDL in the last 72 hours. Invalid input(s): LDLCALCU  ABGs:   Lab Results   Component Value Date    PO2ART 90 08/08/2020    OPN0MAI 42.0 08/08/2020     INR: No results for input(s): INR in the last 72 hours.     Objective:   Vitals: BP (!) 106/53   Pulse 86   Temp 98.8 °F (37.1 °C) (Rectal)   Resp 12   Ht 5' (1.524 m)   Wt 196 lb 10.4 oz (89.2 kg)   SpO2 100%   BMI 38.41 kg/m²   General appearance:  in no acute distress  HEENT: normocephalic, atraumatic,   Neck: supple, trachea midline  Lungs:  breathing comfortably on mv  Heart[de-identified] regular rate and rhythm  Abdomen:  non distended,   Extremities: extremities atraumatic, no cyanosis or edema      Assessment and Plan:     Patient Active Problem List   Diagnosis    HTN (hypertension)    COPD (chronic obstructive pulmonary disease) (HCC)    Anxiety and depression    DM type 2, uncontrolled, with retinopathy ([address]    Hypokalemia    Hyperlipidemia    Precordial pain    Hyperglycemia    Axillary abscess    Light headed    Orthostatic hypotension    Hypertriglyceridemia    CCC (chronic calculous cholecystitis)    Cirrhosis of liver without ascites (HCC)    Mild obstructive sleep apnea    Idiopathic progressive neuropathy    Congestive heart failure

## 2020-08-08 NOTE — PROGRESS NOTES
T    Patchy airspace opacities bilaterally, may be related to pulmonary edema versus pneumonia, likely improved in the left hemithorax. Mild to moderate right pleural effusion. Stable cardiomegaly. Low lung volumes. Andreea Mathieu Chest Portable    Result Date: 7/25/2020  EXAMINATION: ONE XRAY VIEW OF THE CHEST 7/25/2020 3:37 pm COMPARISON: Chest 06/17/2019 HISTORY: ORDERING SYSTEM PROVIDED HISTORY: post arrest        1. Nonspecific pulmonary findings may be related to sequela from pulmonary edema, diffuse infection or ARDS. Pulmonary contusion or sequela during resuscitation (? aspiration) may contribute to these opacities as well. 2. Calcific atherosclerosis aorta. 3. Cardiomegaly. 4. Endotracheal tube tip measures about 2.7 cm superior to the elijah. Zay Ocasio Pulmonary W Contrast    Result Date: 7/25/2020  EXAMINATION: CTA OF THE CHEST 7/25/2020 5:51 pm     1. No pulmonary embolism. 2. Findings of fluid overload with small to moderate pleural effusions, diffuse soft tissue edema, and upper abdominal ascites. 3. Consolidative opacities within the upper to mid lungs could represent pneumonia or atelectasis/scarring. 4. Cirrhosis. 5. Borderline cardiomegaly with severe atherosclerotic disease. Xr Abdomen For Ng/og/ne Tube Placement    Result Date: 7/26/2020  EXAMINATION: ONE SUPINE XRAY VIEW(S) OF THE ABDOMEN 7/26/2020 8:18 am COMPARISON: None       NG tube tip in the gastric antrum with the proximal side-port in the gastric body.        Scheduled Medicines   Medications:    piperacillin-tazobactam  3.375 g Intravenous Q8H    insulin glargine  40 Units Subcutaneous Nightly    insulin regular  20 Units Subcutaneous 3 times per day    insulin NPH  15 Units Subcutaneous QAM    potassium chloride  20 mEq Intravenous Once    enoxaparin  40 mg Subcutaneous Daily    insulin regular  0-12 Units Subcutaneous Q6H    levothyroxine  50 mcg Oral Daily    levetiracetam  1,000 mg Intravenous Q12H    miconazole Topical BID    sodium chloride flush  10 mL Intravenous 2 times per day    atorvastatin  20 mg Oral Daily    aspirin  81 mg Per NG tube Daily    lansoprazole  30 mg Per NG tube QAM AC    chlorhexidine  15 mL Mouth/Throat BID      Infusions:    dextrose      norepinephrine 8 mcg/min (08/08/20 1445)         Objective:   Vitals: /89   Pulse 89   Temp 97.7 °F (36.5 °C) (Rectal)   Resp 13   Ht 5' (1.524 m)   Wt 196 lb 10.4 oz (89.2 kg)   SpO2 98%   BMI 38.41 kg/m²   General appearance: Patient is sedated intubated on ventilator  Neck: no JVD or bruit  Thyroid : Normal lobes   Lungs: Has Vesicular Breath sounds intubated and on ventilator  Heart:  regular rate and rhythm  Abdomen: soft, non-tender; bowel sounds normal; no masses,  no organomegaly  Musculoskeletal: Normal  Extremities: extremities normal, , no edema  Neurologic: Patient is sedated intubated and on ventilator.     Assessment:     Patient Active Problem List:     HTN (hypertension)     COPD (chronic obstructive pulmonary disease) ([facility] [address]     Anxiety and depression     DM type 2, uncontrolled, with retinopathy ([address]     Hypokalemia     Hyperlipidemia     Precordial pain     Hyperglycemia     Axillary abscess     Light headed     Orthostatic hypotension     Hypertriglyceridemia     CCC (chronic calculous cholecystitis)     Cirrhosis of liver without ascites (HCC)     Mild obstructive sleep apnea     Idiopathic progressive neuropathy     Congestive heart failure (CHF) (HCC)     Type 2 diabetes mellitus (HCC)     Hypothyroidism     Depression     Hypertension     CHF (congestive heart failure), NYHA class I, acute on chronic, diastolic (HCC)     ACS (acute coronary syndrome) (HCC)     Acute coronary syndrome (HCC)     Diastolic dysfunction with acute on chronic heart failure (HCC)     NSTEMI (non-ST elevated myocardial infarction) (HCC)     SADI (obstructive sleep apnea)     Morbid obesity (HCC)     VHD (valvular heart disease)     Excessive daytime sleepiness     Ex-smoker     Acute respiratory failure (HCC)      Plan:     1. Reviewed POC blood glucose . Labs and X ray results   2. Reviewed Current Medicines   3. Started on schedule and correction bolus regular insulin and basal Lantus Insulin regime /   4. Monitor Blood glucose frequently   5. Modified  the dose of Insulin/ other medicines as needed  6. Started on tube feeding if able to control her blood pressure without pressor  7. Will follow     .      Jonas Delaney MD

## 2020-08-09 NOTE — PROGRESS NOTES
08/08/20  1745 08/09/20  0030 08/09/20  0530      < > 137   < > 134* 136 137   K 3.9   < > 3.8   < > 3.3* 3.9 4.0   CL 94*   < > 92*   < > 90* 92* 95*   CO2 32   < > 32   < > 31 30 29   PHOS 3.2  --  4.2  --   --   --  3.9   BUN 33*   < > 40*   < > 39* 40* 41*   CREATININE 1.4*   < > 1.4*   < > 1.3* 1.4* 1.3*    < > = values in this interval not displayed. No results for input(s): AST, ALT, ALB, BILIDIR, BILITOT, ALKPHOS in the last 72 hours. No results for input(s): INR in the last 72 hours. No results for input(s): CKTOTAL, CKMB, CKMBINDEX, TROPONINT in the last 72 hours. Abnormal labs for today noted      Imaging:     ECHO:    Microbiology:  Blood culture:    Urine culture:    Sputum culture:    Procedures done this admission:    MEDS  Scheduled Meds:   insulin regular  15 Units Subcutaneous 3 times per day    insulin regular  0-18 Units Subcutaneous Q6H    insulin glargine  40 Units Subcutaneous Nightly    insulin NPH  15 Units Subcutaneous QAM    potassium chloride  20 mEq Intravenous Once    enoxaparin  40 mg Subcutaneous Daily    levothyroxine  50 mcg Oral Daily    levetiracetam  1,000 mg Intravenous Q12H    miconazole   Topical BID    sodium chloride flush  10 mL Intravenous 2 times per day    atorvastatin  20 mg Oral Daily    aspirin  81 mg Per NG tube Daily    lansoprazole  30 mg Per NG tube QAM AC    chlorhexidine  15 mL Mouth/Throat BID     Continuous Infusions:   dextrose      norepinephrine 9 mcg/min (08/09/20 0646)     PRN Meds:sodium phosphate IVPB **OR** sodium phosphate IVPB, potassium chloride, magnesium sulfate, sodium chloride flush, acetaminophen **OR** acetaminophen, polyethylene glycol, promethazine **OR** ondansetron, glucose, dextrose, glucagon (rDNA), dextrose, albuterol sulfate HFA        ASSESSMENT and PLAN  Hospital Day: 16   1-Probable septic shock- on levophed- to be weaned as able. Likely due to suspected gram negative. Blood culture so far negative. Finished a course of abx. Add midodrine for probably chronic hypotension  2-Acute hypoxic respiratory failure due to suspected gram negative pneumonia and acute systolic HF with features of volume overload- lasix prn- no PE on CTA- improving CXR findings. 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 6-SHER on CKD stage 2-3 - lasix drip due to concerns of volume overload- lasix per nephro  7-Electrolyte abnormalities-replace prn  8-PAF- not on AC due to hx of GIB  9-Cirrhosis- ?cardiac related  10-DM- on ss  11-Severe PCM- TF per dietitian- s/p PEG on 8/7  12-Anemia of chronic disease- monitor and transfuse if less than 7      Plan for LTAC    Discussed with sister,       Disp:     Diet DIET TUBE FEED CONTINUOUS/CYCLIC NPO; Semi-elemental (Vital AF);  Gastrostomy; Continuous; 10; 60; 24   DVT Prophylaxis [] Lovenox, []  Heparin, [] SCDs, [] Ambulation   GI Prophylaxis [] PPI,  [] H2 Blocker,  [] Carafate,  [] Diet/Tube Feeds   Code Status DNR-CCA   Disposition Patient requires continued admission due to acute respiratory failure   CMS Level of Risk [] Low, [x] Moderate,[]  High  Patient's risk as above due to acute respiratory failure     HARINDER ROBERTSON MD 8/9/2020 11:03 AM

## 2020-08-09 NOTE — PROGRESS NOTES
Pulmonary and Critical Care  Progress Note    Subjective: The patient is about the same. Shortness of breath none  Chest pain none  Addressing respiratory complaints Patient is negative for  hemoptysis and cyanosis  CONSTITUTIONAL:  negative for fevers and chills      Past Medical History:     has a past medical history of Anxiety, Arthritis, Atrial fibrillation ([address] Atrial fibrillation with RVR ([address] Atrial fibrillation with RVR ([address] Atrial fibrillation with RVR ([address] Cancer ([address] Cervicalgia, Constipation, COPD (chronic obstructive pulmonary disease) ([address] Depression, Diabetes mellitus ([address] Diabetic neuropathy ([address] Disc herniation, Fatigue, Fibromyalgia, H/O cardiovascular stress test, H/O complete electrocardiogram, H/O echocardiogram, Headache(784.0), Hepatitis B, History of cardiac cath, History of chest x-ray, Holter monitor, abnormal, Hx of cardiovascular stress test, Hx of echocardiogram, Hyperlipidemia, Hypertension, Insulin pump in place, Liver cirrhosis ([address] SADI on CPAP, Peripheral vascular disease ([address] PONV (postoperative nausea and vomiting), Renal disease, Tachycardia, and Wears dentures. has a past surgical history that includes Hysterectomy (2000); back surgery (1998); Tonsillectomy (1966); shoulder surgery (Right, 1960); Cataract removal with implant (Bilateral, 2000s); Diagnostic Cardiac Cath Lab Procedure (2000s); Tonsillectomy and adenoidectomy (1966); Cholecystectomy (06222446); and liver biopsy (9/14/2015). reports that she quit smoking about 29 years ago. Her smoking use included cigarettes. She started smoking about 50 years ago. She has a 21.00 pack-year smoking history. She has never used smokeless tobacco. She reports that she does not drink alcohol or use drugs. Family history:  family history includes Anxiety Disorder in her sister;  Arthritis in her brother, father, mother, and sister; Cancer in her brother and father; Dementia in her mother; Depression in her reviewed as a part of this visit. Assessment:     Patient Active Problem List   Diagnosis    HTN (hypertension)    COPD (chronic obstructive pulmonary disease) ([address]    Anxiety and depression    DM type 2, uncontrolled, with retinopathy ([address]    Hypokalemia    Hyperlipidemia    Precordial pain    Hyperglycemia    Axillary abscess    Light headed    Orthostatic hypotension    Hypertriglyceridemia    CCC (chronic calculous cholecystitis)    Cirrhosis of liver without ascites (HCC)    Mild obstructive sleep apnea    Idiopathic progressive neuropathy    Congestive heart failure (CHF) (HCC)    Type 2 diabetes mellitus ([address]    Hypothyroidism    Depression    Hypertension    CHF (congestive heart failure), NYHA class I, acute on chronic, diastolic (HCC)    ACS (acute coronary syndrome) (HCC)    Acute coronary syndrome (HCC)    Diastolic dysfunction with acute on chronic heart failure (HCC)    NSTEMI (non-ST elevated myocardial infarction) ([address]    SADI (obstructive sleep apnea)    Morbid obesity (HCC)    VHD (valvular heart disease)    Excessive daytime sleepiness    Ex-smoker    Acute respiratory failure ([address]       Plan:   1. Overall the patient is unchanged. 2. Reduce A/C to 10.       Adam Brumfield MD  8/9/2020  1:26 PM

## 2020-08-09 NOTE — PROGRESS NOTES
Nephrology Progress Note        [address], [address] [facility], [facility]  Phone: [phone]  Office Hours: 8:30AM - 4:30PM  Monday - Friday       [facility]  Ryan Woo MD  [address]  [facility],  [address]  [place], [place] [address]  PHONE: [phone]  FAX: [phone]    8/9/2020 11:58 AM  Subjective:   Admit Date: 7/25/2020  PCP: Rosalba Wolf PA-C  Interval History: on tube feeds  800ml urine yesterday       Diet: DIET TUBE FEED CONTINUOUS/CYCLIC NPO; Semi-elemental (Vital AF); Gastrostomy; Continuous; 10; 60; 24      Data:   Scheduled Meds:   insulin regular  15 Units Subcutaneous 3 times per day    insulin regular  0-18 Units Subcutaneous Q6H    midodrine  5 mg Oral TID WC    insulin glargine  40 Units Subcutaneous Nightly    insulin NPH  15 Units Subcutaneous QAM    potassium chloride  20 mEq Intravenous Once    enoxaparin  40 mg Subcutaneous Daily    levothyroxine  50 mcg Oral Daily    levetiracetam  1,000 mg Intravenous Q12H    miconazole   Topical BID    sodium chloride flush  10 mL Intravenous 2 times per day    atorvastatin  20 mg Oral Daily    aspirin  81 mg Per NG tube Daily    lansoprazole  30 mg Per NG tube QAM AC    chlorhexidine  15 mL Mouth/Throat BID     Continuous Infusions:   dextrose      norepinephrine 9 mcg/min (08/09/20 0646)     PRN Meds:sodium phosphate IVPB **OR** sodium phosphate IVPB, potassium chloride, magnesium sulfate, sodium chloride flush, acetaminophen **OR** acetaminophen, polyethylene glycol, promethazine **OR** ondansetron, glucose, dextrose, glucagon (rDNA), dextrose, albuterol sulfate HFA  I/O last 3 completed shifts: In: 1894.2 [I.V.:928.3; Blood:322.9; NG/GT:643]  Out: 865 [Urine:865]  No intake/output data recorded.     Intake/Output Summary (Last 24 hours) at 8/9/2020 1158  Last data filed at 8/9/2020 0546  Gross per 24 hour   Intake 1894.24 ml   Output 865 ml   Net 1029.24 ml CBC:   Recent Labs     08/07/20  0509 08/08/20  0535 08/08/20  1745 08/09/20  0530   WBC 12.7* 9.3  --  10.4   HGB 7.6* 6.8* 7.8* 7.5*    333  --  324       BMP:    Recent Labs     08/08/20  1745 08/09/20  0030 08/09/20  0530   * 136 137   K 3.3* 3.9 4.0   CL 90* 92* 95*   CO2 31 30 29   BUN 39* 40* 41*   CREATININE 1.3* 1.4* 1.3*   GLUCOSE 183* 139* 240*     Hepatic: No results for input(s): AST, ALT, ALB, BILITOT, ALKPHOS in the last 72 hours. Troponin: No results for input(s): TROPONINI in the last 72 hours. BNP: No results for input(s): BNP in the last 72 hours. Lipids: No results for input(s): CHOL, HDL in the last 72 hours. Invalid input(s): LDLCALCU  ABGs:   Lab Results   Component Value Date    PO2ART 115 08/09/2020    RGX0ZEV 40.0 08/09/2020     INR: No results for input(s): INR in the last 72 hours.     Objective:   Vitals: BP (!) 113/49   Pulse 96   Temp 99.9 °F (37.7 °C) (Rectal)   Resp 17   Ht 5' (1.524 m)   Wt 199 lb 11.8 oz (90.6 kg)   SpO2 100%   BMI 39.01 kg/m²   General appearance:  in no acute distress  HEENT: normocephalic, atraumatic,   Neck: supple, trachea midline  Lungs:  breathing comfortably on trach  Heart[de-identified] regular rate and rhythm,   Abdomen: peg tube present   Extremities: extremities atraumatic, no cyanosis or edema      Assessment and Plan:     Patient Active Problem List   Diagnosis    HTN (hypertension)    COPD (chronic obstructive pulmonary disease) (HCC)    Anxiety and depression    DM type 2, uncontrolled, with retinopathy [facility]    Hypokalemia    Hyperlipidemia    Precordial pain    Hyperglycemia    Axillary abscess    Light headed    Orthostatic hypotension    Hypertriglyceridemia    CCC (chronic calculous cholecystitis)    Cirrhosis of liver without ascites (HCC)    Mild obstructive sleep apnea    Idiopathic progressive neuropathy    Congestive heart failure (CHF) (HCC)    [facility]    Hypothyroidism    Depression    Hypertension    CHF (congestive heart failure), NYHA class I, acute on chronic, diastolic (HCC)    ACS (acute coronary syndrome) (HCC)    Acute coronary syndrome (HCC)    Diastolic dysfunction with acute on chronic heart failure (HCC)    NSTEMI (non-ST elevated myocardial infarction) [facility] [facility]    SADI (obstructive sleep apnea)    Morbid obesity (HCC)    VHD (valvular heart disease)    Excessive daytime sleepiness    Ex-smoker    Acute respiratory failure (HCC)   SHER: stable  S/p cardiac arrest  Hypokalemia  Hypomagnesemia  encephalopathy s/p cardiac arrest; now with trach and peg   Acute hypoxic resp failure now with trach  Acute on chronic CHF  DM2  Acute anemia     Suggest;  Cr stable at 1.3  Resume home dose of lasix 40mg po bid  Avoid nephrotoxins  Continue supportive mgmt                                   Electronically signed by Khadra Crouch DO on 8/9/2020 at 11:58 MD Alex Ruiz DO [address]  [place], [place] [address]  PHONE: [phone]  FAX: [phone]

## 2020-08-09 NOTE — PROGRESS NOTES
08/09/20 0403   Vent Information   Vent Type 980   Vent Mode AC/VC   Vt Ordered 450 mL   Rate Set 12 bmp   Peak Flow 50 L/min   Pressure Support 0 cmH20   FiO2  30 %   SpO2 100 %   SpO2/FiO2 ratio 333.33   Sensitivity 3   PEEP/CPAP 5   I Time/ I Time % 0 s   Humidification Source HME   Vent Patient Data   High Peep/I Pressure 0   Peak Inspiratory Pressure 21 cmH2O   Mean Airway Pressure 8.8 cmH20   Rate Measured 14 br/min   Vt Exhaled 475 mL   Minute Volume 6.81 Liters   I:E Ratio 1:3.50   Cough/Sputum   Sputum How Obtained Suctioned;Tracheal   $Obtained Sample $Induced Sputum   Cough Productive   Sputum Amount Small   Sputum Color Red   Tenacity Thick   Spontaneous Breathing Trial (SBT) RT Doc   Pulse 92   Additional Respiratory  Assessments   Resp (!) 0   Alarm Settings   High Pressure Alarm 40 cmH2O   Delay Alarm 20 sec(s)   Low Minute Volume Alarm 2.5 L/min   Apnea (secs) 20 secs   High Respiratory Rate 40 br/min   Low Exhaled Vt  250 mL

## 2020-08-09 NOTE — PROGRESS NOTES
Progress Note( Dr. Gladys Peng)    Subjective:   Admit Date: 7/25/2020  PCP: Freda Morgan PA-C    The patient on 8/7/2020 but made the note later    Admitted For : Cardiac arrest but very soon was resuscitated intubated and placed on the ventilator    Consulted For: Better control of blood glucose    Interval History: No major changes in her mental or respiratory status    Patient is still sedated intubated and on ventilator  Patient is COVID negative so she was transferred to regular ICU  on  7/30 /2020  Patient had tracheotomy and PEG tube insertion on 8/7/2020 by Dr Oliveira Redmond on ventilator       Intake/Output Summary (Last 24 hours) at 8/9/2020 1523  Last data filed at 8/9/2020 0546  Gross per 24 hour   Intake 1894.24 ml   Output 865 ml   Net 1029.24 ml       DATA    CBC:   Recent Labs     08/07/20  0509 08/08/20  0535 08/08/20  1745 08/09/20  0530   WBC 12.7* 9.3  --  10.4   HGB 7.6* 6.8* 7.8* 7.5*    333  --  324    CMP:  Recent Labs     08/09/20  0030 08/09/20  0530 08/09/20  1240    137 137   K 3.9 4.0 3.5   CL 92* 95* 96*   CO2 30 29 30   BUN 40* 41* 42*   CREATININE 1.4* 1.3* 1.3*   CALCIUM 9.0 9.0 9.0     Lipids:   Lab Results   Component Value Date    CHOL 122 06/20/2019    CHOL 205 03/17/2016    HDL 51 06/20/2019    TRIG 150 06/20/2019     Glucose:  Recent Labs     08/09/20  0537 08/09/20  0845 08/09/20  1147   POCGLU 235* 240* 249*     [id number]:  Lab Results   Component Value Date    LABA1C 12.0 07/26/2020     High Sensitivity TSH:   Lab Results   Component Value Date    TSHHS 4.730 06/20/2019     Free T3:   Lab Results   Component Value Date    FT3 2.9 03/17/2016     Free T4:  Lab Results   Component Value Date    T4FREE 1.04 06/18/2019       Xr Chest Portable    Result Date: 7/26/2020  EXAMINATION: ONE XRAY VIEW OF THE CHEST 7/26/2020 4:49 am COMPARISON: July 25, 2020 HISTORY: ORDERING SYSTEM PROVIDED HISTORY: Vent management T    Patchy airspace opacities bilaterally, may be related to pulmonary edema versus pneumonia, likely improved in the left hemithorax. Mild to moderate right pleural effusion. Stable cardiomegaly. Low lung volumes. Fredrich Bun Chest Portable    Result Date: 7/25/2020  EXAMINATION: ONE XRAY VIEW OF THE CHEST 7/25/2020 3:37 pm COMPARISON: Chest 06/17/2019 HISTORY: ORDERING SYSTEM PROVIDED HISTORY: post arrest        1. Nonspecific pulmonary findings may be related to sequela from pulmonary edema, diffuse infection or ARDS. Pulmonary contusion or sequela during resuscitation (? aspiration) may contribute to these opacities as well. 2. Calcific atherosclerosis aorta. 3. Cardiomegaly. 4. Endotracheal tube tip measures about 2.7 cm superior to the elijah. Ira Shay Pulmonary W Contrast    Result Date: 7/25/2020  EXAMINATION: CTA OF THE CHEST 7/25/2020 5:51 pm     1. No pulmonary embolism. 2. Findings of fluid overload with small to moderate pleural effusions, diffuse soft tissue edema, and upper abdominal ascites. 3. Consolidative opacities within the upper to mid lungs could represent pneumonia or atelectasis/scarring. 4. Cirrhosis. 5. Borderline cardiomegaly with severe atherosclerotic disease. Xr Abdomen For Ng/og/ne Tube Placement    Result Date: 7/26/2020  EXAMINATION: ONE SUPINE XRAY VIEW(S) OF THE ABDOMEN 7/26/2020 8:18 am COMPARISON: None       NG tube tip in the gastric antrum with the proximal side-port in the gastric body.        Scheduled Medicines   Medications:    insulin regular  15 Units Subcutaneous 3 times per day    insulin regular  0-18 Units Subcutaneous Q6H    midodrine  5 mg Oral TID WC    furosemide  40 mg Oral BID    insulin glargine  40 Units Subcutaneous Nightly    insulin NPH  15 Units Subcutaneous QAM    potassium chloride  20 mEq Intravenous Once    enoxaparin  40 mg Subcutaneous Daily    levothyroxine  50 mcg Oral Daily    levetiracetam  1,000 mg Intravenous Q12H    miconazole   Topical BID    sodium chloride flush 10 mL Intravenous 2 times per day    atorvastatin  20 mg Oral Daily    aspirin  81 mg Per NG tube Daily    lansoprazole  30 mg Per NG tube QAM AC    chlorhexidine  15 mL Mouth/Throat BID      Infusions:    dextrose      norepinephrine 8 mcg/min (08/09/20 1330)         Objective:   Vitals: BP (!) 137/56   Pulse 85   Temp 99.9 °F (37.7 °C) (Rectal)   Resp 18   Ht 5' (1.524 m)   Wt 199 lb 11.8 oz (90.6 kg)   SpO2 99%   BMI 39.01 kg/m²   General appearance: Patient is sedated intubated on ventilator  Neck: no JVD or bruit tracheotomy is done and connected to ventilator  Thyroid : Normal lobes   Lungs: Has Vesicular Breath sounds intubated and on ventilator  Heart:  regular rate and rhythm  Abdomen: soft, non-tender; bowel sounds normal; no masses,  no organomegaly  No has PEG tube  Musculoskeletal: Normal  Extremities: extremities normal, , no edema  Neurologic: Patient is sedated intubated and on ventilator.     Assessment:     Patient Active Problem List:     HTN (hypertension)     COPD (chronic obstructive pulmonary disease) ([address]     Anxiety and depression     DM type 2, uncontrolled, with retinopathy ([facility]     Hypokalemia     Hyperlipidemia     Precordial pain     Hyperglycemia     Axillary abscess     Light headed     Orthostatic hypotension     Hypertriglyceridemia     CCC (chronic calculous cholecystitis)     Cirrhosis of liver without ascites (HCC)     Mild obstructive sleep apnea     Idiopathic progressive neuropathy     Congestive heart failure (CHF) (HCC)     Type 2 diabetes mellitus (HCC)     Hypothyroidism     Depression     Hypertension     CHF (congestive heart failure), NYHA class I, acute on chronic, diastolic (HCC)     ACS (acute coronary syndrome) (HCC)     Acute coronary syndrome (HCC)     Diastolic dysfunction with acute on chronic heart failure (HCC)     NSTEMI (non-ST elevated myocardial infarction) (HCC)     SADI (obstructive sleep apnea)     Morbid obesity (HCC)     VHD (valvular heart disease)     Excessive daytime sleepiness     Ex-smoker     Acute respiratory failure ([facility]      Plan:     1. Reviewed POC blood glucose . Labs and X ray results   2. Reviewed Current Medicines   3. Started on schedule and correction bolus regular insulin and basal Lantus Insulin regime /   4. Monitor Blood glucose frequently   5. Modified  the dose of Insulin/ other medicines as needed  6. On tube feeding now restarted with the PEG tube Will follow     .      Chris Peraza MD

## 2020-08-09 NOTE — PLAN OF CARE
Problem: Falls - Risk of:  Goal: Will remain free from falls  Description: Will remain free from falls  8/9/2020 0041 by Ryann Jennings RN  Outcome: Ongoing  8/8/2020 1538 by Karon Santos RN  Outcome: Ongoing  Goal: Absence of physical injury  Description: Absence of physical injury  8/9/2020 0041 by Ryann eJnnings RN  Outcome: Ongoing  8/8/2020 1538 by Karon Santos RN  Outcome: Ongoing     Problem: Skin Integrity:  Goal: Will show no infection signs and symptoms  Description: Will show no infection signs and symptoms  8/9/2020 0041 by Ryann Jennings RN  Outcome: Ongoing  8/8/2020 1538 by Karon Santos RN  Outcome: Ongoing  Goal: Absence of new skin breakdown  Description: Absence of new skin breakdown  8/9/2020 0041 by Ryann Jennings RN  Outcome: Ongoing  8/8/2020 1538 by Karon Santos RN  Outcome: Ongoing     Problem: Discharge Planning:  Goal: Participates in care planning  Description: Participates in care planning  8/9/2020 0041 by Ryann Jennings RN  Outcome: Ongoing  8/8/2020 1538 by Karon Santos RN  Outcome: Ongoing  Goal: Discharged to appropriate level of care  Description: Discharged to appropriate level of care  8/9/2020 0041 by Ryann Jennings RN  Outcome: Ongoing  8/8/2020 1538 by Karon Santos RN  Outcome: Ongoing     Problem: Airway Clearance - Ineffective:  Goal: Ability to maintain a clear airway will improve  Description: Ability to maintain a clear airway will improve  8/9/2020 0041 by Ryann Jennings RN  Outcome: Ongoing  8/8/2020 1538 by Karon Santos RN  Outcome: Ongoing     Problem: Anxiety/Stress:  Goal: Level of anxiety will decrease  Description: Level of anxiety will decrease  8/9/2020 0041 by Ryann Jennings RN  Outcome: Ongoing  8/8/2020 1538 by Karon Santos RN  Outcome: Ongoing     Problem: Aspiration:  Goal: Absence of aspiration  Description: Absence of aspiration  8/9/2020 0041 by Ryann Jennings RN  Outcome: Ongoing  8/8/2020 1538 by John Jang RN  Outcome: Ongoing     Problem: Bowel Function - Altered:  Goal: Bowel elimination is within specified parameters  Description: Bowel elimination is within specified parameters  8/9/2020 0041 by Susu Richardson RN  Outcome: Ongoing  8/8/2020 1538 by John Jang RN  Outcome: Ongoing     Problem: Cardiac Output - Decreased:  Goal: Hemodynamic stability will improve  Description: Hemodynamic stability will improve  8/9/2020 0041 by Susu Richardson RN  Outcome: Ongoing  8/8/2020 1538 by John Jang RN  Outcome: Ongoing     Problem: Fluid Volume - Imbalance:  Goal: Absence of imbalanced fluid volume signs and symptoms  Description: Absence of imbalanced fluid volume signs and symptoms  8/9/2020 0041 by Susu Richardson RN  Outcome: Ongoing  8/8/2020 1538 by John Jang RN  Outcome: Ongoing     Problem: Gas Exchange - Impaired:  Goal: Levels of oxygenation will improve  Description: Levels of oxygenation will improve  8/9/2020 0041 by Susu Richardson RN  Outcome: Ongoing  8/8/2020 1538 by John Jang RN  Outcome: Ongoing     Problem: Mental Status - Impaired:  Goal: Mental status will be restored to baseline  Description: Mental status will be restored to baseline  8/9/2020 0041 by Susu Richardson RN  Outcome: Ongoing  8/8/2020 1538 by John Jang RN  Outcome: Ongoing     Problem: Nutrition Deficit:  Goal: Ability to achieve adequate nutritional intake will improve  Description: Ability to achieve adequate nutritional intake will improve  8/9/2020 0041 by Susu Richardson RN  Outcome: Ongoing  8/8/2020 1538 by John Jang RN  Outcome: Ongoing     Problem: Pain:  Description: Pain management should include both nonpharmacologic and pharmacologic interventions.   Goal: Pain level will decrease  Description: Pain level will decrease  8/9/2020 0041 by Susu Richardson RN  Outcome: Ongoing  8/8/2020 1538 by Domonique Umaña RN  Outcome: Ongoing  Goal: Recognizes and communicates pain  Description: Recognizes and communicates pain  8/9/2020 0041 by Nilda Hannon RN  Outcome: Ongoing  8/8/2020 1538 by Domonique Umaña RN  Outcome: Ongoing  Goal: Control of acute pain  Description: Control of acute pain  8/9/2020 0041 by Nilda Hannon RN  Outcome: Ongoing  8/8/2020 1538 by Domonique Umaña RN  Outcome: Ongoing  Goal: Control of chronic pain  Description: Control of chronic pain  8/9/2020 0041 by Nilda Hannon RN  Outcome: Ongoing  8/8/2020 1538 by Domonique Umaña RN  Outcome: Ongoing     Problem: Serum Glucose Level - Abnormal:  Goal: Ability to maintain appropriate glucose levels will improve to within specified parameters  Description: Ability to maintain appropriate glucose levels will improve to within specified parameters  8/9/2020 0041 by Nilda Hannon RN  Outcome: Ongoing  8/8/2020 1538 by Domonique Umaña RN  Outcome: Ongoing     Problem: Skin Integrity - Impaired:  Goal: Will show no infection signs and symptoms  Description: Will show no infection signs and symptoms  8/9/2020 0041 by Nilda Hannon RN  Outcome: Ongoing  8/8/2020 1538 by Domonique Umaña RN  Outcome: Ongoing  Goal: Absence of new skin breakdown  Description: Absence of new skin breakdown  8/9/2020 0041 by Nilda Hannon RN  Outcome: Ongoing  8/8/2020 1538 by Domonique Umaña RN  Outcome: Ongoing     Problem: Sleep Pattern Disturbance:  Goal: Appears well-rested  Description: Appears well-rested  8/9/2020 0041 by Nilda Hannon RN  Outcome: Ongoing  8/8/2020 1538 by Domonique Umaña RN  Outcome: Ongoing     Problem: Tissue Perfusion, Altered:  Goal: Circulatory function within specified parameters  Description: Circulatory function within specified parameters  8/9/2020 0041 by Nilda Hannon RN  Outcome: Ongoing  8/8/2020 1538 by Domonique Umaña RN  Outcome: Ongoing     Problem: Tissue Perfusion

## 2020-08-09 NOTE — PROGRESS NOTES
08/09/20 1105   Vent Information   Equipment Changed Suction catheter   Vent Type 980   Vent Mode AC/VC   Vt Ordered 450 mL   Rate Set 10 bmp   Peak Flow 50 L/min   Pressure Support 0 cmH20   FiO2  30 %   SpO2 100 %   SpO2/FiO2 ratio 333.33   Sensitivity 3   PEEP/CPAP 5   I Time/ I Time % 0 s   Vent Patient Data   High Peep/I Pressure 0   Peak Inspiratory Pressure 18 cmH2O   Mean Airway Pressure 9.4 cmH20   Rate Measured 20 br/min   Vt Exhaled 462 mL   Minute Volume 8.97 Liters   I:E Ratio 1:2.00   Spontaneous Breathing Trial (SBT) RT Doc   Pulse 96   Additional Respiratory  Assessments   Resp 17   Alarm Settings   High Pressure Alarm 40 cmH2O

## 2020-08-10 NOTE — PROGRESS NOTES
While RT suctioning trach inline suction, RT noticed blood coming from pts mouth, notified this nurse and suctioned bloody secretions from mouth. This nurse performed oral care, no clots no blood on oral care sponge. Will continue to monitor.

## 2020-08-10 NOTE — PROGRESS NOTES
List    Diagnosis Date Noted    Acute respiratory failure ([facility] 07/25/2020    Excessive daytime sleepiness 11/04/2019    Ex-smoker 11/04/2019    VHD (valvular heart disease)     SADI (obstructive sleep apnea)     [facility]     NSTEMI (non-ST elevated myocardial infarction) [facility]     ACS (acute coronary syndrome) ([facility] 11/08/2018    Acute coronary syndrome ([facility] 96/80/1788    Diastolic dysfunction with acute on chronic heart failure (HCC) 11/08/2018    Congestive heart failure (CHF) ([facility] 11/07/2018    Type 2 diabetes mellitus ([facility] 11/07/2018    Hypothyroidism 11/07/2018    Depression 11/07/2018    Hypertension 11/07/2018    CHF (congestive heart failure), NYHA class I, acute on chronic, diastolic ([facility] 54/41/3162    Idiopathic progressive neuropathy 04/18/2016     Overview Note:     Patient has chronic peripheral neuropathy to bilateral feet. She is currently on gabapentin 800 mg tid with some benefit. She reports decreased sensation to her feet with intermittent pain.  Mild obstructive sleep apnea 10/14/2015    CCC (chronic calculous cholecystitis) 09/14/2015    Cirrhosis of liver without ascites ([facility] 09/14/2015    Light headed 04/23/2014     Overview Note:     From dehydration from hyperglycemia - patient dry on presentation. replace inactive diagnosis      Orthostatic hypotension 04/23/2014    Hypertriglyceridemia 04/23/2014    Precordial pain 04/22/2014    Hyperglycemia 04/22/2014     Overview Note:     Admission blood glucose of 120; was >500 prior to presentation to ER, first accucheck in ER was 341.  Axillary abscess 04/22/2014     Overview Note:     Left      Hyperlipidemia     DM type 2, uncontrolled, with retinopathy ([facility] [facility] 03/01/2013     Overview Note:     Patient's last A1c was 12.1, and she has been referred in the last month to endocrinology.  She has an appointment this coming month with a specialist.      Hypokalemia 03/01/2013    HTN (hypertension) 11/26/2012    COPD (chronic obstructive pulmonary disease) ([facility] 11/26/2012    Anxiety and depression 11/26/2012     Overview Note:     Patient is currently on venlafaxine 150 mg daily for depression and anxiety. She feels like it is not as affective as she would like currently. She does not feel suicidal and is sleeping ok but has decreased motivation and less happy days. Patient is on the vent and off sedation. No response to painful stimuli. EEG No seizures     Anoxic encephalopathy  Hypokalemia  Anemia - stable  Acute on chronic Hypoxic hypercapneic resp failure  Chronic Metabolic alkalosis  Bilateral pleural effusions  VDRF  Leukocytosis - improved  Moderate AS  Morbid obesity  SADI  Smoker  Pulmonary edema  Out of Hospital Cardiac arrest  ? Aspiration pneumonia  Systolic CHF with EF of 85%  Grade III Diastolic dysfunction  Metabolic alkalosis  Moderate malnutrition  S/p Trach and PEG       1. Trach collar trials as tolerated  2. Tube feeds  3. PT/OT  4. Await LTAC  5. Inhalers  6. Prognosis guarded  7. C.w present management  No follow-ups on file.     Electronically signed by Mariano Cook MD on 8/11/2020 at 9:54 AM

## 2020-08-10 NOTE — PROGRESS NOTES
Nephrology Progress Note        [address], [address] [facility], [facility] [address]  Phone: [phone]  Office Hours: 8:30AM - 4:30PM  Monday - Friday       [facility]  Shantel Winter MD  [address],   [facility],  [place]  [facility]  PHONE: [phone]  FAX: [phone]    8/10/2020 8:17 AM  Subjective:   Admit Date: 7/25/2020  PCP: Krys Wolf PA-C  Interval History:   Eyes are open  On tube feeds    Diet: DIET TUBE FEED CONTINUOUS/CYCLIC NPO; Semi-elemental (Vital AF); Gastrostomy; Continuous; 10; 60; 24      Data:   Scheduled Meds:   furosemide  40 mg Oral Daily    insulin regular  20 Units Subcutaneous 3 times per day    insulin NPH  20 Units Subcutaneous QAM    insulin regular  0-18 Units Subcutaneous Q6H    midodrine  5 mg Oral TID WC    insulin glargine  40 Units Subcutaneous Nightly    potassium chloride  20 mEq Intravenous Once    enoxaparin  40 mg Subcutaneous Daily    levothyroxine  50 mcg Oral Daily    levetiracetam  1,000 mg Intravenous Q12H    miconazole   Topical BID    sodium chloride flush  10 mL Intravenous 2 times per day    atorvastatin  20 mg Oral Daily    aspirin  81 mg Per NG tube Daily    lansoprazole  30 mg Per NG tube QAM AC    chlorhexidine  15 mL Mouth/Throat BID     Continuous Infusions:   dextrose      norepinephrine 8 mcg/min (08/10/20 0652)     PRN Meds:sodium phosphate IVPB **OR** sodium phosphate IVPB, potassium chloride, magnesium sulfate, sodium chloride flush, acetaminophen **OR** acetaminophen, polyethylene glycol, promethazine **OR** ondansetron, glucose, dextrose, glucagon (rDNA), dextrose, albuterol sulfate HFA  I/O last 3 completed shifts: In: 2115.1 [I.V.:161.1; NG/GT:1854; IV Piggyback:100]  Out: 975 [Urine:975]  No intake/output data recorded.     Intake/Output Summary (Last 24 hours) at 8/10/2020 0817  Last data filed at 8/10/2020 0641  Gross per 24 hour   Intake 2115.09 ml Output 975 ml   Net 1140.09 ml       CBC:   Recent Labs     08/08/20  0535 08/08/20  1745 08/09/20  0530 08/10/20  0505   WBC 9.3  --  10.4 9.1   HGB 6.8* 7.8* 7.5* 7.2*     --  324 324       BMP:    Recent Labs     08/09/20  1800 08/10/20  0000 08/10/20  0505    135 137   K 3.7 3.8 3.8   CL 95* 95* 96*   CO2 28 31 30   BUN 44* 45* 49*   CREATININE 1.3* 1.3* 1.3*   GLUCOSE 250* 256* 233*     Hepatic: No results for input(s): AST, ALT, ALB, BILITOT, ALKPHOS in the last 72 hours. Troponin: No results for input(s): TROPONINI in the last 72 hours. BNP: No results for input(s): BNP in the last 72 hours. Lipids: No results for input(s): CHOL, HDL in the last 72 hours. Invalid input(s): LDLCALCU  ABGs:   Lab Results   Component Value Date    PO2ART 100 08/10/2020    UDM3EJO 42.0 08/10/2020     INR: No results for input(s): INR in the last 72 hours.     Objective:   Vitals: BP (!) 126/42   Pulse 93   Temp 99.7 °F (37.6 °C) (Rectal)   Resp 12   Ht 5' (1.524 m)   Wt 193 lb 5.5 oz (87.7 kg)   SpO2 99%   BMI 37.76 kg/m²   General appearance: , in no acute distress  HEENT: normocephalic, atraumatic, trach in place  Neck: supple, trachea midline  Lungs:  breathing comfortably on mv  Heart[de-identified] regular rate and rhythm  Abdomen: peg tube present  Extremities: no leg swelling      Assessment and Plan:     Patient Active Problem List   Diagnosis    HTN (hypertension)    COPD (chronic obstructive pulmonary disease) (HCC)    Anxiety and depression    DM type 2, uncontrolled, with retinopathy ([address]    Hypokalemia    Hyperlipidemia    Precordial pain    Hyperglycemia    Axillary abscess    Light headed    Orthostatic hypotension    Hypertriglyceridemia    CCC (chronic calculous cholecystitis)    Cirrhosis of liver without ascites (HCC)    Mild obstructive sleep apnea    Idiopathic progressive neuropathy    Congestive heart failure (CHF) (HCC)    Type 2 diabetes mellitus (HCC)    Hypothyroidism  Depression    Hypertension    CHF (congestive heart failure), NYHA class I, acute on chronic, diastolic (HCC)    ACS (acute coronary syndrome) ([facility]    Acute coronary syndrome (HCC)    Diastolic dysfunction with acute on chronic heart failure (HCC)    NSTEMI (non-ST elevated myocardial infarction) ([facility]    SADI (obstructive sleep apnea)    Morbid obesity (HCC)    VHD (valvular heart disease)    Excessive daytime sleepiness    Ex-smoker    Acute respiratory failure (HCC)   SHER: stable  S/p cardiac arrest  Hypokalemia  Hypomagnesemia  encephalopathy s/p cardiac arrest; now with trach and peg   Acute hypoxic resp failure now with trach  Acute on chronic CHF  DM2  Acute anemia     Suggest;  Cr stable at 1.3  Resume lasix tomorrow, 40mg daily , ok to uptitrate as needed  Avoid nephrotoxins  Continue supportive mgmt  I will sign off today so please call me back if needed                                     Electronically signed by Severiano Chinchilla, DO on 8/10/2020 at 8:17 MD Nicole Hill DO [address]  [place], [place] [address]  PHONE: [phone]  FAX: [phone]

## 2020-08-10 NOTE — CONSULTS
[facility] Nurse  Consult Note       Toñito Murphy  AGE: 79 y.o. GENDER: female  : 1950  TODAY'S DATE:  8/10/2020    Subjective:     Reason for Evaluation and Assessment:  Wound care for f/u sacral wound       Toñito Murphy is a 79 y.o. female referred by:   [x] Physician  [] Nursing  [] Other:     Wound Identification:  Wound Type: pressure  Contributing Factors: chronic pressure, decreased mobility and malnutrition        PAST MEDICAL HISTORY        Diagnosis Date    Anxiety     Arthritis     bilats hands, right shoulder    Atrial fibrillation [facility])     Cardioversion @ [facility] - no trouble with it ever since. Sees Dr. Devorah Torres Atrial fibrillation with RVR [facility])     Atrial fibrillation with RVR ([facility] [address] [address] 2012    Atrial fibrillation with RVR ([facility] [address] 3/1/2013    Cancer (HCC)     skin (right upper chest & face)    Cervicalgia     Constipation     COPD (chronic obstructive pulmonary disease) (HCC)     Depression     Diabetes mellitus (HCC)     IDDM    Diabetic neuropathy (HCC)     Disc herniation     lower back    Fatigue     Fibromyalgia     H/O cardiovascular stress test  EF 70%, EF 66%,02/10, 03/10 EF 63% 04/10    04/26/10 if LAD and circ are compared, the LAD in its mid segment has about 60% stenosis around a small diag. circumflex vessel is a dominant vessel and is without any significant disease,  rca is nondominant with about 50% stenosis , will continue medical management for now.  H/O complete electrocardiogram 02/10    02/03/10 on chart    H/O echocardiogram ,EF 55%, 02/10    02/25/10 EF>55% left ventricular systolic function is normal,mild mitral regurg. there is no PE    Headache(784.0)     Not migraines, usually from a high blood sugar.     Hepatitis B     History of cardiac cath 06/08    06/17/10 heart cath, patient has single vessel cad with RCA which is nondominant, being 50% stenosed, rest of vessels are without any significant disease, medical therapy and risk factor modification will be continued    History of chest x-ray 06/08    06/15/08 chest xray, nonacute chest with borderline cardiomegaly    Holter monitor, abnormal 10/28/13    48hr-Predominantly SR w/only SVT ectopy in single beat form noted.  Hx of cardiovascular stress test 4/1/2013    EF70%, normal    Hx of echocardiogram 4/1/2013    EF55%,mild tricuspid regurg    Hyperlipidemia     Hypertension     Insulin pump in place     Liver cirrhosis (HCC)     Dr Dc Perdomo SADI on CPAP     In the process of getting a c-pap. Does not currently have one.     Peripheral vascular disease ([address]     PONV (postoperative nausea and vomiting) 1960    with shoulder surgery    Renal disease     Tachycardia     Wears dentures     upper plate       PAST SURGICAL HISTORY    Past Surgical History:   Procedure Laterality Date    BACK SURGERY  1998    herniated disc - no hardware    CATARACT REMOVAL WITH IMPLANT Bilateral 2000s    CHOLECYSTECTOMY  [id number]    laproscopic with liver biopsy    DIAGNOSTIC CARDIAC CATH LAB PROCEDURE  2000s    no stents (mimimal blockage)    HYSTERECTOMY  2000    total    LIVER BIOPSY  9/14/2015    SHOULDER SURGERY Right 1960    fracture surgery-screw at humeral head    TONSILLECTOMY  1966    TONSILLECTOMY AND ADENOIDECTOMY  1966       FAMILY HISTORY    Family History   Problem Relation Age of Onset    Arthritis Mother     Depression Mother     Emphysema Mother     Dementia Mother     Arthritis Father     Cancer Father         prostate    Vision Loss Father     Other Father         brain aneurysm    Arthritis Sister     Depression Sister     Anxiety Disorder Sister     Arthritis Brother     Cancer Brother         eye    Hearing Loss Brother     High Blood Pressure Brother        SOCIAL HISTORY    Social History     Tobacco Use    Smoking status: Former Smoker     Packs/day: 1.00     Years: 21.00     Pack years: 21.00 Types: Cigarettes     Start date: 1970     Last attempt to quit: 1991     Years since quittin.6    Smokeless tobacco: Never Used    Tobacco comment: lives with smokers   Substance Use Topics    Alcohol use: No     Alcohol/week: 0.0 standard drinks     Comment:           CAFFEINE: 4-6 diet sodas daily    Drug use: No       ALLERGIES    Allergies   Allergen Reactions    Latex Other (See Comments)     Blisters    Adhesive Tape      Paper tape ok to use    Codeine Nausea And Vomiting       MEDICATIONS    No current facility-administered medications on file prior to encounter. Current Outpatient Medications on File Prior to Encounter   Medication Sig Dispense Refill    MIDODRINE HCL PO Take by mouth 3 times daily      LORazepam (ATIVAN) 1 MG tablet Take 1 mg by mouth every 6 hours as needed for Anxiety.  pantoprazole sodium (PROTONIX) 40 MG PACK packet Take 40 mg by mouth every morning (before breakfast)      albuterol-ipratropium (COMBIVENT)  MCG/ACT inhaler Inhale 2 puffs into the lungs 4 times daily 1 Inhaler 0    furosemide (LASIX) 40 MG tablet Take 1 tablet by mouth 2 times daily 60 tablet 0    potassium chloride (KLOR-CON M) 20 MEQ extended release tablet Take 1 tablet by mouth daily for 5 days 5 tablet 0    albuterol sulfate HFA (PROAIR HFA) 108 (90 Base) MCG/ACT inhaler Inhale 2 puffs into the lungs every 4 hours as needed for Wheezing 1 Inhaler 0    busPIRone (BUSPAR) 10 MG tablet Take 1.5 tablets by mouth 3 times daily 160 tablet 0    insulin glargine (LANTUS) 100 UNIT/ML injection vial Inject 60 Units into the skin nightly (Patient taking differently: Inject 80 Units into the skin nightly ) 1 vial 3    docusate sodium (COLACE, DULCOLAX) 100 MG CAPS Take 100 mg by mouth daily 60 capsule 0    tolterodine (DETROL) 2 MG tablet Take 2 mg by mouth daily      nitroGLYCERIN (NITROSTAT) 0.4 MG SL tablet up to max of 3 total doses.  If no relief after 1 dose, call 774. 88 tablet 1    simvastatin (ZOCOR) 20 MG tablet Take 1 tablet by mouth nightly (Patient taking differently: Take 40 mg by mouth nightly ) 30 tablet 3    levothyroxine (SYNTHROID) 25 MCG tablet Take 25 mcg by mouth Daily      venlafaxine (EFFEXOR XR) 150 MG extended release capsule Take one capsule daily at the same time as the 75 mg capsule 90 capsule 0    ONE TOUCH LANCETS MISC 1 each by Does not apply route daily 100 each 11    Insulin Lispro, Human, (HUMALOG SC) Inject into the skin Insulin pump      gabapentin (NEURONTIN) 800 MG tablet Take 1 tablet by mouth 3 times daily (Patient taking differently: Take 800 mg by mouth 4 times daily. Vaishali Contreras ) 270 tablet 1    glucose blood VI test strips (ASCENSIA AUTODISC VI;ONE TOUCH ULTRA TEST VI) strip 1 each by In Vitro route 4 times daily as needed As needed.            Objective:      BP (!) 123/44   Pulse 87   Temp 98.9 °F (37.2 °C) (Rectal)   Resp 17   Ht 5' (1.524 m)   Wt 193 lb 5.5 oz (87.7 kg)   SpO2 99%   BMI 37.76 kg/m²   Frank Risk Score: Frank Scale Score: 12    LABS    CBC:   Lab Results   Component Value Date    WBC 9.1 08/10/2020    RBC 2.61 08/10/2020    HGB 7.2 08/10/2020    HCT 23.1 08/10/2020    MCV 88.5 08/10/2020    MCH 27.6 08/10/2020    MCHC 31.2 08/10/2020    RDW 17.4 08/10/2020     08/10/2020    MPV 9.8 08/10/2020     CMP:    Lab Results   Component Value Date     08/10/2020    K 3.8 08/10/2020    CL 96 08/10/2020    CO2 30 08/10/2020    BUN 49 08/10/2020    CREATININE 1.3 08/10/2020    GFRAA 49 08/10/2020    AGRATIO 1.3 06/08/2016    LABGLOM 40 08/10/2020    GLUCOSE 233 08/10/2020    PROT 6.9 07/25/2020    PROT 6.7 03/02/2013    LABALBU 3.0 07/25/2020    LABALBU 74 09/11/2018    CALCIUM 8.9 08/10/2020    BILITOT 0.3 07/25/2020    ALKPHOS 125 07/25/2020    AST 23 07/25/2020    ALT 11 07/25/2020     Albumin:    Lab Results   Component Value Date    LABALBU 3.0 07/25/2020    LABALBU 74 09/11/2018     PT/INR:    Lab Results Component Value Date    PROTIME 13.5 08/01/2020    INR 1.11 08/01/2020     HgBA1c:    Lab Results   Component Value Date    LABA1C 12.0 07/26/2020         Assessment:     Patient Active Problem List   Diagnosis    HTN (hypertension)    [facility]    Anxiety and depression    DM type 2, uncontrolled, with retinopathy ([facility]    Hypokalemia    Hyperlipidemia    Precordial pain    Hyperglycemia    Axillary abscess    Light headed    Orthostatic hypotension    Hypertriglyceridemia    CCC (chronic calculous cholecystitis)    Cirrhosis of liver without ascites (HCC)    Mild obstructive sleep apnea    Idiopathic progressive neuropathy    Congest[facility] failure ([facility]F) [facility]    Type 2 diabetes mellitus ([facility]    Hypothyroidism    Depression    Hypertension    CHF (congestive heart failure), NYHA class I, acute on chronic, diastolic (HCC)    ACS (acute coronary syndrome) ([facility])    Acute coronary syndrome (HCC)    Diastolic dysfunction with acute on chronic heart failure ([facility])    NSTEMI (non-ST elevated myocardial infarction) ([facility]    SADI (obstructive sleep apnea)    Morbid obesity ([facility])    VHD (valvular heart disease)    Excessive daytime sleepiness    Ex-smoker    Acute respiratory failure ([facility])       Measurements:  Wound 07/25/20 Sacrum cluster with communicating DTI (Active)   Wound Deep tissue/Injury 08/10/20 1444   Dressing Status Reinforced 08/10/20 1444   Dressing Changed Dressing reinforced 08/10/20 1444   Dressing/Treatment Other (comment) 08/10/20 1200   Wound Cleansed Rinsed/Irrigated with saline 08/10/20 1444   Dressing Change Due 08/07/20 08/06/20 0300   Wound Length (cm) 5.5 cm 08/10/20 1444   Wound Width (cm) 4.5 cm 08/10/20 1444   Wound Depth (cm) 0.4 cm 08/10/20 1444   Wound Surface Area (cm^2) 24.75 cm^2 08/10/20 1444   Change in Wound Size % (l*w) 29.29 08/10/20 1444   Wound Volume (cm^3) 9.9 cm^3 08/10/20 1444   Wound Healing % -183 08/10/20 1444   Distance Tunneling (cm) 0 cm 08/10/20 1444   Tunneling Position ___ O'Clock 0 08/10/20 1444   Undermining Starts ___ O'Clock 0 08/10/20 1444   Undermining Ends___ O'Clock 0 08/10/20 1444   Undermining Maxium Distance (cm) 0 08/10/20 1444   Wound Assessment Brown;Red;Yellow 08/10/20 1444   Drainage Amount Moderate 08/10/20 1444   Drainage Description Serosanguinous 08/10/20 1444   Odor None 08/10/20 1444   Margins Defined edges 08/10/20 1444   Kendy-wound Assessment Intact 08/10/20 1444   Non-staged Wound Description Full thickness 08/10/20 1444   [place]%Wound Bed 0 08/10/20 1444   Red%Wound Bed 50 08/10/20 1444   Yellow%Wound Bed 20 08/10/20 1444   Black%Wound Bed 0 08/10/20 1444   Purple%Wound Bed 0 08/10/20 1444   Other%Wound Bed 30 brown  08/10/20 1444   Number of days: 15       Wound 07/25/20 Left (Active)   Dressing Status Clean;Dry; Intact 07/30/20 0003   Dressing Changed Changed/New 07/28/20 1600   Dressing/Treatment Open to air 07/30/20 0812   Wound Assessment Clean;Dry; Intact 07/30/20 0812   Number of days: 15       Response to treatment:  Well tolerated by patient. Pain Assessment:  Severity:  0  Quality of pain: none  Wound Pain Timing/Severity:   Premedicated: no    Plan:     Plan of Care: Wound 07/25/20 Sacrum cluster with communicating DTI-Dressing/Treatment: (puracol and sacral border)  Wound 07/25/20 Left-Dressing/Treatment: Open to air     Pt in bed agreeable to wound care. Wound care for f/u for sacral wound. Removed dressing from sacral cleansed with NS applied puracol and sacral border. Ordered santyl ointment to be used with dressings. Heels intact. Pt is very high risk for skin breakdown AEB balbina score. Observe balbina orders. Heels floated. Pt turned to rt side.      Specialty Bed Required : yes  [x] Low Air Loss   [] Pressure Redistribution  [] Fluid Immersion  [] Bariatric  [] Total Pressure Relief  [] Other:     Discharge Plan:  Placement for patient upon discharge: tbd  Hospice Care: no  Patient appropriate for Outpatient [address]:  [facility]    Patient/Caregiver Teaching:  Level of patient/caregiver understanding able to:  Cares explained as given. Electronically signed by Abiodun Rose.  REYES Raines, on 8/10/2020 at 3:47 PM

## 2020-08-10 NOTE — PROGRESS NOTES
Comprehensive Nutrition Assessment    Type and Reason for Visit:  Reassess    Nutrition Recommendations/Plan:   · Continue EN as ordered    Nutrition Assessment:  Pt has now had a trach and PEG placed and is in a vegetative state 2/2 to cardiac arrest. Will continue EN as ordered until pt is closer to discharge. Malnutrition Assessment:  Malnutrition Status: At risk for malnutrition (Comment)    Context:  Acute Illness       Estimated Daily Nutrient Needs:  Energy (kcal):  1763; Weight Used for Energy Requirements:  Current     Protein (g):  91(2.0 g/kg IBW);  Weight Used for Protein Requirements:  Ideal        Fluid (ml/day):  1763; Weight Used for Fluid Requirements:  Current         Wounds:  Stage II, Pressure Ulcer       Current Nutrition Therapies:    Current Tube Feeding (TF) Orders:  · Feeding Route: PEG  · Formula: Semi-Elemental  · Schedule: Continuous  · Current TF & Flush Orders Provides: 1728 kcal and 108 g of protein per day    Anthropometric Measures:  · Height: 5' (152.4 cm)  · Current Body Weight: 198 lb 3.1 oz (89.9 kg)   · Admission Body Weight: 257 lb (116.6 kg)    · Usual Body Weight: 232 lb (105.2 kg)     · Ideal Body Weight: 100 lbs; % Ideal Body Weight 208.1 %   · BMI: 38.7  · BMI Categories: Obese Class 2 (BMI 35.0 -39.9)       Nutrition Diagnosis:   · Inadequate oral intake related to impaired respiratory funtion as evidenced by NPO or clear liquid status due to medical condition, intubation      Nutrition Interventions:   Food and/or Nutrient Delivery:  Continue Current Tube Feeding  Nutrition Education/Counseling:  No recommendation at this time   Coordination of Nutrition Care:  Continued Inpatient Monitoring    Goals:  pt will receive greater than 75% of her estimated needs through EN       Nutrition Monitoring and Evaluation:   Food/Nutrient Intake Outcomes:  Enteral Nutrition Intake/Tolerance  Physical Signs/Symptoms Outcomes:  Biochemical Data, Weight, GI Status, Hemodynamic Status     Discharge Planning:     Too soon to determine     Electronically signed by Wero Gonzalez RD, LD on 5/54/21 at 10:52 AM EDT    Contact: [phone]

## 2020-08-10 NOTE — PROGRESS NOTES
08/10/20 0412   Vent Information   Vent Type 980   Vent Mode AC/VC   Vt Ordered 450 mL   Rate Set 10 bmp   Peak Flow 50 L/min   Pressure Support 0 cmH20   FiO2  30 %   SpO2 97 %   SpO2/FiO2 ratio 323.33   Sensitivity 3   PEEP/CPAP 5   I Time/ I Time % 0 s   Humidification Source HME   Vent Patient Data   High Peep/I Pressure 0   Peak Inspiratory Pressure 37 cmH2O   Mean Airway Pressure 14 cmH20   Rate Measured 38 br/min   Vt Exhaled 0 mL   Minute Volume 7.65 Liters   I:E Ratio 4.80:1   Cough/Sputum   Sputum How Obtained Suctioned;Tracheal   $Obtained Sample $Induced Sputum   Cough Productive   Sputum Amount Small   Sputum Color Red   Tenacity Thick   Spontaneous Breathing Trial (SBT) RT Doc   Pulse 96   Additional Respiratory  Assessments   Resp 20   Alarm Settings   High Pressure Alarm 40 cmH2O   Delay Alarm 20 sec(s)   Low Minute Volume Alarm 2.5 L/min   Apnea (secs) 20 secs   High Respiratory Rate 40 br/min   Low Exhaled Vt  250 mL

## 2020-08-10 NOTE — PLAN OF CARE
Problem: Falls - Risk of:  Goal: Will remain free from falls  Description: Will remain free from falls  Outcome: Ongoing  Goal: Absence of physical injury  Description: Absence of physical injury  Outcome: Ongoing     Problem: Skin Integrity:  Goal: Will show no infection signs and symptoms  Description: Will show no infection signs and symptoms  Outcome: Ongoing  Goal: Absence of new skin breakdown  Description: Absence of new skin breakdown  Outcome: Ongoing     Problem: Discharge Planning:  Goal: Participates in care planning  Description: Participates in care planning  Outcome: Ongoing  Goal: Discharged to appropriate level of care  Description: Discharged to appropriate level of care  Outcome: Ongoing     Problem: Airway Clearance - Ineffective:  Goal: Ability to maintain a clear airway will improve  Description: Ability to maintain a clear airway will improve  Outcome: Ongoing     Problem: Anxiety/Stress:  Goal: Level of anxiety will decrease  Description: Level of anxiety will decrease  Outcome: Ongoing     Problem: Aspiration:  Goal: Absence of aspiration  Description: Absence of aspiration  Outcome: Ongoing     Problem:  Bowel Function - Altered:  Goal: Bowel elimination is within specified parameters  Description: Bowel elimination is within specified parameters  Outcome: Ongoing     Problem: Cardiac Output - Decreased:  Goal: Hemodynamic stability will improve  Description: Hemodynamic stability will improve  Outcome: Ongoing     Problem: Fluid Volume - Imbalance:  Goal: Absence of imbalanced fluid volume signs and symptoms  Description: Absence of imbalanced fluid volume signs and symptoms  Outcome: Ongoing     Problem: Gas Exchange - Impaired:  Goal: Levels of oxygenation will improve  Description: Levels of oxygenation will improve  Outcome: Ongoing     Problem: Mental Status - Impaired:  Goal: Mental status will be restored to baseline  Description: Mental status will be restored to baseline  Outcome: Ongoing     Problem: Nutrition Deficit:  Goal: Ability to achieve adequate nutritional intake will improve  Description: Ability to achieve adequate nutritional intake will improve  Outcome: Ongoing     Problem: Pain:  Goal: Pain level will decrease  Description: Pain level will decrease  Outcome: Ongoing  Goal: Recognizes and communicates pain  Description: Recognizes and communicates pain  Outcome: Ongoing  Goal: Control of acute pain  Description: Control of acute pain  Outcome: Ongoing  Goal: Control of chronic pain  Description: Control of chronic pain  Outcome: Ongoing     Problem: Serum Glucose Level - Abnormal:  Goal: Ability to maintain appropriate glucose levels will improve to within specified parameters  Description: Ability to maintain appropriate glucose levels will improve to within specified parameters  Outcome: Ongoing  Goal: Ability to maintain appropriate glucose levels will improve  Description: Ability to maintain appropriate glucose levels will improve  Outcome: Ongoing     Problem: Skin Integrity - Impaired:  Goal: Will show no infection signs and symptoms  Description: Will show no infection signs and symptoms  Outcome: Ongoing  Goal: Absence of new skin breakdown  Description: Absence of new skin breakdown  Outcome: Ongoing     Problem: Sleep Pattern Disturbance:  Goal: Appears well-rested  Description: Appears well-rested  Outcome: Ongoing     Problem: Tissue Perfusion, Altered:  Goal: Circulatory function within specified parameters  Description: Circulatory function within specified parameters  Outcome: Ongoing     Problem: Tissue Perfusion - Cardiopulmonary, Altered:  Goal: Absence of angina  Description: Absence of angina  Outcome: Ongoing  Goal: Hemodynamic stability will improve  Description: Hemodynamic stability will improve  Outcome: Ongoing     Problem: Sensory Perception - Impaired:  Goal: Ability to maintain a stable neurologic state will improve  Description:

## 2020-08-10 NOTE — PROGRESS NOTES
[address] [place] [address]  HOSPITALIST PROGRESS NOTE                       Name:  Makayla Goodman Standard /Age/Sex: 1950  (79 y.o. female)   MRN & CSN:  [id number] & [id number] Admission Date/Time: 2020  8:40 PM   Location:  -A Attending:  Chani Perez MD                                                  HPI  Lynsey Munroe is a 79 y.o. female who was admitted on -with out of hospital cardiac arrest    SUBJECTIVE  Continues to be responsive only to painful stimuli. On the vent and on TF, intermittently requiring low dose levo    ROS- unable to obtain as patient unresponsive        ALLERGIES:   Allergies   Allergen Reactions    Latex Other (See Comments)     Blisters    Adhesive Tape      Paper tape ok to use    Codeine Nausea And Vomiting       PCP: Elda Smith PA-C    PAST MEDICAL HISTORY, SURGICAL HISTORY, SOCIAL HISTORY and  HOME MEDICATIONS all reviewed. OBJECTIVE  Vitals:    08/10/20 0817 08/10/20 0900 08/10/20 0930 08/10/20 1000   BP: (!) 117/50 (!) 119/44 (!) 120/38 (!) 107/49   Pulse: 88 89 89 89   Resp: 12 11 16 13   Temp:       TempSrc:       SpO2:  98%  98%   Weight:       Height:           PHYSICAL EXAM   GEN Unresponsive  EYES  sluggish pupillary responses  HENT Dry mucosa membrane  NECK Supple, no apparent thyromegaly or masses. RESP  unlabored respiration, decreased breath sounds  CARDIO/VASC S1/S2 auscultated. Regular rate without appreciable murmurs, rubs, or gallops. No JVD or carotid bruits  GI Abdomen is soft -exam limited by sedation  MSK -no spontaneous extremity movement. SKIN Normal coloration, warm, dry.   NEURO unresponsive    INTAKE: In: 2145.1 [I.V.:161.1; NG/GT:1884]  Out: 975   OUTPUT: In: 2145.1   Out: 975 [Urine:975]    LABS  Recent Labs     20  0535 20  1745 20  0530 08/10/20  0505   WBC 9.3  --  10.4 9.1   HGB 6.8* 7.8* 7.5* 7.2*   HCT 21.7* 24.8* 23.9* 23.1*     --  324 324      Recent Labs     20  0563 08/09/20  0530  08/09/20  1800 08/10/20  0000 08/10/20  0505      < > 137   < > 136 135 137   K 3.8   < > 4.0   < > 3.7 3.8 3.8   CL 92*   < > 95*   < > 95* 95* 96*   CO2 32   < > 29   < > 28 31 30   PHOS 4.2  --  3.9  --   --   --  3.4   BUN 40*   < > 41*   < > 44* 45* 49*   CREATININE 1.4*   < > 1.3*   < > 1.3* 1.3* 1.3*    < > = values in this interval not displayed. No results for input(s): AST, ALT, ALB, BILIDIR, BILITOT, ALKPHOS in the last 72 hours. No results for input(s): INR in the last 72 hours. No results for input(s): CKTOTAL, CKMB, CKMBINDEX, TROPONINT in the last 72 hours.        Abnormal labs for today noted      Imaging:     ECHO:    Microbiology:  Blood culture:    Urine culture:    Sputum culture:    Procedures done this admission:    MEDS  Scheduled Meds:   furosemide  40 mg Oral Daily    insulin regular  20 Units Subcutaneous 3 times per day    insulin NPH  20 Units Subcutaneous QAM    insulin regular  0-18 Units Subcutaneous Q6H    midodrine  5 mg Oral TID WC    insulin glargine  40 Units Subcutaneous Nightly    potassium chloride  20 mEq Intravenous Once    enoxaparin  40 mg Subcutaneous Daily    levothyroxine  50 mcg Oral Daily    levetiracetam  1,000 mg Intravenous Q12H    miconazole   Topical BID    sodium chloride flush  10 mL Intravenous 2 times per day    atorvastatin  20 mg Oral Daily    aspirin  81 mg Per NG tube Daily    lansoprazole  30 mg Per NG tube QAM AC    chlorhexidine  15 mL Mouth/Throat BID     Continuous Infusions:   dextrose      norepinephrine 6 mcg/min (08/10/20 1023)     PRN Meds:sodium phosphate IVPB **OR** sodium phosphate IVPB, potassium chloride, magnesium sulfate, sodium chloride flush, acetaminophen **OR** acetaminophen, polyethylene glycol, promethazine **OR** ondansetron, glucose, dextrose, glucagon (rDNA), dextrose, albuterol sulfate HFA        ASSESSMENT and PLAN  Hospital Day: 17   1-Probable septic shock- on levophed- to be weaned

## 2020-08-11 NOTE — PLAN OF CARE
Problem: Falls - Risk of:  Goal: Will remain free from falls  Description: Will remain free from falls  8/10/2020 2304 by Skyler Mcnamara RN  Outcome: Ongoing  8/10/2020 1726 by Steph Funk RN  Outcome: Ongoing  Goal: Absence of physical injury  Description: Absence of physical injury  8/10/2020 2304 by Skyler Mcnamara RN  Outcome: Ongoing  8/10/2020 1726 by Steph Funk RN  Outcome: Ongoing     Problem: Skin Integrity:  Goal: Will show no infection signs and symptoms  Description: Will show no infection signs and symptoms  8/10/2020 2304 by Skyler Mcnamara RN  Outcome: Ongoing  8/10/2020 1726 by Steph Funk RN  Outcome: Ongoing  Goal: Absence of new skin breakdown  Description: Absence of new skin breakdown  8/10/2020 2304 by Skyler Mcnamara RN  Outcome: Ongoing  8/10/2020 1726 by Steph Funk RN  Outcome: Ongoing     Problem: Discharge Planning:  Goal: Participates in care planning  Description: Participates in care planning  8/10/2020 2304 by Skyler Mcnamara RN  Outcome: Ongoing  8/10/2020 1726 by Steph Funk RN  Outcome: Ongoing  Goal: Discharged to appropriate level of care  Description: Discharged to appropriate level of care  8/10/2020 2304 by Skyler Mcnamara RN  Outcome: Ongoing  8/10/2020 1726 by Steph Funk RN  Outcome: Ongoing     Problem: Airway Clearance - Ineffective:  Goal: Ability to maintain a clear airway will improve  Description: Ability to maintain a clear airway will improve  8/10/2020 2304 by Skyler Mcnamara RN  Outcome: Ongoing  8/10/2020 1726 by Steph Funk RN  Outcome: Ongoing     Problem: Anxiety/Stress:  Goal: Level of anxiety will decrease  Description: Level of anxiety will decrease  8/10/2020 2304 by Skyler Mcnamara RN  Outcome: Ongoing  8/10/2020 1726 by Steph Funk RN  Outcome: Ongoing     Problem: Aspiration:  Goal: Absence of aspiration  Description: Absence of aspiration  8/10/2020 2304 by Skyler Mcnamara RN  Outcome: Ongoing  8/10/2020 1726 by Steph Funk RN  Outcome: Ongoing Problem:  Bowel Function - Altered:  Goal: Bowel elimination is within specified parameters  Description: Bowel elimination is within specified parameters  8/10/2020 2304 by Christiane Medrano RN  Outcome: Ongoing  8/10/2020 1726 by Charity Espinoza RN  Outcome: Ongoing     Problem: Cardiac Output - Decreased:  Goal: Hemodynamic stability will improve  Description: Hemodynamic stability will improve  8/10/2020 2304 by Christiane Medrano RN  Outcome: Ongoing  8/10/2020 1726 by Charity Espinoza RN  Outcome: Ongoing     Problem: Fluid Volume - Imbalance:  Goal: Absence of imbalanced fluid volume signs and symptoms  Description: Absence of imbalanced fluid volume signs and symptoms  8/10/2020 2304 by Christiane Medrano RN  Outcome: Ongoing  8/10/2020 1726 by Charity Espinoza RN  Outcome: Ongoing     Problem: Gas Exchange - Impaired:  Goal: Levels of oxygenation will improve  Description: Levels of oxygenation will improve  8/10/2020 2304 by Christiane Medraon RN  Outcome: Ongoing  8/10/2020 1726 by Charity Espinoza RN  Outcome: Ongoing     Problem: Mental Status - Impaired:  Goal: Mental status will be restored to baseline  Description: Mental status will be restored to baseline  8/10/2020 2304 by Christiane Medrano RN  Outcome: Ongoing  8/10/2020 1726 by Charity Espinoza RN  Outcome: Ongoing     Problem: Nutrition Deficit:  Goal: Ability to achieve adequate nutritional intake will improve  Description: Ability to achieve adequate nutritional intake will improve  8/10/2020 2304 by Christiane Medrano RN  Outcome: Ongoing  8/10/2020 1726 by Charity Espinoza RN  Outcome: Ongoing     Problem: Pain:  Goal: Pain level will decrease  Description: Pain level will decrease  8/10/2020 2304 by Christiane Medrano RN  Outcome: Ongoing  8/10/2020 1726 by Charity Espinoza RN  Outcome: Ongoing  Goal: Recognizes and communicates pain  Description: Recognizes and communicates pain  8/10/2020 2304 by Christiane Medrano RN  Outcome: Ongoing  8/10/2020 1726 by Charity Espinoza RN  Outcome: Ongoing  Goal: Control of acute pain  Description: Control of acute pain  8/10/2020 2304 by Diann Onofre RN  Outcome: Ongoing  8/10/2020 1726 by Meera Hallman RN  Outcome: Ongoing  Goal: Control of chronic pain  Description: Control of chronic pain  8/10/2020 2304 by Diann Onofre RN  Outcome: Ongoing  8/10/2020 1726 by Meera Hallman RN  Outcome: Ongoing     Problem: Serum Glucose Level - Abnormal:  Goal: Ability to maintain appropriate glucose levels will improve to within specified parameters  Description: Ability to maintain appropriate glucose levels will improve to within specified parameters  8/10/2020 2304 by Diann Onofre RN  Outcome: Ongoing  8/10/2020 1726 by Meera Hallman RN  Outcome: Ongoing  Goal: Ability to maintain appropriate glucose levels will improve  Description: Ability to maintain appropriate glucose levels will improve  8/10/2020 2304 by Diann Onofre RN  Outcome: Ongoing  8/10/2020 1726 by Meera Hallman RN  Outcome: Ongoing     Problem: Skin Integrity - Impaired:  Goal: Will show no infection signs and symptoms  Description: Will show no infection signs and symptoms  8/10/2020 2304 by Diann Onofre RN  Outcome: Ongoing  8/10/2020 1726 by Meera Hallman RN  Outcome: Ongoing  Goal: Absence of new skin breakdown  Description: Absence of new skin breakdown  8/10/2020 2304 by Diann Onofre RN  Outcome: Ongoing  8/10/2020 1726 by Meera Hallman RN  Outcome: Ongoing     Problem: Sleep Pattern Disturbance:  Goal: Appears well-rested  Description: Appears well-rested  8/10/2020 2304 by Diann Onofre RN  Outcome: Ongoing  8/10/2020 1726 by Meera Hallman RN  Outcome: Ongoing     Problem: Tissue Perfusion, Altered:  Goal: Circulatory function within specified parameters  Description: Circulatory function within specified parameters  8/10/2020 2304 by Diann Onofre RN  Outcome: Ongoing  8/10/2020 1726 by Meera Hallman RN  Outcome: Ongoing     Problem: Tissue Perfusion - Cardiopulmonary, Altered:  Goal: Absence of angina  Description: Absence of angina  8/10/2020 2304 by Flores Ansari RN  Outcome: Ongoing  8/10/2020 1726 by Albert Ormond, RN  Outcome: Ongoing  Goal: Hemodynamic stability will improve  Description: Hemodynamic stability will improve  8/10/2020 2304 by Flores Ansari RN  Outcome: Ongoing  8/10/2020 1726 by Albert Ormond, RN  Outcome: Ongoing     Problem: Sensory Perception - Impaired:  Goal: Ability to maintain a stable neurologic state will improve  Description: Ability to maintain a stable neurologic state will improve  8/10/2020 2304 by Flores Ansari RN  Outcome: Ongoing  8/10/2020 1726 by Albert Ormond, RN  Outcome: Ongoing

## 2020-08-11 NOTE — PROGRESS NOTES
List    Diagnosis Date Noted    Acute respiratory failure ([facility] 07/25/2020    Excessive daytime sleepiness 11/04/2019    Ex-smoker 11/04/2019    VHD (valvular heart disease)     SADI (obstructive sleep apnea)     Morbid obesity ([facility]     NSTEMI (non-ST elevated myocardial infarction) ([facility]     ACS (acute coronary syndrome) ([facility] 11/08/2018    Acute coronary syndrome ([facility] 67/07/4495    Diastolic dysfunction with acute on chronic heart failure (HCC) 11/08/2018    Congestive heart failure (CHF) ([facility] 11/07/2018    Type 2 diabetes mellitus ([facility] 11/07/2018    Hypothyroidism 11/07/2018    Depression 11/07/2018    Hypertension 11/07/2018    CHF (congestive heart failure), NYHA class I, acute on chronic, diastolic (HCC) 60/51/1020    Idiopathic progressive neuropathy 04/18/2016     Overview Note:     Patient has chronic peripheral neuropathy to bilateral feet. She is currently on gabapentin 800 mg tid with some benefit. She reports decreased sensation to her feet with intermittent pain.  Mild obstructive sleep apnea 10/14/2015    CCC (chronic calculous cholecystitis) 09/14/2015    Cirrhosis of liver without ascites ([facility] 09/14/2015    Light headed 04/23/2014     Overview Note:     From dehydration from hyperglycemia - patient dry on presentation. replace inactive diagnosis      Orthostatic hypotension 04/23/2014    Hypertriglyceridemia 04/23/2014    Precordial pain 04/22/2014    Hyperglycemia 04/22/2014     Overview Note:     Admission blood glucose of 120; was >500 prior to presentation to ER, first accucheck in ER was 341.  Axillary abscess 04/22/2014     Overview Note:     Left      Hyperlipidemia     DM type 2, uncontrolled, with retinopathy ([facility] 03/01/2013     Overview Note:     Patient's last A1c was 12.1, and she has been referred in the last month to endocrinology.  She has an appointment this coming month with a specialist.      Hypokalemia 03/01/2013    HTN (hypertension) 11/26/2012    COPD (chronic obstructive pulmonary disease) ([facility] 11/26/2012    Anxiety and depression 11/26/2012     Overview Note:     Patient is currently on venlafaxine 150 mg daily for depression and anxiety. She feels like it is not as affective as she would like currently. She does not feel suicidal and is sleeping ok but has decreased motivation and less happy days. Patient is on the vent and off sedation. No response to painful stimuli. EEG No seizures     Anoxic encephalopathy  Hypokalemia  Anemia - ? UGI bleed  Acute on chronic Hypoxic hypercapneic resp failure  Chronic Metabolic alkalosis  Bilateral pleural effusions  VDRF  Leukocytosis - improved  Moderate AS  Morbid obesity  SADI  Smoker  Pulmonary edema  Out of Hospital Cardiac arrest  ? Aspiration pneumonia  Systolic CHF with EF of 76%  Grade III Diastolic dysfunction  Metabolic alkalosis  Moderate malnutrition  S/p Trach and PEG       1. Stool occult blood  2. F/u H&H  3. Keep sats > 92%  4. GI Prophylaxis  5. IV Iron  6. 1 unit PRBC  7. Trach collar trials  8. PT/OT  9. Await LTAC  10. Prognosis guarded  11. C/w present management  No follow-ups on file.     Electronically signed by Isaura Eng MD on 8/11/2020 at 10:11 AM

## 2020-08-11 NOTE — CONSULTS
Department of GeneralSurgery   Surgical Service Dr Rudy Damian   Consult Note    Date of Consult: 8/07/20    Critical care consult time: 35 min    Reason for Consult:  Tracheostomy and PEG tube placement  Requesting Physician:  Dr Diomedes Pittman:  Resp failure    History Obtained From:  patient    HISTORY OF PRESENT ILLNESS:                The patient is a 79 y.o. female who presents with resp distress and cardiac arrrest at home. CPR was done with return of pulse. Pt was taken to [facility] ED and was intubated there and transferred to [facility]. Pt is currently on vent sedated. She failed weaning vent twice and has been on pressors. Past Medical History:    Past Medical History:   Diagnosis Date    Anxiety     Arthritis     bilats hands, right shoulder    Atrial fibrillation [facility])     Cardioversion @ [facility] - no trouble with it ever since. Sees Dr. Vish Gaspar Atrial fibrillation with RVR [facility]) 2013    Atrial fibrillation with RVR ([facility] 11/26/2012    Atrial fibrillation with RVR ([facility] 3/1/2013    Cancer (HCC)     skin (right upper chest & face)    Cervicalgia     Constipation     [facility]     Depression     Diabetes mellitus (HCC)     IDDM    Diabetic neuropathy ([facility]     Disc herniation     lower back    Fatigue     Fibromyalgia     H/O cardiovascular stress test 05/08 EF 70%,07/09 EF 66%,02/10, 03/10 EF 63% 04/10    04/26/10 if LAD and circ are compared, the LAD in its mid segment has about 60% stenosis around a small diag. circumflex vessel is a dominant vessel and is without any significant disease,  rca is nondominant with about 50% stenosis , will continue medical management for now.  H/O complete electrocardiogram 02/10    02/03/10 on chart    H/O echocardiogram 05/08,EF 55%, 02/10    02/25/10 EF>55% left ventricular systolic function is normal,mild mitral regurg. there is no PE    Headache(784.0)     Not migraines, usually from a high blood sugar.     Hepatitis B     History of cardiac cath 06/08    06/17/10 heart cath, patient has single vessel cad with RCA which is nondominant, being 50% stenosed, rest of vessels are without any significant disease, medical therapy and risk factor modification will be continued    History of chest x-ray 06/08    06/15/08 chest xray, nonacute chest with borderline cardiomegaly    Holter monitor, abnormal 10/28/13    48hr-Predominantly SR w/only SVT ectopy in single beat form noted.  Hx of cardiovascular stress test 4/1/2013    EF70%, normal    Hx of echocardiogram 4/1/2013    EF55%,mild tricuspid regurg    Hyperlipidemia     Hypertension     Insulin pump in place     Liver cirrhosis (HCC)     Dr Madhuri Oscar SADI on CPAP     In the process of getting a c-pap. Does not currently have one.     Peripheral vascular disease (HCC)     PONV (postoperative nausea and vomiting) 1960    with shoulder surgery    Renal disease     Tachycardia     Wears dentures     upper plate       Past Surgical History:    Past Surgical History:   Procedure Laterality Date    BACK SURGERY  1998    herniated disc - no hardware    CATARACT REMOVAL WITH IMPLANT Bilateral 2000s    CHOLECYSTECTOMY  [id number]    laproscopic with liver biopsy    DIAGNOSTIC CARDIAC CATH LAB PROCEDURE  2000s    no stents (mimimal blockage)    HYSTERECTOMY  2000    total    LIVER BIOPSY  9/14/2015    SHOULDER SURGERY Right 1960    fracture surgery-screw at humeral head    TONSILLECTOMY  1966    TONSILLECTOMY AND ADENOIDECTOMY  1966       Current Medications:   Current Facility-Administered Medications   Medication Dose Route Frequency Provider Last Rate Last Dose    0.9 % sodium chloride bolus  20 mL Intravenous Once Dottie Jiang MD        furosemide (LASIX) tablet 40 mg  40 mg Oral Daily Courtney Childress DO   40 mg at 08/11/20 0846    insulin regular (HUMULIN R;NOVOLIN R) injection 20 Units  20 Units Subcutaneous 3 times per day M Mariellen Barthel, MD   20 Units at 08/11/20 0614    insulin NPH (HUMULIN N;NOVOLIN N) injection vial 20 Units  20 Units Subcutaneous QAM Vikas Cardozo MD        midodrine (PROAMATINE) tablet 10 mg  10 mg Oral TID  Cecilio Santizo MD   10 mg at 08/11/20 0846    collagenase ointment   Topical Daily Tatianna Latif MD        insulin regular (HUMULIN R;NOVOLIN R) injection 0-18 Units  0-18 Units Subcutaneous Q6H Vikas Cardozo MD   9 Units at 08/11/20 0612    insulin glargine (LANTUS) injection vial 40 Units  40 Units Subcutaneous Nightly Trupti Alexander PA-C   Stopped at 08/10/20 2025    potassium chloride 10 mEq/100 mL IVPB (Peripheral Line)  20 mEq Intravenous Once Trupti Alexander PA-C   Stopped at 08/04/20 1210    enoxaparin (LOVENOX) injection 40 mg  40 mg Subcutaneous Daily Trupti Alexander PA-C   40 mg at 08/11/20 0848    sodium phosphate 30.09 mmol in dextrose 5 % 500 mL IVPB  0.32 mmol/kg Intravenous PRN Trupti Alexander PA-C        Or    sodium phosphate 15.03 mmol in dextrose 5 % 250 mL IVPB  0.16 mmol/kg Intravenous PRN Trupti Alexander PA-C   Stopped at 08/03/20 1446    potassium chloride 20 mEq/50 mL IVPB (Central Line)  20 mEq Intravenous PRN Trupti Alexander PA-C 50 mL/hr at 08/08/20 1942 20 mEq at 08/08/20 1942    magnesium sulfate 1 g in dextrose 5% 100 mL IVPB  1 g Intravenous PRN Trupti Alexander PA-C   Stopped at 08/03/20 0853    levothyroxine (SYNTHROID) tablet 50 mcg  50 mcg Oral Daily Trupti Alexander PA-C   50 mcg at 08/11/20 5140    levETIRAcetam (KEPPRA) 1,000 mg in sodium chloride 0.9 % 100 mL IVPB  1,000 mg Intravenous Q12H Trupti Alexander PA-C   Stopped at 08/11/20 0130    miconazole (MICOTIN) 2 % powder   Topical BID Trupti Alexander PA-C        sodium chloride flush 0.9 % injection 10 mL  10 mL Intravenous 2 times per day Trupti Alexander PA-C   10 mL at 08/11/20 0856    sodium chloride flush 0.9 % injection 10 mL  10 mL Intravenous PRN Trupti Alexander PA-C        acetaminophen (TYLENOL) tablet 650 mg  650 mg Oral Q6H PRN TARUN Babcock-C   650 mg at 07/30/20 1648    Or    acetaminophen (TYLENOL) suppository 650 mg  650 mg Rectal Q6H PRN TARUN Babcock-C   650 mg at 07/27/20 2111    polyethylene glycol (GLYCOLAX) packet 17 g  17 g Oral Daily PRN JOSE LUIS BabcockC        promethazine (PHENERGAN) tablet 12.5 mg  12.5 mg Oral Q6H PRN Wil Pate PA-C        Or    ondansetron (ZOFRAN) injection 4 mg  4 mg Intravenous Q6H PRN JOSE LUIS BabcockC        atorvastatin (LIPITOR) tablet 20 mg  20 mg Oral Daily TARUN Babcock-C   20 mg at 08/11/20 0846    aspirin chewable tablet 81 mg  81 mg Per NG tube Daily TARUN Babcock-C   81 mg at 08/11/20 0846    lansoprazole suspension SUSP 30 mg  30 mg Per NG tube QAM AC Ana Lindsay PA-C   30 mg at 08/11/20 0856    glucose (GLUTOSE) 40 % oral gel 15 g  15 g Oral PRN TARUN Babcock-C        dextrose 50 % IV solution  12.5 g Intravenous PRN JOSE LUIS BabocckC        glucagon (rDNA) injection 1 mg  1 mg Intramuscular PRN TARUN Babcock-C        dextrose 5 % solution  100 mL/hr Intravenous PRN JOSE LUIS BabcockC        norepinephrine (LEVOPHED) 16 mg in dextrose 5% 250 mL infusion  2 mcg/min Intravenous Continuous Tatianna Guzman MD 6.6 mL/hr at 08/11/20 0835 7 mcg/min at 08/11/20 0835    chlorhexidine (PERIDEX) 0.12 % solution 15 mL  15 mL Mouth/Throat BID TARUN Babcock-C   15 mL at 08/11/20 0846    albuterol sulfate  (90 Base) MCG/ACT inhaler 4 puff  4 puff Inhalation Q4H PRN TARUN Babcock-C   4 puff at 08/10/20 1459       Allergies:  Latex; Adhesive tape; and Codeine    Social History:   Social History     Socioeconomic History    Marital status:       Spouse name: None    Number of children: None    Years of education: None    Highest education level: None   Occupational History    Occupation:    Social Needs    Financial resource strain: None    Food insecurity     Worry: None     Inability: None    Transportation needs     Medical: None Non-medical: None   Tobacco Use    Smoking status: Former Smoker     Packs/day: 1.00     Years: 21.00     Pack years: 21.00     Types: Cigarettes     Start date: 1970     Last attempt to quit: 1991     Years since quittin.6    Smokeless tobacco: Never Used    Tobacco comment: lives with smokers   Substance and Sexual Activity    Alcohol use: No     Alcohol/week: 0.0 standard drinks     Comment:           CAFFEINE: 4-6 diet sodas daily    Drug use: No    Sexual activity: None   Lifestyle    Physical activity     Days per week: None     Minutes per session: None    Stress: None   Relationships    Social connections     Talks on phone: None     Gets together: None     Attends Sabianist service: None     Active member of club or organization: None     Attends meetings of clubs or organizations: None     Relationship status: None    Intimate partner violence     Fear of current or ex partner: None     Emotionally abused: None     Physically abused: None     Forced sexual activity: None   Other Topics Concern    None   Social History Narrative    None       Family History:   Family History   Problem Relation Age of Onset    Arthritis Mother     Depression Mother     Emphysema Mother     Dementia Mother     Arthritis Father     Cancer Father         prostate    Vision Loss Father     Other Father         brain aneurysm    Arthritis Sister     Depression Sister     Anxiety Disorder Sister     Arthritis Brother     Cancer Brother         eye    Hearing Loss Brother     High Blood Pressure Brother        REVIEW OFSYSTEMS:    Review of Systems    PHYSICAL EXAM:  Vitals:    20 0734 20 0743 20 0802 20 0832   BP:   (!) 136/45 (!) 131/49   Pulse:   79 80   Resp: 15 14 16 15   Temp:    99.5 °F (37.5 °C)   TempSrc:    Rectal   SpO2: 99% 99% 99% 99%   Weight:       Height:           Physical Exam      DATA:    CBC with Differential:    Lab Results   Component Value Date WBC 8.6 08/11/2020    RBC 2.33 08/11/2020    HGB 6.5 08/11/2020    HCT 21.2 08/11/2020     08/11/2020    MCV 91.0 08/11/2020    MCH 27.9 08/11/2020    MCHC 30.7 08/11/2020    RDW 18.0 08/11/2020    SEGSPCT 71.9 08/11/2020    LYMPHOPCT 17.6 08/11/2020    MONOPCT 8.1 08/11/2020    EOSPCT 3 03/17/2016    BASOPCT 0.5 08/11/2020    MONOSABS 0.7 08/11/2020    LYMPHSABS 1.5 08/11/2020    EOSABS 0.1 08/11/2020    BASOSABS 0.0 08/11/2020    DIFFTYPE AUTOMATED DIFFERENTIAL 08/11/2020     CMP:    Lab Results   Component Value Date     08/11/2020    K 4.0 08/11/2020    CL 99 08/11/2020    CO2 30 08/11/2020    BUN 55 08/11/2020    CREATININE 1.1 08/11/2020    GFRAA 59 08/11/2020    AGRATIO 1.3 06/08/2016    LABGLOM 49 08/11/2020    GLUCOSE 280 08/11/2020    PROT 6.9 07/25/2020    PROT 6.7 03/02/2013    LABALBU 3.0 07/25/2020    LABALBU 74 09/11/2018    CALCIUM 8.6 08/11/2020    BILITOT 0.3 07/25/2020    ALKPHOS 125 07/25/2020    AST 23 07/25/2020    ALT 11 07/25/2020       IMPRESSION:        Patient Active Problem List:     HTN (hypertension)     COPD (chronic obstructive pulmonary disease) ([facility]     Anxiety and depression     DM type 2, uncontrolled, with retinopathy (HCC)     Hypokalemia     Hyperlipidemia     Precordial pain     Hyperglycemia     Axillary abscess     Light headed     Orthostatic hypotension     Hypertriglyceridemia     CCC (chronic calculous cholecystitis)     Cirrhosis of liver without ascites (HCC)     Mild obstructive sleep apnea     Idiopathic progressive neuropathy     Congestive heart failure (CHF) (HCC)     Type 2 diabetes mellitus (HCC)     Hypothyroidism     Depression     Hypertension     CHF (congestive heart failure), NYHA class I, acute on chronic, diastolic (HCC)     ACS (acute coronary syndrome) (HCC)     Acute coronary syndrome (HCC)     Diastolic dysfunction with acute on chronic heart failure (HCC)     NSTEMI (non-ST elevated myocardial infarction) (HCC)     SADI (obstructive sleep apnea)     Morbid obesity (HCC)     VHD (valvular heart disease)     Excessive daytime sleepiness     Ex-smoker     Acute respiratory failure (HCC)    PLAN:    Will proceed with Trach and PEG tube placement today. Pt with poor prognosis.         Lori Stokes MD

## 2020-08-11 NOTE — OP NOTE
Operative Report: Trach/PEG    Patient ID:  Vivienne Sequeira Standard  [id number]  79 y.o.  1950    Indications: Respiratory Failure/ Malnutrition     Pre-operative Diagnosis: Respiratory Failure/Malnutrition     Post-operative Diagnosis: same    Procedure:   Tracheostomy / PEG placement    Surgeon: Gretchen Gee MD    Findings:  same    Estimated Blood Loss:  Minimal           Total IV Fluids: 500 ml            Complications:  bleeding           Disposition: PACU - hemodynamically stable. Condition: stable      Procedure Details: The patientWas positioned supine and the neck was hyperextended. The neck and anterior chest were prepped and draped in the usual sterile fashion. The thyroid and the cricothyroid cartilage were palpated in the skin and the subcutaneous tissue in this area were anesthetized with 1% lidocaine with epinephrine. A vertical midline incision was made just superior to the sternal notch. A hemostat was used to bluntly dissect down to the cricothyroid membrane. The strap muscles were retracted laterally and the thyroid isthmus was retracted superiorly. The endotracheal tube was withdrawn to the level of the cords. The second tracheal ring was identified. A tracheostomy hook is placed between the first and second tracheal rings the trachea was retracted superiorly. A transverse incision was made between the second and third rings using #11 blade. The opening was dilated using a tracheal dilator. A #8 Shiley tracheostomy tube was inserted and advanced and the tracheal tube was withdrawn completely. The tracheostomy tube was sutured in place and tracheostomy ties placed and tied around the neck. Then PEG tube was placed in the following manner. A mouthpiece was placed in the patient's mouth and the endoscope was passed down into the stomach. No abnormalities were noted.   The stomach was insufflated with air and the endoscope positioned  in the midportion and directed towards the anterior abdominal wall. With the room darkened and intensity turned up on the endoscope, a good light reflex was noted on the skin of the abdominal wall  in the left upper quadrant. Finger pressure was applied at the light reflex with adequate  indentation on the stomach wall on endoscopy. A polypectomy snare was passed into the stomach, opened fully, and positioned so that the loop encircled the point of demonstrated finger indentation. The overlying skin was anesthetized with lidocaine and a 1 cm incision was made at the chosen site. The introducer needle with overlying  catheter was passed through this incision and into the stomach under visualization with the gastroscope. The needle and catheter were gently captured by the endoscopic snare. The guidewire was passed and snared and the endoscope, snare, and guidewire were then withdrawn and pulled back out of the mouth. The gastrostomy tube was attached to the loop of the guidewire and the whole thing was pulled back into the stomach until the 3 jonathan of the gastrostomy tube was noted at the skin level. The gastroscope was reintroduced and adequate placement of the gastrostomy tube was identified and a picture was taken. The gastrostomy tube was cut and a collecting bag was applied. The patient tolerated the procedure well and was taken to the recovery room in good condition. The patient tolerated the procedure well and was taken to the postanesthesia care unit in stable condition.          Tej Solomon MD

## 2020-08-11 NOTE — PROGRESS NOTES
Patient was seen and examined  Responds minimally to painful stimuli    Ten point ROS reviewed negative, unless as noted above    Objective: Intake/Output Summary (Last 24 hours) at 8/11/2020 1237  Last data filed at 8/11/2020 0600  Gross per 24 hour   Intake 2065 ml   Output 1025 ml   Net 1040 ml      Vitals:   Vitals:    08/11/20 1232   BP: (!) 143/48   Pulse: 79   Resp: 16   Temp:    SpO2: 99%     Physical Exam:   GEN On mechanical ventilation and sedation. EYES Positive corneal reflex  HENT Mucous membranes are moist. Oral pharynx without exudates, no evidence of thrush. NECK Supple, no apparent thyromegaly or masses. RESP Clear to auscultation, no wheezes, rales or rhonchi. Symmetric chest movement while on mechanical ventilation  CARDIO/VASC S1/S2 auscultated. Regular rate without appreciable murmurs, rubs, or gallops. No JVD or carotid bruits. Peripheral pulses equal bilaterally and palpable. No peripheral edema. GI Abdomen is soft without significant tenderness, masses, or guarding. Bowel sounds are normoactive. Rectal exam deferred.  No costovertebral angle tenderness. Normal appearing external genitalia. Bullard catheter is present. HEME/LYMPH No palpable cervical lymphadenopathy and no hepatosplenomegaly. No petechiae or ecchymoses. MSK No gross joint deformities. SKIN Normal coloration, warm, dry.   NEURO On mechanical ventilation and encephalopathic     Medications:   Medications:    pantoprazole  40 mg Intravenous BID    furosemide  40 mg Oral Daily    insulin regular  20 Units Subcutaneous 3 times per day    insulin NPH  20 Units Subcutaneous QAM    midodrine  10 mg Oral TID WC    collagenase   Topical Daily    insulin regular  0-18 Units Subcutaneous Q6H    insulin glargine  40 Units Subcutaneous Nightly    potassium chloride  20 mEq Intravenous Once    enoxaparin  40 mg Subcutaneous Daily    levothyroxine  50 mcg Oral Daily    levetiracetam  1,000 mg Intravenous Q12H  miconazole   Topical BID    sodium chloride flush  10 mL Intravenous 2 times per day    atorvastatin  20 mg Oral Daily    aspirin  81 mg Per NG tube Daily    chlorhexidine  15 mL Mouth/Throat BID      Infusions:    dextrose      norepinephrine 7 mcg/min (08/11/20 1039)     PRN Meds: sodium phosphate IVPB, 0.32 mmol/kg, PRN    Or  sodium phosphate IVPB, 0.16 mmol/kg, PRN  potassium chloride, 20 mEq, PRN  magnesium sulfate, 1 g, PRN  sodium chloride flush, 10 mL, PRN  acetaminophen, 650 mg, Q6H PRN    Or  acetaminophen, 650 mg, Q6H PRN  polyethylene glycol, 17 g, Daily PRN  promethazine, 12.5 mg, Q6H PRN    Or  ondansetron, 4 mg, Q6H PRN  glucose, 15 g, PRN  dextrose, 12.5 g, PRN  glucagon (rDNA), 1 mg, PRN  dextrose, 100 mL/hr, PRN  albuterol sulfate HFA, 4 puff, Q4H PRN          Electronically signed by Allison Stewart MD on 8/11/2020 at 12:37 PM

## 2020-08-11 NOTE — PROGRESS NOTES
Notified Dr. Natan Ritter of increased swelling around trach site. Does not seem to be obstructive as pt is tolerating vent well with no issues, O2 sat staying at %.

## 2020-08-11 NOTE — PROGRESS NOTES
Sister called, updated on patient. Explained in detail still no purposeful movement at this time, regardless if she is opening her eyes. Sister understood, stated,\" we just want to give her one month until 8/22/20 to see if there is any improvement then we will probably decide from there. \" Still waiting on return call from Josiane, will follow up with Veto Graham.

## 2020-08-11 NOTE — PROGRESS NOTES
Progress Note( Dr. Jonna Brooks)    Subjective:   Admit Date: 7/25/2020  PCP: Maggie Joyce PA-C    The patient on 8/7/2020 but made the note later    Admitted For : Cardiac arrest but very soon was resuscitated intubated and placed on the ventilator    Consulted For: Better control of blood glucose    Interval History: No major changes in her mental or respiratory status    Patient is still sedated intubated and on ventilator  Patient is COVID negative so she was transferred to regular ICU  on  7/30 /2020  Patient had tracheotomy and PEG tube insertion on 8/7/2020 by Dr Faye Garces on ventilator       Intake/Output Summary (Last 24 hours) at 8/10/2020 2231  Last data filed at 8/10/2020 1800  Gross per 24 hour   Intake 2083 ml   Output 925 ml   Net 1158 ml       DATA    CBC:   Recent Labs     08/08/20  0535 08/08/20  1745 08/09/20  0530 08/10/20  0505   WBC 9.3  --  10.4 9.1   HGB 6.8* 7.8* 7.5* 7.2*     --  324 324    CMP:  Recent Labs     08/09/20  1800 08/10/20  0000 08/10/20  0505    135 137   K 3.7 3.8 3.8   CL 95* 95* 96*   CO2 28 31 30   BUN 44* 45* 49*   CREATININE 1.3* 1.3* 1.3*   CALCIUM 8.8 8.7 8.9     Lipids:   Lab Results   Component Value Date    CHOL 122 06/20/2019    CHOL 205 03/17/2016    HDL 51 06/20/2019    TRIG 150 06/20/2019     Glucose:  Recent Labs     08/10/20  1158 08/10/20  1743 08/10/20  2023   POCGLU 238* 173* 164*     [id number]:  Lab Results   Component Value Date    LABA1C 12.0 07/26/2020     High Sensitivity TSH:   Lab Results   Component Value Date    TSHHS 4.730 06/20/2019     Free T3:   Lab Results   Component Value Date    FT3 2.9 03/17/2016     Free T4:  Lab Results   Component Value Date    T4FREE 1.04 06/18/2019       Xr Chest Portable    Result Date: 7/26/2020  EXAMINATION: ONE XRAY VIEW OF THE CHEST 7/26/2020 4:49 am COMPARISON: July 25, 2020 HISTORY: ORDERING SYSTEM PROVIDED HISTORY: Vent management T    Patchy airspace opacities bilaterally, may be related to pulmonary edema versus pneumonia, likely improved in the left hemithorax. Mild to moderate right pleural effusion. Stable cardiomegaly. Low lung volumes. Pattie Couch Chest Portable    Result Date: 7/25/2020  EXAMINATION: ONE XRAY VIEW OF THE CHEST 7/25/2020 3:37 pm COMPARISON: Chest 06/17/2019 HISTORY: ORDERING SYSTEM PROVIDED HISTORY: post arrest        1. Nonspecific pulmonary findings may be related to sequela from pulmonary edema, diffuse infection or ARDS. Pulmonary contusion or sequela during resuscitation (? aspiration) may contribute to these opacities as well. 2. Calcific atherosclerosis aorta. 3. Cardiomegaly. 4. Endotracheal tube tip measures about 2.7 cm superior to the elijah. [employer] Pulmonary W Contrast    Result Date: 7/25/2020  EXAMINATION: CTA OF THE CHEST 7/25/2020 5:51 pm     1. No pulmonary embolism. 2. Findings of fluid overload with small to moderate pleural effusions, diffuse soft tissue edema, and upper abdominal ascites. 3. Consolidative opacities within the upper to mid lungs could represent pneumonia or atelectasis/scarring. 4. Cirrhosis. 5. Borderline cardiomegaly with severe atherosclerotic disease. Xr Abdomen For Ng/og/ne Tube Placement    Result Date: 7/26/2020  EXAMINATION: ONE SUPINE XRAY VIEW(S) OF THE ABDOMEN 7/26/2020 8:18 am COMPARISON: None       NG tube tip in the gastric antrum with the proximal side-port in the gastric body.        Scheduled Medicines   Medications:    furosemide  40 mg Oral Daily    insulin regular  20 Units Subcutaneous 3 times per day    insulin NPH  20 Units Subcutaneous QAM    midodrine  10 mg Oral TID WC    collagenase   Topical Daily    insulin regular  0-18 Units Subcutaneous Q6H    insulin glargine  40 Units Subcutaneous Nightly    potassium chloride  20 mEq Intravenous Once    enoxaparin  40 mg Subcutaneous Daily    levothyroxine  50 mcg Oral Daily    levetiracetam  1,000 mg Intravenous Q12H    miconazole   Topical BID    sodium chloride flush  10 mL Intravenous 2 times per day    atorvastatin  20 mg Oral Daily    aspirin  81 mg Per NG tube Daily    lansoprazole  30 mg Per NG tube QAM AC    chlorhexidine  15 mL Mouth/Throat BID      Infusions:    dextrose      norepinephrine 12 mcg/min (08/10/20 1640)         Objective:   Vitals: BP (!) 126/43   Pulse 88   Temp 100.4 °F (38 °C) (Rectal)   Resp (!) 0   Ht 5' (1.524 m)   Wt 193 lb 5.5 oz (87.7 kg)   SpO2 98%   BMI 37.76 kg/m²   General appearance: Patient is sedated intubated on ventilator  Neck: no JVD or bruit tracheotomy is done and connected to ventilator  Thyroid : Normal lobes   Lungs: Has Vesicular Breath sounds intubated and on ventilator  Heart:  regular rate and rhythm  Abdomen: soft, non-tender; bowel sounds normal; no masses,  no organomegaly  No has PEG tube  Musculoskeletal: Normal  Extremities: extremities normal, , no edema  Neurologic: Patient is sedated intubated and on ventilator.     Assessment:     Patient Active Problem List:     HTN (hypertension)     COPD (chronic obstructive pulmonary disease) ([address]     Anxiety and depression     DM type 2, uncontrolled, with retinopathy ([address]     Hypokalemia     Hyperlipidemia     Precordial pain     Hyperglycemia     Axillary abscess     Light headed     Orthostatic hypotension     Hypertriglyceridemia     CCC (chronic calculous cholecystitis)     Cirrhosis of liver without ascites (HCC)     Mild obstructive sleep apnea     Idiopathic progressive neuropathy     Congestive heart failure (CHF) (HCC)     Type 2 diabetes mellitus (HCC)     Hypothyroidism     Depression     Hypertension     CHF (congestive heart failure), NYHA class I, acute on chronic, diastolic (HCC)     ACS (acute coronary syndrome) (HCC)     Acute coronary syndrome (HCC)     Diastolic dysfunction with acute on chronic heart failure (HCC)     NSTEMI (non-ST elevated myocardial infarction) (HCC)     SADI (obstructive sleep apnea)     Morbid obesity ([facility]     VHD (valvular heart disease)     Excessive daytime sleepiness     Ex-smoker     Acute respiratory failure ([facility]      Plan:     1. Reviewed POC blood glucose . Labs and X ray results   2. Reviewed Current Medicines   3. Started on schedule and correction bolus regular insulin and basal Lantus Insulin regime /   4. Monitor Blood glucose frequently   5. Modified  the dose of Insulin/ other medicines as needed  6. On tube feeding now restarted with the PEG tube   7. Will follow     .      Brandi Ojeda MD

## 2020-08-11 NOTE — PROGRESS NOTES
Physician Progress Note      PATIENT:               Tish Olmedo  CSN #:                  [id number]  :                       1950  ADMIT DATE:       2020 8:40 PM  [address] [place] DATE:  RESPONDING  PROVIDER #:        Cassie Rangel MD          QUERY TEXT:    Dear hospitalist,    Patient admitted with cardiac arrest.  Patient noted to be unresponsive. Please document in progress notes and discharge summary if you are treating   and/or evaluating any of the following: The medical record reflects the following:  Risk Factors: CHF, non compliance. Clinical Indicators: Cardiac consult: Sakshi Alves is a 79 y. o.year old who  presents   with cardiac arrest, had out of hospital cardiac arrest, she is intubated, . Per Progress note: Consult hospice as she has been off sedation and   nonresponsive to pain x2 days. The patient is not responsive off sedation. [place] coma scale: 9, Per nursing pt is unresponsive. can not follow commands,  Treatment: ICU, vented, trach, feeding tube, labs, monitor, hospice consult,   Consults: pulmonary, cardiology, neurology.   Options provided:  -- Coma due to cardiac arrest  -- Somnolence due to cardiac arrest  -- Other - I will add my own diagnosis  -- Disagree - Not applicable / Not valid  -- Disagree - Clinically unable to determine / Unknown  -- Refer to Clinical Documentation Reviewer    PROVIDER RESPONSE TEXT:    Pt is in a coma due to cardiac arrest.    Query created by: Jake Laura on 2020 1:51 PM      Electronically signed by:  Cassie Rangel MD 2020 3:21 PM

## 2020-08-12 NOTE — PROGRESS NOTES
General Surgery  Dr. Allen Campa and Balaji Lino, [facility] Day: 23    Chief Complaint on Admission: Cardiac arrest      Subjective:     Yue Murphy is a 79 y.o. female with 5 Days Post-Op Trach and PEG. Pt tolerating TF via PEG. No bleeding around trach. Yesterday, there was swelling noted around the trach. Pt is ventilating well. Tolerating DIET TUBE FEED CONTINUOUS/CYCLIC NPO; Semi-elemental (Vital AF); Gastrostomy; Continuous; 10; 60; 24. ROS:  Review of Systems   Unable to perform ROS: Intubated       Allergies  Latex; Adhesive tape; and Codeine          Diagnosis Date    Anxiety     Arthritis     bilats hands, right shoulder    Atrial fibrillation [facility])     Cardioversion @ [facility] - no trouble with it ever since. Sees Dr. Ajay Sahu Atrial fibrillation with RVR [facility]) 2013    Atrial fibrillation with RVR ([address] 11/26/2012    Atrial fibrillation with RVR ([address] 3/1/2013    Cancer (HCC)     skin (right upper chest & face)    Cervicalgia     Constipation     COPD (chronic obstructive pulmonary disease) (HCC)     Depression     Diabetes mellitus (HCC)     IDDM    Diabetic neuropathy (HCC)     Disc herniation     lower back    Fatigue     Fibromyalgia     H/O cardiovascular stress test 05/08 EF 70%,07/09 EF 66%,02/10, 03/10 EF 63% 04/10    04/26/10 if LAD and circ are compared, the LAD in its mid segment has about 60% stenosis around a small diag. circumflex vessel is a dominant vessel and is without any significant disease,  rca is nondominant with about 50% stenosis , will continue medical management for now.  H/O complete electrocardiogram 02/10    02/03/10 on chart    H/O echocardiogram 05/08,EF 55%, 02/10    02/25/10 EF>55% left ventricular systolic function is normal,mild mitral regurg. there is no PE    Headache(784.0)     Not migraines, usually from a high blood sugar.     Hepatitis B     History of cardiac cath 06/08    06/17/10 heart cath, patient has single vessel cad with RCA which is nondominant, being 50% stenosed, rest of vessels are without any significant disease, medical therapy and risk factor modification will be continued    History of chest x-ray 06/08    06/15/08 chest xray, nonacute chest with borderline cardiomegaly    Holter monitor, abnormal 10/28/13    48hr-Predominantly SR w/only SVT ectopy in single beat form noted.  Hx of cardiovascular stress test 2013    EF70%, normal    Hx of echocardiogram 2013    EF55%,mild tricuspid regurg    Hyperlipidemia     Hypertension     Insulin pump in place     Liver cirrhosis (HCC)     Dr Lori Armas SADI on CPAP     In the process of getting a c-pap. Does not currently have one.  Peripheral vascular disease ([address]     PONV (postoperative nausea and vomiting) 1960    with shoulder surgery    Renal disease     Tachycardia     Wears dentures     upper plate       Objective:     Vitals:    20 1003   BP: (!) 108/39   Pulse: 76   Resp: 15   Temp:    SpO2: 99%       TEMPERATURE:  Current - Temp: 99.3 °F (37.4 °C); Max - Temp  Av.3 °F (37.4 °C)  Min: 99 °F (37.2 °C)  Max: 99.5 °F (37.5 °C)    I/O this shift:  In: -   Out: 150 [Urine:150]I/O last 3 completed shifts: In: 2725.2 [I.V.:563.3; Blood:347.9; NG/GT:1678; IV Piggyback:136]  Out: 2477 [Urine:1551]      Physical Exam:  Physical Exam  Constitutional:       Appearance: She is obese. Interventions: She is intubated. Pulmonary:      Effort: She is intubated. Comments: There is some swelling and firmness around the trach, mostly superior and to the left of the trach. Pt is ventilating well. No active bleeding. Abdominal:      General: There is no distension. Palpations: Abdomen is soft. There is no mass. Tenderness: There is no abdominal tenderness. There is no guarding or rebound. Comments: PEG in place.             Scheduled Meds:   pantoprazole  40 mg Intravenous BID    furosemide  40 mg Oral Daily    insulin regular  20 Units Subcutaneous 3 times per day    insulin NPH  20 Units Subcutaneous QAM    midodrine  10 mg Oral TID WC    collagenase   Topical Daily    insulin regular  0-18 Units Subcutaneous Q6H    insulin glargine  40 Units Subcutaneous Nightly    potassium chloride  20 mEq Intravenous Once    enoxaparin  40 mg Subcutaneous Daily    levothyroxine  50 mcg Oral Daily    levetiracetam  1,000 mg Intravenous Q12H    miconazole   Topical BID    sodium chloride flush  10 mL Intravenous 2 times per day    atorvastatin  20 mg Oral Daily    aspirin  81 mg Per NG tube Daily    chlorhexidine  15 mL Mouth/Throat BID     Continuous Infusions:   dextrose      norepinephrine 2 mcg/min (08/12/20 1029)     PRN Meds:sodium phosphate IVPB **OR** sodium phosphate IVPB, potassium chloride, magnesium sulfate, sodium chloride flush, acetaminophen **OR** acetaminophen, polyethylene glycol, promethazine **OR** ondansetron, glucose, dextrose, glucagon (rDNA), dextrose, albuterol sulfate HFA      Labs/Imaging Results:   Lab Results   Component Value Date    WBC 8.7 08/12/2020    HGB 7.7 (L) 08/12/2020    HCT 25.7 (L) 08/12/2020    MCV 90.8 08/12/2020     08/12/2020     Lab Results   Component Value Date     08/12/2020    K 4.2 08/12/2020     08/12/2020    CO2 32 08/12/2020    BUN 61 (H) 08/12/2020    CREATININE 1.1 08/12/2020    GLUCOSE 199 (H) 08/12/2020    CALCIUM 9.2 08/12/2020    PROT 6.9 07/25/2020    LABALBU 3.0 (L) 07/25/2020    BILITOT 0.3 07/25/2020    ALKPHOS 125 07/25/2020    AST 23 07/25/2020    ALT 11 07/25/2020    LABGLOM 49 (L) 08/12/2020    GFRAA 59 (L) 08/12/2020    AGRATIO 1.3 06/08/2016    GLOB 3.0 06/08/2016       Assessment:     Patient Active Problem List:     HTN (hypertension)     COPD (chronic obstructive pulmonary disease) ([facility]     Anxiety and depression     DM type 2, uncontrolled, with retinopathy ([facility]     Hypokalemia     Hyperlipidemia     Precordial pain Hyperglycemia     Axillary abscess     Light headed     Orthostatic hypotension     Hypertriglyceridemia     CCC (chronic calculous cholecystitis)     Cirrhosis of liver without ascites (HCC)     Mild obstructive sleep apnea     Idiopathic progressive neuropathy     Congestive heart failure (CHF) (HCC)     Type 2 diabetes mellitus (HCC)     Hypothyroidism     Depression     Hypertension     CHF (congestive heart failure), NYHA class I, acute on chronic, diastolic (HCC)     ACS (acute coronary syndrome) (HCC)     Acute coronary syndrome (HCC)     Diastolic dysfunction with acute on chronic heart failure (HCC)     NSTEMI (non-ST elevated myocardial infarction) (HCC)     SADI (obstructive sleep apnea)     Morbid obesity (HCC)     VHD (valvular heart disease)     Excessive daytime sleepiness     Ex-smoker     Acute respiratory failure ([facility]      Plan:     Swelling around trach likely from blood clot. Is not obstructive as pt is ventilating well. Will continue to monitor. Advised bedside RN to let us know right away of any changes. Continue TF via peg per dietician recs. Routine trach care. Will continue to follow.      Paul Lenz PA-C

## 2020-08-12 NOTE — DISCHARGE SUMMARY
Discharge Summary    Name:  Paula Mccarthy Standard /Age/Sex: 1950  (79 y.o. female)   MRN & CSN:  [id number] & [id number] Admission Date/Time: 2020  8:40 PM   Attending:  Toñito Castanon MD Discharging Physician: Rudolph Duvall MD     Hospital Course:     Antoine Michael is a 79 y.o.  female  who presents with out of hospital cardiac arrest    Patient's condition still remains unchanged  Family wants to pursue hospice care after compassionate extubation today  Minimally responsive to painful stimuli     Assessment and Plan  1) Cardiac arrest 2/2 worsening HFrEF  -Had out of hospital arrest  -Resuscitation after 5 mins of CPR and a dose of epi  -Was diuresed with IV lasix but now transitioned to PO  -Echo: EF of 30% with grade III, severe aortic stenosis and moderate MS  -Cardiology on board  -Prognosis is very poor  -Palliative on board  -For Compassionate extubation today  -Hospice to take over afterward     2) Acute hypoxic respiratory failure due to CHF and possible Asp Pneumonitis  -On mechanical ventilation  -S/P Trach placement 2020  -Completed IV abx.  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  -No acute abn on CT head x2  - MRI with no acute findings  -EEGx2: no epileptiform activity but severe encephalopathy  -Neurology on board; on keppra due to seizure     4)SHER on CKD stage 2-3   -Likely cardiorenal   -Improving  -On lasix      5) Paroxysmal AF  - not on AC due to hx of GIB  -Patient is high risk for embolic CVA given the hx of moderate MS     6) Acute on chronic blood loss anemia  -Hb of 6.5 today; will transfuse as needed to keep hb >7  -Check Fe profile     Other Chronic medical condition; medication resumed unless contraindicated  -Cirrhosis  -DM Type 2  -Severe PCM: s/p PEG on .  TF per dietician      The patient expressed appropriate understanding of and agreement with the discharge recommendations, medications, and plan. Consults this admission:  IP CONSULT TO PULMONOLOGY  IP CONSULT TO CARDIOLOGY  IP CONSULT TO DIETITIAN  IP CONSULT TO ENDOCRINOLOGY  IP CONSULT TO HOSPICE  IP CONSULT TO NEUROLOGY  IP CONSULT TO PALLIATIVE CARE  IP CONSULT TO NEPHROLOGY  IP CONSULT TO GENERAL SURGERY    Discharge Instruction:   Follow up appointments:   Primary care physician:  within 2 weeks    Diet:  Tube feed   Activity: Comatose  Disposition: Discharged to:   []Home, []C, []SNF, []Acute Rehab, [x]Hospice Condition on discharge: Stable    Discharge Medications: [place] [employer], [employer] [address] [id number] Medication Instructions [id number]    Printed on:08/12/20 6869   Medication Information                      albuterol sulfate HFA (PROAIR HFA) 108 (90 Base) MCG/ACT inhaler  Inhale 2 puffs into the lungs every 4 hours as needed for Wheezing             albuterol-ipratropium (COMBIVENT)  MCG/ACT inhaler  Inhale 2 puffs into the lungs 4 times daily             busPIRone (BUSPAR) 10 MG tablet  Take 1.5 tablets by mouth 3 times daily             docusate sodium (COLACE, DULCOLAX) 100 MG CAPS  Take 100 mg by mouth daily             furosemide (LASIX) 40 MG tablet  Take 1 tablet by mouth 2 times daily             gabapentin (NEURONTIN) 800 MG tablet  Take 1 tablet by mouth 3 times daily             glucose blood VI test strips (ASCENSIA AUTODISC VI;ONE TOUCH ULTRA TEST VI) strip  1 each by In Vitro route 4 times daily as needed As needed. insulin glargine (LANTUS) 100 UNIT/ML injection vial  Inject 60 Units into the skin nightly             Insulin Lispro, Human, (HUMALOG SC)  Inject into the skin Insulin pump             levothyroxine (SYNTHROID) 25 MCG tablet  Take 25 mcg by mouth Daily             LORazepam (ATIVAN) 1 MG tablet  Take 1 mg by mouth every 6 hours as needed for Anxiety.              MIDODRINE HCL PO  Take by mouth 3 times daily             nitroGLYCERIN (NITROSTAT) 0.4 MG SL tablet  up to max of 3 total doses. If no relief after 1 dose, call 911. ONE TOUCH LANCETS MISC  1 each by Does not apply route daily             pantoprazole sodium (PROTONIX) 40 MG PACK packet  Take 40 mg by mouth every morning (before breakfast)             potassium chloride (KLOR-CON M) 20 MEQ extended release tablet  Take 1 tablet by mouth daily for 5 days             simvastatin (ZOCOR) 20 MG tablet  Take 1 tablet by mouth nightly             tolterodine (DETROL) 2 MG tablet  Take 2 mg by mouth daily             venlafaxine (EFFEXOR XR) 150 MG extended release capsule  Take one capsule daily at the same time as the 75 mg capsule                 Objective Findings at Discharge:   BP (!) 109/37   Pulse 81   Temp 99.7 °F (37.6 °C) (Rectal)   Resp 18   Ht 5' (1.524 m)   Wt 193 lb 2 oz (87.6 kg)   SpO2 98%   BMI 37.72 kg/m²            PHYSICAL EXAM   GEN Comatose. EYES Positive corneal reflex  NECK Supple, no apparent thyromegaly or masses. RESP Clear to auscultation, no wheezes, rales or rhonchi. Symmetric chest movement while on mechanical ventilation  CARDIO/VASC S1/S2 auscultated. Regular rate without appreciable murmurs, rubs, or gallops. No JVD or carotid bruits. Peripheral pulses equal bilaterally and palpable. No peripheral edema. GI Abdomen is soft without significant tenderness, masses, or guarding. Bowel sounds are normoactive. Rectal exam deferred.  No costovertebral angle tenderness. Normal appearing external genitalia. Bullard catheter is not present. HEME/LYMPH No palpable cervical lymphadenopathy and no hepatosplenomegaly. No petechiae or ecchymoses. MSK No gross joint deformities. SKIN Normal coloration, warm, dry.   NEURO Comatose    BMP/CBC  Recent Labs     08/10/20  0505 08/11/20  0525 08/11/20  1342 08/12/20  0539    139  --  141   K 3.8 4.0  --  4.2   CL 96* 99  --  101   CO2 30 30  --  32   BUN 49* 55*  --  61*   CREATININE 1.3* 1.1  --  1.1   WBC 9.1 8.6  -- 8. 7   HCT 23.1* 21.2* 25.2* 25.7*    308  --  284       IMAGING:  As above    Discharge Time of 35 minutes    Electronically signed by Abelardo Powers MD on 8/12/2020 at 1:22 PM

## 2020-08-12 NOTE — PROGRESS NOTES
Progress Note( Dr. Chris Ramos)    Subjective:   Admit Date: 7/25/2020  PCP: Valentina Machuca PA-C    The patient on 8/7/2020 but made the note later    Admitted For : Cardiac arrest but very soon was resuscitated intubated and placed on the ventilator    Consulted For: Better control of blood glucose    Interval History: No major changes in her mental or respiratory status    Patient is still sedated intubated and on ventilator  Patient is COVID negative so she was transferred to regular ICU  on  7/30 /2020  Patient had tracheotomy and PEG tube insertion on 8/7/2020 by Dr Keisha Ramirez on ventilator       Intake/Output Summary (Last 24 hours) at 8/11/2020 2227  Last data filed at 8/11/2020 2115  Gross per 24 hour   Intake 2745.11 ml   Output 1300 ml   Net 1445.11 ml       DATA    CBC:   Recent Labs     08/09/20  0530 08/10/20  0505 08/11/20  0525 08/11/20  1342   WBC 10.4 9.1 8.6  --    HGB 7.5* 7.2* 6.5* 7.9*    324 308  --     CMP:  Recent Labs     08/10/20  0000 08/10/20  0505 08/11/20  0525    137 139   K 3.8 3.8 4.0   CL 95* 96* 99   CO2 31 30 30   BUN 45* 49* 55*   CREATININE 1.3* 1.3* 1.1   CALCIUM 8.7 8.9 8.6     Lipids:   Lab Results   Component Value Date    CHOL 122 06/20/2019    CHOL 205 03/17/2016    HDL 51 06/20/2019    TRIG 150 06/20/2019     Glucose:  Recent Labs     08/11/20  1653 08/11/20  1757 08/11/20 2039   POCGLU 152* 157* 143*     [id number]:  Lab Results   Component Value Date    LABA1C 12.0 07/26/2020     High Sensitivity TSH:   Lab Results   Component Value Date    TSHHS 4.730 06/20/2019     Free T3:   Lab Results   Component Value Date    FT3 2.9 03/17/2016     Free T4:  Lab Results   Component Value Date    T4FREE 1.04 06/18/2019       Xr Chest Portable    Result Date: 7/26/2020  EXAMINATION: ONE XRAY VIEW OF THE CHEST 7/26/2020 4:49 am COMPARISON: July 25, 2020 HISTORY: ORDERING SYSTEM PROVIDED HISTORY: Vent management T    Patchy airspace opacities bilaterally, may be related to pulmonary edema versus pneumonia, likely improved in the left hemithorax. Mild to moderate right pleural effusion. Stable cardiomegaly. Low lung volumes. [employer] Chest Portable    Result Date: 7/25/2020  EXAMINATION: ONE XRAY VIEW OF THE CHEST 7/25/2020 3:37 pm COMPARISON: Chest 06/17/2019 HISTORY: ORDERING SYSTEM PROVIDED HISTORY: post arrest        1. Nonspecific pulmonary findings may be related to sequela from pulmonary edema, diffuse infection or ARDS. Pulmonary contusion or sequela during resuscitation (? aspiration) may contribute to these opacities as well. 2. Calcific atherosclerosis aorta. 3. Cardiomegaly. 4. Endotracheal tube tip measures about 2.7 cm superior to the elijah. Yifanan Bevels Pulmonary W Contrast    Result Date: 7/25/2020  EXAMINATION: CTA OF THE CHEST 7/25/2020 5:51 pm     1. No pulmonary embolism. 2. Findings of fluid overload with small to moderate pleural effusions, diffuse soft tissue edema, and upper abdominal ascites. 3. Consolidative opacities within the upper to mid lungs could represent pneumonia or atelectasis/scarring. 4. Cirrhosis. 5. Borderline cardiomegaly with severe atherosclerotic disease. Xr Abdomen For Ng/og/ne Tube Placement    Result Date: 7/26/2020  EXAMINATION: ONE SUPINE XRAY VIEW(S) OF THE ABDOMEN 7/26/2020 8:18 am COMPARISON: None       NG tube tip in the gastric antrum with the proximal side-port in the gastric body.        Scheduled Medicines   Medications:    pantoprazole  40 mg Intravenous BID    furosemide  40 mg Oral Daily    insulin regular  20 Units Subcutaneous 3 times per day    insulin NPH  20 Units Subcutaneous QAM    midodrine  10 mg Oral TID WC    collagenase   Topical Daily    insulin regular  0-18 Units Subcutaneous Q6H    insulin glargine  40 Units Subcutaneous Nightly    potassium chloride  20 mEq Intravenous Once    enoxaparin  40 mg Subcutaneous Daily    levothyroxine  50 mcg Oral Daily    levetiracetam  1,000 mg ([facility]     VHD (valvular heart disease)     Excessive daytime sleepiness     Ex-smoker     Acute respiratory failure ([facility]      Plan:     1. Reviewed POC blood glucose . Labs and X ray results   2. Reviewed Current Medicines   3. Started on schedule and correction bolus regular insulin and basal Lantus Insulin regime /   4. Monitor Blood glucose frequently   5. Modified  the dose of Insulin/ other medicines as needed  6. On tube feeding restarted with the PEG tube   7. Will follow     .      Melanie Harper MD

## 2020-08-12 NOTE — PROGRESS NOTES
Palliative Medicine Progress Note    Admit Date: 7/25/2020  Hospital Day:  Hospital Day: 23     CC:  HPI: Reggie Guzmán is a 79year old female who presented to the ED on 7/25/2020 in full arrest.  EMS had been called to her home for c/o SOB and upon arrival she was giving herself breath treatment when she went into cardiac arrest. Epi and was given and CPR started with spontaneous return of pulse. She was intubated in the emergency room for airway protection. She has remained intubated and nonresponsive since admission. She has been off sedation since 7/29/2020. She underwent PEG and trach placement on 8/7/2020.   She has had an EEG which showed significant slowing of brain wave activity, her head CT was normal.    Recommendations:   Companionate weaning  DNR-CC  Family Support    Subjective:     Scheduled Meds:   pantoprazole  40 mg Intravenous BID    furosemide  40 mg Oral Daily    insulin regular  20 Units Subcutaneous 3 times per day    insulin NPH  20 Units Subcutaneous QAM    midodrine  10 mg Oral TID WC    collagenase   Topical Daily    insulin regular  0-18 Units Subcutaneous Q6H    insulin glargine  40 Units Subcutaneous Nightly    potassium chloride  20 mEq Intravenous Once    enoxaparin  40 mg Subcutaneous Daily    levothyroxine  50 mcg Oral Daily    levetiracetam  1,000 mg Intravenous Q12H    miconazole   Topical BID    sodium chloride flush  10 mL Intravenous 2 times per day    atorvastatin  20 mg Oral Daily    aspirin  81 mg Per NG tube Daily    chlorhexidine  15 mL Mouth/Throat BID       Continuous Infusions:   dextrose      norepinephrine 2 mcg/min (08/12/20 1029)       PRN Meds:sodium phosphate IVPB **OR** sodium phosphate IVPB, potassium chloride, magnesium sulfate, sodium chloride flush, acetaminophen **OR** acetaminophen, polyethylene glycol, promethazine **OR** ondansetron, glucose, dextrose, glucagon (rDNA), dextrose, albuterol sulfate HFA    REVIEW OF SYSTEMS:    Vent support via trach, non responsive    Objective:     Patient Vitals for the past 8 hrs:   BP Temp Temp src Pulse Resp SpO2 Height Weight   08/12/20 1218 (!) 109/37 99.7 °F (37.6 °C) Rectal 81 18 98 % -- --   08/12/20 1203 (!) 116/41 -- -- 82 17 98 % -- --   08/12/20 1148 (!) 129/45 -- -- 83 18 98 % -- --   08/12/20 1133 (!) 131/49 -- -- 85 20 98 % -- --   08/12/20 1118 (!) 135/43 -- -- 85 19 98 % -- --   08/12/20 1103 (!) 134/45 -- -- 84 18 99 % -- --   08/12/20 1048 (!) 119/46 -- -- 80 17 99 % -- --   08/12/20 1033 (!) 100/38 -- -- 75 15 99 % -- --   08/12/20 1018 (!) 97/36 -- -- 75 15 98 % -- --   08/12/20 1003 (!) 108/39 -- -- 76 15 99 % -- --   08/12/20 0948 (!) 105/39 -- -- 76 15 99 % -- --   08/12/20 0933 101/75 -- -- 85 19 100 % -- --   08/12/20 0918 (!) 99/36 -- -- 80 13 100 % -- --   08/12/20 0903 (!) 102/39 -- -- 80 14 100 % -- --   08/12/20 0851 -- -- -- 76 12 100 % -- --   08/12/20 0850 -- -- -- 75 12 100 % -- --   08/12/20 0849 -- -- -- 75 13 100 % -- --   08/12/20 0848 -- -- -- 75 13 99 % -- --   08/12/20 0847 (!) 98/35 99.3 °F (37.4 °C) -- 76 13 99 % -- --   08/12/20 0833 (!) 114/43 -- -- 76 15 100 % -- --   08/12/20 0818 (!) 106/40 -- -- 75 14 100 % -- --   08/12/20 0803 (!) 113/45 -- -- 77 13 100 % -- --   08/12/20 0748 (!) 107/40 -- -- 78 13 100 % -- --   08/12/20 0733 (!) 118/46 -- -- 82 19 99 % -- --   08/12/20 0718 -- -- -- 79 13 100 % -- --   08/12/20 0717 (!) 114/47 -- -- 79 13 100 % -- --   08/12/20 0702 (!) 118/45 -- -- 84 13 100 % -- --   08/12/20 0700 -- -- -- 80 13 100 % -- --   08/12/20 0647 (!) 114/42 -- -- 80 12 99 % -- --   08/12/20 0632 (!) 115/45 -- -- 81 13 100 % -- --   08/12/20 0617 (!) 116/50 -- -- 81 15 100 % -- --   08/12/20 0602 (!) 125/44 -- -- 83 28 100 % -- --   08/12/20 0600 (!) 125/44 -- -- 83 25 100 % 5' (1.524 m) 193 lb 2 oz (87.6 kg)   08/12/20 0547 (!) 118/44 -- -- 81 14 100 % -- --   08/12/20 0532 -- -- -- 83 15 100 % -- --   08/12/20 0517 -- -- -- 85 23 99 % -- -- 08/12/20 0502 -- -- -- 80 (!) 0 100 % -- --     I/O last 3 completed shifts: In: 2725.2 [I.V.:563.3; Blood:347.9; NG/GT:1678; IV Piggyback:136]  Out: 8309 [LDUQV:0230]  I/O this shift:  In: -   Out: 150 [Urine:150]    PHYSICAL EXAM:    CONSTITUTIONAL:  awake, alert, cooperative, no apparent distress, and appears stated age  NECK:  Supple, symmetrical, trachea midline, no adenopathy, thyroid symmetric, not enlarged and no tenderness, skin normal  LUNGS:  no increased work of breathing and clear to auscultation. No accessory muscle use  CARDIOVASCULAR:  normal S1 and S2, no edema and no JVD  ABDOMEN:  normal bowel sounds, non-distended and no masses palpated, and no tenderness to palpation. No hepatospleenomegaly  LYMPHADENOPATHY:  no axillary or supraclavicular adenopathy. No cervical adnenopathy  PSYCHIATRIC: Oriented to person place and time. No obvious depression or anxiety. MUSCULOSKELETAL: No obvious misalignment or effusion of the joints. No clubbing, cyanosis of the digits. SKIN:  normal skin color, texture, turgor and no redness, warmth, or swelling. No palpable nodules    WBC/Hgb/Hct/Plts:  8.7/7.7/25.7/284 (08/12 0539)           Assessment:     Active Problems:    Acute respiratory failure (HCC)  Resolved Problems:    * No resolved hospital problems. *      Assessment/plan:  Meeting with [facility] sister, Bere Argueta,  family would does not want [facility] to go to [employer]. Bere Argueta states that Virginie Valentin does have a will and a paper stating she wanted to be a DNR however they have been unable to find those at the house secondary to [address] \"hoarding\". Bere Argueta would like to move towards compassionate extubation and hospice. Bere Argueta is calling Salena Miranda brother and Araceli's niece, Ade Paez, who has been living her her for 15 years and has been Araceli's caregiver. Bere Argueta or Wade Hooker will call back to hospital and let us know when they would like to proceed with compassionate extubation.  Code status will be changed to [facility] and tube feedings will be stopped. Jailene with Bere Argueta and [employer] on phone both will be here with Aunt around 2:30 PM and would like to precede at that time with compassionate wean and hospice if needed. Ordered placed in computer for terminal wean. 0 Araceli's family presents, all family members present are in agreement with compassionate wean. Medication will be given and vent will be weaned off. Family support given.     Time spent with patient and/or family: 55 minutes  Time reviewing records:10  Time communicating with providers:5

## 2020-08-12 NOTE — PROGRESS NOTES
Pts. Sister Gregg Hopkins called and stated that they no longer wanted to go to [place] and instead wanted to discuss hospice options here. Dr. Sherrie Castillo was notified that they are coming in today at 11:30.

## 2020-08-12 NOTE — PROGRESS NOTES
Patient seen and examined by Tammie Scott MD Wean to extubate today,    Per Family wants to pursue hospice care after compassionate extubation today.     Pt extubated,

## 2020-08-12 NOTE — PROGRESS NOTES
[facility] updated at this time and asks to be notified of cardiac death to assess for tissue donation.  Referral number is #[id number]

## 2020-08-12 NOTE — PLAN OF CARE
Ongoing     Problem:  Bowel Function - Altered:  Goal: Bowel elimination is within specified parameters  Description: Bowel elimination is within specified parameters  8/12/2020 0931 by Maria Del Carmen Moreno  Outcome: Ongoing  8/11/2020 2314 by Josh Obrien RN  Outcome: Ongoing     Problem: Cardiac Output - Decreased:  Goal: Hemodynamic stability will improve  Description: Hemodynamic stability will improve  8/12/2020 0931 by Maria Del Carmen Moreno  Outcome: Ongoing  8/11/2020 2314 by Josh Obrien RN  Outcome: Ongoing     Problem: Fluid Volume - Imbalance:  Goal: Absence of imbalanced fluid volume signs and symptoms  Description: Absence of imbalanced fluid volume signs and symptoms  8/12/2020 0931 by Maria Del Carmen Moreno  Outcome: Ongoing  8/11/2020 2314 by Josh Obrien RN  Outcome: Ongoing     Problem: Gas Exchange - Impaired:  Goal: Levels of oxygenation will improve  Description: Levels of oxygenation will improve  8/12/2020 0931 by Maria Del Carmen Moreno  Outcome: Ongoing  8/11/2020 2314 by Josh Obrien RN  Outcome: Ongoing     Problem: Mental Status - Impaired:  Goal: Mental status will be restored to baseline  Description: Mental status will be restored to baseline  8/12/2020 0931 by Maria Del Carmen Moreno  Outcome: Ongoing  8/11/2020 2314 by Josh Obrien RN  Outcome: Ongoing     Problem: Nutrition Deficit:  Goal: Ability to achieve adequate nutritional intake will improve  Description: Ability to achieve adequate nutritional intake will improve  8/12/2020 0931 by Maria Del Carmen Moreno  Outcome: Ongoing  8/11/2020 2314 by Josh Obrien RN  Outcome: Ongoing     Problem: Pain:  Goal: Pain level will decrease  Description: Pain level will decrease  8/12/2020 0931 by Maria Del Carmen Moreno  Outcome: Ongoing  8/11/2020 2314 by Josh Obrien RN  Outcome: Ongoing  Goal: Recognizes and communicates pain  Description: Recognizes and communicates pain  8/12/2020 0931 by Maria Del Carmen Moreno  Outcome: Ongoing  8/11/2020 2314 by Josh Obrien Cardiopulmonary, Altered:  Goal: Absence of angina  Description: Absence of angina  8/12/2020 0931 by Josh March  Outcome: Ongoing  8/11/2020 2314 by Breanna Phelan RN  Outcome: Ongoing  Goal: Hemodynamic stability will improve  Description: Hemodynamic stability will improve  8/12/2020 0931 by Josh March  Outcome: Ongoing  8/11/2020 2314 by Breanna Phelan RN  Outcome: Ongoing     Problem: Sensory Perception - Impaired:  Goal: Ability to maintain a stable neurologic state will improve  Description: Ability to maintain a stable neurologic state will improve  8/12/2020 0931 by Josh March  Outcome: Ongoing  8/11/2020 2314 by Breanna Phelan RN  Outcome: Ongoing

## 2020-08-12 NOTE — PROGRESS NOTES
Pulmonary and Critical Care  Progress Note      VITALS:  BP (!) 131/49   Pulse 85   Temp 99.3 °F (37.4 °C)   Resp 20   Ht 5' (1.524 m)   Wt 193 lb 2 oz (87.6 kg)   SpO2 98%   BMI 37.72 kg/m²     Subjective:   CHIEF COMPLAINT :SOB     HPI:                The patient is a 79 y.o. female with significant past medical history of CHF, DM, HTN, hypothyroidism,15 pk yr smoker quit 20 years ago, Morbid obesity, SADI  presents with complaints of SOB getting worse. EMS was called and had a witness out of hospital Cardiac arrest.She was Intubated in [place]. She had CPR with ROSC in 5 minutes. She was found to have  Suspected pneumonia. She is being treated with ABX. She had worsening LE edema. She had a EF of 55% and moderate Aortic stenosis. She had Pulmonary edema. She is on Lasix drip.she has good urine output. She is sedated now. Her PBNPT is 21,085 and troponin mildly elevated.      Objective:   PHYSICAL EXAM:    LUNGS:Bilateral basal crackles  Abd-soft, BS+,NT  Ext - no pedal edema  CVS-s1s2,no murmurs      DATA:    CBC:  Recent Labs     08/10/20  0505 08/11/20  0525 08/11/20  1342 08/12/20  0539   WBC 9.1 8.6  --  8.7   RBC 2.61* 2.33*  --  2.83*   HGB 7.2* 6.5* 7.9* 7.7*   HCT 23.1* 21.2* 25.2* 25.7*    308  --  284   MCV 88.5 91.0  --  90.8   MCH 27.6 27.9  --  27.2   MCHC 31.2* 30.7*  --  30.0*   RDW 17.4* 18.0*  --  17.8*   SEGSPCT 74.2* 71.9*  --  73.7*      BMP:  Recent Labs     08/10/20  0505 08/11/20  0525 08/12/20  0539    139 141   K 3.8 4.0 4.2   CL 96* 99 101   CO2 30 30 32   BUN 49* 55* 61*   CREATININE 1.3* 1.1 1.1   CALCIUM 8.9 8.6 9.2   GLUCOSE 233* 280* 199*      ABG:  Recent Labs     08/10/20  0600 08/11/20  0600 08/12/20  0600   PH 7.52* 7.51* 7.47*   PO2ART 100 107* 86   FBG0CWH 42.0 41.0 44.0   O2SAT 95.3* 96.3 95.4*     BNP  Lab Results   Component Value Date    BNP 9.6 06/08/2016      D-Dimer:  Lab Results   Component Value Date    DDIMER 759 (H) 08/01/2020      1.  Radiology: None      Assessment/Plan     Patient Active Problem List    Diagnosis Date Noted    Acute respiratory failure ([facility] 07/25/2020    Excessive daytime sleepiness 11/04/2019    Ex-smoker 11/04/2019    VHD (valvular heart disease)     SADI (obstructive sleep apnea)     Morbid obesity ([facility]     NSTEMI (non-ST elevated myocardial infarction) ([facility]     ACS (acute coronary syndrome) ([facility] 11/08/2018    Acute coronary syndrome ([facility] 49/41/5888    Diastolic dysfunction with acute on chronic heart failure ([facility] 11/08/2018    Congestive heart failure (CHF) ([facility] 11/07/2018    Type 2 diabetes mellitus ([facility] 11/07/2018    Hypothyroidism 11/07/2018    Depression 11/07/2018    Hypertension 11/07/2018    CHF (congestive heart failure), NYHA class I, acute on chronic, diastolic (HCC) 81/14/0420    Idiopathic progressive neuropathy 04/18/2016     Overview Note:     Patient has chronic peripheral neuropathy to bilateral feet. She is currently on gabapentin 800 mg tid with some benefit. She reports decreased sensation to her feet with intermittent pain.  Mild obstructive sleep apnea 10/14/2015    CCC (chronic calculous cholecystitis) 09/14/2015    Cirrhosis of liver without ascites ([facility] 09/14/2015    Light headed 04/23/2014     Overview Note:     From dehydration from hyperglycemia - patient dry on presentation. replace inactive diagnosis      Orthostatic hypotension 04/23/2014    Hypertriglyceridemia 04/23/2014    Precordial pain 04/22/2014    Hyperglycemia 04/22/2014     Overview Note:     Admission blood glucose of 120; was >500 prior to presentation to ER, first accucheck in ER was 341.  Axillary abscess 04/22/2014     Overview Note:     Left      Hyperlipidemia     DM type 2, uncontrolled, with retinopathy ([facility] 03/01/2013     Overview Note:     Patient's last A1c was 12.1, and she has been referred in the last month to endocrinology.  She has an appointment this coming month with a specialist.      Hypokalemia 03/01/2013    HTN (hypertension) 11/26/2012    COPD (chronic obstructive pulmonary disease) ([facility] 11/26/2012    Anxiety and depression 11/26/2012     Overview Note:     Patient is currently on venlafaxine 150 mg daily for depression and anxiety. She feels like it is not as affective as she would like currently. She does not feel suicidal and is sleeping ok but has decreased motivation and less happy days. Patient is on the vent and off sedation. No response to painful stimuli. EEG No seizures     Anoxic encephalopathy  Anemia - Stable  Acute on chronic Hypoxic hypercapneic resp failure  Chronic Metabolic alkalosis  Bilateral pleural effusions  VDRF  Leukocytosis - improved  Moderate AS  Morbid obesity  SADI  Smoker  Pulmonary edema  Out of Hospital Cardiac arrest  ? Aspiration pneumonia  Systolic CHF with EF of 95%  Grade III Diastolic dysfunction  Metabolic alkalosis  Moderate malnutrition  S/p Trach and PEG       1. Await family regarding code and further management  2. No Trach collar for now  3. Await LTAC if family agrees  4. Prognosis guarded  5. C/w present management  No follow-ups on file.     Electronically signed by Marco A Riley MD on 8/12/2020 at 11:47 AM

## 2020-08-13 PROBLEM — G93.1 HYPOXEMIC ENCEPHALOPATHY (HCC): Status: ACTIVE | Noted: 2020-01-01

## 2020-08-13 NOTE — CONSULTS
[address] [place] [address] Consultation Note    Date: 8/13/2020  Name: Mane Murphy  MRN: [id number]  YOB: 1950   Patient's PCP: Barron Dawn PA-C  Referring Physician: MARY Lowe CNP  Acute care admit date: 7/25/2020  Intubation/mechanical ventilation: 7/25/20 to 8/12/2020  Withdrawal of life sustaining treatment (mechanical ventilation and vasopressors): 8/12/2020  Procedure: tracheostomy and PEG per Dr Tej Solomon 8/7/2020    Informant: Chart reviewed, discussed with the patient's nurse and I examined the patient, who is unable to provide any information due to her clinical status. There are no family here. I saw the patient on 7/31/20, and have been following her course. CC:  unresponsive    [place]: This is a 79year old patient with Diastolic heart failure, severe aortic stenosis with valve area of 0.62 cm2, obstructive sleep apnea, Type 2 diabetes mellitus, anemia of chronic disease, coronary artery disease, paroxysmal atrial fibrillation not on anticoagulation secondary to GI bleed, who was admitted on 7/25/2020 in transfer from [facility] with shortness of breath. The patient had a cardiopulmonary arrest and EMS performed resuscitation, with intubation in the field, and reportedly less than 5 minutes of CPR before return of spontaneous circulation (ROSC). The patient was found to have decompensated heart failure with pulmonary edema, and was admitted at [facility]. She has been treated for possible sepsis. COVID-19 was negative x 2, and the patient was transferred to the main ICU. The patient is unresponsive, and had been on no sedation. She was on mechanical ventilation and NorEpinephrine. Hospice was consulted, and the patient's family requested continued aggressive care. I have been following peripherally.  A tracheostomy and PEG were placed on 8/7/20, and plans were for transfer to [place] [address]. The patient is felt to have hypoxic encephalopathy. The patient's family decided for withdrawal of life sustaining treatment on 8/12/20, and Palliative Medicine assisted, and the patient was taken off vasopressors and mechanical ventilation. The patient is unresponsive, and hypotensive with Systolic blood pressure in the 60's. Her tube feeding are off. The hospice nurse liaison will meet with the patient's family to discuss hospice and obtain consents. Past Medical History:   Diagnosis Date    Anxiety     Arthritis     bilats hands, right shoulder    Atrial fibrillation [facility])     Cardioversion @ [facility] - no trouble with it ever since. Sees Dr. Luís Sanders Atrial fibrillation with RVR [facility]) 2013    Atrial fibrillation with RVR [facility] 11/26/2012    Atrial fibrillation with RVR ([facility] 3/1/2013    Cancer (HCC)     skin (right upper chest & face)    Cervicalgia     Constipation     COPD (chronic obstructive pulmonary disease) (HCC)     Depression     Diabetes mellitus (HCC)     IDDM    Diabetic neuropathy ([facility])     Disc herniation     lower back    Fatigue     Fibromyalgia     H/O cardiovascular stress test 05/08 EF 70%,07/09 EF 66%,02/10, 03/10 EF 63% 04/10    04/26/10 if LAD and circ are compared, the LAD in its mid segment has about 60% stenosis around a small diag. circumflex vessel is a dominant vessel and is without any significant disease,  rca is nondominant with about 50% stenosis , will continue medical management for now.  H/O complete electrocardiogram 02/10    02/03/10 on chart    H/O echocardiogram 05/08,EF 55%, 02/10    02/25/10 EF>55% left ventricular systolic function is normal,mild mitral regurg. there is no PE    Headache(784.0)     Not migraines, usually from a high blood sugar.     Hepatitis B     History of cardiac cath 06/08    06/17/10 heart cath, patient has single vessel cad with RCA which is nondominant, being 50% stenosed, rest of vessels are without any significant disease, medical therapy and risk factor modification will be continued    History of chest x-ray 06/08    06/15/08 chest xray, nonacute chest with borderline cardiomegaly    Holter monitor, abnormal 10/28/13    48hr-Predominantly SR w/only SVT ectopy in single beat form noted.  Hx of cardiovascular stress test 4/1/2013    EF70%, normal    Hx of echocardiogram 4/1/2013    EF55%,mild tricuspid regurg    Hyperlipidemia     Hypertension     Insulin pump in place     Liver cirrhosis (HCC)     Dr Dominick Parmar SADI on CPAP     In the process of getting a c-pap. Does not currently have one.  Peripheral vascular disease (HCC)     PONV (postoperative nausea and vomiting) 1960    with shoulder surgery    Renal disease     Tachycardia     Wears dentures     upper plate       Past Surgical History:   Procedure Laterality Date    BACK SURGERY  1998    herniated disc - no hardware    CATARACT REMOVAL WITH IMPLANT Bilateral 2000s    CHOLECYSTECTOMY  [id number]    laproscopic with liver biopsy    DIAGNOSTIC CARDIAC CATH LAB PROCEDURE  2000s    no stents (mimimal blockage)    GASTROSTOMY TUBE PLACEMENT N/A 8/7/2020    EGD PEG TUBE PLACEMENT performed by [facility] at [facility] 1    total    LIVER BIOPSY  9/14/2015    SHOULDER SURGERY Right 1960    fracture surgery-screw at humeral head   [address] [facility]    TRACHEOSTOMY N/A 8/7/2020    TRACHEOTOMY performed by Elvis Bourgeois MD at [address] [place] [address] History     Socioeconomic History    Marital status:       Spouse name: Not on file    Number of children: Not on file    Years of education: Not on file    Highest education level: Not on file   Occupational History    Occupation:    Social Needs    Financial resource strain: Not on file    Food insecurity     Worry: Not on file     Inability: Not on file    Transportation needs     Medical: Not on file Non-medical: Not on file   Tobacco Use    Smoking status: Former Smoker     Packs/day: 1.00     Years: 21.00     Pack years: 21.00     Types: Cigarettes     Start date: 1970     Last attempt to quit: 1991     Years since quittin.6    Smokeless tobacco: Never Used    Tobacco comment: lives with smokers   Substance and Sexual Activity    Alcohol use: No     Alcohol/week: 0.0 standard drinks     Comment:           CAFFEINE: 4-6 diet sodas daily    Drug use: No    Sexual activity: Not on file   Lifestyle    Physical activity     Days per week: Not on file     Minutes per session: Not on file    Stress: Not on file   Relationships    Social connections     Talks on phone: Not on file     Gets together: Not on file     Attends Mu-ism service: Not on file     Active member of club or organization: Not on file     Attends meetings of clubs or organizations: Not on file     Relationship status: Not on file    Intimate partner violence     Fear of current or ex partner: Not on file     Emotionally abused: Not on file     Physically abused: Not on file     Forced sexual activity: Not on file   Other Topics Concern    Not on file   Social History Narrative    Not on file       Family History   Problem Relation Age of Onset    Arthritis Mother     Depression Mother     Emphysema Mother     Dementia Mother     Arthritis Father     Cancer Father         prostate    Vision Loss Father     Other Father         brain aneurysm    Arthritis Sister     Depression Sister     Anxiety Disorder Sister     Arthritis Brother     Cancer Brother         eye    Hearing Loss Brother     High Blood Pressure Brother        Allergies   Allergen Reactions    Latex Other (See Comments)     Blisters    Adhesive Tape      Paper tape ok to use    Codeine Nausea And Vomiting       Medications reviewed  Prior to Admission medications    Medication Sig Start Date End Date Taking?  Authorizing Provider   MIDODRINE apply route daily 10/11/16   Renaldo H MIREYA Wolf   Insulin Lispro, Human, (HUMALOG SC) Inject into the skin Insulin pump    Historical Provider, MD   gabapentin (NEURONTIN) 800 MG tablet Take 1 tablet by mouth 3 times daily  Patient taking differently: Take 800 mg by mouth 4 times daily. . 10/4/16   Renaldo H MIREYA Wolf   glucose blood VI test strips (ASCENSIA AUTODISC VI;ONE TOUCH ULTRA TEST VI) strip 1 each by In Vitro route 4 times daily as needed As needed. Historical Provider, MD       ROS: As noted in [address], all other systems are unobtainable due to the patient's clinical condition    Weight:    Wt Readings from Last 3 Encounters:   20 193 lb 2 oz (87.6 kg)   20 290 lb (131.5 kg)   19 248 lb 6.4 oz (112.7 kg)       Data reviewed 2020:  CTA chest 20:  1. No pulmonary embolism. 2. Findings of fluid overload with small to moderate pleural effusions, diffuse soft tissue edema, and upper abdominal ascites. 3. Consolidative opacities within the upper to mid lungs could represent pneumonia or atelectasis/scarring. 4. Cirrhosis. 5. Borderline cardiomegaly with severe atherosclerotic disease.      Cardiac catheterization Dr. Laila Winn 19:   Procedure Summary   Access : Femoral   1. LAD mid segment has 30- 40 % stenosis, Diag has 99 % ostial   disease but it is small vessel   2. Mid Cric has mild 10 to 20 % stenosis   3. RCA is non dominant   4. DONNA is 1.3 cm2, mean Gradient is 20 mmHG, PEak Gradient is 22   mmHG   5. RV 50/18-23 mmHG   6. PcWp: is 24 mmHG   7. Pa 52/36 42 mmHG      Recommendations   Moderate Aortic stenosis   Mild Pulmonary Hypertension   Risk Modification   Heart failure management for Diag , Add Imdur     Transthoracic Echocardiogram 20:   Left ventricular systolic function is abnormal.   Ejection fraction is visually estimated at 30%.  Grade III diastolic dysfunction.   Severe aortic stenosis (DONNA: 0.62 cm sq, mean P mmHg).    Moderate mitral valve stenosis (mean P mmHg).  Mild tricuspid regurgitation is present. RVSP is 36 mmHg.   No evidence of any pericardial effusion.   Pleural effusion present bilaterally.  EE20  Abnormal EEG due to severe attenuation and diffuse slowing of all cerebral electrographic activity. At 2 µV sensitivity 1-2 Hz delta activity was appreciated. No focal slowing or epileptiform discharges were seen. This is a non-specific finding and can be seen in variety of severe encephalopathies. Clinical correlation advised. MRI brain 20:  1. No evidence of an acute infarct. 2. Cerebral parenchymal volume loss with mild chronic microvascular white   matter ischemic disease.      Pro-BNP 2020: 21,085  COVID-19: negative x 2  and   Hemoglobin A1C 20: 12.0%  Accuchecks:   Recent Labs     20  0849 20  1225 20  2229   POCGLU 178* 214* 186*     Lab Results   Component Value Date    ALKPHOS 125 2020    ALT 11 2020    AST 23 2020    PROT 6.9 2020    PROT 6.7 2013    BILITOT 0.3 2020    BILIDIR 0.2 2019    IBILI 0.1 2019    LABALBU 3.0 2020    LABALBU 74 2018     CBC:   Recent Labs     20  0525 20  1342 20  0539   WBC 8.6  --  8.7   HGB 6.5* 7.9* 7.7*   HCT 21.2* 25.2* 25.7*   MCV 91.0  --  90.8     --  284     BMP:   Recent Labs     20  0525 20  0539    141   K 4.0 4.2   CL 99 101   CO2 30 32   BUN 55* 61*   CREATININE 1.1 1.1   GLUCOSE 280* 199*       Physical Exam:   BP (!) 73/40   Pulse 95   Temp 99.5 °F (37.5 °C) (Oral)   Resp (!) 43   Ht 5' (1.524 m)   Wt 193 lb 2 oz (87.6 kg)   SpO2 (!) 83%   BMI 37.72 kg/m²   General: unresponsive, shallow respirations,   Skin: pallor, tattoo right wrist  HEENT: Mucous membranes are dry, conjunctivae are pale   Neck: tracheostomy in place, right Internal jugular central venous catheter    Heart: distant tones, RRR, S1S2, II/VI KASIA at left sternal border  Lungs:  Coarse shallow breath sounds bilaterally, diminished at the bases, without rales, scattered rhonchi   Abdomen: obese, soft, PEG tube in place, bowel sounds quiet, no apparent tenderness  : ham  Extremities:  + edema arms, feet are cool with mottling  Neurologic: unresponsive    Assessment/Plan:  1. Out of hospital cardiopulmonary arrest with resuscitation 7/25/20 with hypoxic and metabolic encephalopathy. The patient is appropriate for General Inpatient Hospice, and I will place transfer orders pending family meeting and consent. PPS 10%. Life expectancy is likely hours. 2. Respiratory failure with hypoxia, due to CHF and possible aspiration pneumonia, COVID-19: negative x 2, with withdrawal of life sustaining treatment 8/12/2020.  3. Combined systolic and diastolic heart failure with LVEF of 30% with pulmonary edema on admission  4. Severe aortic stenosis with valve area of 0.62 cm2  5. Acute Kidney Injury on CKD-3  6. Paroxysmal atrial fibrillation   7.  Type 2 diabetes mellitus with Hemoglobin A1C 12%, not currently on nutrition    Patient Active Problem List   Diagnosis Code    HTN (hypertension) I10    COPD (chronic obstructive pulmonary disease) (HCC) J44.9    Anxiety and depression F41.9, F32.9    DM type 2, uncontrolled, with retinopathy ([facility] E11.319, E11.65    Hypokalemia E87.6    Hyperlipidemia E78.5    Precordial pain R07.2    Hyperglycemia R73.9    Axillary abscess L02.419    Light headed R42    Orthostatic hypotension I95.1    Hypertriglyceridemia E78.1    CCC (chronic calculous cholecystitis) K80.10    Cirrhosis of liver without ascites (HCC) K74.60    Mild obstructive sleep apnea G47.33    Idiopathic progressive neuropathy G60.3    Congestive heart failure (CHF) (HCC) I50.9    Type 2 diabetes mellitus (HCC) E11.9    Hypothyroidism E03.9    Depression F32.9    Hypertension I10    CHF (congestive heart failure), NYHA class I, acute on chronic, diastolic ([facility]) I50.33    ACS (acute coronary syndrome) ([facility]) I24.9    Acute coronary syndrome ([facility]) Y89.2    Diastolic dysfunction with acute on chronic heart failure ([facility]) I50.33    NSTEMI (non-ST elevated myocardial infarction) ([facility]) I21.4    SADI (obstructive sleep apnea) G47.33    Morbid obesity ([facility]) E66.01    VHD (valvular heart disease) I38    Excessive daytime sleepiness G47.19    Ex-smoker Z87.891    Acute respiratory failure ([facility]) S48.51       Certification of Terminal Illness: I certify that this patient is eligible for General Inpatient Hospice services for a terminal diagnosis of hypoxic encephalopathy with a life expectancy predicted to be less than 6 months, if the illness follows its expected course.     Newton Villanueva MD, [facility]

## 2020-08-13 NOTE — DISCHARGE SUMMARY
Discharge Summary Note  Patient ID:  Joanne Couch Standard  [id number]  89 y.o.  1950    Admit date: 7/25/2020    Discharge date and time: No discharge date for patient encounter. Admitting Physician: Brian Haynes MD     Discharge Physician: Anil Hammond MD    Admission Diagnoses:   Acute respiratory failure [facility]) [J96.00]    Discharge Diagnoses:   Cardiac arrest 2/2 worsening HFrEF  -Had out of hospital arrest  -Resuscitation after 5 mins of CPR and a dose of epi  -Was diuresed with IV lasix but now transitioned to PO  -Echo: EF of 30% with grade III, severe aortic stenosis and moderate MS  -Cardiology on board  -Palliative on board  -s/p Compassionate extubation   -Hospice to take over     Acute hypoxic respiratory failure due to CHF and possible Asp Pneumonitis  -On mechanical ventilation  -S/P Trach placement 08/07/2020  -Completed IV abx.  -Pulmo on board; for extubation per family request     Probable anoxic encephalopathy from cardiac arrest  -No acute abn on CT head x2  - MRI with no acute findings  -EEGx2: no epileptiform activity but severe encephalopathy  -Neurology on board; on keppra due to seizure     HSER on CKD stage 2-3   -Likely cardiorenal      Paroxysmal AF  - not on AC due to hx of GIB  -Patient is high risk for embolic CVA given the hx of moderate MS     Acute on chronic blood loss anemia    Cirrhosis  DM Type 2  Severe PCM: s/p PEG on 8/7. Patient is being transferred to Hospice service    Admission Condition: critical    Discharged Condition: critical    Consults: cardiology, nephrology, neurology, General Surgery, Wound care, Palliative care and Hospice.     Significant Diagnostic Studies:   CBC with Differential:    Lab Results   Component Value Date    WBC 8.7 08/12/2020    RBC 2.83 08/12/2020    HGB 7.7 08/12/2020    HCT 25.7 08/12/2020     08/12/2020    MCV 90.8 08/12/2020    MCH 27.2 08/12/2020    MCHC 30.0 08/12/2020    RDW 17.8 08/12/2020    SEGSPCT 73.7 08/12/2020    LYMPHOPCT 15.4 08/12/2020    MONOPCT 7.6 08/12/2020    EOSPCT 3 03/17/2016    BASOPCT 0.7 08/12/2020    MONOSABS 0.7 08/12/2020    LYMPHSABS 1.3 08/12/2020    EOSABS 0.2 08/12/2020    BASOSABS 0.1 08/12/2020    DIFFTYPE AUTOMATED DIFFERENTIAL 08/12/2020     CMP:    Lab Results   Component Value Date     08/12/2020    K 4.2 08/12/2020     08/12/2020    CO2 32 08/12/2020    BUN 61 08/12/2020    CREATININE 1.1 08/12/2020    GFRAA 59 08/12/2020    AGRATIO 1.3 06/08/2016    LABGLOM 49 08/12/2020    GLUCOSE 199 08/12/2020    PROT 6.9 07/25/2020    PROT 6.7 03/02/2013    LABALBU 3.0 07/25/2020    LABALBU 74 09/11/2018    CALCIUM 9.2 08/12/2020    BILITOT 0.3 07/25/2020    ALKPHOS 125 07/25/2020    AST 23 07/25/2020    ALT 11 07/25/2020     Xr Chest Portable    Result Date: 7/26/2020  EXAMINATION: ONE XRAY VIEW OF THE CHEST 7/26/2020 4:49 am COMPARISON: July 25, 2020 HISTORY: ORDERING SYSTEM PROVIDED HISTORY: Vent management TECHNOLOGIST PROVIDED HISTORY: Reason for exam:->Vent management Reason for Exam: Vent management Acuity: Acute Type of Exam: Subsequent/Follow-up FINDINGS: The ETT is 2.4 cm above the elijah. The feeding tube is inserted with its tip below the diaphragm and beyond the field of view. The cardiomediastinal silhouette is stable. There are low lung volumes. There are patchy airspace opacities bilaterally, may be related to pulmonary edema versus pneumonia. There is mild to moderate right pleural effusion. There is no pneumothorax. There is no acute osseous abnormality. Patchy airspace opacities bilaterally, may be related to pulmonary edema versus pneumonia, likely improved in the left hemithorax. Mild to moderate right pleural effusion. Stable cardiomegaly. Low lung volumes.      Xr Chest Portable    Result Date: 7/25/2020  EXAMINATION: ONE XRAY VIEW OF THE CHEST 7/25/2020 4:14 pm COMPARISON: July 25, 2020 HISTORY: ORDERING SYSTEM PROVIDED HISTORY: CVC placement TECHNOLOGIST PROVIDED HISTORY: Reason for exam:->CVC placement Reason for Exam: post central line right side Acuity: Acute Type of Exam: Initial Additional signs and symptoms: post arrest Line placement FINDINGS: ETT tip is approximately 3 cm above the elijah. Right IJ catheter tip overlies the lower SVC. Cardiac silhouette is mildly enlarged but stable. Diffuse hazy opacity overlying the right lung has not appreciably changed. There is no pneumothorax. Persistent small to moderate right pleural effusion. 1. Interval placement of a right IJ catheter with catheter tip at the level of the lower SVC. No pneumothorax. 2. Persistent hazy opacity over the right hemithorax with small to moderate bilateral pleural effusions. Xr Chest Portable    Result Date: 7/25/2020  EXAMINATION: ONE XRAY VIEW OF THE CHEST 7/25/2020 3:37 pm COMPARISON: Chest 06/17/2019 HISTORY: ORDERING SYSTEM PROVIDED HISTORY: post arrest Acuity: Acute Type of Exam: Initial Additional signs and symptoms: tube placement FINDINGS: The cardiac silhouette is enlarged. Calcifications involving the aorta reflect atherosclerosis. The mediastinal and hilar silhouettes appear unremarkable. Near complete opacification right hemithorax in keeping with atelectasis/infiltrate and pleural effusion. Dense consolidation lower left chest obscuring the left hemidiaphragm reflecting consolidation and pleural effusion. Vascular engorgement and cephalization is demonstrated with bilateral peribronchial cuffing and perivascular haziness. No pneumothorax is seen. No acute osseous abnormality is identified. Endotracheal tube tip measures about 2.7 cm superior to the elijah. 1. Nonspecific pulmonary findings may be related to sequela from pulmonary edema, diffuse infection or ARDS. Pulmonary contusion or sequela during resuscitation (? aspiration) may contribute to these opacities as well. 2. Calcific atherosclerosis aorta. 3. Cardiomegaly.  4. Endotracheal tube tip measures about 2.7 cm superior to the elijah. Xr Chest 1 View    Result Date: 7/25/2020  EXAMINATION: ONE XRAY VIEW OF THE CHEST 7/25/2020 11:01 pm COMPARISON: Chest radiograph 07/25/2020 at 4:15 p.m. HISTORY: ORDERING SYSTEM PROVIDED HISTORY: et tube placement TECHNOLOGIST PROVIDED HISTORY: Reason for exam:->et tube placement FINDINGS: The cardiac silhouette is enlarged. Calcifications involving the aorta reflect atherosclerosis. The mediastinal and hilar silhouettes appear unremarkable. Dense consolidation bilateral lower lungs with bilateral pleural effusion, unchanged from chest radiograph performed earlier today. Vascular engorgement and cephalization is demonstrated with bilateral peribronchial cuffing and perivascular haziness. No pneumothorax is seen. No acute osseous abnormality is identified. Endotracheal tube tip projects over the trachea, tip at the level of the aortic knob, 2.2 cm superior to the elijah. Right IJ CVC tip overlies the mid superior vena cava level. NG tube extends below the left hemidiaphragm, out of the field of view. 1. Unchanged findings most typical of congestive heart failure; pneumonia is a consideration in areas of consolidation with pleural effusion. 2. Calcific atherosclerosis aorta. 3. Cardiomegaly. 4.  Life support appliances appear appropriately positioned. Cta Pulmonary W Contrast    Result Date: 7/25/2020  EXAMINATION: CTA OF THE CHEST 7/25/2020 5:51 pm TECHNIQUE: CTA of the chest was performed after the administration of 100 mL Isovue 370 intravenous contrast.  Multiplanar reformatted images are provided for review. MIP images are provided for review. Dose modulation, iterative reconstruction, and/or weight based adjustment of the mA/kV was utilized to reduce the radiation dose to as low as reasonably achievable.  COMPARISON: 08/09/2011 HISTORY: Acute shortness of breath, respiratory arrest. FINDINGS: Image quality degraded by motion artifact and body habitus. Pulmonary Arteries: Pulmonary arteries are adequately opacified for evaluation. No intraluminal filling defect to suggest pulmonary embolism. Main pulmonary artery is normal in caliber. Mediastinum: Endotracheal tube tip is at the level of the aortic arch. Right IJ catheter tip is not visualized within dense intravenous contrast. Borderline cardiomegaly. Extensive atherosclerotic disease including the coronary arteries. No pericardial effusion. Thoracic aorta is normal caliber. No lymphadenopathy. Thyroid and esophagus are unremarkable. Lungs/pleura: Small to moderate pleural effusions with adjacent compressive atelectasis. Additional linear and consolidative opacities in the upper to mid lungs. Upper Abdomen: Cirrhosis with upper abdominal ascites. Cholecystectomy. Enteric tube tip is within the distal stomach. Soft Tissues/Bones: Diffuse soft tissue edema. Osteopenia with scattered degenerative changes throughout the visualized spine. Shoulder osteoarthrosis. 1. No pulmonary embolism. 2. Findings of fluid overload with small to moderate pleural effusions, diffuse soft tissue edema, and upper abdominal ascites. 3. Consolidative opacities within the upper to mid lungs could represent pneumonia or atelectasis/scarring. 4. Cirrhosis. 5. Borderline cardiomegaly with severe atherosclerotic disease. Xr Abdomen For Ng/og/ne Tube Placement    Result Date: 7/26/2020  EXAMINATION: ONE SUPINE XRAY VIEW(S) OF THE ABDOMEN 7/26/2020 8:18 am COMPARISON: None HISTORY: ORDERING SYSTEM PROVIDED HISTORY: Confirmation of course of NG/OG/NE tube and location of tip of tube TECHNOLOGIST PROVIDED HISTORY: Reason for exam:->Confirmation of course of NG/OG/NE tube and location of tip of tube Portable? ->Yes Reason for Exam: ng location Acuity: Acute Type of Exam: Subsequent/Follow-up FINDINGS: Enteric tube is in place tip in the gastric antrum. The proximal side-port is in the gastric body.   No bowel obstruction is suggested. NG tube tip in the gastric antrum with the proximal side-port in the gastric body. Discharge Exam:  GEN    Comatose. EYES   Positive corneal reflex  NECK  Supple, no apparent thyromegaly or masses. RESP  Decreased breath sounds bilaterally, no wheezes, rales or rhonchi. CARDIO/VASC           S1/S2 auscultated. Regular rate without appreciable murmurs,  GI        Abdomen is soft without significant tenderness, masses, or guarding. Bowel sounds are normoactive. Rectal exam deferred.        No costovertebral angle tenderness. Normal appearing external genitalia. Bullard catheter is not present. HEME/LYMPH            No palpable cervical lymphadenopathy and no hepatosplenomegaly. No petechiae or ecchymoses. MSK    No gross joint deformities. SKIN    Normal coloration, warm, dry. NEURO           Comatose    Disposition: Hospice    Patient Instructions:     Discharge Medications: [place] Standard, Quadra Quadra [id number] Medication Instructions [id number]    Printed on:08/13/20 4147   Medication Information                      albuterol sulfate HFA (PROAIR HFA) 108 (90 Base) MCG/ACT inhaler  Inhale 2 puffs into the lungs every 4 hours as needed for Wheezing             albuterol-ipratropium (COMBIVENT)  MCG/ACT inhaler  Inhale 2 puffs into the lungs 4 times daily             busPIRone (BUSPAR) 10 MG tablet  Take 1.5 tablets by mouth 3 times daily             docusate sodium (COLACE, DULCOLAX) 100 MG CAPS  Take 100 mg by mouth daily             furosemide (LASIX) 40 MG tablet  Take 1 tablet by mouth 2 times daily             gabapentin (NEURONTIN) 800 MG tablet  Take 1 tablet by mouth 3 times daily             glucose blood VI test strips (ASCENSIA AUTODISC VI;ONE TOUCH ULTRA TEST VI) strip  1 each by In Vitro route 4 times daily as needed As needed.              insulin glargine (LANTUS) 100 UNIT/ML injection vial  Inject 60 Units into the skin nightly             Insulin Lispro, Human, (HUMALOG SC)  Inject into the skin Insulin pump             levothyroxine (SYNTHROID) 25 MCG tablet  Take 25 mcg by mouth Daily             LORazepam (ATIVAN) 1 MG tablet  Take 1 mg by mouth every 6 hours as needed for Anxiety. MIDODRINE HCL PO  Take by mouth 3 times daily             nitroGLYCERIN (NITROSTAT) 0.4 MG SL tablet  up to max of 3 total doses. If no relief after 1 dose, call 911.              ONE TOUCH LANCETS MISC  1 each by Does not apply route daily             pantoprazole sodium (PROTONIX) 40 MG PACK packet  Take 40 mg by mouth every morning (before breakfast)             potassium chloride (KLOR-CON M) 20 MEQ extended release tablet  Take 1 tablet by mouth daily for 5 days             simvastatin (ZOCOR) 20 MG tablet  Take 1 tablet by mouth nightly             tolterodine (DETROL) 2 MG tablet  Take 2 mg by mouth daily             venlafaxine (EFFEXOR XR) 150 MG extended release capsule  Take one capsule daily at the same time as the 75 mg capsule               Hospice care    Time Spent Doing discharge 35 min  Electronically signed by Annabelle Hoskins MD  on 8/13/20 at 11:02 AM EDT

## 2020-08-13 NOTE — PROGRESS NOTES
[facility] called, reference # [id number]. Body released to  home, no organ donation.   Waiting for family to  come

## 2020-08-13 NOTE — PROGRESS NOTES
HR now in 30-40 range and  Minimal respirations.    Family called and sister stated they were going to try and come in

## 2020-08-13 NOTE — PROGRESS NOTES
Palliative Care RN note: Hospice updated on change in status of patient. They were following from [place], but would reach out to her sister to schedule a time to talk and sign consents for inpatient hospice admission.     Hospice liaison meeting with patient's sister at 4:00 per her request.

## 2020-08-13 NOTE — PROGRESS NOTES
Progress Note( Dr. Columba Harvey)    Subjective:   Admit Date: 7/25/2020  PCP: Barron Dawn PA-C    The patient on 8/7/2020 but made the note later    Admitted For : Cardiac arrest but very soon was resuscitated intubated and placed on the ventilator    Consulted For: Better control of blood glucose    Interval History: No major changes in her mental or respiratory status    Patient is still sedated intubated and on ventilator  Patient is COVID negative so she was transferred to regular ICU  on  7/30 /2020  Patient had tracheotomy and PEG tube insertion on 8/7/2020 by Dr Lidia Braden on ventilator       Intake/Output Summary (Last 24 hours) at 8/12/2020 2206  Last data filed at 8/12/2020 0847  Gross per 24 hour   Intake 1072.12 ml   Output 1026 ml   Net 46.12 ml       DATA    CBC:   Recent Labs     08/10/20  0505 08/11/20  0525 08/11/20  1342 08/12/20  0539   WBC 9.1 8.6  --  8.7   HGB 7.2* 6.5* 7.9* 7.7*    308  --  284    CMP:  Recent Labs     08/10/20  0505 08/11/20  0525 08/12/20  0539    139 141   K 3.8 4.0 4.2   CL 96* 99 101   CO2 30 30 32   BUN 49* 55* 61*   CREATININE 1.3* 1.1 1.1   CALCIUM 8.9 8.6 9.2     Lipids:   Lab Results   Component Value Date    CHOL 122 06/20/2019    CHOL 205 03/17/2016    HDL 51 06/20/2019    TRIG 150 06/20/2019     Glucose:  Recent Labs     08/12/20  0544 08/12/20  0849 08/12/20  1225   POCGLU 215* 178* 214*     KgpedfrmcsN3G:  Lab Results   Component Value Date    LABA1C 12.0 07/26/2020     High Sensitivity TSH:   Lab Results   Component Value Date    TSHHS 4.730 06/20/2019     Free T3:   Lab Results   Component Value Date    FT3 2.9 03/17/2016     Free T4:  Lab Results   Component Value Date    T4FREE 1.04 06/18/2019       Xr Chest Portable    Result Date: 7/26/2020  EXAMINATION: ONE XRAY VIEW OF THE CHEST 7/26/2020 4:49 am COMPARISON: July 25, 2020 HISTORY: ORDERING SYSTEM PROVIDED HISTORY: Vent management T    Patchy airspace opacities bilaterally, may be related to pulmonary edema versus pneumonia, likely improved in the left hemithorax. Mild to moderate right pleural effusion. Stable cardiomegaly. Low lung volumes. Elisa Manual Chest Portable    Result Date: 7/25/2020  EXAMINATION: ONE XRAY VIEW OF THE CHEST 7/25/2020 3:37 pm COMPARISON: Chest 06/17/2019 HISTORY: ORDERING SYSTEM PROVIDED HISTORY: post arrest        1. Nonspecific pulmonary findings may be related to sequela from pulmonary edema, diffuse infection or ARDS. Pulmonary contusion or sequela during resuscitation (? aspiration) may contribute to these opacities as well. 2. Calcific atherosclerosis aorta. 3. Cardiomegaly. 4. Endotracheal tube tip measures about 2.7 cm superior to the elijah. Candida Quezada Pulmonary W Contrast    Result Date: 7/25/2020  EXAMINATION: CTA OF THE CHEST 7/25/2020 5:51 pm     1. No pulmonary embolism. 2. Findings of fluid overload with small to moderate pleural effusions, diffuse soft tissue edema, and upper abdominal ascites. 3. Consolidative opacities within the upper to mid lungs could represent pneumonia or atelectasis/scarring. 4. Cirrhosis. 5. Borderline cardiomegaly with severe atherosclerotic disease. Xr Abdomen For Ng/og/ne Tube Placement    Result Date: 7/26/2020  EXAMINATION: ONE SUPINE XRAY VIEW(S) OF THE ABDOMEN 7/26/2020 8:18 am COMPARISON: None       NG tube tip in the gastric antrum with the proximal side-port in the gastric body.        Scheduled Medicines   Medications:    pantoprazole  40 mg Intravenous BID    furosemide  40 mg Oral Daily    insulin regular  20 Units Subcutaneous 3 times per day    insulin NPH  20 Units Subcutaneous QAM    midodrine  10 mg Oral TID WC    collagenase   Topical Daily    insulin regular  0-18 Units Subcutaneous Q6H    insulin glargine  40 Units Subcutaneous Nightly    potassium chloride  20 mEq Intravenous Once    enoxaparin  40 mg Subcutaneous Daily    levothyroxine  50 mcg Oral Daily    levetiracetam  1,000 mg Intravenous Q12H    miconazole   Topical BID    sodium chloride flush  10 mL Intravenous 2 times per day    atorvastatin  20 mg Oral Daily    aspirin  81 mg Per NG tube Daily    chlorhexidine  15 mL Mouth/Throat BID      Infusions:    dextrose      norepinephrine Stopped (08/12/20 1425)         Objective:   Vitals: BP (!) 73/40   Pulse 95   Temp 99.5 °F (37.5 °C) (Oral)   Resp 30   Ht 5' (1.524 m)   Wt 193 lb 2 oz (87.6 kg)   SpO2 (!) 83%   BMI 37.72 kg/m²   General appearance: Patient is sedated intubated on ventilator  Neck: no JVD or bruit tracheotomy is done and connected to ventilator  Thyroid : Normal lobes   Lungs: Has Vesicular Breath sounds intubated and on ventilator  Heart:  regular rate and rhythm  Abdomen: soft, non-tender; bowel sounds normal; no masses,  no organomegaly  No has PEG tube  Musculoskeletal: Normal  Extremities: extremities normal, , no edema  Neurologic: Patient is sedated intubated and on ventilator.     Assessment:     Patient Active Problem List:     HTN (hypertension)     COPD (chronic obstructive pulmonary disease) ([address]     Anxiety and depression     DM type 2, uncontrolled, with retinopathy ([facility]     Hypokalemia     Hyperlipidemia     Precordial pain     Hyperglycemia     Axillary abscess     Light headed     Orthostatic hypotension     Hypertriglyceridemia     CCC (chronic calculous cholecystitis)     Cirrhosis of liver without ascites (HCC)     Mild obstructive sleep apnea     Idiopathic progressive neuropathy     Congestive heart failure (CHF) (HCC)     Type 2 diabetes mellitus (HCC)     Hypothyroidism     Depression     Hypertension     CHF (congestive heart failure), NYHA class I, acute on chronic, diastolic (HCC)     ACS (acute coronary syndrome) (HCC)     Acute coronary syndrome (HCC)     Diastolic dysfunction with acute on chronic heart failure (HCC)     NSTEMI (non-ST elevated myocardial infarction) (HCC)     SADI (obstructive sleep apnea)     Morbid obesity ([facility]     VHD (valvular heart disease)     Excessive daytime sleepiness     Ex-smoker     Acute respiratory failure ([facility]      Plan:     1. Reviewed POC blood glucose . Labs and X ray results   2. Reviewed Current Medicines   3. Started on schedule and correction bolus regular insulin and basal Lantus Insulin regime /   4. Monitor Blood glucose frequently   5. Modified  the dose of Insulin/ other medicines as needed  6. On tube feeding restarted with the PEG tube  7. Family is in discussion with palliative unit to change patient's CODE STATUS. We will schedule meeting between the family and members of palliative team  8. Will follow     .      Nan Fong MD

## 2020-08-13 NOTE — PROGRESS NOTES
Heart rate  Starting to drop. Having occasional pauses. HR still in 50-60's.   Respirations slowed to 8, and labored

## 2020-11-11 NOTE — CARE COORDINATION
Cm attempted to contact Aakash in admissions at [phone] with voice mail message left requesting return call re; status of precert for this pt. Cm contacted pt's sister re; precert remaining pending at this time.
Cm to pt's room. No family present. Chart reviewed and message from Dr Ronni Wolf advises that family may be leaning toward trach and peg for pt which would then necessitate LTACH care. Cm will return to see pt tomorrow and attempt to reach out to family re; expected steps in care continuum following trach and peg if that is the final decision.
Patient on Vent .  Case Management to follow
Pt had trach and peg placed today. Cm contacted pt's sister, re; LTACH. Dgt states that she prefers [facility] as opposed to [place]. CM advised sister of the 2 LTACHs in [place] and she prefers [place]. CM attempted to contact [facility]. Aakash, with voice mail message left re; referral with demographics.
Received patient from [facility] unit; patient is COVID NG. She remains ventilated. Hospice consult noted- family wanting compassionate extubation. Kristy Espinal called ; spoke to Solomon Mckenna. Referral initiated.  Alissa Ni RN
Remains intubated. Family asking for 2nd EEG; will complete today. CM will follow.  Aidan Bucio RN
Remains ventilated. OHOD to meet with family.  Aidan Bucio RN
Sharla Charles after review of Dr Magaly Collins note. Will follow for post extubation discharge needs. Debbie Cain RN    5599 Met with patient's sister Abel Avendano at bedside. She has many concerns regarding patient medical status. Discussed COVID NEG x2 status. Discussed intubated status and lack of sedation ; patient does not have gag, etc. Sister Keisha Matute is asking for \"brain scan\" to determine \"if she's in there\". Reviewed Hospice consult; sister stated that she did not approve the consult. Discussed listed primary decision maker- patient's brother Prudence Chang. Sister Keisha Matute agrees that brother is primary. Discussed PLOF- patient lives with niece. She needs assist with some care as she has bouts of HF. She does not drive. Patient has a PCP and insurance that assists with Rx when needed. Sister Keisha Matute stated that she is open to [facility] meeting only; brother will also attend. CM will follow for updated needs.  Debbie Cain RN
Was was compassionately extubated 8/12/20. CM contacted pt's sister today. Sister advises that hospice has contacted her and she will be meeting with them today at 1600. Plan transfer to [facility].
Followed protocol

## (undated) DEVICE — SUTURE ETHBND EXCEL SZ 2-0 L36IN NONABSORBABLE GRN L26MM SH X523H

## (undated) DEVICE — GAUZE,SPONGE,4"X4",16PLY,XRAY,STRL,LF: Brand: MEDLINE

## (undated) DEVICE — AGENT HEMSTAT W2XL4IN OXIDIZED REGENERATED CELOS ABSRB

## (undated) DEVICE — GLOVE ORANGE PI 7   MSG9070

## (undated) DEVICE — SPONGE DRN W4XL4IN RAYON/POLYESTER 6 PLY NONWOVEN PRECUT

## (undated) DEVICE — COUNTER NDL 30 COUNT FOAM STRP SGL MAG

## (undated) DEVICE — YANKAUER,BULB TIP,W/O VENT,RIGID,STERILE: Brand: MEDLINE

## (undated) DEVICE — SPONGE LAP W18XL18IN WHT COT 4 PLY FLD STRUNG RADPQ DISP ST

## (undated) DEVICE — BLADE CLIPPER GEN PURP NS

## (undated) DEVICE — SUTURE PERMAHAND SZ 2-0 L18IN NONABSORBABLE BLK L26MM FS 685G

## (undated) DEVICE — TUBING, SUCTION, 9/32" X 10', STRAIGHT: Brand: MEDLINE

## (undated) DEVICE — Device

## (undated) DEVICE — TUBING SUCT 12FR MAL ALUM SHFT FN CAP VENT UNIV CONN W/ OBT

## (undated) DEVICE — MARKER SURG SKIN UTIL REGULAR/FINE 2 TIP W/ RUL AND 9 LBL

## (undated) DEVICE — INTENDED FOR TISSUE SEPARATION, AND OTHER PROCEDURES THAT REQUIRE A SHARP SURGICAL BLADE TO PUNCTURE OR CUT.: Brand: BARD-PARKER ® STAINLESS STEEL BLADES

## (undated) DEVICE — GOWN,ECLIPSE,POLYRNF,BRTHSLV,L,30/CS: Brand: MEDLINE

## (undated) DEVICE — DRAINBAG,ANTI-REFLUX TOWER,L/F,2000ML,LL: Brand: MEDLINE

## (undated) DEVICE — ELECTRODE ES AD CRDLSS PT RET REM POLYHESIVE

## (undated) DEVICE — GARMENT,MEDLINE,DVT,INT,CALF,MED, GEN2: Brand: MEDLINE

## (undated) DEVICE — ELECTRODE ES L2.75IN S STL INSUL BLDE W/ SL EDGE

## (undated) DEVICE — APPLICATOR MEDICATED 26 CC SOLUTION HI LT ORNG CHLORAPREP

## (undated) DEVICE — Z DISCONTINUED BY MEDLINE USE 2280062 TUBE TRACH SZ 8 L79MM OD12.2MM ID7.6MM CUF DISP INNR CANN

## (undated) DEVICE — SUTURE ETHLN SZ 3-0 L18IN NONABSORBABLE BLK FS-1 L24MM 3/8 663H

## (undated) DEVICE — DRAPE SHEET ULTRAGARD: Brand: MEDLINE

## (undated) DEVICE — YANKAUER,FLEXIBLE HANDLE,REGLR CAPACITY: Brand: MEDLINE INDUSTRIES, INC.

## (undated) DEVICE — PENCIL ES CRD L10FT HND SWCHING ROCK SWCH W/ EDGE COAT BLDE

## (undated) DEVICE — JELLY,LUBE,STERILE,FLIP TOP,TUBE,2-OZ: Brand: MEDLINE

## (undated) DEVICE — GLOVE SURG SZ 6 THK91MIL LTX FREE SYN POLYISOPRENE ANTI

## (undated) DEVICE — ZINACTIVE USE 2539609 APPLICATOR MEDICATED 10.5 CC SOLUTION HI LT ORNG CHLORAPREP

## (undated) DEVICE — SHEET,T,THYROID,STERILE: Brand: MEDLINE

## (undated) DEVICE — SYRINGE IRRIG 60ML SFT PLIABLE BLB EZ TO GRP 1 HND USE W/

## (undated) DEVICE — GLOVE ORANGE PI 7 1/2   MSG9075

## (undated) DEVICE — SUTURE PERMAHAND SZ 2-0 L17X18IN NONABSORBABLE BLK SILK SA65H

## (undated) DEVICE — TOWEL,OR,DSP,ST,BLUE,STD,6/PK,12PK/CS: Brand: MEDLINE